# Patient Record
Sex: FEMALE | Race: WHITE | Employment: FULL TIME | URBAN - METROPOLITAN AREA
[De-identification: names, ages, dates, MRNs, and addresses within clinical notes are randomized per-mention and may not be internally consistent; named-entity substitution may affect disease eponyms.]

---

## 2017-01-27 ENCOUNTER — HOSPITAL ENCOUNTER (OUTPATIENT)
Dept: MAMMOGRAPHY | Age: 52
Discharge: HOME | End: 2017-02-25
Attending: FAMILY MEDICINE | Admitting: FAMILY MEDICINE

## 2017-01-27 DIAGNOSIS — Z12.31 VISIT FOR SCREENING MAMMOGRAM: ICD-10-CM

## 2018-10-09 ENCOUNTER — APPOINTMENT (OUTPATIENT)
Dept: GENERAL RADIOLOGY | Age: 53
End: 2018-10-09
Payer: COMMERCIAL

## 2018-10-09 ENCOUNTER — HOSPITAL ENCOUNTER (EMERGENCY)
Age: 53
Discharge: HOME | End: 2018-10-09
Payer: COMMERCIAL

## 2018-10-09 VITALS
RESPIRATION RATE: 15 BRPM | DIASTOLIC BLOOD PRESSURE: 90 MMHG | HEART RATE: 63 BPM | TEMPERATURE: 98.2 F | OXYGEN SATURATION: 97 % | SYSTOLIC BLOOD PRESSURE: 165 MMHG

## 2018-10-09 DIAGNOSIS — M51.36 DDD (DEGENERATIVE DISC DISEASE), LUMBAR: ICD-10-CM

## 2018-10-09 DIAGNOSIS — M54.50 ACUTE LEFT-SIDED LOW BACK PAIN WITHOUT SCIATICA: Primary | ICD-10-CM

## 2018-10-09 PROCEDURE — 99283 EMERGENCY DEPT VISIT LOW MDM: CPT

## 2018-10-09 PROCEDURE — 72110 X-RAY EXAM L-2 SPINE 4/>VWS: CPT

## 2018-10-09 RX ORDER — NAPROXEN 500 MG/1
500 TABLET ORAL 2 TIMES DAILY PRN
Qty: 20 TABLET | Refills: 0 | Status: SHIPPED | OUTPATIENT
Start: 2018-10-09 | End: 2019-04-09

## 2018-10-09 RX ORDER — CYCLOBENZAPRINE HCL 10 MG
10 TABLET ORAL 3 TIMES DAILY PRN
Qty: 15 TABLET | Refills: 0 | Status: SHIPPED | OUTPATIENT
Start: 2018-10-09 | End: 2018-10-19

## 2018-10-09 RX ORDER — LIDOCAINE 50 MG/G
1 PATCH TOPICAL DAILY
Qty: 30 PATCH | Refills: 0 | Status: SHIPPED | OUTPATIENT
Start: 2018-10-09 | End: 2019-04-09

## 2018-10-09 ASSESSMENT — PAIN SCALES - GENERAL: PAINLEVEL_OUTOF10: 8

## 2018-10-09 ASSESSMENT — PAIN DESCRIPTION - LOCATION: LOCATION: BACK

## 2018-10-09 NOTE — ED TRIAGE NOTES
Pt presents to ER with back pain after injury at work. Pt states she tripped over a piece of plastic on the floor, twisted her back and fell into a bin.

## 2018-10-10 NOTE — ED PROVIDER NOTES
afebrile    Constitutional:  Well developed, obese female. In no acute distress  Head:  Atraumatic,  Normocephalic  Eyes:  No scleral icterus. Conjuctiva clear, No discharge  Neck: No JVD, supple. No enlarged lymph nodes. Trachea midline  Respiratory: Respirations nonlabored  Abdomen: Bowel sounds normal, Soft, No tenderness, no masses. Musculoskeletal:  No edema, no deformities. Back:   - No gross deformity, swelling, or discolo  - +mild left sided mid to lower Paralumbar muscle tenderness without masses, fluctuance, warmth, or skin changes.  - No localized midline bony tenderness. No palpable step off, creptitus  - No change in pain with forward flexion  - SLR test negative bilaterally  - ROM limited by pain, especially with left lateral bending and rotation  - No CVA tenderness to percussion  Integument:  Well hydrated, no rash, no pallor. Neurologic:    - No obvious neurological deficits. Patient moves stiffly favoring the left lower back  - Motor & Sensation intact bilateral lower extremities. - 5/5 strength with dorsi/plantar flexion with resistance  - Patella and Achilles reflexes 2+ bilaterally  - BLE ROM intact with 5/5 strength   - No Drop foot  Vascular:  Distal pulses and capillary refill intact bilateral lower extremities      RADIOLOGY/PROCEDURES       XR LUMBAR SPINE (MIN 4 VIEWS) (Final result)   Result time 10/09/18 21:45:07   Final result by Frantz Dunn MD (10/09/18 21:45:07)                Impression:    No acute abnormalities. Mild multilevel degenerative changes. Grade 1 retrolisthesis of L5 on S1 likely relating to the degenerative  changes. Narrative:    EXAMINATION:  THREE XRAY VIEWS OF THE LUMBAR SPINE    10/9/2018 9:01 pm    COMPARISON:  CT abdomen and pelvis 07/03/2016.     HISTORY:  ORDERING SYSTEM PROVIDED HISTORY: work injury, trauma  TECHNOLOGIST PROVIDED HISTORY:  Reason for exam:->work injury, trauma  Ordering Physician Provided Reason for Exam: pain  Acuity: Acute  Type of Exam: Initial  Mechanism of Injury: twisting motion  Relevant Medical/Surgical History: none    FINDINGS:  No fractures are seen.  Vertebral body heights are maintained.  No suspicious  focal bony lesions are seen. Mild multilevel degenerative disc disease and facet osteoarthritis to the mid  to lower lumbar spine.  Grade 1 retrolisthesis of L5 on S1 likely relating to  the degenerative changes.  Findings appear similar to prior CT abdomen and  pelvis. Atherosclerotic vascular calcifications.                      ED COURSE & MEDICAL DECISION MAKING       Vital signs and nursing notes reviewed during ED course. I have independently evaluated this patient . Supervising MD - Dr. Asia Mcgraw  - present in the Emergency Department, available for consultation, throughout entirety of  patient care. All pertinent Lab data and radiographic results reviewed with patient at bedside. The patient and/or the family were informed of the results of any tests/labs/imaging, the treatment plan, and time was allotted to answer questions. Differential Diagnosis: Epidural Abscess, Abdominal Aortic Aneurysm, Metastases to back, Cauda Equina Syndrome, Kidney stone, Pyelonephritis, other    Clinical  IMPRESSION    1. Acute left-sided low back pain without sciatica    2. DDD (degenerative disc disease), lumbar        Patient presents with left lower back following twisting injury at work. On exam, well-appearing 73-year-old female, afebrile and nontoxic. Endo to the ED with a steady gait, favoring the left lower back but no evidence of drop foot. Neurovascular intact in the lower legs, negative straight leg raise. Reproducible left sided paralumbar muscle tenderness to palpation as well as active range of motion without associated midline crepitus or step-offs of the lumbar spine. No visible skin changes bruising or discoloration in the area of trauma. No CVA tenderness to percussion.

## 2019-03-20 ENCOUNTER — HOSPITAL ENCOUNTER (EMERGENCY)
Age: 54
Discharge: HOME | End: 2019-03-20
Payer: COMMERCIAL

## 2019-03-20 VITALS
RESPIRATION RATE: 18 BRPM | HEART RATE: 73 BPM | TEMPERATURE: 98.4 F | HEIGHT: 66 IN | DIASTOLIC BLOOD PRESSURE: 84 MMHG | BODY MASS INDEX: 29.73 KG/M2 | WEIGHT: 185 LBS | OXYGEN SATURATION: 96 % | SYSTOLIC BLOOD PRESSURE: 161 MMHG

## 2019-03-20 DIAGNOSIS — K43.9 VENTRAL HERNIA WITHOUT OBSTRUCTION OR GANGRENE: ICD-10-CM

## 2019-03-20 DIAGNOSIS — L03.319 CELLULITIS AND ABSCESS OF TRUNK: Primary | ICD-10-CM

## 2019-03-20 DIAGNOSIS — L02.219 CELLULITIS AND ABSCESS OF TRUNK: Primary | ICD-10-CM

## 2019-03-20 PROCEDURE — 99283 EMERGENCY DEPT VISIT LOW MDM: CPT

## 2019-03-20 PROCEDURE — 4500000028 HC INTERMEDIATE PROCEDURE

## 2019-03-20 PROCEDURE — 2500000003 HC RX 250 WO HCPCS: Performed by: PHYSICIAN ASSISTANT

## 2019-03-20 RX ORDER — LIDOCAINE HYDROCHLORIDE 20 MG/ML
10 INJECTION, SOLUTION INFILTRATION; PERINEURAL ONCE
Status: COMPLETED | OUTPATIENT
Start: 2019-03-20 | End: 2019-03-20

## 2019-03-20 RX ORDER — CHLORHEXIDINE GLUCONATE 4 G/100ML
SOLUTION TOPICAL
Qty: 1 BOTTLE | Refills: 0 | Status: SHIPPED | OUTPATIENT
Start: 2019-03-20 | End: 2019-04-03

## 2019-03-20 RX ORDER — SULFAMETHOXAZOLE AND TRIMETHOPRIM 800; 160 MG/1; MG/1
1 TABLET ORAL 2 TIMES DAILY
Qty: 14 TABLET | Refills: 0 | Status: SHIPPED | OUTPATIENT
Start: 2019-03-20 | End: 2019-03-27

## 2019-03-20 RX ADMIN — LIDOCAINE HYDROCHLORIDE 10 ML: 20 INJECTION, SOLUTION INFILTRATION; PERINEURAL at 22:15

## 2019-03-20 ASSESSMENT — PAIN SCALES - GENERAL
PAINLEVEL_OUTOF10: 8
PAINLEVEL_OUTOF10: 5

## 2019-03-20 ASSESSMENT — PAIN DESCRIPTION - PAIN TYPE: TYPE: ACUTE PAIN

## 2019-03-28 ENCOUNTER — HOSPITAL ENCOUNTER (OUTPATIENT)
Dept: CT IMAGING | Age: 54
Discharge: HOME | End: 2019-03-28
Payer: COMMERCIAL

## 2019-03-28 ENCOUNTER — HOSPITAL ENCOUNTER (OUTPATIENT)
Dept: ULTRASOUND IMAGING | Age: 54
Discharge: HOME | End: 2019-03-28
Payer: COMMERCIAL

## 2019-03-28 DIAGNOSIS — F17.200 SMOKER: ICD-10-CM

## 2019-03-28 DIAGNOSIS — R19.01 RUQ ABDOMINAL MASS: ICD-10-CM

## 2019-03-28 PROCEDURE — 71250 CT THORAX DX C-: CPT

## 2019-03-28 PROCEDURE — 76705 ECHO EXAM OF ABDOMEN: CPT

## 2019-07-17 ENCOUNTER — HOSPITAL ENCOUNTER (EMERGENCY)
Age: 54
Discharge: HOME | End: 2019-07-17
Attending: EMERGENCY MEDICINE
Payer: COMMERCIAL

## 2019-07-17 ENCOUNTER — APPOINTMENT (OUTPATIENT)
Dept: CT IMAGING | Age: 54
End: 2019-07-17
Payer: COMMERCIAL

## 2019-07-17 VITALS
HEIGHT: 67 IN | DIASTOLIC BLOOD PRESSURE: 96 MMHG | OXYGEN SATURATION: 97 % | TEMPERATURE: 98.4 F | SYSTOLIC BLOOD PRESSURE: 176 MMHG | WEIGHT: 180 LBS | BODY MASS INDEX: 28.25 KG/M2 | RESPIRATION RATE: 15 BRPM | HEART RATE: 71 BPM

## 2019-07-17 DIAGNOSIS — N39.0 URINARY TRACT INFECTION WITHOUT HEMATURIA, SITE UNSPECIFIED: ICD-10-CM

## 2019-07-17 DIAGNOSIS — J06.9 ACUTE UPPER RESPIRATORY INFECTION: ICD-10-CM

## 2019-07-17 DIAGNOSIS — E87.6 HYPOKALEMIA: ICD-10-CM

## 2019-07-17 DIAGNOSIS — R10.9 LEFT SIDED ABDOMINAL PAIN: Primary | ICD-10-CM

## 2019-07-17 LAB
ALBUMIN SERPL-MCNC: 4 GM/DL (ref 3.4–5)
ALP BLD-CCNC: 106 IU/L (ref 40–129)
ALT SERPL-CCNC: 26 U/L (ref 10–40)
ANION GAP SERPL CALCULATED.3IONS-SCNC: 9 MMOL/L (ref 4–16)
AST SERPL-CCNC: 24 IU/L (ref 15–37)
BACTERIA: ABNORMAL /HPF
BASOPHILS ABSOLUTE: 0 K/CU MM
BASOPHILS RELATIVE PERCENT: 0.4 % (ref 0–1)
BILIRUB SERPL-MCNC: 0.2 MG/DL (ref 0–1)
BILIRUBIN URINE: NEGATIVE MG/DL
BLOOD, URINE: NEGATIVE
BUN BLDV-MCNC: 10 MG/DL (ref 6–23)
CALCIUM SERPL-MCNC: 9.5 MG/DL (ref 8.3–10.6)
CHLORIDE BLD-SCNC: 103 MMOL/L (ref 99–110)
CLARITY: CLEAR
CO2: 32 MMOL/L (ref 21–32)
COLOR: YELLOW
CREAT SERPL-MCNC: 0.7 MG/DL (ref 0.6–1.1)
DIFFERENTIAL TYPE: ABNORMAL
EOSINOPHILS ABSOLUTE: 0.2 K/CU MM
EOSINOPHILS RELATIVE PERCENT: 3.3 % (ref 0–3)
GFR AFRICAN AMERICAN: >60 ML/MIN/1.73M2
GFR NON-AFRICAN AMERICAN: >60 ML/MIN/1.73M2
GLUCOSE BLD-MCNC: 133 MG/DL (ref 70–99)
GLUCOSE, URINE: NEGATIVE MG/DL
HCT VFR BLD CALC: 36.3 % (ref 37–47)
HEMOGLOBIN: 11.9 GM/DL (ref 12.5–16)
IMMATURE NEUTROPHIL %: 0.4 % (ref 0–0.43)
KETONES, URINE: NEGATIVE MG/DL
LEUKOCYTE ESTERASE, URINE: ABNORMAL
LIPASE: 13 IU/L (ref 13–60)
LYMPHOCYTES ABSOLUTE: 2.4 K/CU MM
LYMPHOCYTES RELATIVE PERCENT: 35.6 % (ref 24–44)
MAGNESIUM: 2.2 MG/DL (ref 1.8–2.4)
MCH RBC QN AUTO: 29.1 PG (ref 27–31)
MCHC RBC AUTO-ENTMCNC: 32.8 % (ref 32–36)
MCV RBC AUTO: 88.8 FL (ref 78–100)
MONOCYTES ABSOLUTE: 0.4 K/CU MM
MONOCYTES RELATIVE PERCENT: 6.1 % (ref 0–4)
MUCUS: ABNORMAL HPF
NITRITE URINE, QUANTITATIVE: NEGATIVE
NUCLEATED RBC %: 0 %
PDW BLD-RTO: 13.2 % (ref 11.7–14.9)
PH, URINE: 8 (ref 5–8)
PLATELET # BLD: 243 K/CU MM (ref 140–440)
PMV BLD AUTO: 9.4 FL (ref 7.5–11.1)
POTASSIUM SERPL-SCNC: ABNORMAL MMOL/L (ref 3.5–5.1)
PROTEIN UA: NEGATIVE MG/DL
RBC # BLD: 4.09 M/CU MM (ref 4.2–5.4)
RBC URINE: 8 /HPF (ref 0–6)
SEGMENTED NEUTROPHILS ABSOLUTE COUNT: 3.6 K/CU MM
SEGMENTED NEUTROPHILS RELATIVE PERCENT: 54.2 % (ref 36–66)
SODIUM BLD-SCNC: 144 MMOL/L (ref 135–145)
SPECIFIC GRAVITY UA: 1.01 (ref 1–1.03)
SQUAMOUS EPITHELIAL: 1 /HPF
TOTAL IMMATURE NEUTOROPHIL: 0.03 K/CU MM
TOTAL NUCLEATED RBC: 0 K/CU MM
TOTAL PROTEIN: 6.7 GM/DL (ref 6.4–8.2)
TRICHOMONAS: ABNORMAL /HPF
UROBILINOGEN, URINE: NORMAL MG/DL (ref 0.2–1)
WBC # BLD: 6.7 K/CU MM (ref 4–10.5)
WBC UA: 16 /HPF (ref 0–5)

## 2019-07-17 PROCEDURE — 81001 URINALYSIS AUTO W/SCOPE: CPT

## 2019-07-17 PROCEDURE — 80053 COMPREHEN METABOLIC PANEL: CPT

## 2019-07-17 PROCEDURE — 87086 URINE CULTURE/COLONY COUNT: CPT

## 2019-07-17 PROCEDURE — 85025 COMPLETE CBC W/AUTO DIFF WBC: CPT

## 2019-07-17 PROCEDURE — 6360000002 HC RX W HCPCS: Performed by: PHYSICIAN ASSISTANT

## 2019-07-17 PROCEDURE — 99284 EMERGENCY DEPT VISIT MOD MDM: CPT

## 2019-07-17 PROCEDURE — 6360000004 HC RX CONTRAST MEDICATION: Performed by: PHYSICIAN ASSISTANT

## 2019-07-17 PROCEDURE — 83735 ASSAY OF MAGNESIUM: CPT

## 2019-07-17 PROCEDURE — 96365 THER/PROPH/DIAG IV INF INIT: CPT

## 2019-07-17 PROCEDURE — 6370000000 HC RX 637 (ALT 250 FOR IP): Performed by: PHYSICIAN ASSISTANT

## 2019-07-17 PROCEDURE — 2580000003 HC RX 258: Performed by: PHYSICIAN ASSISTANT

## 2019-07-17 PROCEDURE — 83690 ASSAY OF LIPASE: CPT

## 2019-07-17 PROCEDURE — 74177 CT ABD & PELVIS W/CONTRAST: CPT

## 2019-07-17 PROCEDURE — 96366 THER/PROPH/DIAG IV INF ADDON: CPT

## 2019-07-17 PROCEDURE — 96375 TX/PRO/DX INJ NEW DRUG ADDON: CPT

## 2019-07-17 RX ORDER — POTASSIUM BICARBONATE 25 MEQ/1
50 TABLET, EFFERVESCENT ORAL DAILY
Qty: 2 TABLET | Refills: 0 | Status: ON HOLD | OUTPATIENT
Start: 2019-07-17 | End: 2020-11-29 | Stop reason: HOSPADM

## 2019-07-17 RX ORDER — SODIUM CHLORIDE 0.9 % (FLUSH) 0.9 %
10 SYRINGE (ML) INJECTION 2 TIMES DAILY
Status: DISCONTINUED | OUTPATIENT
Start: 2019-07-17 | End: 2019-07-17 | Stop reason: HOSPADM

## 2019-07-17 RX ORDER — AMOXICILLIN AND CLAVULANATE POTASSIUM 875; 125 MG/1; MG/1
1 TABLET, FILM COATED ORAL ONCE
Status: COMPLETED | OUTPATIENT
Start: 2019-07-17 | End: 2019-07-17

## 2019-07-17 RX ORDER — AMOXICILLIN AND CLAVULANATE POTASSIUM 875; 125 MG/1; MG/1
1 TABLET, FILM COATED ORAL 2 TIMES DAILY
Qty: 20 TABLET | Refills: 0 | Status: SHIPPED | OUTPATIENT
Start: 2019-07-17 | End: 2019-07-27

## 2019-07-17 RX ORDER — POTASSIUM CHLORIDE 7.45 MG/ML
10 INJECTION INTRAVENOUS ONCE
Status: COMPLETED | OUTPATIENT
Start: 2019-07-17 | End: 2019-07-17

## 2019-07-17 RX ORDER — 0.9 % SODIUM CHLORIDE 0.9 %
1000 INTRAVENOUS SOLUTION INTRAVENOUS ONCE
Status: COMPLETED | OUTPATIENT
Start: 2019-07-17 | End: 2019-07-17

## 2019-07-17 RX ORDER — ONDANSETRON 4 MG/1
4 TABLET, ORALLY DISINTEGRATING ORAL EVERY 8 HOURS PRN
Qty: 15 TABLET | Refills: 0 | Status: SHIPPED | OUTPATIENT
Start: 2019-07-17 | End: 2020-06-23

## 2019-07-17 RX ORDER — ONDANSETRON 2 MG/ML
4 INJECTION INTRAMUSCULAR; INTRAVENOUS ONCE
Status: COMPLETED | OUTPATIENT
Start: 2019-07-17 | End: 2019-07-17

## 2019-07-17 RX ORDER — POTASSIUM BICARBONATE 25 MEQ/1
50 TABLET, EFFERVESCENT ORAL ONCE
Status: COMPLETED | OUTPATIENT
Start: 2019-07-17 | End: 2019-07-17

## 2019-07-17 RX ADMIN — AMOXICILLIN AND CLAVULANATE POTASSIUM 1 TABLET: 875; 125 TABLET, FILM COATED ORAL at 19:59

## 2019-07-17 RX ADMIN — IOPAMIDOL 80 ML: 755 INJECTION, SOLUTION INTRAVENOUS at 18:16

## 2019-07-17 RX ADMIN — POTASSIUM BICARBONATE 50 MEQ: 25 TABLET, EFFERVESCENT ORAL at 20:01

## 2019-07-17 RX ADMIN — ONDANSETRON 4 MG: 2 INJECTION INTRAMUSCULAR; INTRAVENOUS at 19:59

## 2019-07-17 RX ADMIN — Medication 10 ML: at 18:17

## 2019-07-17 RX ADMIN — SODIUM CHLORIDE 1000 ML: 9 INJECTION, SOLUTION INTRAVENOUS at 17:44

## 2019-07-17 RX ADMIN — POTASSIUM CHLORIDE 10 MEQ: 7.46 INJECTION, SOLUTION INTRAVENOUS at 17:44

## 2019-07-17 ASSESSMENT — PAIN DESCRIPTION - LOCATION: LOCATION: THROAT

## 2019-07-17 ASSESSMENT — PAIN SCALES - GENERAL: PAINLEVEL_OUTOF10: 7

## 2019-07-17 ASSESSMENT — PAIN DESCRIPTION - PAIN TYPE: TYPE: ACUTE PAIN

## 2019-07-17 NOTE — ED PROVIDER NOTES
Migraine     Obesity      Past Surgical History:   Procedure Laterality Date    FOOT SURGERY      FOOT SURGERY      HERNIA REPAIR      HYSTERECTOMY      KIDNEY STONE SURGERY      SHOULDER SURGERY      TONSILLECTOMY      TUBAL LIGATION         CURRENT MEDICATIONS    Current Outpatient Rx   Medication Sig Dispense Refill    potassium bicarbonate (K-LYTE) 25 MEQ disintegrating tablet Take 2 tablets by mouth daily for 1 day 2 tablet 0    ondansetron (ZOFRAN ODT) 4 MG disintegrating tablet Take 1 tablet by mouth every 8 hours as needed for Nausea or Vomiting 15 tablet 0    amoxicillin-clavulanate (AUGMENTIN) 875-125 MG per tablet Take 1 tablet by mouth 2 times daily for 10 days 20 tablet 0    IBUPROFEN PO Take by mouth Indications: PRN         ALLERGIES    Allergies   Allergen Reactions    Pollen Extract Itching     Itchy and watery eyes. Pet dander also causes same symptoms.     Tramadol      Ear ringing       SOCIAL AND FAMILY HISTORY    Social History     Socioeconomic History    Marital status: Legally      Spouse name: None    Number of children: None    Years of education: None    Highest education level: None   Occupational History    None   Social Needs    Financial resource strain: None    Food insecurity:     Worry: None     Inability: None    Transportation needs:     Medical: None     Non-medical: None   Tobacco Use    Smoking status: Current Every Day Smoker     Packs/day: 1.00     Years: 20.00     Pack years: 20.00     Types: Cigarettes    Smokeless tobacco: Never Used   Substance and Sexual Activity    Alcohol use: No     Alcohol/week: 0.0 standard drinks    Drug use: No    Sexual activity: Not Currently     Partners: Male     Comment: 2 DAUGHTERS   Lifestyle    Physical activity:     Days per week: None     Minutes per session: None    Stress: None   Relationships    Social connections:     Talks on phone: None     Gets together: None     Attends Buddhism service:

## 2019-07-19 LAB
CULTURE: NORMAL
Lab: NORMAL
SPECIMEN: NORMAL

## 2020-01-07 ENCOUNTER — HOSPITAL ENCOUNTER (EMERGENCY)
Age: 55
Discharge: HOME | End: 2020-01-07
Attending: EMERGENCY MEDICINE

## 2020-01-07 VITALS
WEIGHT: 190 LBS | HEIGHT: 66 IN | DIASTOLIC BLOOD PRESSURE: 103 MMHG | BODY MASS INDEX: 30.53 KG/M2 | TEMPERATURE: 98.7 F | SYSTOLIC BLOOD PRESSURE: 175 MMHG | OXYGEN SATURATION: 99 % | RESPIRATION RATE: 16 BRPM | HEART RATE: 84 BPM

## 2020-01-07 LAB
BASOPHILS ABSOLUTE: 0 K/CU MM
BASOPHILS RELATIVE PERCENT: 0.7 % (ref 0–1)
DIFFERENTIAL TYPE: ABNORMAL
EOSINOPHILS ABSOLUTE: 0.2 K/CU MM
EOSINOPHILS RELATIVE PERCENT: 4.5 % (ref 0–3)
GLUCOSE BLD-MCNC: 99 MG/DL (ref 70–99)
HCT VFR BLD CALC: 39.7 % (ref 37–47)
HEMOGLOBIN: 12.8 GM/DL (ref 12.5–16)
IMMATURE NEUTROPHIL %: 0.4 % (ref 0–0.43)
LYMPHOCYTES ABSOLUTE: 2.2 K/CU MM
LYMPHOCYTES RELATIVE PERCENT: 49.9 % (ref 24–44)
MCH RBC QN AUTO: 28.6 PG (ref 27–31)
MCHC RBC AUTO-ENTMCNC: 32.2 % (ref 32–36)
MCV RBC AUTO: 88.6 FL (ref 78–100)
MONOCYTES ABSOLUTE: 0.2 K/CU MM
MONOCYTES RELATIVE PERCENT: 5.3 % (ref 0–4)
NUCLEATED RBC %: 0 %
PDW BLD-RTO: 13.4 % (ref 11.7–14.9)
PLATELET # BLD: 239 K/CU MM (ref 140–440)
PMV BLD AUTO: 8.9 FL (ref 7.5–11.1)
RBC # BLD: 4.48 M/CU MM (ref 4.2–5.4)
SEGMENTED NEUTROPHILS ABSOLUTE COUNT: 1.8 K/CU MM
SEGMENTED NEUTROPHILS RELATIVE PERCENT: 39.2 % (ref 36–66)
TOTAL IMMATURE NEUTOROPHIL: 0.02 K/CU MM
TOTAL NUCLEATED RBC: 0 K/CU MM
WBC # BLD: 4.5 K/CU MM (ref 4–10.5)

## 2020-01-07 PROCEDURE — 85025 COMPLETE CBC W/AUTO DIFF WBC: CPT

## 2020-01-07 PROCEDURE — 82962 GLUCOSE BLOOD TEST: CPT

## 2020-01-07 PROCEDURE — 99283 EMERGENCY DEPT VISIT LOW MDM: CPT

## 2020-01-07 RX ORDER — IBUPROFEN 600 MG/1
600 TABLET ORAL 3 TIMES DAILY PRN
Qty: 30 TABLET | Refills: 0 | Status: SHIPPED | OUTPATIENT
Start: 2020-01-07 | End: 2021-01-20

## 2020-01-07 RX ORDER — PREDNISONE 10 MG/1
TABLET ORAL
Qty: 60 TABLET | Refills: 0 | Status: SHIPPED | OUTPATIENT
Start: 2020-01-07 | End: 2020-06-23 | Stop reason: ALTCHOICE

## 2020-01-07 ASSESSMENT — PAIN DESCRIPTION - LOCATION: LOCATION: LEG

## 2020-01-07 ASSESSMENT — PAIN DESCRIPTION - PAIN TYPE: TYPE: ACUTE PAIN

## 2020-01-07 ASSESSMENT — PAIN SCALES - GENERAL: PAINLEVEL_OUTOF10: 8

## 2020-01-07 ASSESSMENT — PAIN DESCRIPTION - ORIENTATION: ORIENTATION: RIGHT;LEFT;LOWER

## 2020-01-08 NOTE — ED PROVIDER NOTES
Emergency Department Encounter    Patient: Dolores Ogden  MRN: [id number]  : 1965  Date of Evaluation: 2020  ED Provider:  Sajan Meeks    Triage Chief Complaint:   Rash (BL lower legs)    [place]:  Dolores Ogden is a 49 y.o. female that presents with a rash developed on her lower extremities this morning when she woke up. She states that is all just above her ankles. It is painful and feels like burning. Touching it does make it worse. She denies any swelling. She has not had any recent travel. She does not have any heart problems that she is aware of. Never had something like this happen to her before. She does have multiple spots on her body which she states that she has had for a long period of time. She denies any fevers, shortness of breath, chest pain, dizziness, lightheadedness, recent travel, history of PE, history of DVT, or other symptoms at this time.     ROS - see HPI, below listed is current ROS at time of my eval:  General:  No fevers, no chills, no weakness  Eyes:  No recent vison changes, no discharge  ENT:  No sore throat, no nasal congestion, no hearing changes  Cardiovascular:  No chest pain, no palpitations  Respiratory:  No shortness of breath, no cough, no wheezing  Gastrointestinal:  No pain, no nausea, no vomiting, no diarrhea  Musculoskeletal:  No muscle pain, no joint pain  Skin:  No rash, no pruritis, no easy bruising  Neurologic:  No speech problems, no headache, no extremity numbness, no extremity tingling, no extremity weakness  Psychiatric:  No anxiety  Genitourinary:  No dysuria, no hematuria  Endocrine:  No unexpected weight gain, no unexpected weight loss  Extremities:  no edema, no pain    Past Medical History:   Diagnosis Date    Arthritis     Arthritis of left knee     Asthma     Diabetes mellitus ([address]     Disorder of rotator cuff syndrome of left shoulder and allied disorder     H/O cardiovascular stress test 16    EF68% normal study    Hx of echocardiogram 2/24/16    EF 55%, normal LV function, moderate LVH    Hyperlipidemia     Hypertension     Kidney stones     Migraine     Obesity      Past Surgical History:   Procedure Laterality Date    FOOT SURGERY      FOOT SURGERY      HERNIA REPAIR      HYSTERECTOMY      KIDNEY STONE SURGERY      SHOULDER SURGERY      TONSILLECTOMY      TUBAL LIGATION       Family History   Problem Relation Age of Onset    Heart Disease Mother     High Blood Pressure Mother     Asthma Mother     Diabetes Sister     Seizures Sister     Asthma Sister     COPD Sister     Heart Disease Maternal Grandmother     High Blood Pressure Maternal Grandmother     Cancer Maternal Grandfather     Emphysema Maternal Grandfather     Asthma Daughter     Depression Daughter     Migraines Daughter     Obesity Daughter     Ulcerative Colitis Daughter     High Cholesterol Daughter     Asthma Daughter     Migraines Daughter     Obesity Daughter     Asthma Brother     Stroke Paternal Uncle      Social History     Socioeconomic History    Marital status: Legally      Spouse name: Not on file    Number of children: Not on file    Years of education: Not on file    Highest education level: Not on file   Occupational History    Not on file   Social Needs    Financial resource strain: Not on file    Food insecurity:     Worry: Not on file     Inability: Not on file    Transportation needs:     Medical: Not on file     Non-medical: Not on file   Tobacco Use    Smoking status: Current Every Day Smoker     Packs/day: 1.00     Years: 20.00     Pack years: 20.00     Types: Cigarettes    Smokeless tobacco: Never Used   Substance and Sexual Activity    Alcohol use: No     Alcohol/week: 0.0 standard drinks    Drug use: No    Sexual activity: Not Currently     Partners: Male     Comment: 2 DAUGHTERS   Lifestyle    Physical activity:     Days per week: Not on file     Minutes per session: Not on file    Stress: Not on file   Relationships    Social connections:     Talks on phone: Not on file     Gets together: Not on file     Attends Muslim service: Not on file     Active member of club or organization: Not on file     Attends meetings of clubs or organizations: Not on file     Relationship status: Not on file    Intimate partner violence:     Fear of current or ex partner: Not on file     Emotionally abused: Not on file     Physically abused: Not on file     Forced sexual activity: Not on file   Other Topics Concern    Not on file   Social History Narrative    Not on file     No current facility-administered medications for this encounter. Current Outpatient Medications   Medication Sig Dispense Refill    predniSONE (DELTASONE) 10 MG tablet Take 60 mg daily for 5 days, then 40 mg for 3 days, then 20 mg for 3 days, then 10 mg for 3 days 60 tablet 0    ibuprofen (ADVIL;MOTRIN) 600 MG tablet Take 1 tablet by mouth 3 times daily as needed for Pain 30 tablet 0    potassium bicarbonate (K-LYTE) 25 MEQ disintegrating tablet Take 2 tablets by mouth daily for 1 day 2 tablet 0    ondansetron (ZOFRAN ODT) 4 MG disintegrating tablet Take 1 tablet by mouth every 8 hours as needed for Nausea or Vomiting 15 tablet 0    IBUPROFEN PO Take by mouth Indications: PRN       Allergies   Allergen Reactions    Pollen Extract Itching     Itchy and watery eyes. Pet dander also causes same symptoms.  Tramadol      Ear ringing       Nursing Notes Reviewed    Physical Exam:  Triage VS:    ED Triage Vitals [01/07/20 2001]   Enc Vitals Group      BP (!) 175/103      Pulse 84      Resp 16      Temp 98.7 °F (37.1 °C)      Temp Source Oral      SpO2 99 %      Weight 190 lb (86.2 kg)      Height 5' 6\" (1.676 m)      Head Circumference       Peak Flow       Pain Score       Pain Loc       Pain Edu? Excl. in [address]? My pulse ox interpretation is - normal    General appearance:  No acute distress. Skin:  Warm. Dry. Patient does have a petechial rash on her lower extremities bilaterally from her ankles proximally to mid calf bilaterally. Eye:  Extraocular movements intact. Ears, nose, mouth and throat:  Oral mucosa moist   Neck:  Trachea midline. Extremity:  No swelling. Normal ROM. Heart:  Regular rate and rhythm, normal S1 & S2, no extra heart sounds. Perfusion:  intact  Respiratory:  Lungs clear to auscultation bilaterally. Respirations nonlabored. Abdominal:  Normal bowel sounds. Soft. Nontender. Non distended. Back:  No CVA tenderness to palpation     Neurological:  Alert and oriented times 3. No focal neuro deficits. Psychiatric:  Appropriate    I have reviewed and interpreted all of the currently available lab results from this visit (if applicable):  Results for orders placed or performed during the hospital encounter of 01/07/20   CBC Auto Differential   Result Value Ref Range    WBC 4.5 4.0 - 10.5 K/CU MM    RBC 4.48 4.2 - 5.4 M/CU MM    Hemoglobin 12.8 12.5 - 16.0 GM/DL    Hematocrit 39.7 37 - 47 %    MCV 88.6 78 - 100 FL    MCH 28.6 27 - 31 PG    MCHC 32.2 32.0 - 36.0 %    RDW 13.4 11.7 - 14.9 %    Platelets 287 146 - 809 K/CU MM    MPV 8.9 7.5 - 11.1 FL    Differential Type AUTOMATED DIFFERENTIAL     Segs Relative 39.2 36 - 66 %    Lymphocytes % 49.9 (H) 24 - 44 %    Monocytes % 5.3 (H) 0 - 4 %    Eosinophils % 4.5 (H) 0 - 3 %    Basophils % 0.7 0 - 1 %    Segs Absolute 1.8 K/CU MM    Lymphocytes Absolute 2.2 K/CU MM    Monocytes Absolute 0.2 K/CU MM    Eosinophils Absolute 0.2 K/CU MM    Basophils Absolute 0.0 K/CU MM    Nucleated RBC % 0.0 %    Total Nucleated RBC 0.0 K/CU MM    Total Immature Neutrophil 0.02 K/CU MM    Immature Neutrophil % 0.4 0 - 0.43 %   POCT Glucose   Result Value Ref Range    POC Glucose 99 70 - 99 MG/DL      Radiographs (if obtained):  Radiologist's Report Reviewed:  No results found.     EKG (if obtained): (All EKG's are interpreted by myself in the

## 2020-01-08 NOTE — ED NOTES
Discharge instructions and prescriptions given to pt. Pt verbalized her understanding and denied any questions or concerns at this time. Pt walked per self to private vehicle.      Simon Paul RN  01/07/20 1089

## 2020-06-23 ENCOUNTER — OFFICE VISIT (OUTPATIENT)
Dept: FAMILY MEDICINE CLINIC | Age: 55
End: 2020-06-23

## 2020-06-23 VITALS
HEART RATE: 88 BPM | HEIGHT: 66 IN | SYSTOLIC BLOOD PRESSURE: 144 MMHG | TEMPERATURE: 97.2 F | DIASTOLIC BLOOD PRESSURE: 90 MMHG | BODY MASS INDEX: 37.93 KG/M2 | OXYGEN SATURATION: 98 % | WEIGHT: 236 LBS

## 2020-06-23 PROCEDURE — 99202 OFFICE O/P NEW SF 15 MIN: CPT | Performed by: NURSE PRACTITIONER

## 2020-06-23 RX ORDER — CEPHALEXIN 500 MG/1
500 CAPSULE ORAL 4 TIMES DAILY
Qty: 40 CAPSULE | Refills: 0 | Status: SHIPPED | OUTPATIENT
Start: 2020-06-23 | End: 2020-07-03

## 2020-06-23 RX ORDER — SPIRONOLACTONE 25 MG/1
TABLET ORAL
COMMUNITY
Start: 2020-05-28

## 2020-06-23 RX ORDER — AMLODIPINE BESYLATE 5 MG/1
5 TABLET ORAL DAILY
COMMUNITY
End: 2022-08-29

## 2020-06-23 RX ORDER — TRIAMCINOLONE ACETONIDE 1 MG/G
CREAM TOPICAL
Qty: 1 TUBE | Refills: 0 | Status: SHIPPED | OUTPATIENT
Start: 2020-06-23 | End: 2021-01-20

## 2020-06-23 ASSESSMENT — ENCOUNTER SYMPTOMS
NAIL CHANGES: 0
EYE PAIN: 0
SORE THROAT: 0
RHINORRHEA: 0
NAUSEA: 0
VOMITING: 0
COUGH: 0
CHEST TIGHTNESS: 0
WHEEZING: 0
SHORTNESS OF BREATH: 0
DIARRHEA: 0

## 2020-06-23 NOTE — PROGRESS NOTES
Eric Berman   49 y.o.  female  [id number]      Chief Complaint   Patient presents with    Rash     c/o red painful warm rash to BLE onset this morning        Subjective:  49 y. o.female is here for a follow up. She has the following chronic/acute medical problems:  Patient Active Problem List   Diagnosis    Obesity, Class I, BMI 30-34.9    HTN (hypertension)    Right knee pain    Hyperlipidemia    GERD (gastroesophageal reflux disease)    Patellofemoral syndrome    DJD (degenerative joint disease) of knee    Tobacco dependence    Major depressive disorder, recurrent episode with anxious distress (HCC)    Mild persistent asthma without complication    Urinary incontinence    Irritable bowel syndrome with diarrhea       Rash   This is a new problem. The current episode started today. The problem is unchanged. The affected locations include the left lower leg and right lower leg. The rash is characterized by pain, redness and swelling. She was exposed to nothing. Pertinent negatives include no anorexia, congestion, cough, diarrhea, eye pain, facial edema, fatigue, fever, joint pain, nail changes, rhinorrhea, shortness of breath, sore throat or vomiting. Treatments tried: Advil for pain. The treatment provided mild relief. Review of Systems   Constitutional: Negative for appetite change, chills, fatigue and fever. HENT: Negative. Negative for congestion, rhinorrhea and sore throat. Eyes: Negative for pain. Respiratory: Negative for cough, chest tightness, shortness of breath and wheezing. Cardiovascular: Negative for chest pain and palpitations. Gastrointestinal: Negative for anorexia, diarrhea, nausea and vomiting. Musculoskeletal: Negative for joint pain. Skin: Positive for rash. Negative for nail changes. Neurological: Negative for dizziness, light-headedness and headaches.        Current Outpatient Medications   Medication Sig Dispense Refill    spironolactone (ALDACTONE) 25 MG tablet TAKE 1 TABLET BY MOUTH ONE TIME A DAY      amLODIPine (NORVASC) 5 MG tablet Take 5 mg by mouth daily      cephALEXin (KEFLEX) 500 MG capsule Take 1 capsule by mouth 4 times daily for 10 days 40 capsule 0    triamcinolone (KENALOG) 0.1 % cream Apply topically 2 times daily x 10 to 14 days. 1 Tube 0    ibuprofen (ADVIL;MOTRIN) 600 MG tablet Take 1 tablet by mouth 3 times daily as needed for Pain 30 tablet 0    potassium bicarbonate (K-LYTE) 25 MEQ disintegrating tablet Take 2 tablets by mouth daily for 1 day 2 tablet 0     No current facility-administered medications for this visit. Past medical, family,surgical history history reviewed today. Objective:  BP (!) 144/90   Pulse 88   Temp 97.2 °F (36.2 °C)   Ht 5' 6\" (1.676 m)   Wt 236 lb (107 kg)   SpO2 98%   BMI 38.09 kg/m²   BP Readings from Last 3 Encounters:   06/23/20 (!) 144/90   01/07/20 (!) 175/103   07/17/19 (!) 176/96     Wt Readings from Last 3 Encounters:   06/23/20 236 lb (107 kg)   01/07/20 190 lb (86.2 kg)   07/17/19 180 lb (81.6 kg)         Physical Exam  Constitutional:       Appearance: Normal appearance. HENT:      Head: Normocephalic. Neck:      Musculoskeletal: Neck supple. Cardiovascular:      Rate and Rhythm: Regular rhythm. Pulses: Normal pulses. Heart sounds: Normal heart sounds. Pulmonary:      Effort: Pulmonary effort is normal.      Breath sounds: Normal breath sounds. Musculoskeletal:      Right lower leg: Edema present. Left lower leg: Edema present. Skin:     General: Skin is warm and dry. Findings: Rash present. Rash is macular. Neurological:      Mental Status: She is alert and oriented to person, place, and time.    Psychiatric:         Mood and Affect: Mood normal.         Behavior: Behavior normal.         Lab Results   Component Value Date    WBC 4.5 01/07/2020    HGB 12.8 01/07/2020    HCT 39.7 01/07/2020    MCV 88.6 01/07/2020     01/07/2020     Lab Results   Component Value Date     07/17/2019    K (LL) 07/17/2019     2.9  K CALLED TO MOOSE KAUFMAN [id number]T  RESULTS READ BACK       07/17/2019    CO2 32 07/17/2019    BUN 10 07/17/2019    CREATININE 0.7 07/17/2019    GLUCOSE 133 (H) 07/17/2019    CALCIUM 9.5 07/17/2019    PROT 6.7 07/17/2019    LABALBU 4.0 07/17/2019    BILITOT 0.2 07/17/2019    ALKPHOS 106 07/17/2019    AST 24 07/17/2019    ALT 26 07/17/2019    LABGLOM >60 07/17/2019    GFRAA >60 07/17/2019     No results found for: CHOL  No results found for: TRIG  No results found for: HDL  No results found for: LDLCALC, LDLCHOLESTEROL  No results found for: LABA1C  No results found for: TSHFT4, TSH, TSHHS      ASSESSMENT/PLAN:      1. Rash  Will start Keflex due to the erythema of the rash, area warm to the touch, and painful. Possibly this rash could be stasis dermatitis. We will go ahead and give triamcinolone cream to apply 2 times daily for the next 10 to 14 days. Advised patient if her symptoms worsen or develops a fever to go to the emergency room. If no improvement to follow-up. Ibuprofen over-the-counter as needed for pain. - cephALEXin (KEFLEX) 500 MG capsule; Take 1 capsule by mouth 4 times daily for 10 days  Dispense: 40 capsule; Refill: 0  - triamcinolone (KENALOG) 0.1 % cream; Apply topically 2 times daily x 10 to 14 days. Dispense: 1 Tube; Refill: 0    2. Bilateral lower extremity edema  Advised patient to elevate her bilateral legs above heart level. Discussed compression socks with patient. Patient verbalized understanding. 3. Tobacco dependence  Patient counseled in length on smoking cessation including benefits of quitting and health risks of continuing to smoke including but not limited to lung cancer, COPD, risk of coronary artery disease. Patient verbalized understanding today.         Medications Discontinued During This Encounter   Medication Reason    IBUPROFEN PO LIST CLEANUP    ondansetron

## 2020-06-23 NOTE — LETTER
[address] [facility],[address] Floor WALK-IN CARE  [address] [place] [address] [address] [facility]  Phone: [phone]  Fax: [phone]    ALEJANDRO Pederson CNP        June 23, 2020     Patient: Antonette Sevilla   YOB: 1965   Date of Visit: 6/23/2020       To Whom it May Concern:    Vibha Lundy was seen in my clinic on 6/23/2020. She may return to work on 6/25/20. If you have any questions or concerns, please don't hesitate to call.     Sincerely,           ALEJANDRO Pederson CNP

## 2020-08-13 ENCOUNTER — APPOINTMENT (OUTPATIENT)
Dept: CT IMAGING | Age: 55
End: 2020-08-13

## 2020-08-13 ENCOUNTER — HOSPITAL ENCOUNTER (EMERGENCY)
Age: 55
Discharge: HOME | End: 2020-08-13
Attending: EMERGENCY MEDICINE

## 2020-08-13 VITALS
RESPIRATION RATE: 18 BRPM | SYSTOLIC BLOOD PRESSURE: 137 MMHG | TEMPERATURE: 98.5 F | OXYGEN SATURATION: 98 % | HEART RATE: 74 BPM | DIASTOLIC BLOOD PRESSURE: 74 MMHG

## 2020-08-13 LAB
ALBUMIN SERPL-MCNC: 4 GM/DL (ref 3.4–5)
ALP BLD-CCNC: 94 IU/L (ref 40–129)
ALT SERPL-CCNC: 20 U/L (ref 10–40)
ANION GAP SERPL CALCULATED.3IONS-SCNC: 11 MMOL/L (ref 4–16)
AST SERPL-CCNC: 25 IU/L (ref 15–37)
BASOPHILS ABSOLUTE: 0 K/CU MM
BASOPHILS RELATIVE PERCENT: 0.4 % (ref 0–1)
BILIRUB SERPL-MCNC: 0.2 MG/DL (ref 0–1)
BUN BLDV-MCNC: 9 MG/DL (ref 6–23)
CALCIUM SERPL-MCNC: 9.1 MG/DL (ref 8.3–10.6)
CHLORIDE BLD-SCNC: 106 MMOL/L (ref 99–110)
CO2: 24 MMOL/L (ref 21–32)
CREAT SERPL-MCNC: 0.8 MG/DL (ref 0.6–1.1)
DIFFERENTIAL TYPE: ABNORMAL
EOSINOPHILS ABSOLUTE: 0.2 K/CU MM
EOSINOPHILS RELATIVE PERCENT: 3.3 % (ref 0–3)
GFR AFRICAN AMERICAN: >60 ML/MIN/1.73M2
GFR NON-AFRICAN AMERICAN: >60 ML/MIN/1.73M2
GLUCOSE BLD-MCNC: 176 MG/DL (ref 70–99)
HCT VFR BLD CALC: 36.7 % (ref 37–47)
HEMOGLOBIN: 12.1 GM/DL (ref 12.5–16)
IMMATURE NEUTROPHIL %: 0.2 % (ref 0–0.43)
LIPASE: 16 IU/L (ref 13–60)
LYMPHOCYTES ABSOLUTE: 2.3 K/CU MM
LYMPHOCYTES RELATIVE PERCENT: 44.2 % (ref 24–44)
MCH RBC QN AUTO: 29.3 PG (ref 27–31)
MCHC RBC AUTO-ENTMCNC: 33 % (ref 32–36)
MCV RBC AUTO: 88.9 FL (ref 78–100)
MONOCYTES ABSOLUTE: 0.3 K/CU MM
MONOCYTES RELATIVE PERCENT: 6.4 % (ref 0–4)
NUCLEATED RBC %: 0 %
PDW BLD-RTO: 13.2 % (ref 11.7–14.9)
PLATELET # BLD: 231 K/CU MM (ref 140–440)
PMV BLD AUTO: 9.2 FL (ref 7.5–11.1)
POTASSIUM SERPL-SCNC: 3.4 MMOL/L (ref 3.5–5.1)
RBC # BLD: 4.13 M/CU MM (ref 4.2–5.4)
SEGMENTED NEUTROPHILS ABSOLUTE COUNT: 2.4 K/CU MM
SEGMENTED NEUTROPHILS RELATIVE PERCENT: 45.5 % (ref 36–66)
SODIUM BLD-SCNC: 141 MMOL/L (ref 135–145)
TOTAL IMMATURE NEUTOROPHIL: 0.01 K/CU MM
TOTAL NUCLEATED RBC: 0 K/CU MM
TOTAL PROTEIN: 6.4 GM/DL (ref 6.4–8.2)
WBC # BLD: 5.2 K/CU MM (ref 4–10.5)

## 2020-08-13 PROCEDURE — 96375 TX/PRO/DX INJ NEW DRUG ADDON: CPT

## 2020-08-13 PROCEDURE — 6360000002 HC RX W HCPCS: Performed by: EMERGENCY MEDICINE

## 2020-08-13 PROCEDURE — 6360000004 HC RX CONTRAST MEDICATION: Performed by: EMERGENCY MEDICINE

## 2020-08-13 PROCEDURE — 96374 THER/PROPH/DIAG INJ IV PUSH: CPT

## 2020-08-13 PROCEDURE — 36415 COLL VENOUS BLD VENIPUNCTURE: CPT

## 2020-08-13 PROCEDURE — 80053 COMPREHEN METABOLIC PANEL: CPT

## 2020-08-13 PROCEDURE — 74177 CT ABD & PELVIS W/CONTRAST: CPT

## 2020-08-13 PROCEDURE — 83690 ASSAY OF LIPASE: CPT

## 2020-08-13 PROCEDURE — 85025 COMPLETE CBC W/AUTO DIFF WBC: CPT

## 2020-08-13 PROCEDURE — 99284 EMERGENCY DEPT VISIT MOD MDM: CPT

## 2020-08-13 RX ORDER — DICYCLOMINE HCL 20 MG
20 TABLET ORAL 3 TIMES DAILY PRN
Qty: 20 TABLET | Refills: 3 | Status: SHIPPED | OUTPATIENT
Start: 2020-08-13

## 2020-08-13 RX ORDER — ONDANSETRON 4 MG/1
4 TABLET, ORALLY DISINTEGRATING ORAL EVERY 8 HOURS PRN
Qty: 15 TABLET | Refills: 0 | Status: ON HOLD | OUTPATIENT
Start: 2020-08-13 | End: 2020-11-29 | Stop reason: SDUPTHER

## 2020-08-13 RX ORDER — MORPHINE SULFATE 4 MG/ML
4 INJECTION, SOLUTION INTRAMUSCULAR; INTRAVENOUS ONCE
Status: COMPLETED | OUTPATIENT
Start: 2020-08-13 | End: 2020-08-13

## 2020-08-13 RX ORDER — ONDANSETRON 2 MG/ML
4 INJECTION INTRAMUSCULAR; INTRAVENOUS ONCE
Status: COMPLETED | OUTPATIENT
Start: 2020-08-13 | End: 2020-08-13

## 2020-08-13 RX ORDER — SODIUM CHLORIDE 0.9 % (FLUSH) 0.9 %
10 SYRINGE (ML) INJECTION 2 TIMES DAILY
Status: DISCONTINUED | OUTPATIENT
Start: 2020-08-13 | End: 2020-08-14 | Stop reason: HOSPADM

## 2020-08-13 RX ADMIN — MORPHINE SULFATE 4 MG: 4 INJECTION, SOLUTION INTRAMUSCULAR; INTRAVENOUS at 21:30

## 2020-08-13 RX ADMIN — ONDANSETRON 4 MG: 2 INJECTION INTRAMUSCULAR; INTRAVENOUS at 21:30

## 2020-08-13 RX ADMIN — IOPAMIDOL 75 ML: 755 INJECTION, SOLUTION INTRAVENOUS at 22:57

## 2020-08-13 ASSESSMENT — PAIN DESCRIPTION - PAIN TYPE: TYPE: ACUTE PAIN

## 2020-08-13 ASSESSMENT — PAIN DESCRIPTION - LOCATION: LOCATION: ABDOMEN

## 2020-08-13 ASSESSMENT — PAIN SCALES - GENERAL
PAINLEVEL_OUTOF10: 7
PAINLEVEL_OUTOF10: 8

## 2020-08-13 NOTE — LETTER
[facility] Emergency Department  [address] [id number]  Phone: [phone]  Fax: [phone]             August 13, 2020    Patient: Kamran Tijerina   YOB: 1965   Date of Visit: 8/13/2020       To Whom It May Concern:    Dalia Woodson was seen and treated in our emergency department on 8/13/2020. She may return to work on 8/15/20. If you have any questions, please call. Thank you.      Sincerely,   Nurse           Signature:__________________________________

## 2020-08-14 NOTE — ED PROVIDER NOTES
EMERGENCY DEPARTMENT ENCOUNTER      CHIEF COMPLAINT:   Abdominal pain    HPI: Solomon Penny is a 49 y.o. female who presents to the Emergency Department complaining of epigastric abdominal pain. The patient states that the pain started but she was at work this morning. It is been constant throughout the day. She states that she was sent home from work because of the pain. She states that it is sharp in nature. She lifts heavy objects at work but denies any specific injury. She denies any associated nausea, vomiting, diarrhea or constipation. There are no exacerbating or relieving factors. The patient denies fevers, chills, hematemesis, bloody stools, flank pain, or any other complaints. REVIEW OF SYSTEMS:  CONSTITUTIONAL:  Denies fever, chills, weight loss or weakness  EYES:  Denies photophobia or discharge  ENT:  Denies sore throat or ear pain  CARDIOVASCULAR:  Denies chest pain, palpitations or swelling  RESPIRATORY:  Denies cough or shortness of breath  GI: See HPI  MUSCULOSKELETAL:  Denies back pain  SKIN:  No rash  NEUROLOGIC:  Denies headache, focal weakness or sensory changes  All systems negative except as marked. \"Remaining review of systems reviewed and negative. I have reviewed the nursing triage documentation and agree unless otherwise noted below. \"    PAST MEDICAL HISTORY:   Past Medical History:   Diagnosis Date    Arthritis     Arthritis of left knee     Asthma     Diabetes mellitus ([facility] [address]     Disorder of rotator cuff syndrome of left shoulder and allied disorder     H/O cardiovascular stress test 2/24/16    EF68% normal study    Hx of echocardiogram 2/24/16    EF 55%, normal LV function, moderate LVH    Hyperlipidemia     Hypertension     Kidney stones     Migraine     Obesity        CURRENT MEDICATIONS:   Home medications reviewed.     SURGICAL HISTORY:   Past Surgical History:   Procedure Laterality Date    FOOT SURGERY      FOOT SURGERY      HERNIA REPAIR      HYSTERECTOMY  KIDNEY STONE SURGERY      SHOULDER SURGERY      TONSILLECTOMY      TUBAL LIGATION         FAMILY HISTORY:   Family History   Problem Relation Age of Onset    Heart Disease Mother     High Blood Pressure Mother     Asthma Mother     Diabetes Sister     Seizures Sister     Asthma Sister     COPD Sister     Heart Disease Maternal Grandmother     High Blood Pressure Maternal Grandmother     Cancer Maternal Grandfather     Emphysema Maternal Grandfather     Asthma Daughter     Depression Daughter     Migraines Daughter     Obesity Daughter     Ulcerative Colitis Daughter     High Cholesterol Daughter     Asthma Daughter     Migraines Daughter     Obesity Daughter     Asthma Brother     Stroke Paternal Uncle        SOCIAL HISTORY:   Social History     Socioeconomic History    Marital status: Legally      Spouse name: Not on file    Number of children: Not on file    Years of education: Not on file    Highest education level: Not on file   Occupational History    Not on file   Social Needs    Financial resource strain: Not on file    Food insecurity     Worry: Not on file     Inability: Not on file    Transportation needs     Medical: Not on file     Non-medical: Not on file   Tobacco Use    Smoking status: Current Every Day Smoker     Packs/day: 1.00     Years: 20.00     Pack years: 20.00     Types: Cigarettes    Smokeless tobacco: Never Used   Substance and Sexual Activity    Alcohol use: No     Alcohol/week: 0.0 standard drinks    Drug use: No    Sexual activity: Not Currently     Partners: Male     Comment: 2 DAUGHTERS   Lifestyle    Physical activity     Days per week: Not on file     Minutes per session: Not on file    Stress: Not on file   Relationships    Social connections     Talks on phone: Not on file     Gets together: Not on file     Attends Holiness service: Not on file     Active member of club or organization: Not on file     Attends meetings of clubs or organizations: Not on file     Relationship status: Not on file    Intimate partner violence     Fear of current or ex partner: Not on file     Emotionally abused: Not on file     Physically abused: Not on file     Forced sexual activity: Not on file   Other Topics Concern    Not on file   Social History Narrative    Not on file       ALLERGIES: Pollen extract and Tramadol    PHYSICAL EXAM:  VITAL SIGNS:   ED Triage Vitals   Enc Vitals Group      BP       Pulse       Resp       Temp       Temp src       SpO2       Weight       Height       Head Circumference       Peak Flow       Pain Score       Pain Loc       Pain Edu? Excl. in [address]? Constitutional:  Non-toxic appearance  HENT: Normocephalic, Atraumatic, Bilateral external ears normal, Oropharynx moist, No oral exudates, Nose normal.  Eyes: PERRL, conjunctiva normal   Neck: Normal range of motion, No tenderness, Supple, No stridor,No lymphadenopathy   Cardiovascular:  Normal heart rate, Normal rhythm  Pulmonary/Chest:  Normal breath sounds, No respiratory distress, No wheezing  Abdomen: Bowel sounds normal, Soft, mild epigastric tenderness to palpation, no guarding, no rebound, No masses, No pulsatile masses  Back:  No tenderness, No CVA tenderness  Extremities:  Normal range of motion, Intact distal pulses, No edema, No tenderness  Skin:  Warm, Dry, No erythema, No rash      EKG:    None    Radiology / Procedures:  CT ABDOMEN PELVIS W IV CONTRAST Additional Contrast? None (Final result)   Result time 08/13/20 23:17:53   Final result by Sienna Chavez DO (08/13/20 23:17:53)                 Impression:     1. No acute findings within the abdomen or pelvis   2. Multiple nonobstructing right intrarenal calculi, largest measuring 16 x 7   mm, within the lower pole collecting system   3. Markedly atrophic left kidney again noted   4.  Prior cholecystectomy and hysterectomy             Narrative:     EXAMINATION:   CT OF THE ABDOMEN AND PELVIS WITH CONTRAST 8/13/2020 10:54 pm     TECHNIQUE:   CT of the abdomen and pelvis was performed with the administration of   intravenous contrast. Multiplanar reformatted images are provided for review. Dose modulation, iterative reconstruction, and/or weight based adjustment of   the mA/kV was utilized to reduce the radiation dose to as low as reasonably   achievable. COMPARISON:   July 17, 2019     HISTORY:   ORDERING SYSTEM PROVIDED HISTORY: Abdominal pain   TECHNOLOGIST PROVIDED HISTORY:   Reason for exam:->Abdominal pain   Additional Contrast?->None   Reason for Exam: Abdominal pain   Acuity: Acute   Type of Exam: Initial     FINDINGS:   Lower Chest: Dependent changes are seen within the lung bases.  A granuloma   is seen within the medial left lung base. Organs: Hepatic steatosis is present.  The gallbladder surgically absent. The pancreas, spleen, adrenal glands, are stable.  Left kidney is again noted   to be atrophic.  Multiple nonobstructing calculi are now seen within the   lower pole collecting system of the right kidney, largest measuring 16 mm in   length, and 7 mm in transverse dimension cortical cyst formation and calculi   are again seen within the atrophic left kidney.  No hydronephrosis is   present. [place] GI/Bowel: The stomach is normal.  Small bowel appears normal, without   evidence of inflammation or obstruction.  The appendix is normal.  No focal   inflammatory change or wall thickening is seen involving the large bowel. Pelvis: Urinary bladder appears normal.  The uterus is surgically absent. Multiple pelvic phleboliths are again noted. Peritoneum/Retroperitoneum: No free fluid or free air seen within the abdomen   or pelvis.  No aneurysm formation is noted.  Benign-appearing retroperitoneal   lymph nodes are again visualized. Bones/Soft Tissues: No acute osseous abnormality is present.                Labs Reviewed   COMPREHENSIVE METABOLIC PANEL - Abnormal; Notable for the following components:       Result Value    Potassium 3.4 (*)     Glucose 176 (*)     All other components within normal limits   CBC WITH AUTO DIFFERENTIAL - Abnormal; Notable for the following components:    RBC 4.13 (*)     Hemoglobin 12.1 (*)     Hematocrit 36.7 (*)     Lymphocytes % 44.2 (*)     Monocytes % 6.4 (*)     Eosinophils % 3.3 (*)     All other components within normal limits   LIPASE       ED COURSE & MEDICAL DECISION MAKING:  Pertinent Labs & Imaging studies reviewed. (See chart for details)  On exam, the patient is afebrile and nontoxic appearing. She is hemodynamically stable and neurologically intact. Labs are obtained and there are no clinically significant lab abnormalities. CT abdomen pelvis is negative for acute abnormality. There are multiple nonobstructing right calculi of the largest measuring 16 x 7 mm. There is a markedly atrophic left kidney again noted. The patient is status post cholecystectomy and hysterectomy. maging was obtained as above. The patient was treated with morphine and Zofran and felt significantly better. Abdomen was reexamined and was benign. The patient was tolerating oral fluids without difficulty. I suspect that the patient has epigastric abdominal pain of unknown etiology. She could have gastritis versus peptic ulcer disease. I have a low suspicion for acute appendicitis, intraabdominal abscess, perforation, bowel obstruction, AAA, dissection, ischemic bowel, or acute surgical abdomen. I feel that the patient is stable for outpatient management with follow up in 2-3 days. She is given return precautions. The patient verbalized understanding, was agreeable with plan, and the patient was discharged home in stable condition. Clinical Impression:  1. Abdominal pain, epigastric    2.  Kidney stones        Disposition referral (if applicable):  [facility] [address] [address] [phone]    Schedule an appointment as soon as

## 2020-08-14 NOTE — ED NOTES
Patient discharged to home at this time. Discharge instructions and follow up care discussed, patient voices understanding.       Chantell Whitman RN  08/13/20 2052

## 2020-11-27 ENCOUNTER — APPOINTMENT (OUTPATIENT)
Dept: CT IMAGING | Age: 55
DRG: 854 | End: 2020-11-27

## 2020-11-27 ENCOUNTER — HOSPITAL ENCOUNTER (INPATIENT)
Age: 55
LOS: 2 days | Discharge: HOME | DRG: 854 | End: 2020-11-29
Attending: EMERGENCY MEDICINE | Admitting: INTERNAL MEDICINE

## 2020-11-27 PROBLEM — N20.1 RIGHT URETERAL CALCULUS: Status: ACTIVE | Noted: 2020-11-27

## 2020-11-27 LAB
ALBUMIN SERPL-MCNC: 4.5 GM/DL (ref 3.4–5)
ALP BLD-CCNC: 110 IU/L (ref 40–128)
ALT SERPL-CCNC: 20 U/L (ref 10–40)
ANION GAP SERPL CALCULATED.3IONS-SCNC: 14 MMOL/L (ref 4–16)
AST SERPL-CCNC: 19 IU/L (ref 15–37)
BACTERIA: NEGATIVE /HPF
BASOPHILS ABSOLUTE: 0 K/CU MM
BASOPHILS RELATIVE PERCENT: 0.1 % (ref 0–1)
BILIRUB SERPL-MCNC: 0.3 MG/DL (ref 0–1)
BILIRUBIN URINE: NEGATIVE MG/DL
BLOOD, URINE: ABNORMAL
BUN BLDV-MCNC: 15 MG/DL (ref 6–23)
CALCIUM SERPL-MCNC: 9.6 MG/DL (ref 8.3–10.6)
CHLORIDE BLD-SCNC: 104 MMOL/L (ref 99–110)
CLARITY: ABNORMAL
CO2: 22 MMOL/L (ref 21–32)
COLOR: ABNORMAL
CREAT SERPL-MCNC: 0.8 MG/DL (ref 0.6–1.1)
DIFFERENTIAL TYPE: ABNORMAL
EOSINOPHILS ABSOLUTE: 0.1 K/CU MM
EOSINOPHILS RELATIVE PERCENT: 0.8 % (ref 0–3)
GFR AFRICAN AMERICAN: >60 ML/MIN/1.73M2
GFR NON-AFRICAN AMERICAN: >60 ML/MIN/1.73M2
GLUCOSE BLD-MCNC: 136 MG/DL (ref 70–99)
GLUCOSE, URINE: NEGATIVE MG/DL
HCT VFR BLD CALC: 41.3 % (ref 37–47)
HEMOGLOBIN: 14.2 GM/DL (ref 12.5–16)
IMMATURE NEUTROPHIL %: 0.1 % (ref 0–0.43)
KETONES, URINE: NEGATIVE MG/DL
LEUKOCYTE ESTERASE, URINE: ABNORMAL
LIPASE: 28 IU/L (ref 13–60)
LYMPHOCYTES ABSOLUTE: 1.2 K/CU MM
LYMPHOCYTES RELATIVE PERCENT: 14.8 % (ref 24–44)
MCH RBC QN AUTO: 29.7 PG (ref 27–31)
MCHC RBC AUTO-ENTMCNC: 34.4 % (ref 32–36)
MCV RBC AUTO: 86.4 FL (ref 78–100)
MONOCYTES ABSOLUTE: 0 K/CU MM
MONOCYTES RELATIVE PERCENT: 0.4 % (ref 0–4)
NITRITE URINE, QUANTITATIVE: NEGATIVE
NUCLEATED RBC %: 0 %
PDW BLD-RTO: 13.2 % (ref 11.7–14.9)
PH, URINE: 8 (ref 5–8)
PLATELET # BLD: 212 K/CU MM (ref 140–440)
PMV BLD AUTO: 9 FL (ref 7.5–11.1)
POTASSIUM SERPL-SCNC: 3.2 MMOL/L (ref 3.5–5.1)
PROTEIN UA: 30 MG/DL
RBC # BLD: 4.78 M/CU MM (ref 4.2–5.4)
RBC URINE: 13 /HPF (ref 0–6)
SARS-COV-2, NAAT: NOT DETECTED
SEGMENTED NEUTROPHILS ABSOLUTE COUNT: 6.6 K/CU MM
SEGMENTED NEUTROPHILS RELATIVE PERCENT: 83.8 % (ref 36–66)
SODIUM BLD-SCNC: 140 MMOL/L (ref 135–145)
SPECIFIC GRAVITY UA: 1.01 (ref 1–1.03)
SQUAMOUS EPITHELIAL: 3 /HPF
TOTAL IMMATURE NEUTOROPHIL: 0.01 K/CU MM
TOTAL NUCLEATED RBC: 0 K/CU MM
TOTAL PROTEIN: 7.6 GM/DL (ref 6.4–8.2)
TRANSITIONAL EPITHELIAL: 1 /HPF
TRICHOMONAS: ABNORMAL /HPF
UROBILINOGEN, URINE: NORMAL MG/DL (ref 0.2–1)
WBC # BLD: 7.9 K/CU MM (ref 4–10.5)
WBC CLUMP: ABNORMAL /HPF
WBC UA: 88 /HPF (ref 0–5)

## 2020-11-27 PROCEDURE — 96375 TX/PRO/DX INJ NEW DRUG ADDON: CPT

## 2020-11-27 PROCEDURE — 74176 CT ABD & PELVIS W/O CONTRAST: CPT

## 2020-11-27 PROCEDURE — 1200000000 HC SEMI PRIVATE

## 2020-11-27 PROCEDURE — 87086 URINE CULTURE/COLONY COUNT: CPT

## 2020-11-27 PROCEDURE — 36415 COLL VENOUS BLD VENIPUNCTURE: CPT

## 2020-11-27 PROCEDURE — 80053 COMPREHEN METABOLIC PANEL: CPT

## 2020-11-27 PROCEDURE — 81001 URINALYSIS AUTO W/SCOPE: CPT

## 2020-11-27 PROCEDURE — 6360000002 HC RX W HCPCS: Performed by: EMERGENCY MEDICINE

## 2020-11-27 PROCEDURE — 2580000003 HC RX 258: Performed by: EMERGENCY MEDICINE

## 2020-11-27 PROCEDURE — 85025 COMPLETE CBC W/AUTO DIFF WBC: CPT

## 2020-11-27 PROCEDURE — 87077 CULTURE AEROBIC IDENTIFY: CPT

## 2020-11-27 PROCEDURE — 99284 EMERGENCY DEPT VISIT MOD MDM: CPT

## 2020-11-27 PROCEDURE — 96365 THER/PROPH/DIAG IV INF INIT: CPT

## 2020-11-27 PROCEDURE — 83690 ASSAY OF LIPASE: CPT

## 2020-11-27 PROCEDURE — 87186 SC STD MICRODIL/AGAR DIL: CPT

## 2020-11-27 PROCEDURE — U0002 COVID-19 LAB TEST NON-CDC: HCPCS

## 2020-11-27 RX ORDER — ONDANSETRON 2 MG/ML
4 INJECTION INTRAMUSCULAR; INTRAVENOUS EVERY 6 HOURS PRN
Status: DISCONTINUED | OUTPATIENT
Start: 2020-11-27 | End: 2020-11-29 | Stop reason: HOSPADM

## 2020-11-27 RX ORDER — 0.9 % SODIUM CHLORIDE 0.9 %
1000 INTRAVENOUS SOLUTION INTRAVENOUS ONCE
Status: COMPLETED | OUTPATIENT
Start: 2020-11-27 | End: 2020-11-27

## 2020-11-27 RX ORDER — HYDROMORPHONE HCL 110MG/55ML
0.5 PATIENT CONTROLLED ANALGESIA SYRINGE INTRAVENOUS ONCE
Status: COMPLETED | OUTPATIENT
Start: 2020-11-27 | End: 2020-11-27

## 2020-11-27 RX ORDER — MORPHINE SULFATE 4 MG/ML
4 INJECTION, SOLUTION INTRAMUSCULAR; INTRAVENOUS ONCE
Status: COMPLETED | OUTPATIENT
Start: 2020-11-27 | End: 2020-11-27

## 2020-11-27 RX ORDER — TAMSULOSIN HYDROCHLORIDE 0.4 MG/1
0.4 CAPSULE ORAL DAILY
Status: DISCONTINUED | OUTPATIENT
Start: 2020-11-28 | End: 2020-11-29 | Stop reason: HOSPADM

## 2020-11-27 RX ORDER — KETOROLAC TROMETHAMINE 30 MG/ML
30 INJECTION, SOLUTION INTRAMUSCULAR; INTRAVENOUS ONCE
Status: COMPLETED | OUTPATIENT
Start: 2020-11-27 | End: 2020-11-27

## 2020-11-27 RX ADMIN — ONDANSETRON 4 MG: 2 INJECTION INTRAMUSCULAR; INTRAVENOUS at 20:43

## 2020-11-27 RX ADMIN — CEFTRIAXONE 1 G: 1 INJECTION, POWDER, FOR SOLUTION INTRAMUSCULAR; INTRAVENOUS at 22:32

## 2020-11-27 RX ADMIN — SODIUM CHLORIDE 1000 ML: 9 INJECTION, SOLUTION INTRAVENOUS at 20:43

## 2020-11-27 RX ADMIN — MORPHINE SULFATE 4 MG: 4 INJECTION, SOLUTION INTRAMUSCULAR; INTRAVENOUS at 20:43

## 2020-11-27 RX ADMIN — KETOROLAC TROMETHAMINE 30 MG: 30 INJECTION, SOLUTION INTRAMUSCULAR; INTRAVENOUS at 20:43

## 2020-11-27 RX ADMIN — HYDROMORPHONE HYDROCHLORIDE 0.5 MG: 2 INJECTION, SOLUTION INTRAMUSCULAR; INTRAVENOUS; SUBCUTANEOUS at 22:32

## 2020-11-27 ASSESSMENT — PAIN SCALES - GENERAL
PAINLEVEL_OUTOF10: 10

## 2020-11-27 ASSESSMENT — PAIN DESCRIPTION - LOCATION: LOCATION: ABDOMEN

## 2020-11-27 ASSESSMENT — PAIN DESCRIPTION - PAIN TYPE: TYPE: ACUTE PAIN

## 2020-11-28 ENCOUNTER — ANESTHESIA (OUTPATIENT)
Dept: OPERATING ROOM | Age: 55
DRG: 854 | End: 2020-11-28

## 2020-11-28 ENCOUNTER — APPOINTMENT (OUTPATIENT)
Dept: GENERAL RADIOLOGY | Age: 55
DRG: 854 | End: 2020-11-28

## 2020-11-28 ENCOUNTER — ANESTHESIA EVENT (OUTPATIENT)
Dept: OPERATING ROOM | Age: 55
DRG: 854 | End: 2020-11-28

## 2020-11-28 VITALS
TEMPERATURE: 98.6 F | RESPIRATION RATE: 13 BRPM | OXYGEN SATURATION: 96 % | DIASTOLIC BLOOD PRESSURE: 51 MMHG | SYSTOLIC BLOOD PRESSURE: 85 MMHG

## 2020-11-28 LAB
ANION GAP SERPL CALCULATED.3IONS-SCNC: 12 MMOL/L (ref 4–16)
BANDED NEUTROPHILS ABSOLUTE COUNT: 2.62 K/CU MM
BANDED NEUTROPHILS RELATIVE PERCENT: 43 % (ref 5–11)
BUN BLDV-MCNC: 19 MG/DL (ref 6–23)
CALCIUM SERPL-MCNC: 8.6 MG/DL (ref 8.3–10.6)
CHLORIDE BLD-SCNC: 107 MMOL/L (ref 99–110)
CO2: 21 MMOL/L (ref 21–32)
CREAT SERPL-MCNC: 1.2 MG/DL (ref 0.6–1.1)
DIFFERENTIAL TYPE: ABNORMAL
GFR AFRICAN AMERICAN: 57 ML/MIN/1.73M2
GFR NON-AFRICAN AMERICAN: 47 ML/MIN/1.73M2
GLUCOSE BLD-MCNC: 127 MG/DL (ref 70–99)
GLUCOSE BLD-MCNC: 153 MG/DL (ref 70–99)
HCT VFR BLD CALC: 41 % (ref 37–47)
HEMOGLOBIN: 13.1 GM/DL (ref 12.5–16)
INR BLD: 1.12 INDEX
LYMPHOCYTES ABSOLUTE: 0.5 K/CU MM
LYMPHOCYTES RELATIVE PERCENT: 8 % (ref 24–44)
MCH RBC QN AUTO: 28.2 PG (ref 27–31)
MCHC RBC AUTO-ENTMCNC: 32 % (ref 32–36)
MCV RBC AUTO: 88.4 FL (ref 78–100)
METAMYELOCYTES ABSOLUTE COUNT: 0.06 K/CU MM
METAMYELOCYTES PERCENT: 1 %
MONOCYTES ABSOLUTE: 0.1 K/CU MM
MONOCYTES RELATIVE PERCENT: 2 % (ref 0–4)
NUCLEATED RED BLOOD CELLS: 1
PDW BLD-RTO: 13.4 % (ref 11.7–14.9)
PLATELET # BLD: 101 K/CU MM (ref 140–440)
PMV BLD AUTO: 9.2 FL (ref 7.5–11.1)
POTASSIUM SERPL-SCNC: 3.7 MMOL/L (ref 3.5–5.1)
PROTHROMBIN TIME: 13.6 SECONDS (ref 11.7–14.5)
RBC # BLD: 4.64 M/CU MM (ref 4.2–5.4)
SARS-COV-2, NAAT: NOT DETECTED
SEGMENTED NEUTROPHILS ABSOLUTE COUNT: 2.8 K/CU MM
SEGMENTED NEUTROPHILS RELATIVE PERCENT: 46 % (ref 36–66)
SODIUM BLD-SCNC: 140 MMOL/L (ref 135–145)
SOURCE: NORMAL
TOXIC GRANULATION: PRESENT
WBC # BLD: 6.1 K/CU MM (ref 4–10.5)

## 2020-11-28 PROCEDURE — 6370000000 HC RX 637 (ALT 250 FOR IP): Performed by: INTERNAL MEDICINE

## 2020-11-28 PROCEDURE — 3700000001 HC ADD 15 MINUTES (ANESTHESIA): Performed by: UROLOGY

## 2020-11-28 PROCEDURE — 0T768DZ DILATION OF RIGHT URETER WITH INTRALUMINAL DEVICE, VIA NATURAL OR ARTIFICIAL OPENING ENDOSCOPIC: ICD-10-PCS | Performed by: UROLOGY

## 2020-11-28 PROCEDURE — 2709999900 HC NON-CHARGEABLE SUPPLY: Performed by: UROLOGY

## 2020-11-28 PROCEDURE — 3600000003 HC SURGERY LEVEL 3 BASE: Performed by: UROLOGY

## 2020-11-28 PROCEDURE — 82962 GLUCOSE BLOOD TEST: CPT

## 2020-11-28 PROCEDURE — 6370000000 HC RX 637 (ALT 250 FOR IP): Performed by: UROLOGY

## 2020-11-28 PROCEDURE — 87040 BLOOD CULTURE FOR BACTERIA: CPT

## 2020-11-28 PROCEDURE — C2617 STENT, NON-COR, TEM W/O DEL: HCPCS | Performed by: UROLOGY

## 2020-11-28 PROCEDURE — 6370000000 HC RX 637 (ALT 250 FOR IP): Performed by: ANESTHESIOLOGY

## 2020-11-28 PROCEDURE — 2580000003 HC RX 258: Performed by: UROLOGY

## 2020-11-28 PROCEDURE — 3700000000 HC ANESTHESIA ATTENDED CARE: Performed by: UROLOGY

## 2020-11-28 PROCEDURE — 7100000001 HC PACU RECOVERY - ADDTL 15 MIN: Performed by: UROLOGY

## 2020-11-28 PROCEDURE — 85007 BL SMEAR W/DIFF WBC COUNT: CPT

## 2020-11-28 PROCEDURE — 85610 PROTHROMBIN TIME: CPT

## 2020-11-28 PROCEDURE — 80048 BASIC METABOLIC PNL TOTAL CA: CPT

## 2020-11-28 PROCEDURE — 85027 COMPLETE CBC AUTOMATED: CPT

## 2020-11-28 PROCEDURE — 2580000003 HC RX 258: Performed by: INTERNAL MEDICINE

## 2020-11-28 PROCEDURE — 36415 COLL VENOUS BLD VENIPUNCTURE: CPT

## 2020-11-28 PROCEDURE — 1200000000 HC SEMI PRIVATE

## 2020-11-28 PROCEDURE — 6360000002 HC RX W HCPCS: Performed by: UROLOGY

## 2020-11-28 PROCEDURE — 7100000000 HC PACU RECOVERY - FIRST 15 MIN: Performed by: UROLOGY

## 2020-11-28 PROCEDURE — 2580000003 HC RX 258: Performed by: EMERGENCY MEDICINE

## 2020-11-28 PROCEDURE — 3600000013 HC SURGERY LEVEL 3 ADDTL 15MIN: Performed by: UROLOGY

## 2020-11-28 PROCEDURE — 6360000002 HC RX W HCPCS: Performed by: INTERNAL MEDICINE

## 2020-11-28 PROCEDURE — 6360000002 HC RX W HCPCS: Performed by: NURSE ANESTHETIST, CERTIFIED REGISTERED

## 2020-11-28 PROCEDURE — 6360000002 HC RX W HCPCS: Performed by: EMERGENCY MEDICINE

## 2020-11-28 PROCEDURE — 76000 FLUOROSCOPY <1 HR PHYS/QHP: CPT

## 2020-11-28 PROCEDURE — 94761 N-INVAS EAR/PLS OXIMETRY MLT: CPT

## 2020-11-28 PROCEDURE — 88300 SURGICAL PATH GROSS: CPT

## 2020-11-28 PROCEDURE — U0002 COVID-19 LAB TEST NON-CDC: HCPCS

## 2020-11-28 PROCEDURE — BT1D1ZZ FLUOROSCOPY OF RIGHT KIDNEY, URETER AND BLADDER USING LOW OSMOLAR CONTRAST: ICD-10-PCS | Performed by: UROLOGY

## 2020-11-28 DEVICE — VARIABLE LENGTH URETERAL STENT
Type: IMPLANTABLE DEVICE | Site: URETER | Status: NON-FUNCTIONAL
Brand: CONTOUR VL™
Removed: 2021-01-21

## 2020-11-28 RX ORDER — FENTANYL CITRATE 50 UG/ML
50 INJECTION, SOLUTION INTRAMUSCULAR; INTRAVENOUS EVERY 5 MIN PRN
Status: DISCONTINUED | OUTPATIENT
Start: 2020-11-28 | End: 2020-11-28

## 2020-11-28 RX ORDER — ONDANSETRON 2 MG/ML
INJECTION INTRAMUSCULAR; INTRAVENOUS PRN
Status: DISCONTINUED | OUTPATIENT
Start: 2020-11-28 | End: 2020-11-28 | Stop reason: SDUPTHER

## 2020-11-28 RX ORDER — ONDANSETRON 2 MG/ML
4 INJECTION INTRAMUSCULAR; INTRAVENOUS
Status: DISCONTINUED | OUTPATIENT
Start: 2020-11-28 | End: 2020-11-28

## 2020-11-28 RX ORDER — PROPOFOL 10 MG/ML
INJECTION, EMULSION INTRAVENOUS PRN
Status: DISCONTINUED | OUTPATIENT
Start: 2020-11-28 | End: 2020-11-28 | Stop reason: SDUPTHER

## 2020-11-28 RX ORDER — HYDRALAZINE HYDROCHLORIDE 20 MG/ML
5 INJECTION INTRAMUSCULAR; INTRAVENOUS EVERY 10 MIN PRN
Status: DISCONTINUED | OUTPATIENT
Start: 2020-11-28 | End: 2020-11-28

## 2020-11-28 RX ORDER — AMLODIPINE BESYLATE 5 MG/1
5 TABLET ORAL ONCE
Status: DISCONTINUED | OUTPATIENT
Start: 2020-11-28 | End: 2020-11-29 | Stop reason: HOSPADM

## 2020-11-28 RX ORDER — ACETAMINOPHEN 325 MG/1
650 TABLET ORAL EVERY 6 HOURS PRN
Status: DISCONTINUED | OUTPATIENT
Start: 2020-11-28 | End: 2020-11-29 | Stop reason: HOSPADM

## 2020-11-28 RX ORDER — SCOLOPAMINE TRANSDERMAL SYSTEM 1 MG/1
1 PATCH, EXTENDED RELEASE TRANSDERMAL ONCE
Status: DISCONTINUED | OUTPATIENT
Start: 2020-11-28 | End: 2020-11-29 | Stop reason: HOSPADM

## 2020-11-28 RX ORDER — LABETALOL HYDROCHLORIDE 5 MG/ML
20 INJECTION, SOLUTION INTRAVENOUS EVERY 4 HOURS PRN
Status: DISCONTINUED | OUTPATIENT
Start: 2020-11-28 | End: 2020-11-29 | Stop reason: HOSPADM

## 2020-11-28 RX ORDER — MORPHINE SULFATE 4 MG/ML
4 INJECTION, SOLUTION INTRAMUSCULAR; INTRAVENOUS ONCE
Status: COMPLETED | OUTPATIENT
Start: 2020-11-28 | End: 2020-11-28

## 2020-11-28 RX ORDER — LIDOCAINE HYDROCHLORIDE 20 MG/ML
INJECTION, SOLUTION INTRAVENOUS PRN
Status: DISCONTINUED | OUTPATIENT
Start: 2020-11-28 | End: 2020-11-28 | Stop reason: SDUPTHER

## 2020-11-28 RX ORDER — SODIUM CHLORIDE 0.9 % (FLUSH) 0.9 %
10 SYRINGE (ML) INJECTION PRN
Status: CANCELLED | OUTPATIENT
Start: 2020-11-28

## 2020-11-28 RX ORDER — ACETAMINOPHEN 650 MG/1
650 SUPPOSITORY RECTAL EVERY 6 HOURS PRN
Status: DISCONTINUED | OUTPATIENT
Start: 2020-11-28 | End: 2020-11-29 | Stop reason: HOSPADM

## 2020-11-28 RX ORDER — SODIUM CHLORIDE 0.9 % (FLUSH) 0.9 %
10 SYRINGE (ML) INJECTION EVERY 12 HOURS SCHEDULED
Status: CANCELLED | OUTPATIENT
Start: 2020-11-28

## 2020-11-28 RX ORDER — 0.9 % SODIUM CHLORIDE 0.9 %
1000 INTRAVENOUS SOLUTION INTRAVENOUS ONCE
Status: COMPLETED | OUTPATIENT
Start: 2020-11-28 | End: 2020-11-28

## 2020-11-28 RX ORDER — SODIUM CHLORIDE 9 MG/ML
INJECTION, SOLUTION INTRAVENOUS CONTINUOUS
Status: DISCONTINUED | OUTPATIENT
Start: 2020-11-28 | End: 2020-11-29 | Stop reason: HOSPADM

## 2020-11-28 RX ORDER — POLYETHYLENE GLYCOL 3350 17 G/17G
17 POWDER, FOR SOLUTION ORAL DAILY PRN
Status: DISCONTINUED | OUTPATIENT
Start: 2020-11-28 | End: 2020-11-29 | Stop reason: HOSPADM

## 2020-11-28 RX ORDER — AMLODIPINE BESYLATE 5 MG/1
5 TABLET ORAL DAILY
Status: DISCONTINUED | OUTPATIENT
Start: 2020-11-28 | End: 2020-11-29 | Stop reason: HOSPADM

## 2020-11-28 RX ORDER — SODIUM CHLORIDE 0.9 % (FLUSH) 0.9 %
10 SYRINGE (ML) INJECTION PRN
Status: DISCONTINUED | OUTPATIENT
Start: 2020-11-28 | End: 2020-11-29 | Stop reason: HOSPADM

## 2020-11-28 RX ORDER — FENTANYL CITRATE 50 UG/ML
25 INJECTION, SOLUTION INTRAMUSCULAR; INTRAVENOUS EVERY 5 MIN PRN
Status: DISCONTINUED | OUTPATIENT
Start: 2020-11-28 | End: 2020-11-28

## 2020-11-28 RX ORDER — KETOROLAC TROMETHAMINE 30 MG/ML
30 INJECTION, SOLUTION INTRAMUSCULAR; INTRAVENOUS EVERY 6 HOURS PRN
Status: DISCONTINUED | OUTPATIENT
Start: 2020-11-28 | End: 2020-11-29 | Stop reason: HOSPADM

## 2020-11-28 RX ORDER — MORPHINE SULFATE 4 MG/ML
4 INJECTION, SOLUTION INTRAMUSCULAR; INTRAVENOUS EVERY 4 HOURS PRN
Status: COMPLETED | OUTPATIENT
Start: 2020-11-28 | End: 2020-11-29

## 2020-11-28 RX ORDER — SODIUM CHLORIDE 0.9 % (FLUSH) 0.9 %
10 SYRINGE (ML) INJECTION EVERY 12 HOURS SCHEDULED
Status: DISCONTINUED | OUTPATIENT
Start: 2020-11-28 | End: 2020-11-29 | Stop reason: HOSPADM

## 2020-11-28 RX ORDER — POTASSIUM CHLORIDE 7.45 MG/ML
10 INJECTION INTRAVENOUS ONCE
Status: COMPLETED | OUTPATIENT
Start: 2020-11-28 | End: 2020-11-28

## 2020-11-28 RX ORDER — FENTANYL CITRATE 50 UG/ML
INJECTION, SOLUTION INTRAMUSCULAR; INTRAVENOUS PRN
Status: DISCONTINUED | OUTPATIENT
Start: 2020-11-28 | End: 2020-11-28 | Stop reason: SDUPTHER

## 2020-11-28 RX ORDER — DEXAMETHASONE SODIUM PHOSPHATE 4 MG/ML
INJECTION, SOLUTION INTRA-ARTICULAR; INTRALESIONAL; INTRAMUSCULAR; INTRAVENOUS; SOFT TISSUE PRN
Status: DISCONTINUED | OUTPATIENT
Start: 2020-11-28 | End: 2020-11-28 | Stop reason: SDUPTHER

## 2020-11-28 RX ORDER — SPIRONOLACTONE 25 MG/1
25 TABLET ORAL DAILY
Status: DISCONTINUED | OUTPATIENT
Start: 2020-11-28 | End: 2020-11-29 | Stop reason: HOSPADM

## 2020-11-28 RX ORDER — LABETALOL HYDROCHLORIDE 5 MG/ML
5 INJECTION, SOLUTION INTRAVENOUS EVERY 10 MIN PRN
Status: DISCONTINUED | OUTPATIENT
Start: 2020-11-28 | End: 2020-11-28

## 2020-11-28 RX ADMIN — PHENYLEPHRINE HYDROCHLORIDE 100 MCG: 10 INJECTION INTRAVENOUS at 10:42

## 2020-11-28 RX ADMIN — PROPOFOL 150 MG: 10 INJECTION, EMULSION INTRAVENOUS at 10:28

## 2020-11-28 RX ADMIN — PHENYLEPHRINE HYDROCHLORIDE 100 MCG: 10 INJECTION INTRAVENOUS at 10:51

## 2020-11-28 RX ADMIN — SODIUM CHLORIDE: 9 INJECTION, SOLUTION INTRAVENOUS at 01:38

## 2020-11-28 RX ADMIN — ONDANSETRON 4 MG: 2 INJECTION INTRAMUSCULAR; INTRAVENOUS at 10:35

## 2020-11-28 RX ADMIN — ACETAMINOPHEN 650 MG: 325 TABLET ORAL at 17:05

## 2020-11-28 RX ADMIN — CEFTRIAXONE 1 G: 1 INJECTION, POWDER, FOR SOLUTION INTRAMUSCULAR; INTRAVENOUS at 20:42

## 2020-11-28 RX ADMIN — SODIUM CHLORIDE 1000 ML: 9 INJECTION, SOLUTION INTRAVENOUS at 00:09

## 2020-11-28 RX ADMIN — SODIUM CHLORIDE, PRESERVATIVE FREE 10 ML: 5 INJECTION INTRAVENOUS at 20:38

## 2020-11-28 RX ADMIN — MORPHINE SULFATE 4 MG: 4 INJECTION, SOLUTION INTRAMUSCULAR; INTRAVENOUS at 00:09

## 2020-11-28 RX ADMIN — LIDOCAINE HYDROCHLORIDE 100 MG: 20 INJECTION, SOLUTION INTRAVENOUS at 10:28

## 2020-11-28 RX ADMIN — MORPHINE SULFATE 4 MG: 4 INJECTION, SOLUTION INTRAMUSCULAR; INTRAVENOUS at 13:51

## 2020-11-28 RX ADMIN — MORPHINE SULFATE 4 MG: 4 INJECTION, SOLUTION INTRAMUSCULAR; INTRAVENOUS at 20:42

## 2020-11-28 RX ADMIN — ACETAMINOPHEN 650 MG: 650 SUPPOSITORY RECTAL at 01:38

## 2020-11-28 RX ADMIN — FENTANYL CITRATE 50 MCG: 50 INJECTION INTRAMUSCULAR; INTRAVENOUS at 11:07

## 2020-11-28 RX ADMIN — FENTANYL CITRATE 100 MCG: 50 INJECTION INTRAMUSCULAR; INTRAVENOUS at 10:28

## 2020-11-28 RX ADMIN — SODIUM CHLORIDE, PRESERVATIVE FREE 10 ML: 5 INJECTION INTRAVENOUS at 13:51

## 2020-11-28 RX ADMIN — PHENYLEPHRINE HYDROCHLORIDE 200 MCG: 10 INJECTION INTRAVENOUS at 10:33

## 2020-11-28 RX ADMIN — FENTANYL CITRATE 50 MCG: 50 INJECTION INTRAMUSCULAR; INTRAVENOUS at 10:39

## 2020-11-28 RX ADMIN — KETOROLAC TROMETHAMINE 30 MG: 30 INJECTION, SOLUTION INTRAMUSCULAR at 04:39

## 2020-11-28 RX ADMIN — DEXAMETHASONE SODIUM PHOSPHATE 4 MG: 4 INJECTION, SOLUTION INTRAMUSCULAR; INTRAVENOUS at 10:35

## 2020-11-28 RX ADMIN — POTASSIUM CHLORIDE 10 MEQ: 7.46 INJECTION, SOLUTION INTRAVENOUS at 01:45

## 2020-11-28 ASSESSMENT — PAIN SCALES - GENERAL
PAINLEVEL_OUTOF10: 0
PAINLEVEL_OUTOF10: 9
PAINLEVEL_OUTOF10: 0
PAINLEVEL_OUTOF10: 0
PAINLEVEL_OUTOF10: 3
PAINLEVEL_OUTOF10: 7
PAINLEVEL_OUTOF10: 4
PAINLEVEL_OUTOF10: 7
PAINLEVEL_OUTOF10: 7
PAINLEVEL_OUTOF10: 0

## 2020-11-28 ASSESSMENT — PULMONARY FUNCTION TESTS
PIF_VALUE: 1
PIF_VALUE: 13
PIF_VALUE: 11
PIF_VALUE: 4
PIF_VALUE: 9
PIF_VALUE: 2
PIF_VALUE: 14
PIF_VALUE: 13
PIF_VALUE: 17
PIF_VALUE: 1
PIF_VALUE: 8
PIF_VALUE: 14
PIF_VALUE: 3
PIF_VALUE: 18
PIF_VALUE: 11
PIF_VALUE: 13
PIF_VALUE: 12
PIF_VALUE: 12
PIF_VALUE: 13
PIF_VALUE: 3
PIF_VALUE: 18
PIF_VALUE: 1
PIF_VALUE: 4
PIF_VALUE: 17
PIF_VALUE: 17
PIF_VALUE: 1
PIF_VALUE: 14
PIF_VALUE: 14
PIF_VALUE: 0
PIF_VALUE: 0
PIF_VALUE: 13
PIF_VALUE: 9
PIF_VALUE: 12

## 2020-11-28 ASSESSMENT — PAIN DESCRIPTION - LOCATION: LOCATION: BACK

## 2020-11-28 ASSESSMENT — PAIN DESCRIPTION - FREQUENCY: FREQUENCY: CONTINUOUS

## 2020-11-28 ASSESSMENT — PAIN DESCRIPTION - PROGRESSION: CLINICAL_PROGRESSION: GRADUALLY IMPROVING

## 2020-11-28 ASSESSMENT — PAIN DESCRIPTION - ORIENTATION: ORIENTATION: RIGHT

## 2020-11-28 ASSESSMENT — PAIN DESCRIPTION - ONSET: ONSET: ON-GOING

## 2020-11-28 ASSESSMENT — PAIN DESCRIPTION - PAIN TYPE: TYPE: ACUTE PAIN

## 2020-11-28 ASSESSMENT — PAIN DESCRIPTION - DESCRIPTORS: DESCRIPTORS: ACHING

## 2020-11-28 NOTE — PROGRESS NOTES
1100- pt arrived from OR and placed on all PACU monitoring devices. Report received from [employer]. and [employer]. Respirations even and unlabored. Mendoza draining pink cloudy urine. VSS  1140- recovery complete, report called to [address] preparing pt for transfer back to room 4111 with tele. 4450-pt to room 4111, Nicky MONTANO at bedside to assume primary care.

## 2020-11-28 NOTE — OP NOTE
[address] [facility] [address]     Operative Note  [facility]    Patient: Brian Plasencia    Date: 2020  : 1965     DOA: 2020   MRN: [id number]    [facility] [id number]  ROOM#: OR/NONE     Pre-operative Diagnosis: Right ureteral stone    Post-operative Diagnosis: same    Procedure: Cystoscopy, right retrograde pyelogram/stent insertion/removal of bladder stone (likely passed ureteral stone)    Surgeons/Assistants: Marcial Smith    Anesthesia: General LMA    EBL: Minimal    Complications: none    Specimens: were obtained - possible passed ureteral stone    Indication for Procedure:      Brian Plasencia is a 48 y.o. female with history of ureterolithiasis who presented with sharp right flank pain radiating down to the groin. Imaging suggested a right ureteral calculus which measured 6*5*7 mm mm. The patient was unable to get pain relief and unable to pass calculus so Brian Plasencia was brought to the OR today for cystourethroscopy, right retrograde pyelogram, and right ureteral stent insertion. Risks and benefits were explained & Brian Plasencia agreed to proceed as planned. Description of procedure: The patient was identified in the holding area, taken to procedure room, and placed in supine position. Anesthesia was administered. The patient was then placed in the dorsal lithotomy position, sterilely prepped and then draped in the usual fashion. Time out was taken to ensure this was the proper operation, for the proper patient, on the proper side; everyone in the room agreed. Rigid cystoscopy ensured. Cloudy urine was drained. A calcification was seen in the bladder the approximate size of the stone noted on the CT prior. It was drained and handed off for pathologic analysis. A right retrograde pyelogram with a cone tipped catheter was performed opacifying the collecting system with findings of moderate HN/HU w/o any distinct filling defect.        A sensor wire was then placed into the patient's right renal pelvis. Using the sensor wire I passed up a 4.8 [place] variable length double J stent with a cystoscope. It was noted to be in good position proximally fluoroscopically and distally cystoscopically. The patient's bladder was drained with an indwelling catheter. Keisha Turner tolerated the procedure well & without difficulty and was then transferred to PACU to recover. Anupama Jacobsen will need to have intervention of the stone (also has right renal stones) as an outpatient once the urine is deemed to be sterile. This may be via ureteroscopy/laser stone manipulation or ESWL. Will have her f/u with a KUB & CT a/p w/o contrast to evaluate her residual stone burden. Right retrograde pyelogram was as follows:  Using a cone-tip catheter, 6 cc's of contrast was injected in the right collecting system opacifying it to completion. A filling defect was not seen in the right ureter consistent with that noted on prior imaging. There was moderate right HN/HU.     Maya Boswell MD  Electronically signed at 11/28/2020

## 2020-11-28 NOTE — ANESTHESIA POSTPROCEDURE EVALUATION
Department of Anesthesiology  Postprocedure Note    Patient: Jerry Escalante  MRN: [id number]  YOB: 1965  Date of evaluation: 11/28/2020  Time:  11:43 AM     Procedure Summary     Date:  11/28/20 Room / Location:  [place] [address] / [facility]    Anesthesia Start:  1019 Anesthesia Stop:  1105    Procedure:  CYSTOSCOPY RETROGRADE PYELOGRAM STENT INSERTION (N/A ) Diagnosis:  (ureteral calculi)    Surgeon:  Kymberly Le MD Responsible Provider:  ALEJANDRO Jimenez CRNA    Anesthesia Type:  general, MAC ASA Status:  3 - Emergent          Anesthesia Type: general, MAC    Khris Phase I: Khris Score: 10    Khris Phase II:      Last vitals: Reviewed and per EMR flowsheets.        Anesthesia Post Evaluation    Patient location during evaluation: PACU  Patient participation: complete - patient participated  Level of consciousness: awake  Pain score: 1  Airway patency: patent  Nausea & Vomiting: no nausea and no vomiting  Complications: no  Cardiovascular status: hemodynamically stable  Respiratory status: acceptable  Hydration status: euvolemic

## 2020-11-28 NOTE — ED NOTES
Dr Ara Bach notified patients hr rate 120-130 and is pain again new orders recieved     Jane Kwok RN  11/28/20 9006

## 2020-11-28 NOTE — H&P
HISTORY AND PHYSICAL  (Hospitalist, Internal Medicine)  IDENTIFYING INFORMATION   PATIENT:  Lexie Hunter  MRN:  [id number]  ADMIT DATE: 11/27/2020      CHIEF COMPLAINT   Back pain    HISTORY OF PRESENT ILLNESS   Lexie Hunter is a 48 y.o. female with hypertension, tobacco use, moderate obesity, history of renal calculi presented to ED with complaints of right flank pain. Patient reported the pain started 3 to 4 hours prior to coming to the ER. Reported\" bad pain\", denied any radiation, denied any aggravating or relieving factors. Admitted to having nausea, vomiting, denied any fever, chills. Has pain with urination, denied any hematuria, dysuria. Patient denied any chest pain, shortness of breath, fever, chills, cough. Patient was very drowsy. Patient received Toradol, morphine, Dilaudid in ER. Was arousable, answering questions appropriately, but had to wake her up multiple times to get information. At presentation patient was noted to have /109, , RR 17, temperature 98.1, saturating 99% on room air. Lab work significant for potassium 3.2, random glucose 136, WBC 7.9, UA-slightly cloudy, moderate blood, leukocyte esterase large, WBC 88, WBC clumps few, bacteria negative. CT abdomen/pelvis-moderate right-sided hydroureteronephrosis caused by 6 x 5 x 7 mm ureteral calculus at the level of L4. Urology was consulted from ER.     PAST MEDICAL HISTORY PAST SURGICAL HISTORY   Hypertension, tobacco use, moderate obesity, history of renal calculi I&D of right breast abscess-2010, tonsillectomy, hysterectomy, tubal ligation, rotator cuff repair-right, right knee arthroscopy-2011   FAMILY HISTORY SOCIAL HISTORY    Reviewed and noncontributory   admits to smoking 1 pack/day, denies any alcohol or illicit drug abuse   MEDICATIONS ALLERGIES    Amlodipine 5 mg daily, spironolactone 25 mg daily   tramadol       PAST MEDICAL, SURGICAL, FAMILY, and SOCIAL HISTORY         Past Medical History:   Diagnosis Date  Arthritis     Arthritis of left knee     Asthma     Diabetes mellitus ([facility] [address]     Disorder of rotator cuff syndrome of left shoulder and allied disorder     H/O cardiovascular stress test 2/24/16    EF68% normal study    Hx of echocardiogram 2/24/16    EF 55%, normal LV function, moderate LVH    Hyperlipidemia     Hypertension     Kidney stones     Migraine     Obesity      Past Surgical History:   Procedure Laterality Date    FOOT SURGERY      FOOT SURGERY      HERNIA REPAIR      HYSTERECTOMY      KIDNEY STONE SURGERY      SHOULDER SURGERY      TONSILLECTOMY      TUBAL LIGATION       Family History   Problem Relation Age of Onset    Heart Disease Mother     High Blood Pressure Mother     Asthma Mother     Diabetes Sister     Seizures Sister     Asthma Sister     COPD Sister     Heart Disease Maternal Grandmother     High Blood Pressure Maternal Grandmother     Cancer Maternal Grandfather     Emphysema Maternal Grandfather     Asthma Daughter     Depression Daughter     Migraines Daughter     Obesity Daughter     Ulcerative Colitis Daughter     High Cholesterol Daughter     Asthma Daughter     Migraines Daughter     Obesity Daughter     Asthma Brother     Stroke Paternal Uncle      Family Hx of HTN  Family Hx as reviewed above, otherwise non-contributory  Social History     Socioeconomic History    Marital status: Legally      Spouse name: None    Number of children: None    Years of education: None    Highest education level: None   Occupational History    None   Social Needs    Financial resource strain: None    Food insecurity     Worry: None     Inability: None    Transportation needs     Medical: None     Non-medical: None   Tobacco Use    Smoking status: Current Every Day Smoker     Packs/day: 1.00     Years: 20.00     Pack years: 20.00     Types: Cigarettes    Smokeless tobacco: Never Used   Substance and Sexual Activity    Alcohol use:  No Alcohol/week: 0.0 standard drinks    Drug use: No    Sexual activity: Not Currently     Partners: Male     Comment: 2 DAUGHTERS   Lifestyle    Physical activity     Days per week: None     Minutes per session: None    Stress: None   Relationships    Social connections     Talks on phone: None     Gets together: None     Attends [employer] service: None     Active member of club or organization: None     Attends meetings of clubs or organizations: None     Relationship status: None    Intimate partner violence     Fear of current or ex partner: None     Emotionally abused: None     Physically abused: None     Forced sexual activity: None   Other Topics Concern    None   Social History Narrative    None       MEDICATIONS   Medications Prior to Admission  Not in a hospital admission. Current Medications  Current Facility-Administered Medications   Medication Dose Route Frequency Provider Last Rate Last Dose    ondansetron (ZOFRAN) injection 4 mg  4 mg Intravenous Q6H PRN Dalton Purcell MD   4 mg at 11/27/20 2043    [START ON 11/28/2020] tamsulosin (FLOMAX) capsule 0.4 mg  0.4 mg Oral Daily Dalton Purcell MD         Current Outpatient Medications   Medication Sig Dispense Refill    dicyclomine (BENTYL) 20 MG tablet Take 1 tablet by mouth 3 times daily as needed (abdominal pain) 20 tablet 3    ondansetron (ZOFRAN ODT) 4 MG disintegrating tablet Take 1 tablet by mouth every 8 hours as needed for Nausea 15 tablet 0    spironolactone (ALDACTONE) 25 MG tablet TAKE 1 TABLET BY MOUTH ONE TIME A DAY      amLODIPine (NORVASC) 5 MG tablet Take 5 mg by mouth daily      triamcinolone (KENALOG) 0.1 % cream Apply topically 2 times daily x 10 to 14 days.  1 Tube 0    ibuprofen (ADVIL;MOTRIN) 600 MG tablet Take 1 tablet by mouth 3 times daily as needed for Pain 30 tablet 0    potassium bicarbonate (K-LYTE) 25 MEQ disintegrating tablet Take 2 tablets by mouth daily for 1 day 2 tablet 0         Allergies  Allergies Allergen Reactions    Pollen Extract Itching     Itchy and watery eyes. Pet dander also causes same symptoms.  Tramadol      Ear ringing       REVIEW OF SYSTEMS   Within above limitations. 14 point review of systems reviewed. Pertinent positive or negative as per HPI or otherwise negative per 14 point systems review. PHYSICAL EXAM     Wt Readings from Last 3 Encounters:   11/27/20 200 lb (90.7 kg)   06/23/20 236 lb (107 kg)   01/07/20 190 lb (86.2 kg)       Blood pressure (!) 172/104, pulse 113, temperature 98.1 °F (36.7 °C), temperature source Oral, resp. rate 17, height 5' 7\" (1.702 m), weight 200 lb (90.7 kg), SpO2 98 %, not currently breastfeeding. GEN  -Awake, alert, NAD.   EYES   -PERRL. HENT  -MM are moist.   RESP  -LS CTA equal bilat, no wheezes, rales or rhonchi. Symmetric chest movement. No respiratory distress noted. C/V  -S1/S2 auscultated, tachycardia without appreciable M/R/G. No peripheral edema. No reproducible chest wall tenderness. GI  -Abdomen is soft, non-distended, no significant tenderness. + BS in all quadrants.   - CVA tenderness. Mendoza catheter is not present. MS  -B/L extremities- No gross joint deformities. No swelling, intact sensation symmetrical.   SKIN  -Normal coloration, warm, dry. NEURO  -CN 2-12 appear grossly intact, normal speech, no lateralizing weakness. PSYC  -drowsy, arousable. LABS AND IMAGING     Results for Asaf Flores (MRN [id number]) as of 11/28/2020 01:25   Ref.  Range 11/27/2020 20:24   Sodium Latest Ref Range: 135 - 145 MMOL/L 140   Potassium Latest Ref Range: 3.5 - 5.1 MMOL/L 3.2 (L)   Chloride Latest Ref Range: 99 - 110 mMol/L 104   CO2 Latest Ref Range: 21 - 32 MMOL/L 22   BUN Latest Ref Range: 6 - 23 MG/DL 15   Creatinine Latest Ref Range: 0.6 - 1.1 MG/DL 0.8   Anion Gap Latest Ref Range: 4 - 16  14   GFR Non- Latest Ref Range: >60 mL/min/1.73m2 >60   GFR African American Latest Ref Range: >60 mL/min/1.73m2 >60 Glucose Latest Ref Range: 70 - 99 MG/ (H)   Calcium Latest Ref Range: 8.3 - 10.6 MG/DL 9.6   Total Protein Latest Ref Range: 6.4 - 8.2 GM/DL 7.6   Albumin Latest Ref Range: 3.4 - 5.0 GM/DL 4.5   Alk Phos Latest Ref Range: 40 - 128 IU/L 110   ALT Latest Ref Range: 10 - 40 U/L 20   AST Latest Ref Range: 15 - 37 IU/L 19   Bilirubin Latest Ref Range: 0.0 - 1.0 MG/DL 0.3   Lipase Latest Ref Range: 13 - 60 IU/L 28   WBC Latest Ref Range: 4.0 - 10.5 K/CU MM 7.9   RBC Latest Ref Range: 4.2 - 5.4 M/CU MM 4.78   Hemoglobin Quant Latest Ref Range: 12.5 - 16.0 GM/DL 14.2   Hematocrit Latest Ref Range: 37 - 47 % 41.3   MCV Latest Ref Range: 78 - 100 FL 86.4   MCH Latest Ref Range: 27 - 31 PG 29.7   MCHC Latest Ref Range: 32.0 - 36.0 % 34.4   MPV Latest Ref Range: 7.5 - 11.1 FL 9.0   RDW Latest Ref Range: 11.7 - 14.9 % 13.2   Platelet Count Latest Ref Range: 140 - 440 K/CU    Lymphocyte % Latest Ref Range: 24 - 44 % 14.8 (L)   Monocytes % Latest Ref Range: 0 - 4 % 0.4   Eosinophils % Latest Ref Range: 0 - 3 % 0.8   Basophils % Latest Ref Range: 0 - 1 % 0.1   Lymphocytes Absolute Latest Units: K/CU MM 1.2   Monocytes Absolute Latest Units: K/CU MM 0.0   Eosinophils Absolute Latest Units: K/CU MM 0.1   Basophils Absolute Latest Units: K/CU MM 0.0   Differential Type Unknown AUTOMATED DIFFERENTIAL   Segs Relative Latest Ref Range: 36 - 66 % 83.8 (H)   Segs Absolute Latest Units: K/CU MM 6.6   Nucleated RBC % Latest Units: % 0.0   Immature Neutrophil % Latest Ref Range: 0 - 0.43 % 0.1   Total Immature Neutrophil Latest Units: K/CU MM 0.01   Total Nucleated RBC Latest Units: K/CU MM 0.0     Results for Megan Garcia (MRN [id number]) as of 11/28/2020 01:25   Ref.  Range 11/27/2020 21:30   Color, UA Latest Ref Range: YELLOW  STRAW (A)   Clarity, UA Latest Ref Range: CLEAR  SLIGHTLY CLOUDY (A)   Bilirubin, Urine Latest Ref Range: NEGATIVE MG/DL NEGATIVE   Ketones, Urine Latest Ref Range: NEGATIVE MG/DL NEGATIVE   Specific [place], UA Latest Ref Range: 1.001 - 1.035  1.006   Blood, Urine Latest Ref Range: NEGATIVE  MODERATE (A)   Protein, UA Latest Ref Range: NEGATIVE MG/DL 30 (A)   Urobilinogen, Urine Latest Ref Range: 0.2 - 1.0 MG/DL NORMAL   Leukocyte Esterase, Urine Latest Ref Range: NEGATIVE  LARGE (A)   Glucose, Urine Latest Ref Range: NEGATIVE MG/DL NEGATIVE   Nitrite Urine, Quantitative Latest Ref Range: NEGATIVE  NEGATIVE   pH, Urine Latest Ref Range: 5.0 - 8.0  8.0   WBC, UA Latest Ref Range: 0 - 5 /HPF 88 (H)   WBC Clumps, UA Latest Units: /HPF FEW   RBC, UA Latest Ref Range: 0 - 6 /HPF 13 (H)   Squam Epithel, UA Latest Units: /HPF 3   Trans Epithel, UA Latest Units: /HPF 1   Bacteria, UA Latest Ref Range: NEGATIVE /HPF NEGATIVE   Trichomonas, UA Latest Ref Range: NONE SEEN /HPF NONE SEEN     Results for Jimmy Cameron (MRN [id number]) as of 11/28/2020 01:25   Ref. Range 11/27/2020 23:05   SARS-CoV-2, NAAT Unknown NOT DETECTED     Recent Imaging    CT ABDOMEN PELVIS WO CONTRAST Additional Contrast? None [[id number]]  Collected: 11/27/20 2109        Order Status: Completed  Updated: 11/27/20 2126       Narrative:         EXAMINATION:   CT OF THE ABDOMEN AND PELVIS WITHOUT CONTRAST 11/27/2020 9:04 pm     TECHNIQUE:   CT of the abdomen and pelvis was performed without the administration of   intravenous contrast. Multiplanar reformatted images are provided for review. Dose modulation, iterative reconstruction, and/or weight based adjustment of   the mA/kV was utilized to reduce the radiation dose to as low as reasonably   achievable.      COMPARISON:   08/13/2020     HISTORY:   ORDERING SYSTEM PROVIDED HISTORY: R side flank pain   TECHNOLOGIST PROVIDED HISTORY:   Reason for exam:->R side flank pain   Additional Contrast?->None   Is the patient pregnant?->No   Reason for Exam: rt flank pain   Acuity: Acute   Type of Exam: Initial   Additional signs and symptoms: Larry Alfaro is a 48 y.o. female that   presents with right flank pain.  Patient reports sudden onset pain just prior   to arrival.  Pain is severe, constant, rating to the right groin.  Patient   has had similar pain in the past secondary to kidney stones   Relevant Medical/Surgical History: pain nausea     FINDINGS:   Lower Chest: The lung bases are clear. Small hiatal hernia. Organs: Moderate right-sided hydronephrosis caused by a right mid ureteral   calculus measuring 5 x 6 x 7 mm in size.  This is noted at the level of L4   additional right-sided calculi identified nonobstructive.  Involve the right   lower pole up to 6 mm in size.  Left kidney is atrophic with multiple   left-sided calculi.  No left-sided hydronephrosis.  No left ureteral calculi   identified. .     Otherwise the noncontrast of the liver, spleen, adrenal glands, and pancreas   unremarkable. GI/Bowel: No bowel obstruction.  Moderate retained stool throughout the   colon.  Appendix unremarkable. Pelvis: Bladder is unremarkable.  No suspicious adnexal mass. Peritoneum/Retroperitoneum: Moderate aortic vascular calcifications.  Aorta   is nonaneurysmal.     Bones/Soft Tissues: Severe facet arthropathy identified involving lumbar   spine.       Impression:         Moderate right-sided hydroureteronephrosis caused by a 6 x 5 x 7 mm ureteral   calculus at the level of L4.           Relevant labs and imaging reviewed    ASSESSMENT AND PLAN     #. Right ureteral calculus:  -CT abdomen/pelvis-moderate right-sided hydroureteronephrosis caused by 6X5X 7 mm ureteral calculus at the level of L4.  -Urology consulted from ER-Dr. Mahogany Sommers   -Patient to be n.p.o. after midnight . -IV fluids, analgesics, antiemetics as needed .  -Continue IV ceftriaxone, tamsulosin     #. Sepsis secondary to UTI :  -When patient was transferred to the floor had a fever of 102, tachycardic  -Blood cultures ordered  -Continue IV fluids. #.  Mild hypokalemia: Replace    #.   Accelerated hypertension at admission :  -Resume home medications . labetalol as needed ordered .  -Patient is on amlodipine, spironolactone     #. Moderate obesity with BMI 31.3     DVT Prophylaxis: Hold chemical prophylaxis as patient is scheduled for procedure in a.m. GI Prophylaxis: Not indicated  Code Status: FULL.       Case d/w ED physician    Teo Stephen MD  Hospitalist, Internal Medicine  11/27/2020 at 11:07 PM

## 2020-11-28 NOTE — ED NOTES
Spoke with the charge nurse Krystal Ambriz RN informed patient heart rate was tachy but her MEWS was a 4 she stated it was of to accept her report given to nurse     Monika Mccain RN  11/28/20 20 [facility]  11/28/20 1329

## 2020-11-28 NOTE — ED NOTES
Called lab to see if I could send covid test informed to wait until 2250 to collect     Roger Anderson RN  11/27/20 2232

## 2020-11-28 NOTE — ANESTHESIA PRE PROCEDURE
Department of Anesthesiology  Preprocedure Note       Name:  Norma Sanchez   Age:  48 y.o.  :  1965                                          MRN:  [id number]         Date:  2020      Surgeon: Murtaza Lugo):  Roberth Sherwood MD    Procedure: Procedure(s):  CYSTOSCOPY RETROGRADE PYELOGRAM STENT INSERTION    Medications prior to admission:   Prior to Admission medications    Medication Sig Start Date End Date Taking? Authorizing Provider   dicyclomine (BENTYL) 20 MG tablet Take 1 tablet by mouth 3 times daily as needed (abdominal pain) 20   Gemini Núñez MD   ondansetron (ZOFRAN ODT) 4 MG disintegrating tablet Take 1 tablet by mouth every 8 hours as needed for Nausea 20   Gemini Núñez MD   spironolactone (ALDACTONE) 25 MG tablet TAKE 1 TABLET BY MOUTH ONE TIME A DAY 20   Historical Provider, MD   amLODIPine (NORVASC) 5 MG tablet Take 5 mg by mouth daily    Historical Provider, MD   triamcinolone (KENALOG) 0.1 % cream Apply topically 2 times daily x 10 to 14 days.  20   Zaynab Sands, APRN - CNP   ibuprofen (ADVIL;MOTRIN) 600 MG tablet Take 1 tablet by mouth 3 times daily as needed for Pain 20   Anna Espinoza DO   potassium bicarbonate (K-LYTE) 25 MEQ disintegrating tablet Take 2 tablets by mouth daily for 1 day 19  Malorie Garcia PA-C       Current medications:    Current Facility-Administered Medications   Medication Dose Route Frequency Provider Last Rate Last Dose    sodium chloride flush 0.9 % injection 10 mL  10 mL Intravenous 2 times per day Jessica Robertson MD        sodium chloride flush 0.9 % injection 10 mL  10 mL Intravenous PRN Jessica Robertson MD        acetaminophen (TYLENOL) tablet 650 mg  650 mg Oral Q6H PRN Jessica Robertson MD        Or    acetaminophen (TYLENOL) suppository 650 mg  650 mg Rectal Q6H PRN Jessica Robertson MD   650 mg at 20 0138    polyethylene glycol (GLYCOLAX) packet 17 g  17 g Oral Daily PRN Suchitha MD Deepika        0.9 % sodium chloride infusion   Intravenous Continuous Renetta Pimentel  mL/hr at 11/28/20 0138      morphine sulfate (PF) injection 4 mg  4 mg Intravenous Q4H PRN Renetta Pimentel MD        ketorolac (TORADOL) injection 30 mg  30 mg Intravenous Q6H PRN Renetta Pimentel MD   30 mg at 11/28/20 0439    cefTRIAXone (ROCEPHIN) 1 g IVPB in 50 mL D5W minibag  1 g Intravenous Q24H Renetta Pimentel MD        labetalol (NORMODYNE;TRANDATE) injection 20 mg  20 mg Intravenous Q4H PRN Renetta Pimentel MD        amLODIPine (NORVASC) tablet 5 mg  5 mg Oral Daily Renetta Pimentel MD        spironolactone (ALDACTONE) tablet 25 mg  25 mg Oral Daily Renetta Pimentel MD        amLODIPine (NORVASC) tablet 5 mg  5 mg Oral Once Evaristo Tapia MD   Stopped at 11/28/20 0048    scopolamine (TRANSDERM-SCOP) transdermal patch 1 patch  1 patch Transdermal Once John Pereyra MD        [facility]ansetron [facility]) injection 4 mg  4 mg Intravenous Q6H PRN Aruna Garcia MD   4 mg at 11/27/20 2043    tamsulosin (FLOMAX) capsule 0.4 mg  0.4 mg Oral Daily Aruna Garcia MD           Allergies: Allergies   Allergen Reactions    Pollen Extract Itching     Itchy and watery eyes. Pet dander also causes same symptoms.     Tramadol      Ear ringing       Problem List:    Patient Active Problem List   Diagnosis Code    Obesity, Class I, BMI 30-34.9 E66.9    HTN (hypertension) I10    Right knee pain M25.561    Hyperlipidemia E78.5    GERD (gastroesophageal reflux disease) K21.9    Patellofemoral syndrome M22.2X9    DJD (degenerative joint disease) of knee M17.10    Tobacco dependence F17.200    Major depressive disorder, recurrent episode with anxious distress (HCC) F33.9    Mild persistent asthma without complication K92.80    Urinary incontinence R32    Irritable bowel syndrome with diarrhea K58.0    Right ureteral calculus N20.1       Past Medical History:        Diagnosis Date  Arthritis     Arthritis of left knee     Asthma     Diabetes mellitus ([facility] [address]     Disorder of rotator cuff syndrome of left shoulder and allied disorder     H/O cardiovascular stress test 2/24/16    EF68% normal study    Hx of echocardiogram 2/24/16    EF 55%, normal LV function, moderate LVH    Hyperlipidemia     Hypertension     Kidney stones     Migraine     Obesity        Past Surgical History:        Procedure Laterality Date    FOOT SURGERY      FOOT SURGERY      HERNIA REPAIR      HYSTERECTOMY      KIDNEY STONE SURGERY      SHOULDER SURGERY      TONSILLECTOMY      TUBAL LIGATION         Social History:    Social History     Tobacco Use    Smoking status: Current Every Day Smoker     Packs/day: 1.00     Years: 20.00     Pack years: 20.00     Types: Cigarettes    Smokeless tobacco: Never Used   Substance Use Topics    Alcohol use: No     Alcohol/week: 0.0 standard drinks                                Ready to quit: Not Answered  Counseling given: Not Answered      Vital Signs (Current):   Vitals:    11/28/20 0115 11/28/20 0200 11/28/20 0430 11/28/20 0833   BP: (!) 154/90 (!) 152/79 (!) 95/52 (!) 100/58   Pulse: 139 119 108 101   Resp: 16 16 16 16   Temp: 102.7 °F (39.3 °C) 100.2 °F (37.9 °C) 99 °F (37.2 °C) 98.3 °F (36.8 °C)   TempSrc: Oral Oral Oral Oral   SpO2: 97% 94% 95% 96%   Weight:       Height:                                                  BP Readings from Last 3 Encounters:   11/28/20 (!) 100/58   08/13/20 137/74   06/23/20 (!) 144/90       NPO Status: Time of last liquid consumption: 0000                        Time of last solid consumption: 0000                        Date of last liquid consumption: 11/27/20                        Date of last solid food consumption: 11/27/20    BMI:   Wt Readings from Last 3 Encounters:   11/27/20 200 lb (90.7 kg)   06/23/20 236 lb (107 kg)   01/07/20 190 lb (86.2 kg)     Body mass index is 31.32 kg/m².     CBC:   Lab Results   Component Value Date    WBC 6.1 11/28/2020    RBC 4.64 11/28/2020    HGB 13.1 11/28/2020    HCT 41.0 11/28/2020    MCV 88.4 11/28/2020    RDW 13.4 11/28/2020     11/28/2020       CMP:   Lab Results   Component Value Date     11/28/2020    K 3.7 11/28/2020     11/28/2020    CO2 21 11/28/2020    BUN 19 11/28/2020    CREATININE 1.2 11/28/2020    GFRAA 57 11/28/2020    LABGLOM 47 11/28/2020    GLUCOSE 153 11/28/2020    PROT 7.6 11/27/2020    PROT 7.4 04/30/2012    CALCIUM 8.6 11/28/2020    BILITOT 0.3 11/27/2020    ALKPHOS 110 11/27/2020    AST 19 11/27/2020    ALT 20 11/27/2020       POC Tests: No results for input(s): POCGLU, POCNA, POCK, POCCL, POCBUN, POCHEMO, POCHCT in the last 72 hours. Coags:   Lab Results   Component Value Date    PROTIME 13.6 11/28/2020    PROTIME 10.4 04/30/2012    INR 1.12 11/28/2020    APTT 24.4 07/16/2013       HCG (If Applicable):   Lab Results   Component Value Date    PREGTESTUR NEGATIVE 03/20/2016        ABGs: No results found for: PHART, PO2ART, FNH7IDK, IHF6GUA, BEART, [id number]     Type & Screen (If Applicable):  No results found for: LABABO, LABRH    Drug/Infectious Status (If Applicable):  No results found for: HIV, HEPCAB    COVID-19 Screening (If Applicable):   Lab Results   Component Value Date    COVID19 NOT DETECTED 11/27/2020         Anesthesia Evaluation  Patient summary reviewed  Airway: Mallampati: III  TM distance: >3 FB   Neck ROM: full  Mouth opening: > = 3 FB Dental:    (+) edentulous      Pulmonary: breath sounds clear to auscultation  (+) asthma:                            Cardiovascular:    (+) hypertension:,         Rhythm: regular                      Neuro/Psych:   (+) headaches:, psychiatric history:            GI/Hepatic/Renal:   (+) GERD:, renal disease: kidney stones, morbid obesity          Endo/Other:    (+) Diabetes, .                  Abdominal:   (+) obese,         Vascular:                                        Anesthesia Plan      general and MAC     ASA 3 - emergent       Induction: intravenous. MIPS: Postoperative opioids intended. Anesthetic plan and risks discussed with patient. Plan discussed with CRNA.     Attending anesthesiologist reviewed and agrees with [address] MD   11/28/2020

## 2020-11-28 NOTE — CONSULTS
Department of Urology   Consult Note  [facility] 1 2 3 4 5    Date: 2020   Patient: Maryjane Martinez   : 1965   DOA: 2020   MRN: [id number]   ROOM#: [id number]     Reason for Consult:  stone, ? infected  Requesting Practitioner: Nay Bear MD      CHIEF COMPLAINT:  Flank Pain (R side flank pain)    History Obtained From:  patient, electronic medical record    HISTORY OF PRESENT ILLNESS:                The patient is a 48 y.o. diabetic female with significant past medical history of renal stones who presented with right renal colic to [facility] ED 32/88/15 and was admitted to IM upon finding on CT of a 6 x 5 x 7 mm ureteral calculus at the level of L4. She was last seen in our office by Dr. Jessica Dela Cruz on 12/9/15 for h/o urinary frequency. Had a PCNL at [facility] remotely as well. [facility] IM HPI 20: Luisa Quispe is a 48 y.o. female with hypertension, tobacco use, moderate obesity, history of renal calculi presented to ED with complaints of right flank pain. Patient reported the pain started 3 to 4 hours prior to coming to the ER. Reported\" bad pain\", denied any radiation, denied any aggravating or relieving factors. Admitted to having nausea, vomiting, denied any fever, chills. Has pain with urination, denied any hematuria, dysuria. Patient denied any chest pain, shortness of breath, fever, chills, cough. Patient was very drowsy. Patient received Toradol, morphine, Dilaudid in ER. Was arousable, answering questions appropriately, but had to wake her up multiple times to get information. At presentation patient was noted to have /109, , RR 17, temperature 98.1, saturating 99% on room air. Lab work significant for potassium 3.2, random glucose 136, WBC 7.9, UA-slightly cloudy, moderate blood, leukocyte esterase large, WBC 88, WBC clumps few, bacteria negative.   CT abdomen/pelvis-moderate right-sided hydroureteronephrosis caused by 6 x 5 x 7 mm ureteral calculus at the level of L4.  Urology was consulted from ER. \"       Past Medical History:        Diagnosis Date    Arthritis     Arthritis of left knee     Asthma     Diabetes mellitus ([address]     Disorder of rotator cuff syndrome of left shoulder and allied disorder     H/O cardiovascular stress test 2/24/16    EF68% normal study    Hx of echocardiogram 2/24/16    EF 55%, normal LV function, moderate LVH    Hyperlipidemia     Hypertension     Kidney stones     Migraine     Obesity        Past Surgical History:        Procedure Laterality Date    FOOT SURGERY      FOOT SURGERY      HERNIA REPAIR      HYSTERECTOMY      KIDNEY STONE SURGERY      SHOULDER SURGERY      TONSILLECTOMY      TUBAL LIGATION         Current Medications:   Current Facility-Administered Medications: sodium chloride flush 0.9 % injection 10 mL, 10 mL, Intravenous, 2 times per day  sodium chloride flush 0.9 % injection 10 mL, 10 mL, Intravenous, PRN  acetaminophen (TYLENOL) tablet 650 mg, 650 mg, Oral, Q6H PRN **OR** acetaminophen (TYLENOL) suppository 650 mg, 650 mg, Rectal, Q6H PRN  polyethylene glycol (GLYCOLAX) packet 17 g, 17 g, Oral, Daily PRN  0.9 % sodium chloride infusion, , Intravenous, Continuous  morphine sulfate (PF) injection 4 mg, 4 mg, Intravenous, Q4H PRN  ketorolac (TORADOL) injection 30 mg, 30 mg, Intravenous, Q6H PRN  cefTRIAXone (ROCEPHIN) 1 g IVPB in 50 mL D5W minibag, 1 g, Intravenous, Q24H  labetalol (NORMODYNE;TRANDATE) injection 20 mg, 20 mg, Intravenous, Q4H PRN  amLODIPine (NORVASC) tablet 5 mg, 5 mg, Oral, Daily  spironolactone (ALDACTONE) tablet 25 mg, 25 mg, Oral, Daily  amLODIPine (NORVASC) tablet 5 mg, 5 mg, Oral, Once  ondansetron (ZOFRAN) injection 4 mg, 4 mg, Intravenous, Q6H PRN  tamsulosin (FLOMAX) capsule 0.4 mg, 0.4 mg, Oral, Daily    Allergies:  Pollen extract and Tramadol    Social History:   TOBACCO:   reports that she has been smoking cigarettes. She has a 20.00 pack-year smoking history.  She has never used smokeless tobacco.  ETOH:   reports no history of alcohol use. Family History:       Problem Relation Age of Onset    Heart Disease Mother     High Blood Pressure Mother     Asthma Mother     Diabetes Sister     Seizures Sister     Asthma Sister     COPD Sister     Heart Disease Maternal Grandmother     High Blood Pressure Maternal Grandmother     Cancer Maternal Grandfather     Emphysema Maternal Grandfather     Asthma Daughter     Depression Daughter    [facility] Migraines Daughter    [facility] Obesity Daughter     Ulcerative Colitis Daughter     High Cholesterol Daughter     Asthma Daughter    [facility] Migraines Daughter     Obesity Daughter     Asthma Brother     Stroke Paternal Uncle        REVIEW OF SYSTEMS:  CONSTITUTIONAL:  positive for  fevers  GASTROINTESTINAL:  positive for nausea, vomiting and abdominal pain    PHYSICAL EXAM:    VITALS:  BP (!) 100/58   Pulse 101   Temp 98.3 °F (36.8 °C) (Oral)   Resp 16   Ht 5' 7\" (1.702 m)   Wt 200 lb (90.7 kg)   SpO2 96%   BMI 31.32 kg/m²   TEMPERATURE:  Current - Temp: 98.3 °F (36.8 °C); Max - Temp  Av.8 °F (37.7 °C)  Min: 98.1 °F (36.7 °C)  Max: 102.7 °F (39.3 °C)    24HR BLOOD PRESSURE RANGE:  Systolic (79KBA), NRW:858 , Min:95 , KBO:719   ; Diastolic (89XIO), AXZ:83, Min:52, Max:109      8HR INTAKE OUTPUT:  No intake/output data recorded.     URINARY CATHETER OUTPUT (Mendoza):     DRAIN/TUBE OUTPUT:        CONSTITUTIONAL:  awake, alert, cooperative, no apparent distress, and appears stated age  EYES:  Lids and lashes normal, pupils equal  NECK:  supple, symmetrical, trachea midline and skin normal  BACK:  Symmetric, no curvature, spinous processes are non-tender on palpation, paraspinous muscles are non-tender on palpation, no costal vertebral tenderness  LUNGS:  No increased work of breathing  ABDOMEN:  No scars, normal bowel sounds, soft, non-distended, non-tender, no masses palpated, no hepatosplenomegally      Data:    WBC:    Lab Results Component Value Date    WBC 6.1 11/28/2020     Hemoglobin/Hematocrit:    Lab Results   Component Value Date    HGB 13.1 11/28/2020    HCT 41.0 11/28/2020     BMP:    Lab Results   Component Value Date     11/28/2020    K 3.7 11/28/2020     11/28/2020    CO2 21 11/28/2020    BUN 19 11/28/2020    LABALBU 4.5 11/27/2020    CREATININE 1.2 11/28/2020    CALCIUM 8.6 11/28/2020    GFRAA 57 11/28/2020    LABGLOM 47 11/28/2020     PT/INR:    Lab Results   Component Value Date    PROTIME 13.6 11/28/2020    PROTIME 10.4 04/30/2012    INR 1.12 11/28/2020       Imaging:    Ct Abdomen Pelvis Wo Contrast Additional Contrast? None    Result Date: 11/27/2020  EXAMINATION: CT OF THE ABDOMEN AND PELVIS WITHOUT CONTRAST 11/27/2020 9:04 pm TECHNIQUE: CT of the abdomen and pelvis was performed without the administration of intravenous contrast. Multiplanar reformatted images are provided for review. Dose modulation, iterative reconstruction, and/or weight based adjustment of the mA/kV was utilized to reduce the radiation dose to as low as reasonably achievable. COMPARISON: 08/13/2020 HISTORY: ORDERING SYSTEM PROVIDED HISTORY: R side flank pain TECHNOLOGIST PROVIDED HISTORY: Reason for exam:->R side flank pain Additional Contrast?->None Is the patient pregnant?->No Reason for Exam: rt flank pain Acuity: Acute Type of Exam: Initial Additional signs and symptoms: Deanna Byrd is a 48 y.o. female that presents with right flank pain. Patient reports sudden onset pain just prior to arrival.  Pain is severe, constant, rating to the right groin. Patient has had similar pain in the past secondary to kidney stones Relevant Medical/Surgical History: pain nausea FINDINGS: Lower Chest: The lung bases are clear. Small hiatal hernia. Organs: Moderate right-sided hydronephrosis caused by a right mid ureteral calculus measuring 5 x 6 x 7 mm in size.   This is noted at the level of L4 additional right-sided calculi identified nonobstructive. Involve the right lower pole up to 6 mm in size. Left kidney is atrophic with multiple left-sided calculi. No left-sided hydronephrosis. No left ureteral calculi identified. . Otherwise the noncontrast of the liver, spleen, adrenal glands, and pancreas unremarkable. GI/Bowel: No bowel obstruction. Moderate retained stool throughout the colon. Appendix unremarkable. Pelvis: Bladder is unremarkable. No suspicious adnexal mass. Peritoneum/Retroperitoneum: Moderate aortic vascular calcifications. Aorta is nonaneurysmal. Bones/Soft Tissues: Severe facet arthropathy identified involving lumbar spine. Moderate right-sided hydroureteronephrosis caused by a 6 x 5 x 7 mm ureteral calculus at the level of L4. Impression: 47 yo female with right renal colic 2nd 5*8*0 mm right proximal ureteral stone with non-obstructive right renal stones and an atrophic left kidney      Recommendation: NPO for cystoscopy, right retrograde pyelogram/stent insertion possible ureteroscopy/stone manipulation. COVID swab negative. On IV Rocephin. Right flank initialed, all questions answered, consent signed & placed in the chart. Will proceed. Patient seen and examined, chart reviewed.      Janeth Stanton MD     Electronically signed at 11/28/2020

## 2020-11-28 NOTE — ED PROVIDER NOTES
Triage Chief Complaint:   Flank Pain (R side flank pain)    [place]:  Robert Jordan is a 48 y.o. female that presents with right flank pain. Patient reports sudden onset pain just prior to arrival.  Pain is severe, constant, rating to the right groin. Patient has had similar pain in the past secondary to kidney stones. Some associated nausea and vomiting. No diarrhea constipation. No fevers chills. Patient has not urinated since the onset of symptoms. No fall or trauma. Patient has had prior hysterectomy.     ROS:  General:  No fevers, no chills, no weakness  Eyes:  No recent vison changes, no discharge  ENT:  No sore throat, no nasal congestion, no hearing changes  Cardiovascular:  No chest pain, no palpitations  Respiratory:  No shortness of breath, no cough, no wheezing  Gastrointestinal:  No pain, + nausea, + vomiting, no diarrhea  Musculoskeletal:  No muscle pain, no joint pain  Skin:  No rash, no pruritis, no easy bruising  Neurologic:  No speech problems, no headache, no extremity numbness, no extremity tingling, no extremity weakness  Psychiatric:  No anxiety  Genitourinary:  No dysuria, no hematuria, + flank pain  Endocrine:  No unexpected weight gain, no unexpected weight loss  Extremities:  no edema, no pain    Past Medical History:   Diagnosis Date    Arthritis     Arthritis of left knee     Asthma     Diabetes mellitus [address] [address]     Disorder of rotator cuff syndrome of left shoulder and allied disorder     H/O cardiovascular stress test 2/24/16    EF68% normal study    Hx of echocardiogram 2/24/16    EF 55%, normal LV function, moderate LVH    Hyperlipidemia     Hypertension     Kidney stones     Migraine     Obesity      Past Surgical History:   Procedure Laterality Date    FOOT SURGERY      FOOT SURGERY      HERNIA REPAIR      HYSTERECTOMY      KIDNEY STONE SURGERY      SHOULDER SURGERY      TONSILLECTOMY      TUBAL LIGATION       Family History   Problem Relation Age of Onset  Heart Disease Mother     High Blood Pressure Mother     Asthma Mother     Diabetes Sister     Seizures Sister     Asthma Sister     COPD Sister     Heart Disease Maternal Grandmother     High Blood Pressure Maternal Grandmother     Cancer Maternal Grandfather     Emphysema Maternal Grandfather     Asthma Daughter     Depression Daughter     Migraines Daughter     Obesity Daughter     Ulcerative Colitis Daughter     High Cholesterol Daughter     Asthma Daughter     Migraines Daughter     Obesity Daughter     Asthma Brother     Stroke Paternal Uncle      Social History     Socioeconomic History    Marital status: Legally      Spouse name: Not on file    Number of children: Not on file    Years of education: Not on file    Highest education level: Not on file   Occupational History    Not on file   Social Needs    Financial resource strain: Not on file    Food insecurity     Worry: Not on file     Inability: Not on file   [employer] needs     Medical: Not on file     Non-medical: Not on file   Tobacco Use    Smoking status: Current Every Day Smoker     Packs/day: 1.00     Years: 20.00     Pack years: 20.00     Types: Cigarettes    Smokeless tobacco: Never Used   Substance and Sexual Activity    Alcohol use: No     Alcohol/week: 0.0 standard drinks    Drug use: No    Sexual activity: Not Currently     Partners: Male     Comment: 2 DAUGHTERS   Lifestyle    Physical activity     Days per week: Not on file     Minutes per session: Not on file    Stress: Not on file   Relationships    Social connections     Talks on phone: Not on file     Gets together: Not on file     Attends Voodoo service: Not on file     Active member of club or organization: Not on file     Attends meetings of clubs or organizations: Not on file     Relationship status: Not on file    Intimate partner violence     Fear of current or ex partner: Not on file     Emotionally abused: Not on file Physically abused: Not on file     Forced sexual activity: Not on file   Other Topics Concern    Not on file   Social History Narrative    Not on file     Current Facility-Administered Medications   Medication Dose Route Frequency Provider Last Rate Last Dose    ondansetron (ZOFRAN) injection 4 mg  4 mg Intravenous Q6H PRN Torrie Valladares MD   4 mg at 11/27/20 2043    0.9 % sodium chloride bolus  1,000 mL Intravenous Once Torrie Valladares  mL/hr at 11/27/20 2043 1,000 mL at 11/27/20 2043    cefTRIAXone (ROCEPHIN) 1 g IVPB in 50 mL D5W minibag  1 g Intravenous Once Torrie Valladares  mL/hr at 11/27/20 2232 1 g at 11/27/20 2232     Current Outpatient Medications   Medication Sig Dispense Refill    dicyclomine (BENTYL) 20 MG tablet Take 1 tablet by mouth 3 times daily as needed (abdominal pain) 20 tablet 3    ondansetron (ZOFRAN ODT) 4 MG disintegrating tablet Take 1 tablet by mouth every 8 hours as needed for Nausea 15 tablet 0    spironolactone (ALDACTONE) 25 MG tablet TAKE 1 TABLET BY MOUTH ONE TIME A DAY      amLODIPine (NORVASC) 5 MG tablet Take 5 mg by mouth daily      triamcinolone (KENALOG) 0.1 % cream Apply topically 2 times daily x 10 to 14 days. 1 Tube 0    ibuprofen (ADVIL;MOTRIN) 600 MG tablet Take 1 tablet by mouth 3 times daily as needed for Pain 30 tablet 0    potassium bicarbonate (K-LYTE) 25 MEQ disintegrating tablet Take 2 tablets by mouth daily for 1 day 2 tablet 0     Allergies   Allergen Reactions    Pollen Extract Itching     Itchy and watery eyes. Pet dander also causes same symptoms.  Tramadol      Ear ringing       Nursing Notes Reviewed    Physical Exam:  ED Triage Vitals [11/27/20 2004]   Enc Vitals Group      BP (!) 159/109      Pulse 104      Resp 17      Temp 98.1 °F (36.7 °C)      Temp Source Oral      SpO2 99 %      Weight 200 lb (90.7 kg)      Height 5' 7\" (1.702 m)      Head Circumference       Peak Flow       Pain Score       Pain Loc       Pain Edu? Excl.  in GC?        My pulse ox interpretation is - normal    General appearance: Appears quite uncomfortable. Holding right flank. Skin:  Warm. Dry. No rash, abrasions or contusions to the affected flank. Eye:  Extraocular movements intact. Ears, nose, mouth and throat:  Oral mucosa moist   Neck:  Trachea midline. Extremity:  No swelling. Normal ROM     Heart:   slightly tachycardic but regular, normal S1 & S2, no extra heart sounds. Perfusion:  Intact. Respiratory:  Lungs clear to auscultation bilaterally. Respirations nonlabored. Abdominal:  Normal bowel sounds. Soft. Nontender. Non distended. Back:  + Right-sided CVA tenderness to palpation. No midline bony TLS tenderness or step-off. Neurological:  Alert and oriented times 3. No focal neuro deficits. No saddle anesthesia.           Psychiatric:  Appropriate    I have reviewed and interpreted all of the currently available lab results from this visit (if applicable):  Results for orders placed or performed during the hospital encounter of 11/27/20   CBC Auto Differential   Result Value Ref Range    WBC 7.9 4.0 - 10.5 K/CU MM    RBC 4.78 4.2 - 5.4 M/CU MM    Hemoglobin 14.2 12.5 - 16.0 GM/DL    Hematocrit 41.3 37 - 47 %    MCV 86.4 78 - 100 FL    MCH 29.7 27 - 31 PG    MCHC 34.4 32.0 - 36.0 %    RDW 13.2 11.7 - 14.9 %    Platelets 116 369 - 100 K/CU MM    MPV 9.0 7.5 - 11.1 FL    Differential Type AUTOMATED DIFFERENTIAL     Segs Relative 83.8 (H) 36 - 66 %    Lymphocytes % 14.8 (L) 24 - 44 %    Monocytes % 0.4 0 - 4 %    Eosinophils % 0.8 0 - 3 %    Basophils % 0.1 0 - 1 %    Segs Absolute 6.6 K/CU MM    Lymphocytes Absolute 1.2 K/CU MM    Monocytes Absolute 0.0 K/CU MM    Eosinophils Absolute 0.1 K/CU MM    Basophils Absolute 0.0 K/CU MM    Nucleated RBC % 0.0 %    Total Nucleated RBC 0.0 K/CU MM    Total Immature Neutrophil 0.01 K/CU MM    Immature Neutrophil % 0.1 0 - 0.43 %   CMP   Result Value Ref Range    Sodium 140 135 - 145 MMOL/L Potassium 3.2 (L) 3.5 - 5.1 MMOL/L    Chloride 104 99 - 110 mMol/L    CO2 22 21 - 32 MMOL/L    BUN 15 6 - 23 MG/DL    CREATININE 0.8 0.6 - 1.1 MG/DL    Glucose 136 (H) 70 - 99 MG/DL    Calcium 9.6 8.3 - 10.6 MG/DL    Alb 4.5 3.4 - 5.0 GM/DL    Total Protein 7.6 6.4 - 8.2 GM/DL    Total Bilirubin 0.3 0.0 - 1.0 MG/DL    ALT 20 10 - 40 U/L    AST 19 15 - 37 IU/L    Alkaline Phosphatase 110 40 - 128 IU/L    GFR Non-African American >60 >60 mL/min/1.73m2    GFR African American >60 >60 mL/min/1.73m2    Anion Gap 14 4 - 16   Urinalysis   Result Value Ref Range    Color, UA STRAW (A) YELLOW    Clarity, UA SLIGHTLY CLOUDY (A) CLEAR    Glucose, Urine NEGATIVE NEGATIVE MG/DL    Bilirubin Urine NEGATIVE NEGATIVE MG/DL    Ketones, Urine NEGATIVE NEGATIVE MG/DL    Specific Gravity, UA 1.006 1.001 - 1.035    Blood, Urine MODERATE (A) NEGATIVE    pH, Urine 8.0 5.0 - 8.0    Protein, UA 30 (A) NEGATIVE MG/DL    Urobilinogen, Urine NORMAL 0.2 - 1.0 MG/DL    Nitrite Urine, Quantitative NEGATIVE NEGATIVE    Leukocyte Esterase, Urine LARGE (A) NEGATIVE    RBC, UA 13 (H) 0 - 6 /HPF    WBC, UA 88 (H) 0 - 5 /HPF    Bacteria, UA NEGATIVE NEGATIVE /HPF    WBC Clumps, UA FEW /HPF    Squam Epithel, UA 3 /HPF    Trans Epithel, UA 1 /HPF    Trichomonas, UA NONE SEEN NONE SEEN /HPF   Lipase   Result Value Ref Range    Lipase 28 13 - 60 IU/L      Radiographs (if obtained):  [] The following radiograph was interpreted by myself in the absence of a radiologist:   [x] Radiologist's Report Reviewed:  CT ABDOMEN PELVIS WO CONTRAST Additional Contrast? None   Final Result   Moderate right-sided hydroureteronephrosis caused by a 6 x 5 x 7 mm ureteral   calculus at the level of L4. EKG (if obtained): (All EKG's are interpreted by myself in the absence of a cardiologist)    Chart review shows recent radiographs:  No results found. MDM:  Pt presents as above. Emergent conditions considered.   Presentation prompted initial immediate evaluation. Labs and imaging obtained. IV established and IV Toradol, IV morphine and IV Zofran given. CBC without clinically significant derangement. CMP with mild hypokalemia and mild hyperglycemia. Urinalysis suggestive of possible urinary tract infection in addition to moderate amount of blood. Lipase is not suggestive of a pancreatitis. CT imaging demonstrating a moderate right-sided hydronephrosis caused by 6 x 5 x 7 mm ureteral calculus. I did discuss the case with urology. I spoke with Dr. Sharath Muhammad who would like the patient to be n.p.o. at midnight and agrees with antibiotics and admission at this time. Further IV Dilaudid is given for pain control on reassessment. Patient is updated and agreeable with this plan. Case is to be discussed with the hospitalist and patient to be admitted to medicine. Questions sought and answered with the patient. They voice understanding and agree with plan. Clinical Impression:  1. Ureteric stone    2. Hydronephrosis with urinary obstruction due to ureteral calculus      Disposition referral (if applicable):  No follow-up provider specified. Disposition medications (if applicable):  New Prescriptions    No medications on file       Comment: Please note this report has been produced using speech recognition software and may contain errors related to that system including errors in grammar, punctuation, and spelling, as well as words and phrases that may be inappropriate. If there are any questions or concerns please feel free to contact the dictating provider for clarification.        Dafne Watson MD  11/27/20 1134

## 2020-11-29 VITALS
OXYGEN SATURATION: 95 % | BODY MASS INDEX: 31.39 KG/M2 | HEIGHT: 67 IN | WEIGHT: 200 LBS | SYSTOLIC BLOOD PRESSURE: 102 MMHG | RESPIRATION RATE: 14 BRPM | DIASTOLIC BLOOD PRESSURE: 50 MMHG | TEMPERATURE: 98.1 F | HEART RATE: 85 BPM

## 2020-11-29 PROCEDURE — 6360000002 HC RX W HCPCS: Performed by: UROLOGY

## 2020-11-29 PROCEDURE — 94761 N-INVAS EAR/PLS OXIMETRY MLT: CPT

## 2020-11-29 PROCEDURE — 2580000003 HC RX 258: Performed by: UROLOGY

## 2020-11-29 PROCEDURE — 6370000000 HC RX 637 (ALT 250 FOR IP): Performed by: UROLOGY

## 2020-11-29 RX ORDER — TAMSULOSIN HYDROCHLORIDE 0.4 MG/1
0.4 CAPSULE ORAL DAILY
Qty: 14 CAPSULE | Refills: 0 | Status: SHIPPED | OUTPATIENT
Start: 2020-11-30 | End: 2021-01-20

## 2020-11-29 RX ORDER — ONDANSETRON 4 MG/1
4 TABLET, ORALLY DISINTEGRATING ORAL EVERY 8 HOURS PRN
Qty: 15 TABLET | Refills: 0 | Status: SHIPPED | OUTPATIENT
Start: 2020-11-29 | End: 2021-01-20

## 2020-11-29 RX ORDER — OXYCODONE HYDROCHLORIDE 5 MG/1
5 TABLET ORAL EVERY 6 HOURS PRN
Qty: 9 TABLET | Refills: 0 | Status: SHIPPED | OUTPATIENT
Start: 2020-11-29 | End: 2020-12-02

## 2020-11-29 RX ORDER — CIPROFLOXACIN 500 MG/1
500 TABLET, FILM COATED ORAL 2 TIMES DAILY
Qty: 20 TABLET | Refills: 0 | Status: SHIPPED | OUTPATIENT
Start: 2020-11-29 | End: 2020-12-09

## 2020-11-29 RX ADMIN — SODIUM CHLORIDE: 9 INJECTION, SOLUTION INTRAVENOUS at 02:15

## 2020-11-29 RX ADMIN — KETOROLAC TROMETHAMINE 30 MG: 30 INJECTION, SOLUTION INTRAMUSCULAR at 08:32

## 2020-11-29 RX ADMIN — MORPHINE SULFATE 4 MG: 4 INJECTION, SOLUTION INTRAMUSCULAR; INTRAVENOUS at 02:15

## 2020-11-29 RX ADMIN — TAMSULOSIN HYDROCHLORIDE 0.4 MG: 0.4 CAPSULE ORAL at 08:32

## 2020-11-29 RX ADMIN — ACETAMINOPHEN 650 MG: 325 TABLET ORAL at 08:32

## 2020-11-29 RX ADMIN — SPIRONOLACTONE 25 MG: 25 TABLET ORAL at 08:32

## 2020-11-29 ASSESSMENT — PAIN DESCRIPTION - PAIN TYPE: TYPE: ACUTE PAIN

## 2020-11-29 ASSESSMENT — PAIN DESCRIPTION - LOCATION: LOCATION: FLANK

## 2020-11-29 ASSESSMENT — PAIN SCALES - GENERAL
PAINLEVEL_OUTOF10: 7
PAINLEVEL_OUTOF10: 7
PAINLEVEL_OUTOF10: 0

## 2020-11-29 ASSESSMENT — PAIN DESCRIPTION - ORIENTATION: ORIENTATION: RIGHT

## 2020-11-29 NOTE — DISCHARGE INSTR - ACTIVITY
[address] [address]  Phone: [phone]             November 29, 2020    Patient: Sharri Ruiz   YOB: 1965   Date of Visit: 11/27/2020       To Whom It May Concern:    Tosha Lagunas was seen and treated in our facility  beginning 11/27/2020 until 11/29/20  . She may return to work on 1/2/20 with a lift restriction of no more than 25 lbs .       Sincerely,       Malorie Reno DO

## 2020-11-29 NOTE — DISCHARGE SUMMARY
Discharge Summary    Name:  Marlowe Peabody /Age/Sex: 1965  (48 y.o. female)   MRN & CSN:  [id number] & [id number] Admission Date/Time: 2020  8:08 PM   Attending:  Maryann Lentz DO Discharging Physician: Maryann Lentz DO     HPI and Hospital Course:   Marlowe Peabody is a 48 y.o.  female  who presented with right flank pain that began earlier on the day of presenting to the ED. Radiated to right groin. Associated with n/v. ED work up found to have a 6x5x7 mm obstructing stone in right kidney with hydronephrosis. Patient was admitted to medicine service and urology consulted. Cystoscopy completed on  with right sided ureter stent placed and UCx sent. Procedure tolerated well. The patient was discharged on cipro with OP follow up with urology. Medications as below    · Acute right hydronephrosis with obstructing calculus of 9p8p1hw  · Sirs criteria met on admission for sepsis, found to be from pyelonephritis  · Acute pyelonephritis associated with right sided obstructing kidney stone  · Chronic tobacco abuse, continued  · Moderal obesity with BMI of 31    The patient expressed appropriate understanding of and agreement with the discharge recommendations, medications, and plan.      Consults this admission:  IP CONSULT TO Car[facility] Koehler 141 TO HOSPITALIST    Discharge Instruction:   Follow up appointments: urology  Primary care physician:  within 2 weeks    Diet:  General/cardiac/ADA/as tolerated  Activity: {discharge activity: as tolerated  Disposition: Discharged to:   [x]Home, []HHC, []SNF, []Acute Rehab, []Hospice   Condition on discharge: Stable    Discharge Medications:      Gwenlyn Goodell   Home Medication Instructions [id number]    Printed on:20 6647   Medication Information                      amLODIPine (NORVASC) 5 MG tablet  Take 5 mg by mouth daily             dicyclomine (BENTYL) 20 MG tablet  Take 1 tablet by mouth 3 times daily as needed (abdominal pain) ibuprofen (ADVIL;MOTRIN) 600 MG tablet  Take 1 tablet by mouth 3 times daily as needed for Pain             ondansetron (ZOFRAN ODT) 4 MG disintegrating tablet  Take 1 tablet by mouth every 8 hours as needed for Nausea             oxyCODONE (ROXICODONE) 5 MG immediate release tablet  Take 1 tablet by mouth every 6 hours as needed for Pain for up to 3 days. Intended supply: 3 days. Take lowest dose possible to manage pain             spironolactone (ALDACTONE) 25 MG tablet  TAKE 1 TABLET BY MOUTH ONE TIME A DAY             tamsulosin (FLOMAX) 0.4 MG capsule  Take 1 capsule by mouth daily for 14 days             triamcinolone (KENALOG) 0.1 % cream  Apply topically 2 times daily x 10 to 14 days. Objective Findings at Discharge:   BP (!) 102/50   Pulse 85   Temp 98.1 °F (36.7 °C) (Oral)   Resp 14   Ht 5' 7\" (1.702 m)   Wt 200 lb (90.7 kg)   SpO2 95%   BMI 31.32 kg/m²            PHYSICAL EXAM   GEN Awake female, laying in bed in no apparent distress. Appears given age. EYES Pupils are equally round. No scleral discharge  HENT Atraumatic and symmetric head  NECK No apparent thyromegaly  RESP Symmetric chest movement while on room air. CARDIO/VASC Peripheral pulses equal bilaterally and palpable. No peripheral edema. GI Abdomen is not distended. Rectal exam deferred.  Mendoza catheter is not present. HEME/LYMPH No petechiae or ecchymoses. MSK Spontaneous movement of BL upper extremities  SKIN Normal coloration, warm, dry. NEURO Cranial nerves appear grossly intact  PSYCH Awake, alert.  Oriented x4    BMP/CBC  Recent Labs     11/27/20 2024 11/28/20  0345    140   K 3.2* 3.7    107   CO2 22 21   BUN 15 19   CREATININE 0.8 1.2*   WBC 7.9 6.1   HCT 41.3 41.0    101*     SIGNIFICANT IMAGING AND LABS:    Klebsiella pneumoniae (2)   --- Rool Valiente, 11/27    Antibiotic  Interpretation  TERESA  Status     ampicillin  Resistant  RESISTANT  Final     ceFAZolin  Sensitive  <=4  Final

## 2020-11-29 NOTE — PROGRESS NOTES
[facility] [address]   Psychiatric  Progress Note 0 1 2      Date: 2020   Patient: Sharri uRiz   : 1965   DOA: 2020   MRN: [id number]   ROOM#: [id number]     Subjective     I'm feeling better. Objective     AF/VSS    CBC:   Lab Results   Component Value Date    WBC 6.1 2020    RBC 4.64 2020    HGB 13.1 2020    HCT 41.0 2020    MCV 88.4 2020    MCH 28.2 2020    MCHC 32.0 2020    RDW 13.4 2020     2020    MPV 9.2 2020     BMP:    Lab Results   Component Value Date     2020    K 3.7 2020     2020    CO2 21 2020    BUN 19 2020    LABALBU 4.5 2020    CREATININE 1.2 2020    CALCIUM 8.6 2020    GFRAA 57 2020    LABGLOM 47 2020    GLUCOSE 153 2020       Imaging Studies:     Ct Abdomen Pelvis Wo Contrast Additional Contrast? None     Result Date: 2020  EXAMINATION: CT OF THE ABDOMEN AND PELVIS WITHOUT CONTRAST 2020 9:04 pm TECHNIQUE: CT of the abdomen and pelvis was performed without the administration of intravenous contrast. Multiplanar reformatted images are provided for review. Dose modulation, iterative reconstruction, and/or weight based adjustment of the mA/kV was utilized to reduce the radiation dose to as low as reasonably achievable. COMPARISON: 2020 HISTORY: ORDERING SYSTEM PROVIDED HISTORY: R side flank pain TECHNOLOGIST PROVIDED HISTORY: Reason for exam:->R side flank pain Additional Contrast?->None Is the patient pregnant?->No Reason for Exam: rt flank pain Acuity: Acute Type of Exam: Initial Additional signs and symptoms: Sharri Ruiz is a 48 y.o. female that presents with right flank pain. Patient reports sudden onset pain just prior to arrival.  Pain is severe, constant, rating to the right groin.   Patient has had similar pain in the past secondary to kidney stones Relevant Medical/Surgical History: pain nausea FINDINGS: Lower Chest: The lung bases are clear. Small hiatal hernia. Organs: Moderate right-sided hydronephrosis caused by a right mid ureteral calculus measuring 5 x 6 x 7 mm in size. This is noted at the level of L4 additional right-sided calculi identified nonobstructive. Involve the right lower pole up to 6 mm in size. Left kidney is atrophic with multiple left-sided calculi. No left-sided hydronephrosis. No left ureteral calculi identified. . Otherwise the noncontrast of the liver, spleen, adrenal glands, and pancreas unremarkable. GI/Bowel: No bowel obstruction. Moderate retained stool throughout the colon. Appendix unremarkable. Pelvis: Bladder is unremarkable. No suspicious adnexal mass. Peritoneum/Retroperitoneum: Moderate aortic vascular calcifications. Aorta is nonaneurysmal. Bones/Soft Tissues: Severe facet arthropathy identified involving lumbar spine.      Moderate right-sided hydroureteronephrosis caused by a 6 x 5 x 7 mm ureteral calculus at the level of L4.            Urine culture 11/27/20 pending    Results for Luigi Marcum (MRN [id number]) as of 11/29/2020 09:45   Ref. Range 11/27/2020 23:05   COVID-19 Unknown Rpt     Physical Exam:     NAD    Assessment/Plan    Deanna Byrd is a 48 y.o. diabetic female admitted 11/27/2020 for right renal colic 2nd 8*3*4 mm right mid ureteral stone    1) 7 mm right mid ureteral stone: s/p right JJ stent insertion 11/28/20.  she will need a right ureteroscopy/stone manipulation as outpatient once her urine is proven sterile. She may have passed her ureteral stone but she does have residual RLP stone burden that could be addressed. Will order stone protocol CT and KUB. 2) Urine culture pending from 11/27/20, AF on IV Rocephin. 3) disposition: I'm ok with dc home once her urine culture returns. Patient seen and examined, chart reviewed.      Sri Mccarty MD     Electronically signed at 11/29/2020

## 2020-11-30 LAB
CULTURE: ABNORMAL
CULTURE: ABNORMAL
Lab: ABNORMAL
SPECIMEN: ABNORMAL

## 2020-12-03 LAB
CULTURE: NORMAL
Lab: NORMAL
SPECIMEN: NORMAL

## 2021-01-04 ENCOUNTER — HOSPITAL ENCOUNTER (EMERGENCY)
Age: 56
Discharge: HOME | End: 2021-01-04
Attending: EMERGENCY MEDICINE

## 2021-01-04 ENCOUNTER — APPOINTMENT (OUTPATIENT)
Dept: GENERAL RADIOLOGY | Age: 56
End: 2021-01-04

## 2021-01-04 VITALS
SYSTOLIC BLOOD PRESSURE: 168 MMHG | WEIGHT: 200 LBS | HEART RATE: 93 BPM | RESPIRATION RATE: 22 BRPM | BODY MASS INDEX: 32.14 KG/M2 | OXYGEN SATURATION: 99 % | DIASTOLIC BLOOD PRESSURE: 86 MMHG | HEIGHT: 66 IN | TEMPERATURE: 99.1 F

## 2021-01-04 DIAGNOSIS — M79.601 RIGHT ARM PAIN: Primary | ICD-10-CM

## 2021-01-04 PROCEDURE — 73000 X-RAY EXAM OF COLLAR BONE: CPT

## 2021-01-04 PROCEDURE — 6370000000 HC RX 637 (ALT 250 FOR IP): Performed by: EMERGENCY MEDICINE

## 2021-01-04 PROCEDURE — 99283 EMERGENCY DEPT VISIT LOW MDM: CPT

## 2021-01-04 RX ORDER — LIDOCAINE 4 G/G
1 PATCH TOPICAL ONCE
Status: DISCONTINUED | OUTPATIENT
Start: 2021-01-04 | End: 2021-01-05 | Stop reason: HOSPADM

## 2021-01-04 RX ORDER — LIDOCAINE 50 MG/G
1 PATCH TOPICAL DAILY
Qty: 10 PATCH | Refills: 0 | Status: SHIPPED | OUTPATIENT
Start: 2021-01-04 | End: 2021-01-14

## 2021-01-04 RX ORDER — PREDNISONE 20 MG/1
40 TABLET ORAL DAILY
Qty: 10 TABLET | Refills: 0 | Status: SHIPPED | OUTPATIENT
Start: 2021-01-04 | End: 2021-01-09

## 2021-01-04 RX ORDER — HYDROCODONE BITARTRATE AND ACETAMINOPHEN 5; 325 MG/1; MG/1
1 TABLET ORAL ONCE
Status: COMPLETED | OUTPATIENT
Start: 2021-01-04 | End: 2021-01-04

## 2021-01-04 RX ORDER — HYDROCODONE BITARTRATE AND ACETAMINOPHEN 5; 325 MG/1; MG/1
1 TABLET ORAL EVERY 8 HOURS PRN
Qty: 6 TABLET | Refills: 0 | Status: SHIPPED | OUTPATIENT
Start: 2021-01-04 | End: 2021-01-06

## 2021-01-04 RX ADMIN — HYDROCODONE BITARTRATE AND ACETAMINOPHEN 1 TABLET: 5; 325 TABLET ORAL at 22:44

## 2021-01-04 ASSESSMENT — ENCOUNTER SYMPTOMS
ABDOMINAL PAIN: 0
BACK PAIN: 0
NAUSEA: 0
VOMITING: 0
RHINORRHEA: 0
COUGH: 0
EYE DISCHARGE: 0
SHORTNESS OF BREATH: 0
SORE THROAT: 0
EYE PAIN: 0

## 2021-01-04 ASSESSMENT — PAIN DESCRIPTION - ORIENTATION: ORIENTATION: RIGHT

## 2021-01-05 NOTE — ED PROVIDER NOTES
[address] ENCOUNTER      Pt Name: Tomas Mendoza  MRN: [id number]  [place] 1965  Date of evaluation: 1/4/2021  Provider: Cuate Katz MD    CHIEF COMPLAINT       Chief Complaint   Patient presents with    Clavicle Injury     pain to right clavicle, NKI         HISTORY OF PRESENT ILLNESS      Tomas Mendoza is a 48 y.o. female who presents to the emergency department  for   Chief Complaint   Patient presents with    Clavicle Injury     pain to right clavicle, NKI       60-year-old female presents with report of pain in her right clavicle as well as some right-sided radicular symptoms. Denies any weakness in the right upper extremity. Denies any trauma or injury to her right clavicle or right arm. She does have a history of prior rotator cuff surgeries on the right side as well as osteotomy at the [facility] joint. She is not have any fever or chills. No constitutional infectious symptoms. Denies any abdominal pain or chest pain. She is alert and oriented in the emergency department. No signs of respiratory distress. She is moving her right arm well. She states she has tried some over-the-counter measures like TENS unit and anti-inflammatories as well as some muscle relaxers that have not completely resolved her symptoms. Nursing Notes, Triage Notes & Vital Signs were reviewed. REVIEW OF SYSTEMS    (2-9 systems for level 4, 10 or more for level 5)     Review of Systems   Constitutional: Negative for chills and fever. HENT: Negative for congestion, rhinorrhea and sore throat. Eyes: Negative for pain and discharge. Respiratory: Negative for cough and shortness of breath. Cardiovascular: Negative for chest pain and palpitations. Gastrointestinal: Negative for abdominal pain, nausea and vomiting. Endocrine: Negative for polydipsia and polyuria.    Genitourinary: Negative for dysuria and flank pain.   Musculoskeletal: Negative for back pain and neck pain. Right arm pain   Skin: Negative for pallor and wound. Neurological: Negative for dizziness, facial asymmetry, light-headedness, numbness and headaches. Psychiatric/Behavioral: Negative for confusion. Except as noted above the remainder of the review of systems was reviewed and negative. PAST MEDICAL HISTORY     Past Medical History:   Diagnosis Date    Arthritis     Arthritis of left knee     Asthma     Diabetes mellitus ([address]     Disorder of rotator cuff syndrome of left shoulder and allied disorder     H/O cardiovascular stress test 2/24/16    EF68% normal study    Hx of echocardiogram 2/24/16    EF 55%, normal LV function, moderate LVH    Hyperlipidemia     Hypertension     Kidney stones     Migraine     Obesity        Prior to Admission medications    Medication Sig Start Date End Date Taking? Authorizing Provider   ondansetron (ZOFRAN ODT) 4 MG disintegrating tablet Take 1 tablet by mouth every 8 hours as needed for Nausea 11/29/20   Michelle Trinidad,    tamsulosin (FLOMAX) 0.4 MG capsule Take 1 capsule by mouth daily for 14 days 11/30/20 12/14/20  Michelle Trinidad,    dicyclomine (BENTYL) 20 MG tablet Take 1 tablet by mouth 3 times daily as needed (abdominal pain) 8/13/20   Cheryl Yousif MD   spironolactone (ALDACTONE) 25 MG tablet TAKE 1 TABLET BY MOUTH ONE TIME A DAY 5/28/20   Historical Provider, MD   amLODIPine (NORVASC) 5 MG tablet Take 5 mg by mouth daily    Historical Provider, MD   triamcinolone (KENALOG) 0.1 % cream Apply topically 2 times daily x 10 to 14 days.  6/23/20   Lenon Pump, APRN - CNP   ibuprofen (ADVIL;MOTRIN) 600 MG tablet Take 1 tablet by mouth 3 times daily as needed for Pain 1/7/20   Ashok Montejo DO        Patient Active Problem List   Diagnosis    Obesity, Class I, BMI 30-34.9    HTN (hypertension)    Right knee pain    Hyperlipidemia    GERD (gastroesophageal reflux disease)  Patellofemoral syndrome    DJD (degenerative joint disease) of knee    Tobacco dependence    Major depressive disorder, recurrent episode with anxious distress (HCC)    Mild persistent asthma without complication    Urinary incontinence    Irritable bowel syndrome with diarrhea    Right ureteral calculus         SURGICAL HISTORY       Past Surgical History:   Procedure Laterality Date    CYSTOSCOPY INSERTION / REMOVAL STENT / STONE N/A 11/28/2020    CYSTOSCOPY RETROGRADE PYELOGRAM STENT INSERTION performed by Consuelo Philip MD at [facility]      SHOULDER SURGERY      TONSILLECTOMY      TUBAL LIGATION           CURRENT MEDICATIONS       Previous Medications    AMLODIPINE (NORVASC) 5 MG TABLET    Take 5 mg by mouth daily    DICYCLOMINE (BENTYL) 20 MG TABLET    Take 1 tablet by mouth 3 times daily as needed (abdominal pain)    IBUPROFEN (ADVIL;MOTRIN) 600 MG TABLET    Take 1 tablet by mouth 3 times daily as needed for Pain    ONDANSETRON (ZOFRAN ODT) 4 MG DISINTEGRATING TABLET    Take 1 tablet by mouth every 8 hours as needed for Nausea    SPIRONOLACTONE (ALDACTONE) 25 MG TABLET    TAKE 1 TABLET BY MOUTH ONE TIME A DAY    TAMSULOSIN (FLOMAX) 0.4 MG CAPSULE    Take 1 capsule by mouth daily for 14 days    TRIAMCINOLONE (KENALOG) 0.1 % CREAM    Apply topically 2 times daily x 10 to 14 days.        ALLERGIES     Pollen extract and Tramadol    FAMILY HISTORY       Family History   Problem Relation Age of Onset    Heart Disease Mother     High Blood Pressure Mother     Asthma Mother     Diabetes Sister     Seizures Sister     Asthma Sister     COPD Sister     Heart Disease Maternal Grandmother     High Blood Pressure Maternal Grandmother     Cancer Maternal Grandfather     Emphysema Maternal Grandfather     Asthma Daughter     Depression Daughter    [facility] Migraines Daughter     Obesity Daughter     Ulcerative Colitis Daughter     High Cholesterol Daughter     Asthma Daughter    [facility] Migraines Daughter     Obesity Daughter     Asthma Brother     Stroke Paternal Uncle           SOCIAL HISTORY       Social History     Socioeconomic History    Marital status: Legally      Spouse name: None    Number of children: None    Years of education: None    Highest education level: None   Occupational History    None   Social Needs    Financial resource strain: None    Food insecurity     Worry: None     Inability: None    Transportation needs     Medical: None     Non-medical: None   Tobacco Use    Smoking status: Current Every Day Smoker     Packs/day: 1.00     Years: 20.00     Pack years: 20.00     Types: Cigarettes    Smokeless tobacco: Never Used   Substance and Sexual Activity    Alcohol use: No     Alcohol/week: 0.0 standard drinks    Drug use: No    Sexual activity: Not Currently     Partners: Male     Comment: 2 DAUGHTERS   Lifestyle    Physical activity     Days per week: None     Minutes per session: None    Stress: None   Relationships    Social connections     Talks on phone: None     Gets together: None     Attends Alevism service: None     Active member of club or organization: None     Attends meetings of clubs or organizations: None     Relationship status: None    Intimate partner violence     Fear of current or ex partner: None     Emotionally abused: None     Physically abused: None     Forced sexual activity: None   Other Topics Concern    None   Social History Narrative    None       SCREENINGS               PHYSICAL EXAM    (up to 7 for level 4, 8 or more for level 5)     ED Triage Vitals [01/04/21 1941]   BP Temp Temp Source Pulse Resp SpO2 Height Weight   (!) 168/86 99.1 °F (37.3 °C) Oral 93 22 99 % 5' 6\" (1.676 m) 200 lb (90.7 kg)       Physical Exam  Vitals signs reviewed. Constitutional:       Appearance: She is not ill-appearing or toxic-appearing.    HENT: Head: Normocephalic and atraumatic. Nose: No congestion or rhinorrhea. Mouth/Throat:      Mouth: Mucous membranes are moist.      Pharynx: No oropharyngeal exudate or posterior oropharyngeal erythema. Eyes:      General:         Right eye: No discharge. Left eye: No discharge. Extraocular Movements: Extraocular movements intact. Pupils: Pupils are equal, round, and reactive to light. Neck:      Musculoskeletal: Normal range of motion and neck supple. No neck rigidity or muscular tenderness. Cardiovascular:      Rate and Rhythm: Normal rate. Heart sounds: No friction rub. No gallop. Pulmonary:      Effort: Pulmonary effort is normal. No respiratory distress. Comments: Respirations unlabored  No retractions, no increased work of breathing  Abdominal:      Palpations: Abdomen is soft. Tenderness: There is no abdominal tenderness. There is no guarding. Comments: Abdomen soft, non-tender, non-peritoneal  No guarding on abdominal pain   Musculoskeletal:         General: Tenderness present. Right lower leg: No edema. Left lower leg: No edema. Comments: Tenderness over right clavicle; good ROM of right shoulder; RUE neurovascularly intact with 2+ pulses  Good  strength right hand   Skin:     General: Skin is warm. Capillary Refill: Capillary refill takes less than 2 seconds. Findings: No erythema, lesion or rash. Neurological:      General: No focal deficit present. Mental Status: She is alert and oriented to person, place, and time. DIAGNOSTIC RESULTS     Labs Reviewed - No data to display         RADIOLOGY:     Non-plain film images such as CT, Ultrasound and MRI are read by the radiologist. Plain radiographic images are visualized and preliminarily interpreted by the emergency physician.        Interpretation per the Radiologist below, if available at the time of this note:    XR CLAVICLE RIGHT   Final Result   No acute abnormality visualized. ED BEDSIDE ULTRASOUND:   Performed by ED Physician Donna Arango MD       LABS:  Labs Reviewed - No data to display    All other labs were within normal range or not returned as of this dictation. EMERGENCY DEPARTMENT COURSE and DIFFERENTIAL DIAGNOSIS/MDM:   Vitals:    Vitals:    01/04/21 1941   BP: (!) 168/86   Pulse: 93   Resp: 22   Temp: 99.1 °F (37.3 °C)   TempSrc: Oral   SpO2: 99%   Weight: 200 lb (90.7 kg)   Height: 5' 6\" (1.676 m)           MDM  Number of Diagnoses or Management Options  Right arm pain  Diagnosis management comments: 30-year-old female presents with right clavicle pain. Denies any trauma or injury. She does have a history of prior rotator cuff surgery on the right arm as well as her AC joint osteotomy. She is not have any fever or chills. No signs of infection. Blood pressure elevated emergency department. Vitals otherwise unremarkable. She is good range of motion at the right shoulder. Does complain of some symptoms which do seem consistent with radicular symptoms. She has good  strength. Did obtain x-ray which is nonacute. Results are discussed with patient. Etiology seems most consistent with inflammatory process like arthritis or potential a radiculopathy. She is treated symptomatically emergency department. She will follow up outpatient. Return precautions for worsening concerning symptoms discussed. She is discharged home in stable condition.          -  Patient seen and evaluated in the emergency department. -  Triage and nursing notes reviewed and incorporated. -  Old chart records reviewed and incorporated. -  Work-up included:  See above  -  Results discussed with patient. CONSULTS:  None    PROCEDURES:  None performed unless otherwise noted below     Procedures        FINAL IMPRESSION      1.  Right arm pain          DISPOSITION/PLAN   DISPOSITION        PATIENT REFERRED TO:  No follow-up provider specified. DISCHARGE MEDICATIONS:  New Prescriptions    No medications on file       ED Provider Disposition Time  DISPOSITION        Appropriate personal protective equipment was worn during the patient's evaluation. These included surgical, eye protection, surgical mask or in 95 respirator and gloves. The patient was also placed in a surgical mask for the prevention of possible spread of respiratory viral illnesses. The Patient was instructed to read the package inserts with any medication that was prescribed. Major potential reactions and medication interactions were discussed. The Patient understands that there are numerous possible adverse reactions not covered. The patient was also instructed to arrange follow-up with his or her primary care provider for review of any pending labwork or incidental findings on any radiology results that were obtained. All efforts were made to discuss any incidental findings that require further monitoring. Controlled Substances Monitoring:     No flowsheet data found.     (Please note that portions of this note were completed with a voice recognition program.  Efforts were made to edit the dictations but occasionally words are mis-transcribed.)    Siddhartha Cevallos MD (electronically signed)  Attending Emergency Physician           Siddhartha Cevallos MD  01/04/21 3181

## 2021-01-15 ENCOUNTER — HOSPITAL ENCOUNTER (OUTPATIENT)
Dept: LAB | Age: 56
Discharge: HOME | End: 2021-01-15

## 2021-01-15 PROCEDURE — U0002 COVID-19 LAB TEST NON-CDC: HCPCS

## 2021-01-15 PROCEDURE — C9803 HOPD COVID-19 SPEC COLLECT: HCPCS

## 2021-01-16 LAB
SARS-COV-2: NOT DETECTED
SOURCE: NORMAL

## 2021-01-20 ENCOUNTER — ANESTHESIA EVENT (OUTPATIENT)
Dept: OPERATING ROOM | Age: 56
End: 2021-01-20

## 2021-01-20 RX ORDER — AMOXICILLIN AND CLAVULANATE POTASSIUM 500; 125 MG/1; MG/1
1 TABLET, FILM COATED ORAL 2 TIMES DAILY
Status: ON HOLD | COMMUNITY
End: 2021-04-28 | Stop reason: ALTCHOICE

## 2021-01-20 RX ORDER — ATORVASTATIN CALCIUM 20 MG/1
20 TABLET, FILM COATED ORAL DAILY
COMMUNITY
End: 2022-09-27

## 2021-01-20 RX ORDER — FOLIC ACID 1 MG/1
1 TABLET ORAL DAILY
COMMUNITY

## 2021-01-20 ASSESSMENT — LIFESTYLE VARIABLES: SMOKING_STATUS: 1

## 2021-01-20 NOTE — ANESTHESIA PRE PROCEDURE
Department of Anesthesiology  Preprocedure Note       Name:  Amparo Titus   Age:  48 y.o.  :  1965                                          MRN:  [id number]         Date:  2021      Surgeon: Radha Ramos):  Shashi Ny MD    Procedure: Procedure(s):  RIGHT CYSTOSCOPY RETROGRADE PYLEOGRAMS STONE MANIPULATION    Medications prior to admission:   Prior to Admission medications    Medication Sig Start Date End Date Taking? Authorizing Provider   ondansetron (ZOFRAN ODT) 4 MG disintegrating tablet Take 1 tablet by mouth every 8 hours as needed for Nausea 20   Domingo Manzo DO   tamsulosin (FLOMAX) 0.4 MG capsule Take 1 capsule by mouth daily for 14 days 20  Domingo Manzo DO   dicyclomine (BENTYL) 20 MG tablet Take 1 tablet by mouth 3 times daily as needed (abdominal pain) 20   Autumn Calhoun MD   spironolactone (ALDACTONE) 25 MG tablet TAKE 1 TABLET BY MOUTH ONE TIME A DAY 20   Historical Provider, MD   amLODIPine (NORVASC) 5 MG tablet Take 5 mg by mouth daily    Historical Provider, MD   triamcinolone (KENALOG) 0.1 % cream Apply topically 2 times daily x 10 to 14 days. 20   ALEJANDRO Paz - CNP   ibuprofen (ADVIL;MOTRIN) 600 MG tablet Take 1 tablet by mouth 3 times daily as needed for Pain 20   Lindsay Sandhoff, DO       Current medications:    No current facility-administered medications for this encounter.       Current Outpatient Medications   Medication Sig Dispense Refill    ondansetron (ZOFRAN ODT) 4 MG disintegrating tablet Take 1 tablet by mouth every 8 hours as needed for Nausea 15 tablet 0    tamsulosin (FLOMAX) 0.4 MG capsule Take 1 capsule by mouth daily for 14 days 14 capsule 0    dicyclomine (BENTYL) 20 MG tablet Take 1 tablet by mouth 3 times daily as needed (abdominal pain) 20 tablet 3    spironolactone (ALDACTONE) 25 MG tablet TAKE 1 TABLET BY MOUTH ONE TIME A DAY      amLODIPine (NORVASC) 5 MG tablet Take 5 mg by mouth daily      Pack years: 20.00     Types: Cigarettes    Smokeless tobacco: Never Used   Substance Use Topics    Alcohol use: No     Alcohol/week: 0.0 standard drinks                                Ready to quit: Not Answered  Counseling given: Not Answered      Vital Signs (Current): There were no vitals filed for this visit. BP Readings from Last 3 Encounters:   01/04/21 (!) 168/86   11/29/20 (!) 102/50   11/28/20 (!) 85/51       NPO Status:                                                                                 BMI:   Wt Readings from Last 3 Encounters:   01/04/21 200 lb (90.7 kg)   11/27/20 200 lb (90.7 kg)   06/23/20 236 lb (107 kg)     There is no height or weight on file to calculate BMI.    CBC:   Lab Results   Component Value Date    WBC 6.1 11/28/2020    RBC 4.64 11/28/2020    HGB 13.1 11/28/2020    HCT 41.0 11/28/2020    MCV 88.4 11/28/2020    RDW 13.4 11/28/2020     11/28/2020       CMP:   Lab Results   Component Value Date     11/28/2020    K 3.7 11/28/2020     11/28/2020    CO2 21 11/28/2020    BUN 19 11/28/2020    CREATININE 1.2 11/28/2020    GFRAA 57 11/28/2020    LABGLOM 47 11/28/2020    GLUCOSE 153 11/28/2020    PROT 7.6 11/27/2020    PROT 7.4 04/30/2012    CALCIUM 8.6 11/28/2020    BILITOT 0.3 11/27/2020    ALKPHOS 110 11/27/2020    AST 19 11/27/2020    ALT 20 11/27/2020       POC Tests: No results for input(s): POCGLU, POCNA, POCK, POCCL, POCBUN, POCHEMO, POCHCT in the last 72 hours.     Coags:   Lab Results   Component Value Date    PROTIME 13.6 11/28/2020    PROTIME 10.4 04/30/2012    INR 1.12 11/28/2020    APTT 24.4 07/16/2013       HCG (If Applicable):   Lab Results   Component Value Date    PREGTESTUR NEGATIVE 03/20/2016        ABGs: No results found for: PHART, PO2ART, IHU7EVB, WNH2VTX, BEART, T9HWEOEC     Type & Screen (If Applicable):  No results found for: LABABO, LABRH    Drug/Infectious Status (If Applicable):  No results found for: HIV, HEPCAB    COVID-19 Screening (If Applicable):   Lab Results   Component Value Date    COVID19 NOT DETECTED 01/15/2021         Anesthesia Evaluation    Airway:         Dental:          Pulmonary:   (+) asthma: current smoker                           Cardiovascular:    (+) hypertension:, hyperlipidemia         Beta Blocker:  Not on Beta Blocker      ROS comment: EF 55%, normal LV function, moderate LVH     Neuro/Psych:   (+) headaches: migraine headaches, psychiatric history:depression/anxiety             GI/Hepatic/Renal:   (+) GERD:, renal disease: kidney stones, morbid obesity          Endo/Other:    (+) DiabetesType II DM, , : arthritis: OA., .                 Abdominal:   (+) obese,         Vascular:                                        Anesthesia Plan      general     ASA 3       Induction: intravenous. Pre Anesthesia Assessment complete.  Chart reviewed on 1/20/2021        Ludivina Luna APRN - CRNA   1/20/2021

## 2021-01-20 NOTE — PROGRESS NOTES
Surgery:  [email or web address]                        Arrival time: 0800  Nothing to eat or drink after midnight unless instructed to take certain medications by the doctor or the nurse the am of surgery  Arrive at the front information desk -1st floor /[facility] is on [address]  Please leave money and all other valuables at home. Wear comfortable clothing. If you wear contacts please bring a case. No make up. You may be asked to remove rings or body piercing. Please bring insurance cards and picture ID am of procedure. Please bring any consent or paper work from your doctor. If you become ill,such as a cold, sore throat or fever contact your doctor. Please bathe or shower am of procedure.   Medications to take AM of procedure:     Any questions call [facility]  [phone]

## 2021-01-21 ENCOUNTER — APPOINTMENT (OUTPATIENT)
Dept: GENERAL RADIOLOGY | Age: 56
End: 2021-01-21
Attending: UROLOGY

## 2021-01-21 ENCOUNTER — ANESTHESIA (OUTPATIENT)
Dept: OPERATING ROOM | Age: 56
End: 2021-01-21

## 2021-01-21 ENCOUNTER — HOSPITAL ENCOUNTER (OUTPATIENT)
Age: 56
Setting detail: OUTPATIENT SURGERY
Discharge: HOME | End: 2021-01-21
Attending: UROLOGY | Admitting: UROLOGY

## 2021-01-21 VITALS
OXYGEN SATURATION: 99 % | RESPIRATION RATE: 16 BRPM | TEMPERATURE: 97.2 F | BODY MASS INDEX: 32.14 KG/M2 | WEIGHT: 200 LBS | HEART RATE: 91 BPM | HEIGHT: 66 IN | DIASTOLIC BLOOD PRESSURE: 54 MMHG | SYSTOLIC BLOOD PRESSURE: 125 MMHG

## 2021-01-21 VITALS
RESPIRATION RATE: 11 BRPM | SYSTOLIC BLOOD PRESSURE: 130 MMHG | TEMPERATURE: 98.6 F | DIASTOLIC BLOOD PRESSURE: 78 MMHG | OXYGEN SATURATION: 100 %

## 2021-01-21 DIAGNOSIS — N20.1 RIGHT URETERAL STONE: Primary | ICD-10-CM

## 2021-01-21 LAB — GLUCOSE BLD-MCNC: 93 MG/DL (ref 70–99)

## 2021-01-21 PROCEDURE — C1769 GUIDE WIRE: HCPCS | Performed by: UROLOGY

## 2021-01-21 PROCEDURE — 2580000003 HC RX 258: Performed by: ANESTHESIOLOGY

## 2021-01-21 PROCEDURE — 2580000003 HC RX 258: Performed by: UROLOGY

## 2021-01-21 PROCEDURE — 6370000000 HC RX 637 (ALT 250 FOR IP): Performed by: ANESTHESIOLOGY

## 2021-01-21 PROCEDURE — 82962 GLUCOSE BLOOD TEST: CPT

## 2021-01-21 PROCEDURE — 7100000001 HC PACU RECOVERY - ADDTL 15 MIN: Performed by: UROLOGY

## 2021-01-21 PROCEDURE — 7100000010 HC PHASE II RECOVERY - FIRST 15 MIN: Performed by: UROLOGY

## 2021-01-21 PROCEDURE — 82360 CALCULUS ASSAY QUANT: CPT

## 2021-01-21 PROCEDURE — 3700000000 HC ANESTHESIA ATTENDED CARE: Performed by: UROLOGY

## 2021-01-21 PROCEDURE — 88300 SURGICAL PATH GROSS: CPT | Performed by: PATHOLOGY

## 2021-01-21 PROCEDURE — 76000 FLUOROSCOPY <1 HR PHYS/QHP: CPT

## 2021-01-21 PROCEDURE — 7100000011 HC PHASE II RECOVERY - ADDTL 15 MIN: Performed by: UROLOGY

## 2021-01-21 PROCEDURE — C1758 CATHETER, URETERAL: HCPCS | Performed by: UROLOGY

## 2021-01-21 PROCEDURE — 2500000003 HC RX 250 WO HCPCS: Performed by: NURSE ANESTHETIST, CERTIFIED REGISTERED

## 2021-01-21 PROCEDURE — C2617 STENT, NON-COR, TEM W/O DEL: HCPCS | Performed by: UROLOGY

## 2021-01-21 PROCEDURE — 7100000000 HC PACU RECOVERY - FIRST 15 MIN: Performed by: UROLOGY

## 2021-01-21 PROCEDURE — C1894 INTRO/SHEATH, NON-LASER: HCPCS | Performed by: UROLOGY

## 2021-01-21 PROCEDURE — 3600000003 HC SURGERY LEVEL 3 BASE: Performed by: UROLOGY

## 2021-01-21 PROCEDURE — 6360000002 HC RX W HCPCS: Performed by: NURSE ANESTHETIST, CERTIFIED REGISTERED

## 2021-01-21 PROCEDURE — 3700000001 HC ADD 15 MINUTES (ANESTHESIA): Performed by: UROLOGY

## 2021-01-21 PROCEDURE — 6370000000 HC RX 637 (ALT 250 FOR IP): Performed by: NURSE ANESTHETIST, CERTIFIED REGISTERED

## 2021-01-21 PROCEDURE — 2720000010 HC SURG SUPPLY STERILE: Performed by: UROLOGY

## 2021-01-21 PROCEDURE — 6360000002 HC RX W HCPCS: Performed by: UROLOGY

## 2021-01-21 PROCEDURE — 3600000013 HC SURGERY LEVEL 3 ADDTL 15MIN: Performed by: UROLOGY

## 2021-01-21 PROCEDURE — 2709999900 HC NON-CHARGEABLE SUPPLY: Performed by: UROLOGY

## 2021-01-21 DEVICE — VARIABLE LENGTH URETERAL STENT
Type: IMPLANTABLE DEVICE | Site: URETER | Status: FUNCTIONAL
Brand: CONTOUR VL™

## 2021-01-21 RX ORDER — SODIUM CHLORIDE, SODIUM LACTATE, POTASSIUM CHLORIDE, CALCIUM CHLORIDE 600; 310; 30; 20 MG/100ML; MG/100ML; MG/100ML; MG/100ML
INJECTION, SOLUTION INTRAVENOUS CONTINUOUS
Status: DISCONTINUED | OUTPATIENT
Start: 2021-01-21 | End: 2021-01-21 | Stop reason: HOSPADM

## 2021-01-21 RX ORDER — HYDRALAZINE HYDROCHLORIDE 20 MG/ML
5 INJECTION INTRAMUSCULAR; INTRAVENOUS EVERY 10 MIN PRN
Status: DISCONTINUED | OUTPATIENT
Start: 2021-01-21 | End: 2021-01-21 | Stop reason: HOSPADM

## 2021-01-21 RX ORDER — SODIUM CHLORIDE 9 MG/ML
INJECTION, SOLUTION INTRAVENOUS CONTINUOUS
Status: DISCONTINUED | OUTPATIENT
Start: 2021-01-21 | End: 2021-01-21 | Stop reason: HOSPADM

## 2021-01-21 RX ORDER — HYDROCODONE BITARTRATE AND ACETAMINOPHEN 5; 325 MG/1; MG/1
2 TABLET ORAL PRN
Status: COMPLETED | OUTPATIENT
Start: 2021-01-21 | End: 2021-01-21

## 2021-01-21 RX ORDER — SCOLOPAMINE TRANSDERMAL SYSTEM 1 MG/1
PATCH, EXTENDED RELEASE TRANSDERMAL PRN
Status: DISCONTINUED | OUTPATIENT
Start: 2021-01-21 | End: 2021-01-21 | Stop reason: SDUPTHER

## 2021-01-21 RX ORDER — PROMETHAZINE HYDROCHLORIDE 25 MG/ML
6.25 INJECTION, SOLUTION INTRAMUSCULAR; INTRAVENOUS
Status: DISCONTINUED | OUTPATIENT
Start: 2021-01-21 | End: 2021-01-21 | Stop reason: HOSPADM

## 2021-01-21 RX ORDER — LABETALOL HYDROCHLORIDE 5 MG/ML
5 INJECTION, SOLUTION INTRAVENOUS EVERY 10 MIN PRN
Status: DISCONTINUED | OUTPATIENT
Start: 2021-01-21 | End: 2021-01-21 | Stop reason: HOSPADM

## 2021-01-21 RX ORDER — HYDROMORPHONE HCL 110MG/55ML
0.5 PATIENT CONTROLLED ANALGESIA SYRINGE INTRAVENOUS EVERY 5 MIN PRN
Status: DISCONTINUED | OUTPATIENT
Start: 2021-01-21 | End: 2021-01-21 | Stop reason: HOSPADM

## 2021-01-21 RX ORDER — FENTANYL CITRATE 50 UG/ML
INJECTION, SOLUTION INTRAMUSCULAR; INTRAVENOUS PRN
Status: DISCONTINUED | OUTPATIENT
Start: 2021-01-21 | End: 2021-01-21 | Stop reason: SDUPTHER

## 2021-01-21 RX ORDER — HYDROCODONE BITARTRATE AND ACETAMINOPHEN 5; 325 MG/1; MG/1
1 TABLET ORAL EVERY 8 HOURS PRN
Qty: 12 TABLET | Refills: 0 | Status: SHIPPED | OUTPATIENT
Start: 2021-01-21 | End: 2021-01-24

## 2021-01-21 RX ORDER — HYDROCODONE BITARTRATE AND ACETAMINOPHEN 5; 325 MG/1; MG/1
1 TABLET ORAL PRN
Status: COMPLETED | OUTPATIENT
Start: 2021-01-21 | End: 2021-01-21

## 2021-01-21 RX ORDER — PROPOFOL 10 MG/ML
INJECTION, EMULSION INTRAVENOUS PRN
Status: DISCONTINUED | OUTPATIENT
Start: 2021-01-21 | End: 2021-01-21 | Stop reason: SDUPTHER

## 2021-01-21 RX ORDER — PHENYLEPHRINE HCL IN 0.9% NACL 1 MG/10 ML
SYRINGE (ML) INTRAVENOUS PRN
Status: DISCONTINUED | OUTPATIENT
Start: 2021-01-21 | End: 2021-01-21 | Stop reason: SDUPTHER

## 2021-01-21 RX ORDER — FENTANYL CITRATE 50 UG/ML
50 INJECTION, SOLUTION INTRAMUSCULAR; INTRAVENOUS EVERY 5 MIN PRN
Status: DISCONTINUED | OUTPATIENT
Start: 2021-01-21 | End: 2021-01-21 | Stop reason: HOSPADM

## 2021-01-21 RX ORDER — DIPHENHYDRAMINE HYDROCHLORIDE 50 MG/ML
12.5 INJECTION INTRAMUSCULAR; INTRAVENOUS
Status: DISCONTINUED | OUTPATIENT
Start: 2021-01-21 | End: 2021-01-21 | Stop reason: HOSPADM

## 2021-01-21 RX ORDER — LIDOCAINE HYDROCHLORIDE 20 MG/ML
INJECTION, SOLUTION EPIDURAL; INFILTRATION; INTRACAUDAL; PERINEURAL PRN
Status: DISCONTINUED | OUTPATIENT
Start: 2021-01-21 | End: 2021-01-21 | Stop reason: SDUPTHER

## 2021-01-21 RX ORDER — GLYCOPYRROLATE 1 MG/5 ML
SYRINGE (ML) INTRAVENOUS PRN
Status: DISCONTINUED | OUTPATIENT
Start: 2021-01-21 | End: 2021-01-21 | Stop reason: SDUPTHER

## 2021-01-21 RX ORDER — MEPERIDINE HYDROCHLORIDE 25 MG/ML
12.5 INJECTION INTRAMUSCULAR; INTRAVENOUS; SUBCUTANEOUS EVERY 5 MIN PRN
Status: DISCONTINUED | OUTPATIENT
Start: 2021-01-21 | End: 2021-01-21 | Stop reason: HOSPADM

## 2021-01-21 RX ORDER — DEXAMETHASONE SODIUM PHOSPHATE 4 MG/ML
INJECTION, SOLUTION INTRA-ARTICULAR; INTRALESIONAL; INTRAMUSCULAR; INTRAVENOUS; SOFT TISSUE PRN
Status: DISCONTINUED | OUTPATIENT
Start: 2021-01-21 | End: 2021-01-21 | Stop reason: SDUPTHER

## 2021-01-21 RX ORDER — CIPROFLOXACIN 2 MG/ML
400 INJECTION, SOLUTION INTRAVENOUS ONCE
Status: DISCONTINUED | OUTPATIENT
Start: 2021-01-21 | End: 2021-01-21 | Stop reason: HOSPADM

## 2021-01-21 RX ORDER — ONDANSETRON 2 MG/ML
INJECTION INTRAMUSCULAR; INTRAVENOUS PRN
Status: DISCONTINUED | OUTPATIENT
Start: 2021-01-21 | End: 2021-01-21 | Stop reason: SDUPTHER

## 2021-01-21 RX ADMIN — FENTANYL CITRATE 50 MCG: 50 INJECTION INTRAMUSCULAR; INTRAVENOUS at 10:15

## 2021-01-21 RX ADMIN — Medication 150 MCG: at 10:31

## 2021-01-21 RX ADMIN — FENTANYL CITRATE 50 MCG: 50 INJECTION INTRAMUSCULAR; INTRAVENOUS at 10:45

## 2021-01-21 RX ADMIN — CEFTRIAXONE 1 G: 1 INJECTION, POWDER, FOR SOLUTION INTRAMUSCULAR; INTRAVENOUS at 10:27

## 2021-01-21 RX ADMIN — Medication 100 MCG: at 10:40

## 2021-01-21 RX ADMIN — Medication 0.2 MG: at 10:40

## 2021-01-21 RX ADMIN — SODIUM CHLORIDE: 9 INJECTION, SOLUTION INTRAVENOUS at 10:02

## 2021-01-21 RX ADMIN — FENTANYL CITRATE 50 MCG: 50 INJECTION INTRAMUSCULAR; INTRAVENOUS at 10:35

## 2021-01-21 RX ADMIN — DEXAMETHASONE SODIUM PHOSPHATE 4 MG: 4 INJECTION, SOLUTION INTRAMUSCULAR; INTRAVENOUS at 11:08

## 2021-01-21 RX ADMIN — FENTANYL CITRATE 50 MCG: 50 INJECTION INTRAMUSCULAR; INTRAVENOUS at 10:23

## 2021-01-21 RX ADMIN — ONDANSETRON 4 MG: 2 INJECTION INTRAMUSCULAR; INTRAVENOUS at 11:08

## 2021-01-21 RX ADMIN — PROPOFOL 170 MG: 10 INJECTION, EMULSION INTRAVENOUS at 10:17

## 2021-01-21 RX ADMIN — LIDOCAINE HYDROCHLORIDE 100 MG: 20 INJECTION, SOLUTION EPIDURAL; INFILTRATION; INTRACAUDAL; PERINEURAL at 10:17

## 2021-01-21 RX ADMIN — SODIUM CHLORIDE, POTASSIUM CHLORIDE, SODIUM LACTATE AND CALCIUM CHLORIDE: 600; 310; 30; 20 INJECTION, SOLUTION INTRAVENOUS at 08:20

## 2021-01-21 RX ADMIN — HYDROCODONE BITARTRATE AND ACETAMINOPHEN 2 TABLET: 5; 325 TABLET ORAL at 13:00

## 2021-01-21 RX ADMIN — SCOPOLAMINE 1 PATCH: 1 PATCH, EXTENDED RELEASE TRANSDERMAL at 09:55

## 2021-01-21 ASSESSMENT — PAIN SCALES - GENERAL
PAINLEVEL_OUTOF10: 7
PAINLEVEL_OUTOF10: 3
PAINLEVEL_OUTOF10: 7
PAINLEVEL_OUTOF10: 0

## 2021-01-21 ASSESSMENT — PULMONARY FUNCTION TESTS
PIF_VALUE: 9
PIF_VALUE: 14
PIF_VALUE: 9
PIF_VALUE: 14
PIF_VALUE: 14
PIF_VALUE: 0
PIF_VALUE: 15
PIF_VALUE: 15
PIF_VALUE: 1
PIF_VALUE: 14
PIF_VALUE: 11
PIF_VALUE: 14
PIF_VALUE: 1
PIF_VALUE: 12
PIF_VALUE: 14
PIF_VALUE: 14
PIF_VALUE: 1
PIF_VALUE: 14
PIF_VALUE: 1
PIF_VALUE: 14
PIF_VALUE: 14
PIF_VALUE: 0
PIF_VALUE: 14
PIF_VALUE: 15
PIF_VALUE: 8
PIF_VALUE: 14
PIF_VALUE: 20
PIF_VALUE: 14

## 2021-01-21 ASSESSMENT — PAIN DESCRIPTION - ORIENTATION
ORIENTATION: LOWER
ORIENTATION: LOWER

## 2021-01-21 ASSESSMENT — LIFESTYLE VARIABLES: SMOKING_STATUS: 1

## 2021-01-21 ASSESSMENT — PAIN DESCRIPTION - DESCRIPTORS: DESCRIPTORS: BURNING

## 2021-01-21 ASSESSMENT — PAIN DESCRIPTION - FREQUENCY: FREQUENCY: CONTINUOUS

## 2021-01-21 ASSESSMENT — PAIN DESCRIPTION - PAIN TYPE: TYPE: SURGICAL PAIN

## 2021-01-21 NOTE — PROGRESS NOTES
1119 patient received from the OR monitor placed and alarms on. Report received from [facility]. Black string noted for stent no vaginal drainage or bleeding noted   1130 Dr Suzan Melara in to visit at bedside  1150 voided 50 cc's light pink tinged urine per bedpan no stones noted   1227 transferred back to same day surgery via cart.  Report given to [facility] RN

## 2021-01-21 NOTE — PROGRESS NOTES
1230 pt recd from PACU call light in reach,   1235 soda and kristen crackers provided  1250 ambulated to bathroom  1300 medicated for pain  1325 pt states she is ready to go home pain is down to 3/10.   1330 pt dressing  1335 waiting on ride  1350 discharged to car via wheelchair

## 2021-01-21 NOTE — BRIEF OP NOTE
Brief Postoperative Note      Patient: Sandee Ashraf  YOB: 1965  MRN: [id number]    Date of Procedure: 1/21/2021    Pre-Op Diagnosis: 1.  6mm proximal right ureter stone                                 2.  6mm right renal stone    Post-Op Diagnosis: Same       Procedure  1. Right ureteroscopy and extraction of small right renal stones  2. Cystoscopy and right ureter stent exchange    Surgeon(s):  Kristina Melchor MD    Assistant:  none    Anesthesia: General    Estimated Blood Loss (mL): Minimal    Complications: None    Specimens:   Right renal stones    Implants:  Right ureter stent- 4.8 Kyr[place]z variable with black string          Findings: 1.   No stone found in right ureter                  2.  Found 20-30 small stone debris (uric acid appearance) each less than 1mm but no solid stone greater than 2mm                  3.  Right ureter appeared intact    Electronically signed by Kristina Melchor MD on 1/21/2021 at 11:05 AM

## 2021-01-21 NOTE — ANESTHESIA PRE PROCEDURE
Department of Anesthesiology  Preprocedure Note       Name:  Lanny Florez   Age:  48 y.o.  :  1965                                          MRN:  [id number]         Date:  2021      Surgeon: Olga Bunn):  Newton Vences MD    Procedure: Procedure(s):  RIGHT CYSTOSCOPY RETROGRADE PYLEOGRAMS STONE MANIPULATION    Medications prior to admission:   Prior to Admission medications    Medication Sig Start Date End Date Taking? Authorizing Provider   VITAMIN D PO Take 1,000 Units by mouth daily    Historical Provider, MD   amoxicillin-clavulanate (AUGMENTIN) 500-125 MG per tablet Take 1 tablet by mouth 2 times daily    Historical Provider, MD   atorvastatin (LIPITOR) 20 MG tablet Take 20 mg by mouth daily    Historical Provider, MD   folic acid (FOLVITE) 1 MG tablet Take 1 mg by mouth daily    Historical Provider, MD   dicyclomine (BENTYL) 20 MG tablet Take 1 tablet by mouth 3 times daily as needed (abdominal pain) 20   Daren Rivera MD   spironolactone (ALDACTONE) 25 MG tablet TAKE 1 TABLET BY MOUTH ONE TIME A DAY 20   Historical Provider, MD   amLODIPine (NORVASC) 5 MG tablet Take 5 mg by mouth daily    Historical Provider, MD       Current medications:    No current facility-administered medications for this visit. No current outpatient medications on file. Facility-Administered Medications Ordered in Other Visits   Medication Dose Route Frequency Provider Last Rate Last Admin    ciprofloxacin (CIPRO) IVPB 400 mg  400 mg Intravenous Once Newton Vences MD        lactated ringers infusion   Intravenous Continuous Josie Dempsey  mL/hr at 21 0820 New Bag at 21 0820       Allergies: Allergies   Allergen Reactions    Pollen Extract Itching     Itchy and watery eyes. Pet dander also causes same symptoms.     Tramadol      Ear ringing       Problem List:    Patient Active Problem List   Diagnosis Code    Obesity, Class I, BMI 30-34.9 E66.9    HTN (hypertension) I10  Right knee pain M25.561    Hyperlipidemia E78.5    GERD (gastroesophageal reflux disease) K21.9    Patellofemoral syndrome M22.2X9    DJD (degenerative joint disease) of knee M17.10    Tobacco dependence F17.200    Major depressive disorder, recurrent episode with anxious distress (HCC) F33.9    Mild persistent asthma without complication L57.71    Urinary incontinence R32    Irritable bowel syndrome with diarrhea K58.0    Right ureteral calculus N20.1       Past Medical History:        Diagnosis Date    Arthritis     Arthritis of left knee     Asthma     Diabetes mellitus ([facility] [address]     Disorder of rotator cuff syndrome of left shoulder and allied disorder     H/O cardiovascular stress test 2/24/16    EF68% normal study    Hx of echocardiogram 2/24/16    EF 55%, normal LV function, moderate LVH    Hyperlipidemia     Hypertension     Kidney stones     Migraine     Obesity     PONV (postoperative nausea and vomiting)        Past Surgical History:        Procedure Laterality Date    CYSTOSCOPY INSERTION / REMOVAL STENT / STONE N/A 11/28/2020    CYSTOSCOPY RETROGRADE PYELOGRAM STENT INSERTION performed by Marya Coello MD [facility]      HYSTERECTOMY      KIDNEY STONE SURGERY      SHOULDER SURGERY      TONSILLECTOMY      TUBAL LIGATION         Social History:    Social History     Tobacco Use    Smoking status: Current Every Day Smoker     Packs/day: 1.00     Years: 20.00     Pack years: 20.00     Types: Cigarettes    Smokeless tobacco: Never Used   Substance Use Topics    Alcohol use: No     Alcohol/week: 0.0 standard drinks                                Ready to quit: Not Answered  Counseling given: Not Answered      Vital Signs (Current): There were no vitals filed for this visit.                                            BP Readings from Last 3 Encounters:   01/21/21 120/65   01/04/21 (!) 168/86   11/29/20 (!) 102/50       NPO Status:                                                                                 BMI:   Wt Readings from Last 3 Encounters:   01/21/21 200 lb (90.7 kg)   01/04/21 200 lb (90.7 kg)   11/27/20 200 lb (90.7 kg)     There is no height or weight on file to calculate BMI.    CBC:   Lab Results   Component Value Date    WBC 6.1 11/28/2020    RBC 4.64 11/28/2020    HGB 13.1 11/28/2020    HCT 41.0 11/28/2020    MCV 88.4 11/28/2020    RDW 13.4 11/28/2020     11/28/2020       CMP:   Lab Results   Component Value Date     11/28/2020    K 3.7 11/28/2020     11/28/2020    CO2 21 11/28/2020    BUN 19 11/28/2020    CREATININE 1.2 11/28/2020    GFRAA 57 11/28/2020    LABGLOM 47 11/28/2020    GLUCOSE 153 11/28/2020    PROT 7.6 11/27/2020    PROT 7.4 04/30/2012    CALCIUM 8.6 11/28/2020    BILITOT 0.3 11/27/2020    ALKPHOS 110 11/27/2020    AST 19 11/27/2020    ALT 20 11/27/2020       POC Tests: No results for input(s): POCGLU, POCNA, POCK, POCCL, POCBUN, POCHEMO, POCHCT in the last 72 hours. Coags:   Lab Results   Component Value Date    PROTIME 13.6 11/28/2020    PROTIME 10.4 04/30/2012    INR 1.12 11/28/2020    APTT 24.4 07/16/2013       HCG (If Applicable):   Lab Results   Component Value Date    PREGTESTUR NEGATIVE 03/20/2016        ABGs: No results found for: PHART, PO2ART, JYB6KRV, XHO7GJS, BEART, K9OLWWIZ     Type & Screen (If Applicable):  No results found for: LABABO, LABRH    Drug/Infectious Status (If Applicable):  No results found for: HIV, HEPCAB    COVID-19 Screening (If Applicable):   Lab Results   Component Value Date    COVID19 NOT DETECTED 01/15/2021         Anesthesia Evaluation  Patient summary reviewed and Nursing notes reviewed   history of anesthetic complications: PONV.   Airway: Mallampati: IV  TM distance: >3 FB   Neck ROM: full   Dental:    (+) edentulous      Pulmonary:normal exam    (+) asthma: current smoker                           Cardiovascular:  Exercise tolerance: good (>4 METS),   (+) hypertension:, hyperlipidemia        Rhythm: regular  Rate: normal           Beta Blocker:  Not on Beta Blocker      ROS comment: EF 55%, normal LV function, moderate LVH     Neuro/Psych:   (+) headaches: migraine headaches, psychiatric history:depression/anxiety             GI/Hepatic/Renal:   (+) GERD:, renal disease: kidney stones, morbid obesity          Endo/Other:    (+) DiabetesType II DM, , : arthritis: OA., .                 Abdominal:   (+) obese,         Vascular:                                          Anesthesia Plan      general     ASA 3       Induction: intravenous. Anesthetic plan and risks discussed with patient. Plan discussed with CRNA. Pre Anesthesia Assessment complete.  Chart reviewed on 1/21/2021        ALEJANDRO Krishna - CRNA   1/21/2021

## 2021-01-21 NOTE — ANESTHESIA POSTPROCEDURE EVALUATION
Department of Anesthesiology  Postprocedure Note    Patient: Hank Mott  MRN: [id number]  YOB: 1965  Date of evaluation: 1/21/2021  Time:  11:20 AM     Procedure Summary     Date: 01/21/21 Room / Location: [place] [id number] / [facility]    Anesthesia Start: 1010 Anesthesia Stop: 1120    Procedure: CYSTOSCOPY STENT EXCHANGE URETEROSCOPY EXTRACTION OF STONES (Right Ureter) Diagnosis: (URETER - KIDNEY STONE)    Surgeons: Sandralee Cushing, MD Responsible Provider: Kaley Bunch DO    Anesthesia Type: general ASA Status: 3          Anesthesia Type: general    Khris Phase I: Khris Score: 10    Khris Phase II:      Last vitals: Reviewed and per EMR flowsheets.        Anesthesia Post Evaluation    Patient location during evaluation: PACU  Patient participation: complete - patient participated  Level of consciousness: sleepy but conscious  Airway patency: patent  Nausea & Vomiting: no nausea  Complications: no  Cardiovascular status: hemodynamically stable  Respiratory status: acceptable  Hydration status: euvolemic

## 2021-01-22 NOTE — OP NOTE
[address] [place] [address], [address] [place] [address]                                OPERATIVE REPORT    PATIENT NAME: Sriisha Glover                      :        1965  MED REC NO:   [id number]                          ROOM:  ACCOUNT NO:   [de-identified]                           ADMIT DATE: 2021  PROVIDER:     Jossue Hanna MD    DATE OF PROCEDURE:  2021    PREOPERATIVE DIAGNOSES:  1. A 6-mm proximal right ureter stone. 2.  A 6-mm right renal stone. POSTOPERATIVE DIAGNOSES:  1. A 6-mm proximal right ureter stone. 2.  A 6-mm right renal stone. PROCEDURE PERFORMED:  1. Right ureteroscopy and extraction of small right renal stones. 2.  Cystoscopy and right ureter stent exchange. SURGEON:  Jossue Hanna MD    ANESTHESIA:  General.    ESTIMATED BLOOD LOSS:  Minimal.    COMPLICATIONS:  None. SPECIMEN:  Right renal stones. DRAINS:  A 4.8-Thai variable right ureter stent with black string. FINDINGS:  1.  No stone found in right ureter. 2.  I did not find a large solid stone but found 20 to 30 small stone  debris with uric acid appearance in the right kidney. I attempted to  remove the largest ones but the remaining were too small for the basket. 3.  Right ureter appeared intact after the procedure. DISPOSITION:  Stable to PACU. INDICATIONS FOR PROCEDURE:  The patient is a 52-year-old female with a  history of kidney stones who had an obstructing 6-mm proximal right  ureter stones several weeks ago. She has a stent in place. She  presents today for further management of her stones. She also has right  renal stones on CAT scan. She denied any UTI symptoms but was bothered  by stent irritation. She was on Augmentin for preoperative urine  culture. She denies any current urinary tract infection symptoms. Her  COVID test was negative.   I reviewed the risks, benefits and alternative therapies. The patient elected to proceed with surgery. She accepted  the COVID risks. The patient received preoperative antibiotics of Cipro and Rocephin. She had compression boots in place. She was brought to the operating  room, placed in supine position. A general anesthetic was administered  by the Anesthesia Service. She was then placed in dorsal lithotomy  position and prepped and draped in a sterile fashion after being  appropriately padded. A time-out was performed per protocol. A  21-Sinhala cystoscope with a 30-degree lens was placed through the  urethra and into the bladder without difficulty. The bladder was  irrigated several times. Her previous stent was removed to the meatus  followed by a sensor guidewire. I then placed a semi-rigid ureteroscope  and advanced to the proximal right ureter. No stone was found. A  second sensor guidewire was placed into the right collecting system  followed by a navigator ureteral access sheath. I then placed a  LithoVue flexible ureteroscope into the right collecting system. The  remainder of the right ureter was clear of stones. I then inspected the  right renal pelvis. I did not find any large stone, but I did find a  clot which was removed. In addition, there were 20 to 30 small yellow  stone debris with a uric acid appearance but no single large stone. I  did attempt to remove the stone fragments with a basket; however, I was  only able to remove three. The remainder were too small to collect in  the basket. The right renal pelvis and calyces were inspected again and  no additional solitary stones were found except for more of the small  debris. I elected to end the procedure at this point in time. The  flexible ureteroscope was slowly removed. The right ureter appeared  intact. The cystoscope was replaced over the safety wire and a 4.8  [place] variable stent was placed into the right collecting system.   It appeared to have adequate positioning. A black string remained in place  for later stent removal.  The bladder was emptied and the patient was  brought to the PACU in stable condition. There was minimal blood loss  during this procedure. The patient will follow up in our office in one week for black string  and stent removal.  She will follow up in our office in one month with a  renal ultrasound.         Amalia Olvera MD    D: 2021 11:19:21       T: 2021 11:27:46     [email or web address]  Job#: [id number]     Doc#: [id number]    CC:  Jossue Hanna MD

## 2021-01-25 LAB
STONE COMPOSITION: NORMAL
STONE DESCRIPTION: NORMAL
STONE MASS: 3 MG
STONE NUMBER: 6
STONE SIZE: NORMAL MM

## 2021-02-09 ENCOUNTER — HOSPITAL ENCOUNTER (EMERGENCY)
Age: 56
Discharge: HOME | End: 2021-02-09

## 2021-02-09 ENCOUNTER — APPOINTMENT (OUTPATIENT)
Dept: GENERAL RADIOLOGY | Age: 56
End: 2021-02-09

## 2021-02-09 VITALS
OXYGEN SATURATION: 97 % | TEMPERATURE: 98.4 F | RESPIRATION RATE: 20 BRPM | SYSTOLIC BLOOD PRESSURE: 156 MMHG | HEART RATE: 96 BPM | DIASTOLIC BLOOD PRESSURE: 71 MMHG

## 2021-02-09 DIAGNOSIS — S89.92XA INJURY OF LEFT KNEE, INITIAL ENCOUNTER: Primary | ICD-10-CM

## 2021-02-09 PROCEDURE — 99283 EMERGENCY DEPT VISIT LOW MDM: CPT

## 2021-02-09 PROCEDURE — 73564 X-RAY EXAM KNEE 4 OR MORE: CPT

## 2021-02-09 RX ORDER — NAPROXEN 500 MG/1
500 TABLET ORAL 2 TIMES DAILY
Qty: 60 TABLET | Refills: 0 | Status: ON HOLD | OUTPATIENT
Start: 2021-02-09 | End: 2021-04-29 | Stop reason: HOSPADM

## 2021-02-09 ASSESSMENT — PAIN - FUNCTIONAL ASSESSMENT: PAIN_FUNCTIONAL_ASSESSMENT: 0-10

## 2021-02-09 NOTE — ED PROVIDER NOTES
EMERGENCY DEPARTMENT ENCOUNTER      PCP: Astrid Mayorga MD    [address] [facility]    Chief Complaint   Patient presents with    Knee Pain     fall on ice, left knee       I evaluated this patient. My supervising physician was available for consultation. HPI    Tyrone Mckinney is a 48 y.o. female who presents with left knee pain. Onset was this morning. The context is patient slipped on ice landed on left knee. Pain is localized to medial left knee. The pain severity is 10 out of 10. Patient was able to ambulate after the injury. the patient has no associated other injuries. The pain is aggravated by ambulation, palpation and knee movement. REVIEW OF SYSTEMS    General: Denies fever or chills  Cardiac: Denies chest pain  Pulmonary: Denies shortness of breath  GI: Denies abdominal pain, vomiting, or diarrhea  : No dysuria or hematuria    Denies any other muscles skeletal injuries, including chest wall and back.     All other review of systems are negative  See HPI and nursing notes for additional information     PAST MEDICAL & SURGICAL HISTORY    Past Medical History:   Diagnosis Date    Arthritis     Arthritis of left knee     Asthma     Diabetes mellitus ([facility] [address]     Disorder of rotator cuff syndrome of left shoulder and allied disorder     H/O cardiovascular stress test 2/24/16    EF68% normal study    Hx of echocardiogram 2/24/16    EF 55%, normal LV function, moderate LVH    Hyperlipidemia     Hypertension     Kidney stones     Migraine     Obesity     PONV (postoperative nausea and vomiting)      Past Surgical History:   Procedure Laterality Date    CYSTOSCOPY INSERTION / REMOVAL STENT / STONE N/A 11/28/2020    CYSTOSCOPY RETROGRADE PYELOGRAM STENT INSERTION performed by Hedda Lanes, MD at [address] [facility] / [address] / Chip Gerard Right 1/21/2021    CYSTOSCOPY STENT EXCHANGE URETEROSCOPY EXTRACTION OF STONES performed by Brittany Bautista MD at [address] [place] [address] FOOT SURGERY      HERNIA REPAIR      HYSTERECTOMY      KIDNEY STONE SURGERY      SHOULDER SURGERY      TONSILLECTOMY      TUBAL LIGATION         CURRENT MEDICATIONS    Current Outpatient Rx   Medication Sig Dispense Refill    naproxen (NAPROSYN) 500 MG tablet Take 1 tablet by mouth 2 times daily 60 tablet 0    VITAMIN D PO Take 1,000 Units by mouth daily      amoxicillin-clavulanate (AUGMENTIN) 500-125 MG per tablet Take 1 tablet by mouth 2 times daily      atorvastatin (LIPITOR) 20 MG tablet Take 20 mg by mouth daily      folic acid (FOLVITE) 1 MG tablet Take 1 mg by mouth daily      dicyclomine (BENTYL) 20 MG tablet Take 1 tablet by mouth 3 times daily as needed (abdominal pain) 20 tablet 3    spironolactone (ALDACTONE) 25 MG tablet TAKE 1 TABLET BY MOUTH ONE TIME A DAY      amLODIPine (NORVASC) 5 MG tablet Take 5 mg by mouth daily         ALLERGIES    Allergies   Allergen Reactions    Pollen Extract Itching     Itchy and watery eyes. Pet dander also causes same symptoms.     Tramadol      Ear ringing       SOCIAL & FAMILY HISTORY    Social History     Socioeconomic History    Marital status: Legally      Spouse name: None    Number of children: None    Years of education: None    Highest education level: None   Occupational History    None   Social Needs    Financial resource strain: None    Food insecurity     Worry: None     Inability: None    Transportation needs     Medical: None     Non-medical: None   Tobacco Use    Smoking status: Current Every Day Smoker     Packs/day: 1.00     Years: 20.00     Pack years: 20.00     Types: Cigarettes    Smokeless tobacco: Never Used   Substance and Sexual Activity    Alcohol use: No     Alcohol/week: 0.0 standard drinks    Drug use: No    Sexual activity: Not Currently     Partners: Male     Comment: 2 DAUGHTERS   Lifestyle    Physical activity     Days per week: None     Minutes per session: None    Stress: None   Relationships    Social connections     Talks on phone: None     Gets together: None     Attends Hoahaoism service: None     Active member of club or organization: None     Attends meetings of clubs or organizations: None     Relationship status: None    Intimate partner violence     Fear of current or ex partner: None     Emotionally abused: None     Physically abused: None     Forced sexual activity: None   Other Topics Concern    None   Social History Narrative    None     Family History   Problem Relation Age of Onset    Heart Disease Mother     High Blood Pressure Mother     Asthma Mother     Diabetes Sister     Seizures Sister     Asthma Sister     COPD Sister     Heart Disease Maternal Grandmother     High Blood Pressure Maternal Grandmother     Cancer Maternal Grandfather     Emphysema Maternal Grandfather     Asthma Daughter     Depression Daughter     Migraines Daughter     Obesity Daughter     Ulcerative Colitis Daughter     High Cholesterol Daughter     Asthma Daughter     Migraines Daughter     Obesity Daughter     Asthma Brother     Stroke Paternal Uncle            PHYSICAL EXAM    VITAL SIGNS: BP (!) 156/71   Pulse 96   Temp 98.4 °F (36.9 °C) (Oral)   Resp 20   SpO2 97%   Constitutional:  Well developed, Appears comfortable    Musculoskeletal:    Left knee exam:      -Inspection:  No obvious defects or deformities, skin intact   - Palpation: No redness, or warmth      No effusion     medial joint line tenderness, no parapatellar, pes region tenderness. -ROM:  Extension - Has full extension (Extensor mechanism intact)    Flexion - Mildly limited due pain   -Provocative tests: Manual manipulation of knee limited due to pain    No swelling, discoloration, tenderness to palpation of lower leg, or range of motion deficit of the ipsilateral hip and ankle  Vascular: Distal pulses (DP, PT) intact on affected side. Capillary refill intact.   Integument:  Well hydrated, no rash   Neurologic: Awake and alert, normal flow of speech. Distal sensation, motor intact. Psychiatric: Cooperative, pleasant affect      RADIOLOGY/PROCEDURES    Pt elects to hold on xray today and understands that I am unable to fully evaluate for presenting symptoms by omiting this. The patient understands they may return to the ED at any time, without penalty or recrimminations, for reevaluation and to have it done. ED COURSE & MEDICAL DECISION MAKING        Patient presents left knee pain after fall onto the knee on ice. No obvious deformity on exam, patient able to ambulate, neurovascularly intact. Patient refuses x-rays at this time, I do not suspect fracture. Discussed rice therapy. Patient to take 2 days off work to rest the extremity. Naproxen for pain control and anti-inflammatory. I recommend follow-up with orthopedist if no improvement over the next 5-7 days. Patient agrees to return emergency department if symptoms worsen or any new symptoms develop. Clinical  IMPRESSION    1. Injury of left knee, initial encounter            Comment: Please note this report has been produced using speech recognition software and may contain errors related to that system including errors in grammar, punctuation, and spelling, as well as words and phrases that may be inappropriate. If there are any questions or concerns please feel free to contact the dictating provider for clarification.         Sebastian Montano Alakayodema  02/10/21 8413

## 2021-02-09 NOTE — DISCHARGE INSTR - COC
Continuity of Care Form    Patient Name: Sandee Ashraf   :  1965  MRN:  [id number]    Admit date:  2021  Discharge date:  ***    Code Status Order: Prior   Advance Directives:     Admitting Physician:  No admitting provider for patient encounter.   PCP: Elayne Ovalles MD    Discharging Nurse: [facility] Unit/Room#: ED04/ED-04  Discharging Unit Phone Number: ***    Emergency Contact:   Extended Emergency Contact Information  Primary Emergency Contact: Bradford Gaucher [address] Phone: [phone]  Relation: Parent    Past Surgical History:  Past Surgical History:   Procedure Laterality Date    CYSTOSCOPY INSERTION / REMOVAL STENT / STONE N/A 2020    CYSTOSCOPY RETROGRADE PYELOGRAM STENT INSERTION performed by Ivanna Lu MD at [address] [place] [address] Ave / [address] [place] [address] Rd / John Pert Right 2021    CYSTOSCOPY STENT EXCHANGE URETEROSCOPY EXTRACTION OF STONES performed by Kristina Melchor MD at [facility]      HYSTERECTOMY      KIDNEY STONE SURGERY      SHOULDER SURGERY      TONSILLECTOMY      TUBAL LIGATION         Immunization History:   Immunization History   Administered Date(s) Administered    Influenza Virus Vaccine 2011    Tdap (Boostrix, Adacel) 2015       Active Problems:  Patient Active Problem List   Diagnosis Code    Obesity, Class I, BMI 30-34.9 E66.9    HTN (hypertension) I10    Right knee pain M25.561    Hyperlipidemia E78.5    GERD (gastroesophageal reflux disease) K21.9    Patellofemoral syndrome M22.2X9    DJD (degenerative joint disease) of knee M17.10    Tobacco dependence F17.200    Major depressive disorder, recurrent episode with anxious distress ([facility] [address] F33.9    Mild persistent asthma without complication K52.12    Urinary incontinence R32    Irritable bowel syndrome with diarrhea K58.0    Right ureteral stone N20.1       Isolation/Infection: Isolation          No Isolation        Patient Infection Status     Infection Onset Added Last Indicated Last Indicated By Review Planned Expiration Resolved Resolved By    None active    Resolved    COVID-19 Rule Out 01/15/21 01/15/21 01/15/21 Covid-19 Ambulatory (Ordered)   21 Rule-Out Test Resulted          Nurse Assessment:  Last Vital Signs: BP (!) 156/71   Pulse 96   Temp 98.4 °F (36.9 °C) (Oral)   Resp 20   SpO2 97%     Last documented pain score (0-10 scale): Pain Level: 10  Last Weight:   Wt Readings from Last 1 Encounters:   21 200 lb (90.7 kg)     Mental Status:  {IP PT MENTAL STATUS:}    IV Access:  { HANANE IV ACCESS:049333259}    Nursing Mobility/ADLs:  Walking   {CHP DME RQOY:363311073}  Transfer  {CHP DME VVX}  Bathing  {CHP DME VCUE:406788541}  Dressing  {CHP DME BJRW:174944706}  Toileting  {CHP DME IQCI:478966954}  Feeding  {CHP DME BXRZ:688986697}  Med Admin  {CHP DME QJBK:213373915}  Med Delivery   { HANANE MED Delivery:294148240}    Wound Care Documentation and Therapy:        Elimination:  Continence:   · Bowel: {YES / ZA:24381}  · Bladder: {YES / CS:78315}  Urinary Catheter: {Urinary Catheter:072747778}   Colostomy/Ileostomy/Ileal Conduit: {YES / DJ:30901}       Date of Last BM: ***  No intake or output data in the 24 hours ending 21 1134  No intake/output data recorded.     Safety Concerns:     [address] Safety Concerns:049436428}    Impairments/Disabilities:      [address] Impairments/Disabilities:918792813}    Nutrition Therapy:  Current Nutrition Therapy:   [address] Diet List:697690918}    Routes of Feeding: {CHP DME Other Feedings:713028593}  Liquids: {Slp liquid thickness:79126}  Daily Fluid Restriction: {CHP DME Yes amt example:960580915}  Last Modified Barium Swallow with Video (Video Swallowing Test): {Done Not Done ROSETTA:043191504}    Treatments at the Time of Hospital Discharge:   Respiratory Treatments: ***  Oxygen Therapy:  {Therapy; copd oxygen:28446}  Ventilator:    { CC Vent KMWN:788839576}    Rehab Therapies: {THERAPEUTIC INTERVENTION:0300208161}  Weight Bearing Status/Restrictions: { CC Weight Bearin}  Other Medical Equipment (for information only, NOT a DME order):  {EQUIPMENT:246017540}  Other Treatments: ***    Patient's personal belongings (please select all that are sent with patient):  {[facility] DME Belongings:228771237}    RN SIGNATURE:  {Esignature:344580959}    CASE MANAGEMENT/SOCIAL WORK SECTION    Inpatient Status Date: ***    Readmission Risk Assessment Score:  Readmission Risk              Risk of Unplanned Readmission:        0           Discharging to Facility/ Agency   · Name:   · Address:  · Phone:  · Fax:    Dialysis Facility (if applicable)   · Name:  · Address:  · Dialysis Schedule:  · Phone:  · Fax:    / signature: {Esignature:521851239}    PHYSICIAN SECTION    Prognosis: {Prognosis:8686619460}    Condition at Discharge: [address] [place] [address] Patient Condition:303046596}    Rehab Potential (if transferring to Rehab): {Prognosis:6161604350}    Recommended Labs or Other Treatments After Discharge: ***    Physician Certification: I certify the above information and transfer of Calderon Dos Santos  is necessary for the continuing treatment of the diagnosis listed and that she requires {Admit to Appropriate Level of Care:49930} for {GREATER/LESS:653577070} 30 days.      Update Admission H&P: {CHP DME Changes in TIOSY:713705291}    PHYSICIAN SIGNATURE:  {Esignature:356591485}

## 2021-02-09 NOTE — ED TRIAGE NOTES
Pt to ER for c/o left knee pain after falling on ice today. Denies hitting head or loc.     Mother, Amadou Faith, [phone]

## 2021-02-11 ENCOUNTER — HOSPITAL ENCOUNTER (EMERGENCY)
Age: 56
Discharge: HOME | End: 2021-02-11

## 2021-02-11 VITALS
SYSTOLIC BLOOD PRESSURE: 135 MMHG | DIASTOLIC BLOOD PRESSURE: 109 MMHG | OXYGEN SATURATION: 96 % | HEART RATE: 93 BPM | RESPIRATION RATE: 18 BRPM | TEMPERATURE: 97.8 F

## 2021-02-11 DIAGNOSIS — M25.562 ACUTE PAIN OF LEFT KNEE: Primary | ICD-10-CM

## 2021-02-11 PROCEDURE — 99284 EMERGENCY DEPT VISIT MOD MDM: CPT

## 2021-02-11 NOTE — LETTER
[facility] Emergency Department  [address] [address]  Phone: [phone]  Fax: [phone]               February 11, 2021    Patient: Alessandro Penn   YOB: 1965   Date of Visit: 2/11/2021       To Whom It May Concern:    Rosey Peterson was seen and treated in our emergency department on 2/11/2021.      Patient is to remain nonweightbearing left lower extremity with use of crutches until cleared by orthopedist.    Sincerely,       MANDEEP Benjamin         Signature:__________________________________

## 2021-02-11 NOTE — ED PROVIDER NOTES
EMERGENCY DEPARTMENT ENCOUNTER      PCP: Fatimah Mckinney MD    [address] [facility]    Chief Complaint   Patient presents with    Knee Pain     left knee, seen on the 2/09/2021 and states it is not getting any better         This patient was not evaluated by the attending physician. I have independently evaluated this patient . HPI    Karly Valente is a 48 y.o. female who presents with left knee pain. Onset 2 days. Context is patient slipped on ice and injured her knee 2 days ago. She was seen in the emergency department at which time x-rays were negative per patient report. She is currently awaiting orthopedic appointment in 4 days and continues to have knee pain and desires a work note as work is requiring her to continue work without restrictions. Pain is generalized, does not radiate. Pain is worse with ambulation and range of motion. No new injury from initial injury. No new nature or severity of symptoms. No lower extremity pain or swelling otherwise. No distal numbness, tingling, weakness, or functional deficit. No other pain. REVIEW OF SYSTEMS    General: Denies constitutional symptoms. Cardiac: Denies chest wall injury or chest pain  Pulmonary: Denies chest wall injury or shortness of breath  GI: Denies abdomen injury or abdominal pain  Musculoskeletal:  Denies any other musculoskeletal pain or injuries, including chest wall and back. Skin:  Skin intact. Lymphatics:  No nodules or streaks.       See HPI and nursing notes for additional information     PAST MEDICAL & SURGICAL HISTORY    Past Medical History:   Diagnosis Date    Arthritis     Arthritis of left knee     Asthma     Diabetes mellitus ([facility] [address]     Disorder of rotator cuff syndrome of left shoulder and allied disorder     H/O cardiovascular stress test 2/24/16    EF68% normal study    Hx of echocardiogram 2/24/16    EF 55%, normal LV function, moderate LVH    Hyperlipidemia     Hypertension     Kidney stones  Migraine     Obesity     PONV (postoperative nausea and vomiting)      Past Surgical History:   Procedure Laterality Date    CYSTOSCOPY INSERTION / REMOVAL STENT / STONE N/A 11/28/2020    CYSTOSCOPY RETROGRADE PYELOGRAM STENT INSERTION performed by Violette Maldonado MD at [address] / Breanne Lobato / Roxie Guillaume Right 1/21/2021    CYSTOSCOPY STENT EXCHANGE URETEROSCOPY EXTRACTION OF STONES performed by Rafael Crawford MD at [facility]      HERNIA REPAIR      HYSTERECTOMY      KIDNEY STONE SURGERY      SHOULDER SURGERY      TONSILLECTOMY      TUBAL LIGATION         CURRENT MEDICATIONS    Current Outpatient Rx   Medication Sig Dispense Refill    naproxen (NAPROSYN) 500 MG tablet Take 1 tablet by mouth 2 times daily 60 tablet 0    VITAMIN D PO Take 1,000 Units by mouth daily      amoxicillin-clavulanate (AUGMENTIN) 500-125 MG per tablet Take 1 tablet by mouth 2 times daily      atorvastatin (LIPITOR) 20 MG tablet Take 20 mg by mouth daily      folic acid (FOLVITE) 1 MG tablet Take 1 mg by mouth daily      dicyclomine (BENTYL) 20 MG tablet Take 1 tablet by mouth 3 times daily as needed (abdominal pain) 20 tablet 3    spironolactone (ALDACTONE) 25 MG tablet TAKE 1 TABLET BY MOUTH ONE TIME A DAY      amLODIPine (NORVASC) 5 MG tablet Take 5 mg by mouth daily         ALLERGIES    Allergies   Allergen Reactions    Pollen Extract Itching     Itchy and watery eyes. Pet dander also causes same symptoms.     Tramadol      Ear ringing       SOCIAL & FAMILY HISTORY    Social History     Socioeconomic History    Marital status: Legally      Spouse name: None    Number of children: None    Years of education: None    Highest education level: None   Occupational History    None   Social Needs    Financial resource strain: None    Food insecurity     Worry: None     Inability: None    Transportation needs     Medical: None     Non-medical: None Tobacco Use    Smoking status: Current Every Day Smoker     Packs/day: 1.00     Years: 20.00     Pack years: 20.00     Types: Cigarettes    Smokeless tobacco: Never Used   Substance and Sexual Activity    Alcohol use: No     Alcohol/week: 0.0 standard drinks    Drug use: No    Sexual activity: Not Currently     Partners: Male     Comment: 2 DAUGHTERS   Lifestyle    Physical activity     Days per week: None     Minutes per session: None    Stress: None   Relationships    Social connections     Talks on phone: None     Gets together: None     Attends Episcopal service: None     Active member of club or organization: None     Attends meetings of clubs or organizations: None     Relationship status: None    Intimate partner violence     Fear of current or ex partner: None     Emotionally abused: None     Physically abused: None     Forced sexual activity: None   Other Topics Concern    None   Social History Narrative    None     Family History   Problem Relation Age of Onset    Heart Disease Mother     High Blood Pressure Mother     Asthma Mother     Diabetes Sister     Seizures Sister     Asthma Sister     COPD Sister     Heart Disease Maternal Grandmother     High Blood Pressure Maternal Grandmother     Cancer Maternal Grandfather     Emphysema Maternal Grandfather     Asthma Daughter     Depression Daughter     Migraines Daughter     Obesity Daughter     Ulcerative Colitis Daughter     High Cholesterol Daughter     Asthma Daughter     Migraines Daughter     Obesity Daughter     Asthma Brother     Stroke Paternal Uncle            PHYSICAL EXAM    VITAL SIGNS: BP (!) 135/109   Pulse 93   Temp 97.8 °F (36.6 °C) (Oral)   Resp 18   SpO2 96%   Constitutional:  Well developed, well nourished, no acute distress, non-toxic appearance   HENT:  Atraumatic    Musculoskeletal:   Left knee exam: There is mild effusion without erythema noted.   There is generalized tenderness and focal

## 2021-02-11 NOTE — ED NOTES
Patient seen in the ED for left knee injury,made a f/u appt with an ortho but can not be seen until the 15th of this month and need a note for work,denies any new pain or injury     Kai Alejandra RN  02/11/21 1033

## 2021-02-16 ENCOUNTER — OFFICE VISIT (OUTPATIENT)
Dept: ORTHOPEDIC SURGERY | Age: 56
End: 2021-02-16

## 2021-02-16 VITALS
HEART RATE: 87 BPM | WEIGHT: 200 LBS | RESPIRATION RATE: 16 BRPM | OXYGEN SATURATION: 100 % | HEIGHT: 66 IN | BODY MASS INDEX: 32.14 KG/M2

## 2021-02-16 DIAGNOSIS — S83.92XA SPRAIN OF LEFT KNEE, UNSPECIFIED LIGAMENT, INITIAL ENCOUNTER: ICD-10-CM

## 2021-02-16 DIAGNOSIS — S80.02XA CONTUSION OF LEFT KNEE, INITIAL ENCOUNTER: Primary | ICD-10-CM

## 2021-02-16 PROCEDURE — 99203 OFFICE O/P NEW LOW 30 MIN: CPT | Performed by: PHYSICIAN ASSISTANT

## 2021-02-16 ASSESSMENT — ENCOUNTER SYMPTOMS
ALLERGIC/IMMUNOLOGIC NEGATIVE: 1
EYES NEGATIVE: 1
RESPIRATORY NEGATIVE: 1
GASTROINTESTINAL NEGATIVE: 1

## 2021-02-16 NOTE — PATIENT INSTRUCTIONS
Continue to weight bear as tolerated using walker as needed  Continue range of motion as tolerated  Ice and elevate as needed  Tylenol or Motrin for pain as needed  Reddie brace given   Work note given  MRI ordered.   Once MRI has been completed, please follow-up in office  Central Scheduling # [phone]

## 2021-02-16 NOTE — PROGRESS NOTES
[place] [address] [facility]    HPI:  Fred Quevedo is a 48 y.o. female who complains of left knee pain. Patient states she had a fall from slipping on ice and twisted her left knee on February 9, 2021. Patient rates her pain 7-8/10 and describes it as an achy sharp sensation. Patient states her pain is on the lateral side of her left knee. Patient states she had a bruise on the lateral thigh and medial knee region. Patient states she has gone to the emergency department twice previously and was given naproxen. Patient states she was initially unable to weight-bear but now has been able to bear some weight on her knee now and has been using a walker. Patient states she previously did not use a walker before this fall. She states some of her pain has improving. She states walking and standing worsen her pain. She states she has noted a lot of cracking, popping, instability, and some feelings of locking. She states she does have a history of surgery on her bilateral patellas.     Past Medical History:   Diagnosis Date    Arthritis     Arthritis of left knee     Asthma     Diabetes mellitus ([facility] [address]     Disorder of rotator cuff syndrome of left shoulder and allied disorder     H/O cardiovascular stress test 2/24/16    EF68% normal study    Hx of echocardiogram 2/24/16    EF 55%, normal LV function, moderate LVH    Hyperlipidemia     Hypertension     Kidney stones     Migraine     Obesity     PONV (postoperative nausea and vomiting)      Past Surgical History:   Procedure Laterality Date    CYSTOSCOPY INSERTION / REMOVAL STENT / STONE N/A 11/28/2020    CYSTOSCOPY RETROGRADE PYELOGRAM STENT INSERTION performed by José Luis Fitch MD at [facility] [place] [facility] [place] [address] [place] [address] / [facility] / Jack Haile Right 1/21/2021    CYSTOSCOPY STENT EXCHANGE URETEROSCOPY EXTRACTION OF STONES performed by Abeba Maradiaga MD at [facility] KIDNEY STONE SURGERY      SHOULDER SURGERY      TONSILLECTOMY      TUBAL LIGATION        Family History   Problem Relation Age of Onset    Heart Disease Mother     High Blood Pressure Mother     Asthma Mother     Diabetes Sister     Seizures Sister     Asthma Sister     COPD Sister     Heart Disease Maternal Grandmother     High Blood Pressure Maternal Grandmother     Cancer Maternal Grandfather     Emphysema Maternal Grandfather     Asthma Daughter     Depression Daughter     Migraines Daughter     Obesity Daughter     Ulcerative Colitis Daughter     High Cholesterol Daughter     Asthma Daughter     Migraines Daughter     Obesity Daughter     Asthma Brother     Stroke Paternal Uncle      Social History     Socioeconomic History    Marital status: Legally      Spouse name: None    Number of children: None    Years of education: None    Highest education level: None   Occupational History    None   Social Needs    Financial resource strain: None    Food insecurity     Worry: None     Inability: None    Transportation needs     Medical: None     Non-medical: None   Tobacco Use    Smoking status: Current Every Day Smoker     Packs/day: 1.00     Years: 20.00     Pack years: 20.00     Types: Cigarettes    Smokeless tobacco: Never Used   Substance and Sexual Activity    Alcohol use: No     Alcohol/week: 0.0 standard drinks    Drug use: No    Sexual activity: Not Currently     Partners: Male     Comment: 2 DAUGHTERS   Lifestyle    Physical activity     Days per week: None     Minutes per session: None    Stress: None   Relationships    Social connections     Talks on phone: None     Gets together: None     Attends [employer] service: None     Active member of club or organization: None     Attends meetings of clubs or organizations: None     Relationship status: None    Intimate partner violence     Fear of current or ex partner: None     Emotionally abused: None Physically abused: None     Forced sexual activity: None   Other Topics Concern    None   Social History Narrative    None     Current Outpatient Medications   Medication Sig Dispense Refill    naproxen (NAPROSYN) 500 MG tablet Take 1 tablet by mouth 2 times daily 60 tablet 0    VITAMIN D PO Take 1,000 Units by mouth daily      amoxicillin-clavulanate (AUGMENTIN) 500-125 MG per tablet Take 1 tablet by mouth 2 times daily      atorvastatin (LIPITOR) 20 MG tablet Take 20 mg by mouth daily      folic acid (FOLVITE) 1 MG tablet Take 1 mg by mouth daily      dicyclomine (BENTYL) 20 MG tablet Take 1 tablet by mouth 3 times daily as needed (abdominal pain) 20 tablet 3    spironolactone (ALDACTONE) 25 MG tablet TAKE 1 TABLET BY MOUTH ONE TIME A DAY      amLODIPine (NORVASC) 5 MG tablet Take 5 mg by mouth daily       No current facility-administered medications for this visit. Allergies   Allergen Reactions    Pollen Extract Itching     Itchy and watery eyes. Pet dander also causes same symptoms.  Tramadol      Ear ringing     Review of Systems:   Review of Systems   Constitutional: Negative. HENT: Negative. Eyes: Negative. Respiratory: Negative. Cardiovascular: Negative. Gastrointestinal: Negative. Endocrine: Negative. Genitourinary: Negative. Musculoskeletal: Positive for arthralgias. Skin: Negative. Allergic/Immunologic: Negative. Neurological: Negative. Hematological: Negative. Psychiatric/Behavioral: Negative. Physical Exam:  Pulse 87   Resp 16   Ht 5' 6\" (1.676 m)   Wt 200 lb (90.7 kg)   SpO2 100%   BMI 32.28 kg/m²      Gait is antalgic and patient is noted to be using walker. The patient can bear weight on the injured extremity. Gen/Psych: Examination reveals a pleasant individual in no acute distress. The patient is oriented to time, place, and person. The patient's mood and affect are appropriate. Patient appears well nourished.       Lymph: No obvious lymphedema in left lower extremity     Skin: Intact in left lower extremity with no ulcerations, lesions, rash, erythema. Vascular: There are no obvious varicosities in left lower extremity, sensation present to light touch over left lower extremity. Capillary refill less than 3. Musculoskeletal:  Left leg/knee exam (limited exam due to pain and guarding): Inspection:    Faint yellowish ecchymosis seen on the medial aspect of the left knee. Mild swelling noted over the left knee. No erythema noted. No ecchymosis noted over the lateral thigh. Leg alignment:     neutral  Quadriceps/hamstring atrophy:   no  Knee effusion:    mild   Knee erythema:   no  ROM:     15 degrees-85 degrees  Lachman:    no  Posterior drawer:   no  Lateral patella glide at 30 deg's: 20%  Medial patella glide at 30 deg's: 10mm  Varus laxity at 0 and 30 deg's: Limited due to pain and guarding/increased pain  Valguslaxity at 0 and 30 deg's: Limited due to pain and guarding/increased pain  Tenderness at: Increased tenderness over the lateral joint line and fibular head region. Mild tenderness over the medial joint line region. Nontender palpation of posterior calf, soft compartments. Atul    Limited due to pain and guarding  Knee strength is 5/5 flexion and extension  No pain with hip internal rotation and external rotation. Patient able to perform ankle dorsiflexion and plantarflexion. Outside record review: ER provider notes from 2021 and 2021 were reviewed. She was noted to have an x-ray of the left knee completed on 2021. Impression   1. No acute bony abnormality   2. Tricompartmental osteoarthritic changes progressed compared to 2016   3. Small joint effusion         Imagin views of the left knee were obtained and showed no acute fracture or dislocation. No significant narrowing of the medial or lateral compartments noted. Significant narrowing of the patellofemoral compartment noted. Tricompartmental steophytes noted. The official read and interpretation of these x-rays will be done by the the [facility]. Please see their impression below. Impression   1. No acute abnormality in the left knee. 2. Severe patellofemoral osteoarthritis.         Impression:   1. Left knee contusion  2. Left knee sprain    Plan:   Patient was seen in clinic today for evaluation of her left knee pain. Patient states she had a fall on her knee on 2/9/2021. Patient states her knee twisted the time and states she had bruising on the lateral thigh and medial knee region. She states she was unable to bear weight on the knee initially but has been bearing some weight with the use of a walker at this time. Patient states she previously did not use a walker. On physical exam, patient had increased tenderness to palpation over the lateral joint line and fibular head region of the left knee. Mild tenderness over the medial joint line. Physical exam was limited due to patient's pain and guarding. Patient was noted to have increased pain with valgus and varus maneuvers. Capillary refill less than 3 and sensation intact to light touch. Non-tender to palpation posterior calf, soft compartments. 4 views of the left knee were obtained and showed no acute fracture or dislocation. Tricompartmental osteophytes were noted. The patient was given a knee brace in the office today. Due to the patient's mechanism of injury, limited range of motion, and pain with weightbearing, an MRI was ordered. The patient will follow up in clinic once MRI has been completed. The patient was given a work note today. Continue to weight bear as tolerated using walker as needed  Continue range of motion as tolerated  Ice and elevate as needed  Tylenol or Motrin for pain as needed  Knee brace given   Work note given  MRI ordered.   Once MRI has been completed, please follow-up in office  Central Scheduling # [phone]      Please note this report has been partially produced using speech recognition software and may contain errors related to that system including errors in grammar, punctuation, and spelling, as well as words and phrases that may be inappropriate.  If there are any questions or concerns please feel free to contact the dictating provider for clarification

## 2021-03-01 ENCOUNTER — HOSPITAL ENCOUNTER (OUTPATIENT)
Dept: ULTRASOUND IMAGING | Age: 56
Discharge: HOME | End: 2021-03-01

## 2021-03-01 DIAGNOSIS — N20.0 URIC ACID NEPHROLITHIASIS: ICD-10-CM

## 2021-03-01 PROCEDURE — 76775 US EXAM ABDO BACK WALL LIM: CPT

## 2021-03-02 ENCOUNTER — HOSPITAL ENCOUNTER (OUTPATIENT)
Dept: MRI IMAGING | Age: 56
Discharge: HOME | End: 2021-03-02

## 2021-03-02 DIAGNOSIS — S83.92XA SPRAIN OF LEFT KNEE, UNSPECIFIED LIGAMENT, INITIAL ENCOUNTER: ICD-10-CM

## 2021-03-02 DIAGNOSIS — S80.02XA CONTUSION OF LEFT KNEE, INITIAL ENCOUNTER: ICD-10-CM

## 2021-03-02 PROCEDURE — 73721 MRI JNT OF LWR EXTRE W/O DYE: CPT

## 2021-03-09 ENCOUNTER — TELEPHONE (OUTPATIENT)
Dept: ORTHOPEDIC SURGERY | Age: 56
End: 2021-03-09

## 2021-03-09 NOTE — TELEPHONE ENCOUNTER
Called and left message on pts voicemail appointment rescheduled to Dr Meghna Elena at 2:50 on 3/11/21

## 2021-03-11 ENCOUNTER — OFFICE VISIT (OUTPATIENT)
Dept: ORTHOPEDIC SURGERY | Age: 56
End: 2021-03-11
Payer: MEDICAID

## 2021-03-11 VITALS
RESPIRATION RATE: 15 BRPM | OXYGEN SATURATION: 98 % | BODY MASS INDEX: 32.14 KG/M2 | HEIGHT: 66 IN | HEART RATE: 99 BPM | WEIGHT: 200 LBS

## 2021-03-11 DIAGNOSIS — M17.12 PRIMARY OSTEOARTHRITIS OF LEFT KNEE: Primary | ICD-10-CM

## 2021-03-11 PROCEDURE — 99214 OFFICE O/P EST MOD 30 MIN: CPT | Performed by: ORTHOPAEDIC SURGERY

## 2021-03-11 NOTE — PROGRESS NOTES
Patient presents to the office today for evaluation of the of the left knee. Pt seen Dainelle KAUFMAN on 2/16/21 which she ordered an MRI of the left knee. Pt states onset of pain about a month ago. Pt states on 2/9/21 she twisted her left knee and instantly felt pain along the lateral aspect of the left knee. Pt states pain today is an 8/10. She is having a difficult time walking and going up and down the stairs. Pt states pain will increase at night. Nothing is helping with her pain. She has tired ice and ibuprofen. MRI of the left knee completed on 3/2/21  Impression   Abnormal appearance to the ACL felt more compatible with developing ACL   ganglion although low-grade sprain could have a similar appearance.       Patchy bone marrow edema involving femur, tibia, fibula and patella.  There   is a nondisplaced fracture line involving the fibular head.  No other   fracture lines are seen.  No suspicious focal bony lesions.       Tricompartmental degenerative changes, most significant (severe) to the   patellofemoral compartment.       Small knee joint effusion with some intra-articular loose bodies.       Trace Dumont's cyst with some small loose bodies or debris within the cyst.       Prior remote injury to the [facility].       The findings were sent to the Radiology Results [id number] at 11:51   am on 3/2/2021to be communicated to caregivers office.

## 2021-03-11 NOTE — PATIENT INSTRUCTIONS
Continue weight-bearing as tolerated. Continue range of motion exercises as instructed. Ice and elevate as needed. Tylenol or Motrin for pain. We will schedule surgery at soonest convenience. If you have any questions regarding your surgery please call our office and ask to speak with Sonja.  [phone]

## 2021-03-11 NOTE — LETTER
[facility]  [address] [facility]  Phone: [phone]  Fax: [phone]    Agnes Almaguer MD        March 11, 2021     Patient: Miguel Nuñez   YOB: 1965   Date of Visit: 3/11/2021       To Whom It May Concern: It is my medical opinion that Anastasia Guevara should remain out of work until at least 3 months in order to fully heal from surgery. .    If you have any questions or concerns, please don't hesitate to call.     Sincerely,        Agnes Almaguer MD

## 2021-03-12 ASSESSMENT — ENCOUNTER SYMPTOMS
VOMITING: 0
COLOR CHANGE: 0
SHORTNESS OF BREATH: 0
EYE REDNESS: 0
CHEST TIGHTNESS: 0
EYE PAIN: 0
WHEEZING: 0

## 2021-03-12 NOTE — PROGRESS NOTES
3/11/2021   Chief Complaint   Patient presents with    Knee Pain     left        History of Present Illness:                             Robert Shetty is a 48 y.o. female who presents today for evaluation of her left knee pain, swelling, and stiffness. She has had many years of problems and pain in the left knee. She has been diagnosed in the past with knee arthritis. She has received multiple injections over the course of the last few years which have been only partially successful but a typically wear off quickly. She had a injury to the left knee on 2/9/2020 when she twisted her knee. Since that time her knee pain has been severe and debilitating. She has a difficult time walking and mobilizing. She is requiring use of a cane even for short distances. She is afraid that her knee may buckle or cause her to get out and fall. She has been unable to return to her job which requires prolonged standing walking. She recently had an MRI is here today for a review the results. Patient presents to the office today for evaluation of the of the left knee. Pt seen Cindy KAUFMAN on 2/16/21 which she ordered an MRI of the left knee. Pt states onset of pain about a month ago. Pt states on 2/9/21 she twisted her left knee and instantly felt pain along the lateral aspect of the left knee. Pt states pain today is an 8/10. She is having a difficult time walking and going up and down the stairs. Pt states pain will increase at night. Nothing is helping with her pain. She has tired ice and ibuprofen.                   Medical History  Patient's medications, allergies, past medical, surgical, social and family histories were reviewed and updated as appropriate.     Past Medical History:   Diagnosis Date    Arthritis     Arthritis of left knee     Asthma     Diabetes mellitus ([facility] [address]     Disorder of rotator cuff syndrome of left shoulder and allied disorder     H/O cardiovascular stress test 2/24/16    EF68% normal study    Hx of echocardiogram 2/24/16    EF 55%, normal LV function, moderate LVH    Hyperlipidemia     Hypertension     Kidney stones     Migraine     Obesity     PONV (postoperative nausea and vomiting)      Past Surgical History:   Procedure Laterality Date    CYSTOSCOPY INSERTION / REMOVAL STENT / STONE N/A 11/28/2020    CYSTOSCOPY RETROGRADE PYELOGRAM STENT INSERTION performed by Serg Polo MD at [address] / REMOVAL STENT / STONE Right 1/21/2021    CYSTOSCOPY STENT EXCHANGE URETEROSCOPY EXTRACTION OF STONES performed by Barbie Diallo MD at [facility]      HERNIA REPAIR      HYSTERECTOMY      KIDNEY STONE SURGERY      SHOULDER SURGERY      TONSILLECTOMY      TUBAL LIGATION       Family History   Problem Relation Age of Onset    Heart Disease Mother     High Blood Pressure Mother     Asthma Mother     Diabetes Sister     Seizures Sister     Asthma Sister     COPD Sister     Heart Disease Maternal Grandmother     High Blood Pressure Maternal Grandmother     Cancer Maternal Grandfather     Emphysema Maternal Grandfather     Asthma Daughter     Depression Daughter     Migraines Daughter     Obesity Daughter     Ulcerative Colitis Daughter     High Cholesterol Daughter     Asthma Daughter     Migraines Daughter     Obesity Daughter     Asthma Brother     Stroke Paternal Uncle      Social History     Socioeconomic History    Marital status: Legally      Spouse name: None    Number of children: None    Years of education: None    Highest education level: None   Occupational History    None   Social Needs    Financial resource strain: None    Food insecurity     Worry: None     Inability: None    Transportation needs     Medical: None     Non-medical: None   Tobacco Use    Smoking status: Current Every Day Smoker     Packs/day: 1.00     Years: 20.00     Pack years: 20.00     Types: Cigarettes    Smokeless tobacco: Never Used   Substance and Sexual Activity    Alcohol use: No     Alcohol/week: 0.0 standard drinks    Drug use: No    Sexual activity: Not Currently     Partners: Male     Comment: 2 DAUGHTERS   Lifestyle    Physical activity     Days per week: None     Minutes per session: None    Stress: None   Relationships    Social connections     Talks on phone: None     Gets together: None     Attends Judaism service: None     Active member of club or organization: None     Attends meetings of clubs or organizations: None     Relationship status: None    Intimate partner violence     Fear of current or ex partner: None     Emotionally abused: None     Physically abused: None     Forced sexual activity: None   Other Topics Concern    None   Social History Narrative    None     Current Outpatient Medications   Medication Sig Dispense Refill    VITAMIN D PO Take 1,000 Units by mouth daily      atorvastatin (LIPITOR) 20 MG tablet Take 20 mg by mouth daily      folic acid (FOLVITE) 1 MG tablet Take 1 mg by mouth daily      dicyclomine (BENTYL) 20 MG tablet Take 1 tablet by mouth 3 times daily as needed (abdominal pain) 20 tablet 3    spironolactone (ALDACTONE) 25 MG tablet TAKE 1 TABLET BY MOUTH ONE TIME A DAY      amLODIPine (NORVASC) 5 MG tablet Take 5 mg by mouth daily      naproxen (NAPROSYN) 500 MG tablet Take 1 tablet by mouth 2 times daily (Patient not taking: Reported on 3/11/2021) 60 tablet 0    amoxicillin-clavulanate (AUGMENTIN) 500-125 MG per tablet Take 1 tablet by mouth 2 times daily       No current facility-administered medications for this visit. Allergies   Allergen Reactions    Pollen Extract Itching     Itchy and watery eyes. Pet dander also causes same symptoms.  Tramadol      Ear ringing       Review of Systems:   Review of Systems   Constitutional: Negative for chills and fever. HENT: Negative for congestion and sneezing.     Eyes: Negative for pain and redness. Respiratory: Negative for chest tightness, shortness of breath and wheezing. Cardiovascular: Negative for chest pain and palpitations. Gastrointestinal: Negative for vomiting. Musculoskeletal: Positive for arthralgias, gait problem and joint swelling. Skin: Negative for color change and rash. Neurological: Negative for weakness and numbness. Psychiatric/Behavioral: Negative for agitation. The patient is not nervous/anxious. Examination:  General Exam:  Vitals: Pulse 99   Resp 15   Ht 5' 6\" (1.676 m)   Wt 200 lb (90.7 kg)   SpO2 98%   BMI 32.28 kg/m²    Physical Exam  Vitals signs and nursing note reviewed. Constitutional:       Appearance: Normal appearance. She is obese. HENT:      Head: Normocephalic and atraumatic. Eyes:      Conjunctiva/sclera: Conjunctivae normal.      Pupils: Pupils are equal, round, and reactive to light. Neck:      Musculoskeletal: Normal range of motion. Pulmonary:      Effort: Pulmonary effort is normal.   Musculoskeletal:      Right hip: Normal.      Left hip: She exhibits normal range of motion, normal strength, no tenderness, no bony tenderness, no swelling, no crepitus and no deformity. Right knee: She exhibits normal range of motion, no swelling, no effusion, no ecchymosis, no deformity, no erythema, normal alignment, no LCL laxity, normal patellar mobility, no bony tenderness and no MCL laxity. No medial joint line and no lateral joint line tenderness noted. Comments: Left Lower Extremity:    There is moderate to severe tenderness to palpation diffusely throughout the knee, most significant along the anterior joint. There is a moderate knee joint effusion present and mild global swelling anteriorly. Moderate restricted range of motion at the knee with approximately 8 degrees lack of full extension and knee flexion up to 120 degrees with pain at the extremes of motion.   There is mild crepitation during active range of motion. There is mild varus knee alignment. There is 5 out of 5 strength with knee flexion and extension. There is no instability to varus or valgus stress testing and anterior and posterior drawer testing. Sensation is intact to light touch throughout the lower extremity. There is positive medial and lateral Atul's test with tenderness to palpation and pain along the joint line. Skin is intact. Pulses are intact    No pain with active range of motion of the hip. Strength and range of motion of the hip are intact. No tenderness to palpation at the hip. Skin:     General: Skin is warm and dry. Neurological:      Mental Status: She is alert and oriented to person, place, and time. Psychiatric:         Mood and Affect: Mood normal.         Behavior: Behavior normal.            Diagnostic testing:  X-ray images were reviewed by myself and discussed with the patient:  X-ray images 3 views of the left knee from 2/16/2021 were reviewed by myself and discussed with patient:  There is evidence of severe advanced tricompartmental degenerative joint disease most severe in the patellofemoral joint where there is complete loss of joint space and bone-on-bone articulation. There are prominent osteophytes present in the medial lateral joint line as well as the patellofemoral joint.     Prior images of the left knee were reviewed by myself and discussed with the patient today:  MRI of the left knee completed on 3/2/21  Impression   Abnormal appearance to the ACL felt more compatible with developing ACL   ganglion although low-grade sprain could have a similar appearance.       Patchy bone marrow edema involving femur, tibia, fibula and patella.  There   is a nondisplaced fracture line involving the fibular head.  No other   fracture lines are seen.  No suspicious focal bony lesions.       Tricompartmental degenerative changes, most significant (severe) to the   patellofemoral compartment.       Small knee joint effusion with some intra-articular loose bodies.       Trace Dumont's cyst with some small loose bodies or debris within the cyst.       Prior remote injury to the MCL.         Office Procedures:  Orders Placed This Encounter   Procedures    CT KNEE LEFT WO CONTRAST     Standing Status:   Future     Standing Expiration Date:   3/12/2022     Scheduling Instructions:      Please schedule patient between 4/12/21 through 4/16/21 for the [address] surgery     Order Specific Question:   Reason for exam:     Answer:   [place] [facility] [employer] Protocol  Date of Surgery: 4/28/21   [facility]     Referral Priority:   Elective     Referral Type:   Eval and Treat     Referral Reason:   Specialty Services Required     Requested Specialty:   Physical Therapy     Number of Visits Requested:   1       Assessment and Plan  1. Left knee advanced tricompartmental primary osteoarthritis    We discussed the severity of the degenerative findings on the x-rays, MRI and physical exam.  The patient feels that they have exhausted conservative measures. I do not expect further injections to be significantly helpful at this time. The patient has previously tried injections, physical therapy, over-the-counter pain medications, and activity modification. The pain level is severe and bothers the patient on a daily basis, including interrupting sleep at night. Activities of daily living are difficult to perform. The patient has trouble getting dressed, getting up from a seated position, climbing stairs, and has fear of falling. The pain and dysfunction at the knee are severely limiting at this stage and the patient would like to consider surgical intervention. I have recommended surgical intervention for a total knee replacement. We discussed the risks, benefits, and alternatives to surgery.   We discussed the intended benefits from a well-functioning replacement and the anticipated recovery process. We discussed potential risks and complications including but not limited to DVT/PE, infection, stiffness, loosening, and fracture. We discussed pain control, physical therapy, and anticoagulation during the perioperative timeframe. The patient would like to proceed with knee replacement surgery and will be enrolled in the joint replacement class    Plan will be to proceed with a robotic assisted total knee replacement. She will be on aspirin postoperatively for DVT prophylaxis. The patient was counseled at length about the risks of yunior Covid-19 during their perioperative period and any recovery window from their procedure. The patient was made aware that yunior Covid-19  may worsen their prognosis for recovering from their procedure  and lend to a higher morbidity and/or mortality risk. All material risks, benefits, and reasonable alternatives including postponing the procedure were discussed. The patient does wish to proceed with the procedure at this time.           Electronically signed by Agnes Almaguer MD on 3/12/2021 at 1:47 PM

## 2021-03-15 DIAGNOSIS — S83.92XA SPRAIN OF LEFT KNEE, UNSPECIFIED LIGAMENT, INITIAL ENCOUNTER: ICD-10-CM

## 2021-03-15 DIAGNOSIS — M17.12 PRIMARY OSTEOARTHRITIS OF LEFT KNEE: Primary | ICD-10-CM

## 2021-03-15 RX ORDER — HYDROCODONE BITARTRATE AND ACETAMINOPHEN 5; 325 MG/1; MG/1
1 TABLET ORAL 2 TIMES DAILY PRN
Qty: 14 TABLET | Refills: 0 | Status: SHIPPED | OUTPATIENT
Start: 2021-03-15 | End: 2021-03-22

## 2021-03-15 NOTE — TELEPHONE ENCOUNTER
Patient called office requesting pain medication until knee replacement surgery on 4/28/21. OTC pain relievers are not helping . Please advise.

## 2021-03-17 NOTE — TELEPHONE ENCOUNTER
patient called a third time requesting a prescription for pain meds. She reports her pain is severe and is not scheduled for surgery until 4/28/21.  Please advise

## 2021-03-18 NOTE — TELEPHONE ENCOUNTER
WILLOVM for patient advising patient that medication was sent into her pharmacy and she should take the medication sparingly per Dr. Malika Brooks advisement due to upcoming surgery

## 2021-03-24 DIAGNOSIS — M17.12 PRIMARY OSTEOARTHRITIS OF LEFT KNEE: Primary | ICD-10-CM

## 2021-04-12 ENCOUNTER — HOSPITAL ENCOUNTER (OUTPATIENT)
Dept: CT IMAGING | Age: 56
Discharge: HOME | End: 2021-04-12

## 2021-04-12 DIAGNOSIS — M17.12 PRIMARY OSTEOARTHRITIS OF LEFT KNEE: ICD-10-CM

## 2021-04-12 PROCEDURE — 73700 CT LOWER EXTREMITY W/O DYE: CPT

## 2021-04-19 RX ORDER — SODIUM CHLORIDE, SODIUM LACTATE, POTASSIUM CHLORIDE, CALCIUM CHLORIDE 600; 310; 30; 20 MG/100ML; MG/100ML; MG/100ML; MG/100ML
INJECTION, SOLUTION INTRAVENOUS ONCE
Status: CANCELLED | OUTPATIENT
Start: 2021-04-28

## 2021-04-20 ENCOUNTER — HOSPITAL ENCOUNTER (OUTPATIENT)
Dept: PHYSICAL THERAPY | Age: 56
Setting detail: THERAPIES SERIES
Discharge: HOME | End: 2021-04-20

## 2021-04-20 PROCEDURE — 97110 THERAPEUTIC EXERCISES: CPT

## 2021-04-20 PROCEDURE — 97161 PT EVAL LOW COMPLEX 20 MIN: CPT

## 2021-04-20 ASSESSMENT — PAIN DESCRIPTION - PROGRESSION: CLINICAL_PROGRESSION: GRADUALLY WORSENING

## 2021-04-20 ASSESSMENT — PAIN DESCRIPTION - DESCRIPTORS: DESCRIPTORS: ACHING;DULL

## 2021-04-20 ASSESSMENT — PAIN DESCRIPTION - PAIN TYPE: TYPE: ACUTE PAIN

## 2021-04-20 NOTE — CARE COORDINATION
CM attempted to call pt to discuss discharge plan regarding upcoming surgery. No answer at this time, voicemail left with request for pt to return call.

## 2021-04-20 NOTE — PLAN OF CARE
Outpatient Physical Therapy           [place]           [] Phone: [phone]   Fax: [phone]  [place]           [] Phone: [phone]   Fax: [phone]     To: Referring Practitioner: Dr. Asia Pino   From: Shawn Rea, PT     Patient: Isabella Barker       : 1965  Diagnosis: Diagnosis: L TKA pre-op   Treatment Diagnosis: Treatment Diagnosis: L TKA pre-op   Date: 2021    Physical Therapy Certification/Re-Certification Form  Dear Dr. Asia Pino,  The following patient has been evaluated for physical therapy services and for therapy to continue, insurance requires physician review of the treatment plan initially and every 90 days. Please review the attached evaluation and/or summary of the patient's plan of care, and verify that you agree therapy should continue by signing the attached document and sending it back to our office. Assessment:      Pt is 48year old female with expected L TKA on 21. Pt now has difficulties completing closed chain activities including stairs and sit to stand and prolonged weightbearing activities with increasing pain. Pt demo deficits this date that include pain, flexibility restrictions for knee flex/ext, quad/hip flexor weakness, and diminished balance. Pt educated on proper gait with AD and stair negotiation. Issued handout for exercises prior and post surgery until return to outpatient PT services. Pt will benefit with PT services with strength/ROM, gait training, manual and modalities post surgery to maximize function. Pt prior to onset of current condition had min/no pain with able to complete full ADLs and work activities. Patient received education on their current pathology and how their condition effects them with their functional activities. Patient understood discussion and questions were answered. Patient understands their activity limitations and understands rational for treatment progression.     Plan of Care/Treatment to date:  [x] Therapeutic Exercise  [] Modalities:  [x] Therapeutic Activity     [] Ultrasound  [] Electrical Stimulation  [x] Gait Training      [] Cervical Traction [] Lumbar Traction  [x] Neuromuscular Re-education    [] Cold/hotpack [] Iontophoresis   [x] Instruction in HEP      [] Vasopneumatic    [] Dry Needling  [] Manual Therapy               [] Aquatic Therapy       Other:          Frequency/Duration:  # Days per week: [x] 1 day # Weeks: [] 1 week [] 5 weeks     [] 2 days   [x] 2 weeks [] 6 weeks     [] 3 days   [] 3 weeks [] 7 weeks     [] 4 days   [] 4 weeks [] 8 weeks         [] 9 weeks [] 10 weeks         [] 11 weeks [] 12 weeks    Rehab Potential/Progress: [] Excellent [x] Good [] Fair  [] Poor     Goals:    Short term goals (All Goals MET)   Time Frame for Long term goals : In this visit, patient will  Long term goal 1: demonstrate good understanding of exercise progression post surgery. Long term goal 2: demonstrate good understanding of walker use post surgery    Long term goal 3: demonstrate good understanding of ascending/descending stairs after surgery. Long term goal 4: watch [id number] Video      Time Frame for Long term goals: Re-assess post surgery for appropriate goals. Electronically signed by:  James Alva PT, DPT, OCS 4/20/2021, 12:12 PM    4/20/2021 12:12 PM   EUGENE Whiting      If you have any questions or concerns, please don't hesitate to call.   Thank you for your referral.      Physician Signature:________________________________Date:_________ TIME: _____  By signing above, therapists plan is approved by physician

## 2021-04-20 NOTE — FLOWSHEET NOTE
Outpatient Physical Therapy  [place]           [x] Phone: [phone]   Fax: [phone]  [place]           [] Phone: [phone]   Fax: [phone]        Physical Therapy Daily Treatment Note  Date:  2021    Patient Name:  Jb Elias    :  1965  MRN: [id number]  Restrictions/Precautions:  None  Diagnosis:   Diagnosis: L TKA pre-op  Date of Injury/Surgery: 21  Treatment Diagnosis: Treatment Diagnosis: L TKA pre-op    Insurance/Certification information:   Self Pay  Referring Physician:  Referring Practitioner: Dr. Renetta South  Next Doctor Visit:  Surgery 21  Plan of care signed (Y/N):  N, sent 21  Outcome Measure:   Visit# / total visits:   1/2  Pain level: 0/10 at rest      3-4/10 worst this week    Goals:     Short term goals (All Goals MET)   Time Frame for Long term goals : In this visit, patient will  Long term goal 1: demonstrate good understanding of exercise progression post surgery. Long term goal 2: demonstrate good understanding of walker use post surgery    Long term goal 3: demonstrate good understanding of ascending/descending stairs after surgery. Long term goal 4: watch [id number] Video      Time Frame for Long term goals: Re-assess post surgery for appropriate goals. Summary of Evaluation:   Pt is 48year old female with expected L TKA on 21. Pt now has difficulties completing closed chain activities including stairs and sit to stand and prolonged weightbearing activities with increasing pain. Pt demo deficits this date that include pain, flexibility restrictions for knee flex/ext, quad/hip flexor weakness, and diminished balance. Pt educated on proper gait with AD and stair negotiation. Issued handout for exercises prior and post surgery until return to outpatient PT services. Pt will benefit with PT services with strength/ROM, gait training, manual and modalities post surgery to maximize function.  Pt prior to onset of current educated on proper gait with AD and stair negotiation. Issued handout for exercises prior and post surgery until return to outpatient PT services. Pt will benefit with PT services with strength/ROM, gait training, manual and modalities post surgery to maximize function. Pt prior to onset of current condition had min/no pain with able to complete full ADLs and work activities. Patient received education on their current pathology and how their condition effects them with their functional activities. Patient understood discussion and questions were answered. Patient understands their activity limitations and understands rational for treatment progression.          Plan for Next Session: Specific instructions for Next Treatment: Post-op follow up, re-eval      Time In / Time Out:  [id number]        If [facility] Please Indicate Time In/Out/Total Time  CPT Code Time in Time out Total Time                                                            Total for session             Timed Code/Total Treatment Minutes:  15'/55'    (1) PT Eval  40'    (1) TE  15'       Next Progress Note due:  Eval sent 4/20/21     Plan of Care Interventions:  [x] Therapeutic Exercise  [] Modalities:  [x] Therapeutic Activity     [] Ultrasound  [] Estim  [] Gait Training      [] Cervical Traction [] Lumbar Traction  [x] Neuromuscular Re-education    [] Cold/hotpack [] Iontophoresis   [x] Instruction in HEP      [] Vasopneumatic   [] Dry Needling    [x] Manual Therapy               [] Aquatic Therapy              Electronically signed by:  Juan C Colin PT, DPT, OCS 4/20/2021, 12:12 PM    4/20/2021 12:12 PM   Girma Woodson, SPT

## 2021-04-20 NOTE — PROGRESS NOTES
Physical Therapy  Initial Assessment  Date: 2021  Patient Name: Kathleen David  MRN: [id number]  : 1965     Treatment Diagnosis: L TKA pre-op    Restrictions: vertigo, unable to lay supine (propped is best) and on L side     Subjective   General  Chart Reviewed: Yes  Patient assessed for rehabilitation services?: Yes  Referring Practitioner: Dr. Yani Watson  Diagnosis: L TKA pre-op  Subjective  Subjective: Pt surgery is scheduled for 21. She fell on ice in February, and decided with her surgeron TKA was the best option. She is not using ice and taking ibuprophen 800 mg. She has vicodin prescribed when she initially fell; but not using them anymore. Her plan is to attend outpatient therapy.   Pain Screening  Patient Currently in Pain: Yes  Pain Assessment  Pain Assessment: 0-10  Pain Level: (0/10 at rest, 7/10)  Pain Type: Acute pain  Pain Location: Knee  Pain Orientation: Left  Pain Descriptors: Aching;Dull  Pain Frequency: Intermittent  Pain Onset: Awakened from sleep  Clinical Progression: Gradually worsening  Vital Signs  Patient Currently in Pain: Yes    Orientation  Orientation  Overall Orientation Status: Within Normal Limits    Social/Functional History  Social/Functional History  Lives With: Daughter  Type of Home: House  Home Layout: Two level(sleeps on 1st floor;)  Home Access: Stairs to enter with rails(1 step in back, 1 step front)  Bathroom Shower/Tub: Walk-in shower  Bathroom Toilet: Standard  Bathroom Equipment: Shower chair;Built-in shower seat  Bathroom Accessibility: Accessible  Home Equipment: Standard walker;Rolling walker;Cane  Receives Help From: Family  Active : Yes  Mode of Transportation: Car  Type of occupation: retired  Leisure & Hobbies: hiking, walking    Objective  Observation/Palpation  Posture: Good  Palpation: TTP along patellar tendon, lateral joint line  Observation: demo antalgic gait w/o AD, decreased heel strike and step length  Edema: Swelling along medil joint capsule > lateral    AROM RLE (degrees)  RLE AROM: WNL  PROM LLE (degrees)  LLE General PROM: hard end feel with flexion and limited by increased pain  AROM LLE (degrees)  LLE General AROM: ext: 18 deg lacking flex: 90 deg *increased pain  Joint Mobility  ROM LLE: Patellar mobility limited in all directions: pain with inferior and lateral glide    Strength RLE  R Hip Flexion: 4-/5  R Hip Extension: 4+/5  R Hip ABduction: 4+/5  R Hip ADduction: 4+/5  R Hip Internal Rotation: 4/5  R Hip External Rotation: 4/5  R Knee Flexion: 4+/5  R Knee Extension: 4+/5  Strength LLE  L Hip Flexion: 4-/5  L Hip Extension: 4/5  L Hip ABduction: 4-/5  L Hip ADduction: 4/5  L Hip Internal Rotation: 4-/5  L Hip External Rotation: 4-/5  L Knee Flexion: 4-/5  L Knee Extension: 3+/5     Additional Measures  Other: 30 sec STS: 6, no icnrease in pain     SL Balance: <10 sec bilat no increase in pain    Assessment   Conditions Requiring Skilled Therapeutic Intervention  Body structures, Functions, Activity limitations: Decreased functional mobility ; Increased pain;Decreased balance;Decreased ROM; Decreased ADL status; Decreased strength;Decreased coordination; Vestibular Impairment  Pt is 48year old female with expected L TKA on 4/28/21. Pt now has difficulties completing closed chain activities including stairs and sit to stand and prolonged weightbearing activities with increasing pain. Pt demo deficits this date that include pain, flexibility restrictions for knee flex/ext, quad/hip flexor weakness, and diminished balance. Pt educated on proper gait with AD and stair negotiation. Issued handout for exercises prior and post surgery until return to outpatient PT services. Pt will benefit with PT services with strength/ROM, gait training, manual and modalities post surgery to maximize function. Pt prior to onset of current condition had min/no pain with able to complete full ADLs and work activities.  Patient received education on their current pathology and how their condition effects them with their functional activities. Patient understood discussion and questions were answered. Patient understands their activity limitations and understands rational for treatment progression. Treatment Diagnosis: L TKA pre-op  Prognosis: Good  Decision Making: Low Complexity  Barriers to Learning: Vertigo; unable to lay completly supine and on L side  REQUIRES PT FOLLOW UP: Yes  Activity Tolerance  Activity Tolerance: Patient limited by pain; Patient limited by fatigue;Patient limited by endurance      Plan   Plan  Times per week: 1x  Plan weeks: 2 weeks  Specific instructions for Next Treatment: Post-op follow up, re-eval  Current Treatment Recommendations: Strengthening, Neuromuscular Re-education, Home Exercise Program, ROM, Manual Therapy - Soft Tissue Mobilization, Balance Training, Endurance Training, Manual Therapy - Joint Manipulation, Gait Training, Stair training, Pain Management    Goals  Patient Goals   Patient goals : Improve pain and ability to walk long distances, return to work    Short term goals (All Goals MET)   Time Frame for Long term goals : In this visit, patient will  Long term goal 1: demonstrate good understanding of exercise progression post surgery. Long term goal 2: demonstrate good understanding of walker use post surgery    Long term goal 3: demonstrate good understanding of ascending/descending stairs after surgery. Long term goal 4: watch [id number] Video      Time Frame for Long term goals: Re-assess post surgery for appropriate goals.     Tara Duggan, PT, DPT, OCS    4/20/2021 10:11 AM   Riley Teran SPT

## 2021-04-21 ENCOUNTER — HOSPITAL ENCOUNTER (OUTPATIENT)
Age: 56
Discharge: HOME | End: 2021-04-21

## 2021-04-21 ENCOUNTER — HOSPITAL ENCOUNTER (OUTPATIENT)
Dept: LAB | Age: 56
Discharge: HOME | End: 2021-04-21

## 2021-04-21 ENCOUNTER — HOSPITAL ENCOUNTER (OUTPATIENT)
Dept: GENERAL RADIOLOGY | Age: 56
Discharge: HOME | End: 2021-04-21

## 2021-04-21 ENCOUNTER — HOSPITAL ENCOUNTER (OUTPATIENT)
Dept: PREADMISSION TESTING | Age: 56
Discharge: HOME | End: 2021-04-25
Payer: MEDICAID

## 2021-04-21 VITALS
OXYGEN SATURATION: 99 % | TEMPERATURE: 96.3 F | WEIGHT: 223 LBS | DIASTOLIC BLOOD PRESSURE: 81 MMHG | HEART RATE: 86 BPM | BODY MASS INDEX: 37.15 KG/M2 | RESPIRATION RATE: 16 BRPM | SYSTOLIC BLOOD PRESSURE: 136 MMHG | HEIGHT: 65 IN

## 2021-04-21 DIAGNOSIS — M17.12 PRIMARY OSTEOARTHRITIS OF LEFT KNEE: ICD-10-CM

## 2021-04-21 DIAGNOSIS — N20.0 KIDNEY STONE: ICD-10-CM

## 2021-04-21 LAB
ALBUMIN SERPL-MCNC: 4.4 GM/DL (ref 3.4–5)
ALP BLD-CCNC: 83 IU/L (ref 40–128)
ALT SERPL-CCNC: 18 U/L (ref 10–40)
ANION GAP SERPL CALCULATED.3IONS-SCNC: 8 MMOL/L (ref 4–16)
AST SERPL-CCNC: 16 IU/L (ref 15–37)
BACTERIA: ABNORMAL /HPF
BASOPHILS ABSOLUTE: 0 K/CU MM
BASOPHILS RELATIVE PERCENT: 0.5 % (ref 0–1)
BILIRUB SERPL-MCNC: 0.2 MG/DL (ref 0–1)
BILIRUBIN URINE: NEGATIVE MG/DL
BLOOD, URINE: ABNORMAL
BUN BLDV-MCNC: 15 MG/DL (ref 6–23)
CALCIUM SERPL-MCNC: 9.3 MG/DL (ref 8.3–10.6)
CHLORIDE BLD-SCNC: 102 MMOL/L (ref 99–110)
CLARITY: ABNORMAL
CO2: 29 MMOL/L (ref 21–32)
COLOR: YELLOW
CREAT SERPL-MCNC: 0.7 MG/DL (ref 0.6–1.1)
DIFFERENTIAL TYPE: ABNORMAL
EKG ATRIAL RATE: 78 BPM
EKG DIAGNOSIS: NORMAL
EKG P AXIS: 36 DEGREES
EKG P-R INTERVAL: 154 MS
EKG Q-T INTERVAL: 396 MS
EKG QRS DURATION: 86 MS
EKG QTC CALCULATION (BAZETT): 451 MS
EKG R AXIS: -11 DEGREES
EKG T AXIS: 26 DEGREES
EKG VENTRICULAR RATE: 78 BPM
EOSINOPHILS ABSOLUTE: 0.3 K/CU MM
EOSINOPHILS RELATIVE PERCENT: 4.4 % (ref 0–3)
ERYTHROCYTE SEDIMENTATION RATE: 38 MM/HR (ref 0–30)
GFR AFRICAN AMERICAN: >60 ML/MIN/1.73M2
GFR NON-AFRICAN AMERICAN: >60 ML/MIN/1.73M2
GLUCOSE BLD-MCNC: 108 MG/DL (ref 70–99)
GLUCOSE, URINE: NEGATIVE MG/DL
HCT VFR BLD CALC: 40.7 % (ref 37–47)
HEMOGLOBIN: 13.1 GM/DL (ref 12.5–16)
IMMATURE NEUTROPHIL %: 0.4 % (ref 0–0.43)
KETONES, URINE: NEGATIVE MG/DL
LEUKOCYTE ESTERASE, URINE: ABNORMAL
LYMPHOCYTES ABSOLUTE: 2 K/CU MM
LYMPHOCYTES RELATIVE PERCENT: 35.2 % (ref 24–44)
MCH RBC QN AUTO: 28.9 PG (ref 27–31)
MCHC RBC AUTO-ENTMCNC: 32.2 % (ref 32–36)
MCV RBC AUTO: 89.6 FL (ref 78–100)
MONOCYTES ABSOLUTE: 0.4 K/CU MM
MONOCYTES RELATIVE PERCENT: 6.8 % (ref 0–4)
MUCUS: ABNORMAL HPF
NITRITE URINE, QUANTITATIVE: NEGATIVE
NUCLEATED RBC %: 0 %
PDW BLD-RTO: 13.6 % (ref 11.7–14.9)
PH, URINE: 6 (ref 5–8)
PLATELET # BLD: 260 K/CU MM (ref 140–440)
PMV BLD AUTO: 9 FL (ref 7.5–11.1)
POTASSIUM SERPL-SCNC: 4.4 MMOL/L (ref 3.5–5.1)
PROTEIN UA: NEGATIVE MG/DL
RBC # BLD: 4.54 M/CU MM (ref 4.2–5.4)
RBC URINE: 6 /HPF (ref 0–6)
SARS-COV-2: NOT DETECTED
SEGMENTED NEUTROPHILS ABSOLUTE COUNT: 3 K/CU MM
SEGMENTED NEUTROPHILS RELATIVE PERCENT: 52.7 % (ref 36–66)
SODIUM BLD-SCNC: 139 MMOL/L (ref 135–145)
SOURCE: NORMAL
SPECIFIC GRAVITY UA: 1.02 (ref 1–1.03)
SQUAMOUS EPITHELIAL: 7 /HPF
TOTAL IMMATURE NEUTOROPHIL: 0.02 K/CU MM
TOTAL NUCLEATED RBC: 0 K/CU MM
TOTAL PROTEIN: 6.6 GM/DL (ref 6.4–8.2)
TRICHOMONAS: ABNORMAL /HPF
UROBILINOGEN, URINE: NEGATIVE MG/DL (ref 0.2–1)
WBC # BLD: 5.6 K/CU MM (ref 4–10.5)
WBC CLUMP: ABNORMAL /HPF
WBC UA: 101 /HPF (ref 0–5)

## 2021-04-21 PROCEDURE — U0003 INFECTIOUS AGENT DETECTION BY NUCLEIC ACID (DNA OR RNA); SEVERE ACUTE RESPIRATORY SYNDROME CORONAVIRUS 2 (SARS-COV-2) (CORONAVIRUS DISEASE [COVID-19]), AMPLIFIED PROBE TECHNIQUE, MAKING USE OF HIGH THROUGHPUT TECHNOLOGIES AS DESCRIBED BY CMS-2020-01-R: HCPCS

## 2021-04-21 PROCEDURE — 85025 COMPLETE CBC W/AUTO DIFF WBC: CPT

## 2021-04-21 PROCEDURE — 87086 URINE CULTURE/COLONY COUNT: CPT

## 2021-04-21 PROCEDURE — 36415 COLL VENOUS BLD VENIPUNCTURE: CPT

## 2021-04-21 PROCEDURE — C9803 HOPD COVID-19 SPEC COLLECT: HCPCS

## 2021-04-21 PROCEDURE — U0005 INFEC AGEN DETEC AMPLI PROBE: HCPCS

## 2021-04-21 PROCEDURE — 93010 ELECTROCARDIOGRAM REPORT: CPT | Performed by: INTERNAL MEDICINE

## 2021-04-21 PROCEDURE — 87077 CULTURE AEROBIC IDENTIFY: CPT

## 2021-04-21 PROCEDURE — 85652 RBC SED RATE AUTOMATED: CPT

## 2021-04-21 PROCEDURE — 71046 X-RAY EXAM CHEST 2 VIEWS: CPT

## 2021-04-21 PROCEDURE — 93005 ELECTROCARDIOGRAM TRACING: CPT | Performed by: ORTHOPAEDIC SURGERY

## 2021-04-21 PROCEDURE — 81001 URINALYSIS AUTO W/SCOPE: CPT

## 2021-04-21 PROCEDURE — 87186 SC STD MICRODIL/AGAR DIL: CPT

## 2021-04-21 PROCEDURE — 80053 COMPREHEN METABOLIC PANEL: CPT

## 2021-04-21 RX ORDER — IBUPROFEN 800 MG/1
800 TABLET ORAL EVERY 8 HOURS PRN
COMMUNITY

## 2021-04-21 RX ORDER — ALBUTEROL SULFATE 90 UG/1
2 AEROSOL, METERED RESPIRATORY (INHALATION) EVERY 6 HOURS PRN
COMMUNITY

## 2021-04-21 ASSESSMENT — PAIN DESCRIPTION - ONSET: ONSET: ON-GOING

## 2021-04-21 ASSESSMENT — PAIN DESCRIPTION - ORIENTATION: ORIENTATION: LEFT

## 2021-04-21 ASSESSMENT — PAIN DESCRIPTION - FREQUENCY: FREQUENCY: INTERMITTENT

## 2021-04-21 ASSESSMENT — PAIN DESCRIPTION - PROGRESSION: CLINICAL_PROGRESSION: GRADUALLY WORSENING

## 2021-04-21 ASSESSMENT — PAIN DESCRIPTION - PAIN TYPE: TYPE: CHRONIC PAIN

## 2021-04-21 NOTE — PROGRESS NOTES
Notified Dr Roxane Okeefe office that the patients sed rate 38 today and small amt blood in urine( culture pending

## 2021-04-23 ENCOUNTER — TELEPHONE (OUTPATIENT)
Dept: ORTHOPEDIC SURGERY | Age: 56
End: 2021-04-23

## 2021-04-23 LAB
CULTURE: ABNORMAL
CULTURE: ABNORMAL
Lab: ABNORMAL
SPECIMEN: ABNORMAL

## 2021-04-23 NOTE — TELEPHONE ENCOUNTER
Called patient's PCP spoke with MA, asked for patient to be placed on antibiotics for abnormal UA / Culture. Faxed over lab results. They will notify patient.

## 2021-04-27 ENCOUNTER — ANESTHESIA EVENT (OUTPATIENT)
Dept: OPERATING ROOM | Age: 56
End: 2021-04-27

## 2021-04-27 ASSESSMENT — LIFESTYLE VARIABLES: SMOKING_STATUS: 1

## 2021-04-27 NOTE — ANESTHESIA PRE PROCEDURE
Department of Anesthesiology  Preprocedure Note       Name:  Glenys Watkins   Age:  48 y.o.  :  1965                                          MRN:  [id number]         Date:  2021      Surgeon: Rahul Lauren):  Donte Diego MD    Procedure: Procedure(s):  LEFT KNEE TOTAL ARTHROPLASTY ROBOTIC    Medications prior to admission:   Prior to Admission medications    Medication Sig Start Date End Date Taking? Authorizing Provider   ibuprofen (ADVIL;MOTRIN) 800 MG tablet Take 800 mg by mouth every 8 hours as needed for Pain    Historical Provider, MD   albuterol sulfate HFA (VENTOLIN HFA) 108 (90 Base) MCG/ACT inhaler Inhale 2 puffs into the lungs every 6 hours as needed for Wheezing    Historical Provider, MD   naproxen (NAPROSYN) 500 MG tablet Take 1 tablet by mouth 2 times daily  Patient not taking: Reported on 3/11/2021 2/9/21   MANDEEP Donohue   VITAMIN D PO Take 1,000 Units by mouth daily    Historical Provider, MD   amoxicillin-clavulanate (AUGMENTIN) 500-125 MG per tablet Take 1 tablet by mouth 2 times daily    Historical Provider, MD   atorvastatin (LIPITOR) 20 MG tablet Take 20 mg by mouth daily    Historical Provider, MD   folic acid (FOLVITE) 1 MG tablet Take 1 mg by mouth daily    Historical Provider, MD   dicyclomine (BENTYL) 20 MG tablet Take 1 tablet by mouth 3 times daily as needed (abdominal pain) 20   Augustus Fox MD   spironolactone (ALDACTONE) 25 MG tablet TAKE 1 TABLET BY MOUTH ONE TIME A DAY 20   Historical Provider, MD   amLODIPine (NORVASC) 5 MG tablet Take 5 mg by mouth daily    Historical Provider, MD       Current medications:    No current facility-administered medications for this encounter.       Current Outpatient Medications   Medication Sig Dispense Refill    ibuprofen (ADVIL;MOTRIN) 800 MG tablet Take 800 mg by mouth every 8 hours as needed for Pain      albuterol sulfate HFA (VENTOLIN HFA) 108 (90 Base) MCG/ACT inhaler Inhale 2 puffs into the lungs every 6 hours as needed for Wheezing      naproxen (NAPROSYN) 500 MG tablet Take 1 tablet by mouth 2 times daily (Patient not taking: Reported on 3/11/2021) 60 tablet 0    VITAMIN D PO Take 1,000 Units by mouth daily      amoxicillin-clavulanate (AUGMENTIN) 500-125 MG per tablet Take 1 tablet by mouth 2 times daily      atorvastatin (LIPITOR) 20 MG tablet Take 20 mg by mouth daily      folic acid (FOLVITE) 1 MG tablet Take 1 mg by mouth daily      dicyclomine (BENTYL) 20 MG tablet Take 1 tablet by mouth 3 times daily as needed (abdominal pain) 20 tablet 3    spironolactone (ALDACTONE) 25 MG tablet TAKE 1 TABLET BY MOUTH ONE TIME A DAY      amLODIPine (NORVASC) 5 MG tablet Take 5 mg by mouth daily         Allergies: Allergies   Allergen Reactions    Pollen Extract Itching     Itchy and watery eyes. Pet dander also causes same symptoms.     Tramadol      Ear ringing       Problem List:    Patient Active Problem List   Diagnosis Code    Obesity, Class I, BMI 30-34.9 E66.9    HTN (hypertension) I10    Right knee pain M25.561    Hyperlipidemia E78.5    GERD (gastroesophageal reflux disease) K21.9    Patellofemoral syndrome M22.2X9    DJD (degenerative joint disease) of knee M17.10    Tobacco dependence F17.200    Major depressive disorder, recurrent episode with anxious distress (HCC) F33.9    Mild persistent asthma without complication E59.86    Urinary incontinence R32    Irritable bowel syndrome with diarrhea K58.0    Right ureteral stone N20.1       Past Medical History:        Diagnosis Date    Arthritis     Arthritis of left knee     Asthma     last flare up summer 2020    Diabetes mellitus ([facility] [address]     \"use to take Metformin been off this since  2019\"    Disorder of rotator cuff syndrome of left shoulder and allied disorder     H/O cardiovascular stress test 2/24/16    EF68% normal study    History of motion sickness     Hx of echocardiogram 2/24/16    EF 55%, normal LV function, moderate LVH    Hyperlipidemia     Hypertension     Kidney stones     \"had kidney stone 11/2020- removed with surg- , back in 2009- had to put tube in my back(nephostomy tube) for kidney stone-had lithotripsy    Migraine     Obesity     PONV (postoperative nausea and vomiting)     hx of motion sickness    UTI (urinary tract infection)     \"last one 11/2020\"    Vertigo     \"have trouble leaning to my left and laying flat with this\"    Wears glasses        Past Surgical History:        Procedure Laterality Date    CHOLECYSTECTOMY  age20's    CYSTOSCOPY INSERTION / REMOVAL STENT / STONE N/A 11/28/2020    CYSTOSCOPY RETROGRADE PYELOGRAM STENT INSERTION performed by Jazmyn Dominguez MD at [address] / [facility] Right 1/21/2021    CYSTOSCOPY STENT EXCHANGE URETEROSCOPY EXTRACTION OF STONES performed by Elissa Raymundo MD at [address] Bilateral     tarsal tunnel, hammer toe, neuroma removed -1990's    HERNIA REPAIR  2019? ventral hernia repair    HYSTERECTOMY  2001    KIDNEY STONE SURGERY      per old chart had surg for left nephrostolithotomy and cysto /lithotripsy in [address]     \"open surg right shoulder and later had 2 rotator cuff surgeries on my right shoulder\"    TONSILLECTOMY  age 26   [address]       Social History:    Social History     Tobacco Use    Smoking status: Current Every Day Smoker     Packs/day: 1.00     Years: 20.00     Pack years: 20.00     Types: Cigarettes    Smokeless tobacco: Never Used   Substance Use Topics    Alcohol use: No     Alcohol/week: 0.0 standard drinks                                Ready to quit: Not Answered  Counseling given: Not Answered      Vital Signs (Current): There were no vitals filed for this visit.                                            BP Readings from Last 3 Encounters:   04/21/21 136/81   02/11/21 (!) 135/109   02/09/21 (!) 156/71       NPO Status: BMI:   Wt Readings from Last 3 Encounters:   04/21/21 223 lb (101.2 kg)   03/11/21 200 lb (90.7 kg)   02/16/21 200 lb (90.7 kg)     There is no height or weight on file to calculate BMI.    CBC:   Lab Results   Component Value Date    WBC 5.6 04/21/2021    RBC 4.54 04/21/2021    HGB 13.1 04/21/2021    HCT 40.7 04/21/2021    MCV 89.6 04/21/2021    RDW 13.6 04/21/2021     04/21/2021       CMP:   Lab Results   Component Value Date     04/21/2021    K 4.4 04/21/2021     04/21/2021    CO2 29 04/21/2021    BUN 15 04/21/2021    CREATININE 0.7 04/21/2021    GFRAA >60 04/21/2021    LABGLOM >60 04/21/2021    GLUCOSE 108 04/21/2021    PROT 6.6 04/21/2021    PROT 7.4 04/30/2012    CALCIUM 9.3 04/21/2021    BILITOT 0.2 04/21/2021    ALKPHOS 83 04/21/2021    AST 16 04/21/2021    ALT 18 04/21/2021       POC Tests: No results for input(s): POCGLU, POCNA, POCK, POCCL, POCBUN, POCHEMO, POCHCT in the last 72 hours. Coags:   Lab Results   Component Value Date    PROTIME 13.6 11/28/2020    PROTIME 10.4 04/30/2012    INR 1.12 11/28/2020    APTT 24.4 07/16/2013       HCG (If Applicable):   Lab Results   Component Value Date    PREGTESTUR NEGATIVE 03/20/2016        ABGs: No results found for: PHART, PO2ART, FEO5VIC, EPP7LTG, BEART, Q8WRBEXF     Type & Screen (If Applicable):  No results found for: LABABO, LABRH    Drug/Infectious Status (If Applicable):  No results found for: HIV, HEPCAB    COVID-19 Screening (If Applicable):   Lab Results   Component Value Date    COVID19 NOT DETECTED 04/21/2021           Anesthesia Evaluation  Patient summary reviewed   history of anesthetic complications: PONV.   Airway: Mallampati: II  TM distance: >3 FB   Neck ROM: full  Mouth opening: > = 3 FB Dental:    (+) edentulous      Pulmonary:normal exam    (+) asthma: current smoker                           Cardiovascular:    (+) hypertension:, hyperlipidemia               ROS comment: Normal sinus rhythm   Normal ECG   No previous ECGs available   Confirmed by RISHABH Haro ([id number]) on 4/21/2021 3:47:26 PM     cardiovascular stress test EF68% normal study     echocardiogram EF 55%, normal LV function, moderate LVH      Neuro/Psych:   (+) headaches: migraine headaches, psychiatric history:            GI/Hepatic/Renal:   (+) GERD:, morbid obesity          Endo/Other:    (+) Diabetes, . Abdominal:           Vascular: negative vascular ROS. Anesthesia Plan      regional and spinal     ASA 2     (Wants a scope patch)  Induction: intravenous. MIPS: Postoperative opioids intended. Anesthetic plan and risks discussed with patient. Plan discussed with CRNA. Attending anesthesiologist reviewed and agrees with Pre Eval content            Claude Lee, APRN - CRNA   4/27/2021         Pre Anesthesia Assessment complete.  Chart reviewed on 4/27/2021

## 2021-04-28 ENCOUNTER — ANESTHESIA (OUTPATIENT)
Dept: OPERATING ROOM | Age: 56
End: 2021-04-28

## 2021-04-28 ENCOUNTER — HOSPITAL ENCOUNTER (OUTPATIENT)
Age: 56
Discharge: HOME | End: 2021-04-29
Attending: ORTHOPAEDIC SURGERY | Admitting: ORTHOPAEDIC SURGERY
Payer: MEDICAID

## 2021-04-28 ENCOUNTER — APPOINTMENT (OUTPATIENT)
Dept: GENERAL RADIOLOGY | Age: 56
End: 2021-04-28
Attending: ORTHOPAEDIC SURGERY

## 2021-04-28 VITALS — TEMPERATURE: 98.4 F | DIASTOLIC BLOOD PRESSURE: 77 MMHG | OXYGEN SATURATION: 100 % | SYSTOLIC BLOOD PRESSURE: 136 MMHG

## 2021-04-28 DIAGNOSIS — Z96.652 STATUS POST LEFT KNEE REPLACEMENT: Primary | ICD-10-CM

## 2021-04-28 PROBLEM — M17.12 PRIMARY OSTEOARTHRITIS OF LEFT KNEE: Status: ACTIVE | Noted: 2021-04-28

## 2021-04-28 LAB
GLUCOSE BLD-MCNC: 111 MG/DL (ref 70–99)
GLUCOSE BLD-MCNC: 119 MG/DL (ref 70–99)

## 2021-04-28 PROCEDURE — 2720000010 HC SURG SUPPLY STERILE: Performed by: ORTHOPAEDIC SURGERY

## 2021-04-28 PROCEDURE — 6360000002 HC RX W HCPCS: Performed by: ANESTHESIOLOGY

## 2021-04-28 PROCEDURE — 76942 ECHO GUIDE FOR BIOPSY: CPT | Performed by: ANESTHESIOLOGY

## 2021-04-28 PROCEDURE — 7100000000 HC PACU RECOVERY - FIRST 15 MIN: Performed by: ORTHOPAEDIC SURGERY

## 2021-04-28 PROCEDURE — 2580000003 HC RX 258: Performed by: ORTHOPAEDIC SURGERY

## 2021-04-28 PROCEDURE — 97116 GAIT TRAINING THERAPY: CPT

## 2021-04-28 PROCEDURE — C2617 STENT, NON-COR, TEM W/O DEL: HCPCS | Performed by: ORTHOPAEDIC SURGERY

## 2021-04-28 PROCEDURE — S2900 ROBOTIC SURGICAL SYSTEM: HCPCS | Performed by: ORTHOPAEDIC SURGERY

## 2021-04-28 PROCEDURE — 3700000000 HC ANESTHESIA ATTENDED CARE: Performed by: ORTHOPAEDIC SURGERY

## 2021-04-28 PROCEDURE — 97162 PT EVAL MOD COMPLEX 30 MIN: CPT

## 2021-04-28 PROCEDURE — 73560 X-RAY EXAM OF KNEE 1 OR 2: CPT

## 2021-04-28 PROCEDURE — 6360000002 HC RX W HCPCS: Performed by: ORTHOPAEDIC SURGERY

## 2021-04-28 PROCEDURE — 6370000000 HC RX 637 (ALT 250 FOR IP): Performed by: ORTHOPAEDIC SURGERY

## 2021-04-28 PROCEDURE — 3700000001 HC ADD 15 MINUTES (ANESTHESIA): Performed by: ORTHOPAEDIC SURGERY

## 2021-04-28 PROCEDURE — 2500000003 HC RX 250 WO HCPCS: Performed by: NURSE ANESTHETIST, CERTIFIED REGISTERED

## 2021-04-28 PROCEDURE — 6360000002 HC RX W HCPCS: Performed by: NURSE ANESTHETIST, CERTIFIED REGISTERED

## 2021-04-28 PROCEDURE — C1776 JOINT DEVICE (IMPLANTABLE): HCPCS | Performed by: ORTHOPAEDIC SURGERY

## 2021-04-28 PROCEDURE — 2500000003 HC RX 250 WO HCPCS: Performed by: ORTHOPAEDIC SURGERY

## 2021-04-28 PROCEDURE — 7100000011 HC PHASE II RECOVERY - ADDTL 15 MIN: Performed by: ORTHOPAEDIC SURGERY

## 2021-04-28 PROCEDURE — 2709999900 HC NON-CHARGEABLE SUPPLY: Performed by: ORTHOPAEDIC SURGERY

## 2021-04-28 PROCEDURE — 82962 GLUCOSE BLOOD TEST: CPT

## 2021-04-28 PROCEDURE — 7100000001 HC PACU RECOVERY - ADDTL 15 MIN: Performed by: ORTHOPAEDIC SURGERY

## 2021-04-28 PROCEDURE — 3600000019 HC SURGERY ROBOT ADDTL 15MIN: Performed by: ORTHOPAEDIC SURGERY

## 2021-04-28 PROCEDURE — 3600000009 HC SURGERY ROBOT BASE: Performed by: ORTHOPAEDIC SURGERY

## 2021-04-28 PROCEDURE — 27447 TOTAL KNEE ARTHROPLASTY: CPT | Performed by: ORTHOPAEDIC SURGERY

## 2021-04-28 PROCEDURE — C1713 ANCHOR/SCREW BN/BN,TIS/BN: HCPCS | Performed by: ORTHOPAEDIC SURGERY

## 2021-04-28 PROCEDURE — 7100000010 HC PHASE II RECOVERY - FIRST 15 MIN: Performed by: ORTHOPAEDIC SURGERY

## 2021-04-28 DEVICE — CEMENT BNE 40GM W/ GENT HI VISC RADPQ FOR REV SURG: Type: IMPLANTABLE DEVICE | Site: KNEE | Status: FUNCTIONAL

## 2021-04-28 RX ORDER — HYDROCODONE BITARTRATE AND ACETAMINOPHEN 5; 325 MG/1; MG/1
1 TABLET ORAL PRN
Status: DISCONTINUED | OUTPATIENT
Start: 2021-04-28 | End: 2021-04-28

## 2021-04-28 RX ORDER — SPIRONOLACTONE 25 MG/1
25 TABLET ORAL DAILY
Status: DISCONTINUED | OUTPATIENT
Start: 2021-04-28 | End: 2021-04-29 | Stop reason: HOSPADM

## 2021-04-28 RX ORDER — FOLIC ACID 1 MG/1
1 TABLET ORAL DAILY
Status: DISCONTINUED | OUTPATIENT
Start: 2021-04-28 | End: 2021-04-29 | Stop reason: HOSPADM

## 2021-04-28 RX ORDER — ATORVASTATIN CALCIUM 20 MG/1
20 TABLET, FILM COATED ORAL DAILY
Status: DISCONTINUED | OUTPATIENT
Start: 2021-04-28 | End: 2021-04-29 | Stop reason: HOSPADM

## 2021-04-28 RX ORDER — SODIUM CHLORIDE, SODIUM LACTATE, POTASSIUM CHLORIDE, CALCIUM CHLORIDE 600; 310; 30; 20 MG/100ML; MG/100ML; MG/100ML; MG/100ML
INJECTION, SOLUTION INTRAVENOUS CONTINUOUS
Status: DISCONTINUED | OUTPATIENT
Start: 2021-04-28 | End: 2021-04-29

## 2021-04-28 RX ORDER — DICYCLOMINE HCL 20 MG
20 TABLET ORAL 3 TIMES DAILY PRN
Status: DISCONTINUED | OUTPATIENT
Start: 2021-04-28 | End: 2021-04-29 | Stop reason: HOSPADM

## 2021-04-28 RX ORDER — ONDANSETRON 2 MG/ML
4 INJECTION INTRAMUSCULAR; INTRAVENOUS
Status: DISCONTINUED | OUTPATIENT
Start: 2021-04-28 | End: 2021-04-28

## 2021-04-28 RX ORDER — TRANEXAMIC ACID 100 MG/ML
1000 INJECTION, SOLUTION INTRAVENOUS
Status: COMPLETED | OUTPATIENT
Start: 2021-04-28 | End: 2021-04-28

## 2021-04-28 RX ORDER — 0.9 % SODIUM CHLORIDE 0.9 %
500 INTRAVENOUS SOLUTION INTRAVENOUS
Status: DISCONTINUED | OUTPATIENT
Start: 2021-04-28 | End: 2021-04-28

## 2021-04-28 RX ORDER — FENTANYL CITRATE 50 UG/ML
50 INJECTION, SOLUTION INTRAMUSCULAR; INTRAVENOUS EVERY 5 MIN PRN
Status: DISCONTINUED | OUTPATIENT
Start: 2021-04-28 | End: 2021-04-28

## 2021-04-28 RX ORDER — PROPOFOL 10 MG/ML
INJECTION, EMULSION INTRAVENOUS CONTINUOUS PRN
Status: DISCONTINUED | OUTPATIENT
Start: 2021-04-28 | End: 2021-04-28 | Stop reason: SDUPTHER

## 2021-04-28 RX ORDER — DIPHENHYDRAMINE HYDROCHLORIDE 50 MG/ML
12.5 INJECTION INTRAMUSCULAR; INTRAVENOUS
Status: DISCONTINUED | OUTPATIENT
Start: 2021-04-28 | End: 2021-04-28

## 2021-04-28 RX ORDER — HYDROMORPHONE HCL 110MG/55ML
0.25 PATIENT CONTROLLED ANALGESIA SYRINGE INTRAVENOUS
Status: DISCONTINUED | OUTPATIENT
Start: 2021-04-28 | End: 2021-04-29 | Stop reason: HOSPADM

## 2021-04-28 RX ORDER — PROMETHAZINE HYDROCHLORIDE 25 MG/ML
6.25 INJECTION, SOLUTION INTRAMUSCULAR; INTRAVENOUS
Status: DISCONTINUED | OUTPATIENT
Start: 2021-04-28 | End: 2021-04-28

## 2021-04-28 RX ORDER — OXYCODONE HYDROCHLORIDE AND ACETAMINOPHEN 5; 325 MG/1; MG/1
2 TABLET ORAL EVERY 4 HOURS PRN
Status: DISCONTINUED | OUTPATIENT
Start: 2021-04-28 | End: 2021-04-29 | Stop reason: HOSPADM

## 2021-04-28 RX ORDER — HYDROMORPHONE HCL 110MG/55ML
0.5 PATIENT CONTROLLED ANALGESIA SYRINGE INTRAVENOUS EVERY 5 MIN PRN
Status: DISCONTINUED | OUTPATIENT
Start: 2021-04-28 | End: 2021-04-28

## 2021-04-28 RX ORDER — HYDRALAZINE HYDROCHLORIDE 20 MG/ML
5 INJECTION INTRAMUSCULAR; INTRAVENOUS EVERY 10 MIN PRN
Status: DISCONTINUED | OUTPATIENT
Start: 2021-04-28 | End: 2021-04-28

## 2021-04-28 RX ORDER — MIDAZOLAM HYDROCHLORIDE 1 MG/ML
INJECTION INTRAMUSCULAR; INTRAVENOUS PRN
Status: DISCONTINUED | OUTPATIENT
Start: 2021-04-28 | End: 2021-04-28 | Stop reason: SDUPTHER

## 2021-04-28 RX ORDER — ACETAMINOPHEN 325 MG/1
650 TABLET ORAL EVERY 6 HOURS
Status: DISCONTINUED | OUTPATIENT
Start: 2021-04-28 | End: 2021-04-29 | Stop reason: HOSPADM

## 2021-04-28 RX ORDER — MEPERIDINE HYDROCHLORIDE 25 MG/ML
12.5 INJECTION INTRAMUSCULAR; INTRAVENOUS; SUBCUTANEOUS EVERY 5 MIN PRN
Status: DISCONTINUED | OUTPATIENT
Start: 2021-04-28 | End: 2021-04-28

## 2021-04-28 RX ORDER — HYDROCODONE BITARTRATE AND ACETAMINOPHEN 5; 325 MG/1; MG/1
2 TABLET ORAL EVERY 6 HOURS PRN
Status: DISCONTINUED | OUTPATIENT
Start: 2021-04-28 | End: 2021-04-28

## 2021-04-28 RX ORDER — SENNA PLUS 8.6 MG/1
1 TABLET ORAL DAILY PRN
Status: DISCONTINUED | OUTPATIENT
Start: 2021-04-28 | End: 2021-04-29 | Stop reason: HOSPADM

## 2021-04-28 RX ORDER — PROMETHAZINE HYDROCHLORIDE 12.5 MG/1
12.5 TABLET ORAL EVERY 6 HOURS PRN
Status: DISCONTINUED | OUTPATIENT
Start: 2021-04-28 | End: 2021-04-29 | Stop reason: HOSPADM

## 2021-04-28 RX ORDER — SODIUM CHLORIDE, SODIUM LACTATE, POTASSIUM CHLORIDE, CALCIUM CHLORIDE 600; 310; 30; 20 MG/100ML; MG/100ML; MG/100ML; MG/100ML
INJECTION, SOLUTION INTRAVENOUS ONCE
Status: COMPLETED | OUTPATIENT
Start: 2021-04-28 | End: 2021-04-28

## 2021-04-28 RX ORDER — OXYCODONE HYDROCHLORIDE AND ACETAMINOPHEN 5; 325 MG/1; MG/1
1 TABLET ORAL EVERY 4 HOURS PRN
Status: DISCONTINUED | OUTPATIENT
Start: 2021-04-28 | End: 2021-04-29 | Stop reason: HOSPADM

## 2021-04-28 RX ORDER — MAGNESIUM HYDROXIDE 1200 MG/15ML
LIQUID ORAL CONTINUOUS PRN
Status: COMPLETED | OUTPATIENT
Start: 2021-04-28 | End: 2021-04-28

## 2021-04-28 RX ORDER — BUPIVACAINE HYDROCHLORIDE 5 MG/ML
INJECTION, SOLUTION EPIDURAL; INTRACAUDAL PRN
Status: DISCONTINUED | OUTPATIENT
Start: 2021-04-28 | End: 2021-04-28 | Stop reason: SDUPTHER

## 2021-04-28 RX ORDER — LABETALOL HYDROCHLORIDE 5 MG/ML
5 INJECTION, SOLUTION INTRAVENOUS EVERY 10 MIN PRN
Status: DISCONTINUED | OUTPATIENT
Start: 2021-04-28 | End: 2021-04-28

## 2021-04-28 RX ORDER — AMLODIPINE BESYLATE 5 MG/1
5 TABLET ORAL DAILY
Status: DISCONTINUED | OUTPATIENT
Start: 2021-04-29 | End: 2021-04-29 | Stop reason: HOSPADM

## 2021-04-28 RX ORDER — SODIUM CHLORIDE 9 MG/ML
25 INJECTION, SOLUTION INTRAVENOUS PRN
Status: DISCONTINUED | OUTPATIENT
Start: 2021-04-28 | End: 2021-04-29 | Stop reason: HOSPADM

## 2021-04-28 RX ORDER — SODIUM CHLORIDE 0.9 % (FLUSH) 0.9 %
10 SYRINGE (ML) INJECTION EVERY 12 HOURS SCHEDULED
Status: DISCONTINUED | OUTPATIENT
Start: 2021-04-28 | End: 2021-04-29 | Stop reason: HOSPADM

## 2021-04-28 RX ORDER — SODIUM CHLORIDE 0.9 % (FLUSH) 0.9 %
10 SYRINGE (ML) INJECTION PRN
Status: DISCONTINUED | OUTPATIENT
Start: 2021-04-28 | End: 2021-04-29 | Stop reason: HOSPADM

## 2021-04-28 RX ORDER — ALBUTEROL SULFATE 90 UG/1
2 AEROSOL, METERED RESPIRATORY (INHALATION) EVERY 6 HOURS PRN
Status: DISCONTINUED | OUTPATIENT
Start: 2021-04-28 | End: 2021-04-29 | Stop reason: HOSPADM

## 2021-04-28 RX ORDER — ROPIVACAINE HYDROCHLORIDE 5 MG/ML
INJECTION, SOLUTION EPIDURAL; INFILTRATION; PERINEURAL
Status: COMPLETED | OUTPATIENT
Start: 2021-04-28 | End: 2021-04-28

## 2021-04-28 RX ORDER — ONDANSETRON 2 MG/ML
4 INJECTION INTRAMUSCULAR; INTRAVENOUS EVERY 6 HOURS PRN
Status: DISCONTINUED | OUTPATIENT
Start: 2021-04-28 | End: 2021-04-29 | Stop reason: HOSPADM

## 2021-04-28 RX ORDER — HYDROMORPHONE HCL 110MG/55ML
0.5 PATIENT CONTROLLED ANALGESIA SYRINGE INTRAVENOUS
Status: DISCONTINUED | OUTPATIENT
Start: 2021-04-28 | End: 2021-04-29 | Stop reason: HOSPADM

## 2021-04-28 RX ADMIN — OXYCODONE HYDROCHLORIDE AND ACETAMINOPHEN 2 TABLET: 5; 325 TABLET ORAL at 13:38

## 2021-04-28 RX ADMIN — MIDAZOLAM 2 MG: 1 INJECTION INTRAMUSCULAR; INTRAVENOUS at 07:21

## 2021-04-28 RX ADMIN — CEFAZOLIN 2000 MG: 10 INJECTION, POWDER, FOR SOLUTION INTRAVENOUS at 07:38

## 2021-04-28 RX ADMIN — PROPOFOL 50 MCG/KG/MIN: 10 INJECTION, EMULSION INTRAVENOUS at 07:46

## 2021-04-28 RX ADMIN — HYDROMORPHONE HYDROCHLORIDE 0.5 MG: 2 INJECTION, SOLUTION INTRAMUSCULAR; INTRAVENOUS; SUBCUTANEOUS at 17:33

## 2021-04-28 RX ADMIN — SODIUM CHLORIDE, POTASSIUM CHLORIDE, SODIUM LACTATE AND CALCIUM CHLORIDE: 600; 310; 30; 20 INJECTION, SOLUTION INTRAVENOUS at 21:22

## 2021-04-28 RX ADMIN — SODIUM CHLORIDE, POTASSIUM CHLORIDE, SODIUM LACTATE AND CALCIUM CHLORIDE: 600; 310; 30; 20 INJECTION, SOLUTION INTRAVENOUS at 07:23

## 2021-04-28 RX ADMIN — ACETAMINOPHEN 650 MG: 325 TABLET ORAL at 13:38

## 2021-04-28 RX ADMIN — HYDROMORPHONE HYDROCHLORIDE 0.5 MG: 2 INJECTION, SOLUTION INTRAMUSCULAR; INTRAVENOUS; SUBCUTANEOUS at 23:14

## 2021-04-28 RX ADMIN — CEFAZOLIN SODIUM 2000 MG: 10 INJECTION, POWDER, FOR SOLUTION INTRAVENOUS at 17:29

## 2021-04-28 RX ADMIN — SPIRONOLACTONE 25 MG: 25 TABLET ORAL at 17:29

## 2021-04-28 RX ADMIN — BUPIVACAINE HYDROCHLORIDE 2.7 ML: 5 INJECTION, SOLUTION EPIDURAL; INTRACAUDAL; PERINEURAL at 07:34

## 2021-04-28 RX ADMIN — ASPIRIN 325 MG: 325 TABLET, COATED ORAL at 17:29

## 2021-04-28 RX ADMIN — FOLIC ACID 1 MG: 1 TABLET ORAL at 17:29

## 2021-04-28 RX ADMIN — ROPIVACAINE HYDROCHLORIDE 15 ML: 5 INJECTION, SOLUTION EPIDURAL; INFILTRATION; PERINEURAL at 09:50

## 2021-04-28 RX ADMIN — OXYCODONE HYDROCHLORIDE AND ACETAMINOPHEN 2 TABLET: 5; 325 TABLET ORAL at 19:50

## 2021-04-28 RX ADMIN — SODIUM CHLORIDE, POTASSIUM CHLORIDE, SODIUM LACTATE AND CALCIUM CHLORIDE: 600; 310; 30; 20 INJECTION, SOLUTION INTRAVENOUS at 11:05

## 2021-04-28 RX ADMIN — SODIUM CHLORIDE, POTASSIUM CHLORIDE, SODIUM LACTATE AND CALCIUM CHLORIDE: 600; 310; 30; 20 INJECTION, SOLUTION INTRAVENOUS at 08:51

## 2021-04-28 RX ADMIN — ATORVASTATIN CALCIUM 20 MG: 20 TABLET, FILM COATED ORAL at 17:29

## 2021-04-28 RX ADMIN — TRANEXAMIC ACID 1000 MG: 100 INJECTION, SOLUTION INTRAVENOUS at 07:57

## 2021-04-28 ASSESSMENT — PAIN SCALES - GENERAL
PAINLEVEL_OUTOF10: 5
PAINLEVEL_OUTOF10: 8
PAINLEVEL_OUTOF10: 5
PAINLEVEL_OUTOF10: 0
PAINLEVEL_OUTOF10: 7
PAINLEVEL_OUTOF10: 7

## 2021-04-28 ASSESSMENT — PULMONARY FUNCTION TESTS
PIF_VALUE: 1
PIF_VALUE: 0
PIF_VALUE: 1
PIF_VALUE: 0
PIF_VALUE: 1
PIF_VALUE: 0
PIF_VALUE: 1
PIF_VALUE: 0
PIF_VALUE: 1
PIF_VALUE: 0
PIF_VALUE: 1
PIF_VALUE: 1
PIF_VALUE: 0
PIF_VALUE: 1
PIF_VALUE: 1
PIF_VALUE: 0
PIF_VALUE: 1
PIF_VALUE: 1
PIF_VALUE: 0
PIF_VALUE: 1
PIF_VALUE: 1

## 2021-04-28 ASSESSMENT — PAIN DESCRIPTION - LOCATION
LOCATION: KNEE

## 2021-04-28 ASSESSMENT — PAIN DESCRIPTION - DESCRIPTORS
DESCRIPTORS: ACHING

## 2021-04-28 ASSESSMENT — PAIN DESCRIPTION - PROGRESSION
CLINICAL_PROGRESSION: NOT CHANGED
CLINICAL_PROGRESSION: GRADUALLY IMPROVING
CLINICAL_PROGRESSION: GRADUALLY WORSENING
CLINICAL_PROGRESSION: NOT CHANGED

## 2021-04-28 ASSESSMENT — PAIN DESCRIPTION - ORIENTATION
ORIENTATION: LEFT
ORIENTATION: LEFT

## 2021-04-28 ASSESSMENT — PAIN DESCRIPTION - ONSET
ONSET: ON-GOING
ONSET: ON-GOING

## 2021-04-28 ASSESSMENT — PAIN - FUNCTIONAL ASSESSMENT: PAIN_FUNCTIONAL_ASSESSMENT: 0-10

## 2021-04-28 ASSESSMENT — PAIN DESCRIPTION - PAIN TYPE
TYPE: SURGICAL PAIN
TYPE: SURGICAL PAIN
TYPE: ACUTE PAIN

## 2021-04-28 NOTE — H&P
Chief Complaint   Patient presents with    Knee Pain       left         History of Present Illness:                             Kathleen David is a 48 y.o. female who presents today for evaluation of her left knee pain, swelling, and stiffness. She has had many years of problems and pain in the left knee. She has been diagnosed in the past with knee arthritis. She has received multiple injections over the course of the last few years which have been only partially successful but a typically wear off quickly. She had a injury to the left knee on 2/9/2020 when she twisted her knee. Since that time her knee pain has been severe and debilitating. She has a difficult time walking and mobilizing. She is requiring use of a cane even for short distances. She is afraid that her knee may buckle or cause her to get out and fall. She has been unable to return to her job which requires prolonged standing walking. She recently had an MRI is here today for a review the results.     Patient presents to the office today for evaluation of the of the left knee. Pt seen Essence KAUFMAN on 2/16/21 which she ordered an MRI of the left knee. Pt states onset of pain about a month ago. Pt states on 2/9/21 she twisted her left knee and instantly felt pain along the lateral aspect of the left knee. Pt states pain today is an 8/10. She is having a difficult time walking and going up and down the stairs. Pt states pain will increase at night. Nothing is helping with her pain. She has tired ice and ibuprofen.              Medical History  Patient's medications, allergies, past medical, surgical, social and family histories were reviewed and updated as appropriate.     Past Medical History:   Diagnosis Date    Arthritis     Arthritis of left knee     Asthma     last flare up summer 2020    Diabetes mellitus ([facility] [address]     \"use to take Metformin been off this since  2019\"    Disorder of rotator cuff syndrome of left shoulder and allied disorder     H/O cardiovascular stress test 2/24/16    EF68% normal study    History of motion sickness     Hx of echocardiogram 2/24/16    EF 55%, normal LV function, moderate LVH    Hyperlipidemia     Hypertension     Kidney stones     \"had kidney stone 11/2020- removed with surg- , back in 2009- had to put tube in my back(nephostomy tube) for kidney stone-had lithotripsy    Migraine     Obesity     PONV (postoperative nausea and vomiting)     hx of motion sickness    UTI (urinary tract infection)     \"last one 11/2020\"    Vertigo     \"have trouble leaning to my left and laying flat with this\"    Wears glasses      Past Surgical History:   Procedure Laterality Date    CHOLECYSTECTOMY  age22's    CYSTOSCOPY INSERTION / REMOVAL STENT / STONE N/A 11/28/2020    CYSTOSCOPY RETROGRADE PYELOGRAM STENT INSERTION performed by Julissa Bermudez MD at [facility] / [address] [place] [address] Rd / [place] Right 1/21/2021    CYSTOSCOPY STENT EXCHANGE URETEROSCOPY EXTRACTION OF STONES performed by Shanell Cotto MD at [facility] Bilateral     tarsal tunnel, hammer toe, neuroma removed -1990's    HERNIA REPAIR  2019?     ventral hernia repair    HYSTERECTOMY  2001    KIDNEY STONE SURGERY      per old chart had surg for left nephrostolithotomy and cysto /lithotripsy in [address] Right     \"open surg right shoulder and later had 2 rotator cuff surgeries on my right shoulder\"    TONSILLECTOMY  age 26   [place] TUBAL LIGATION  13     Family History   Problem Relation Age of Onset    Heart Disease Mother     High Blood Pressure Mother     Asthma Mother     Heart Disease Father     Diabetes Sister     Seizures Sister     Asthma Sister     COPD Sister     Heart Disease Maternal Grandmother     High Blood Pressure Maternal Grandmother     Cancer Maternal Grandfather     Emphysema Maternal Grandfather     Asthma Daughter     Depression Daughter     Migraines Daughter     Obesity Daughter     Ulcerative Colitis Daughter     High Cholesterol Daughter     Asthma Daughter    Yvonne [place]view Migraines Daughter     Obesity Daughter     Asthma Brother     Stroke Paternal Uncle      Social History     Socioeconomic History    Marital status: Legally      Spouse name: None    Number of children: None    Years of education: None    Highest education level: None   Occupational History    None   Social Needs    Financial resource strain: None    Food insecurity     Worry: None     Inability: None    Transportation needs     Medical: None     Non-medical: None   Tobacco Use    Smoking status: Current Every Day Smoker     Packs/day: 1.00     Years: 20.00     Pack years: 20.00     Types: Cigarettes    Smokeless tobacco: Never Used   Substance and Sexual Activity    Alcohol use: No     Alcohol/week: 0.0 standard drinks    Drug use: No    Sexual activity: Not Currently     Partners: Male     Comment: 2 DAUGHTERS   Lifestyle    Physical activity     Days per week: None     Minutes per session: None    Stress: None   Relationships    Social connections     Talks on phone: None     Gets together: None     Attends Protestant service: None     Active member of club or organization: None     Attends meetings of clubs or organizations: None     Relationship status: None    Intimate partner violence     Fear of current or ex partner: None     Emotionally abused: None     Physically abused: None     Forced sexual activity: None   Other Topics Concern    None   Social History Narrative    None     Current Facility-Administered Medications   Medication Dose Route Frequency Provider Last Rate Last Admin    ceFAZolin (ANCEF) 2000 mg in dextrose 5 % 100 mL IVPB  2,000 mg Intravenous Once Moe Ramon MD        lactated ringers infusion   Intravenous Once Moe Ramon MD        meperidine (DEMEROL) injection 12.5 mg  12.5 mg Intravenous Q5 Min PRN Brittany Fair MD        fentaNYL (SUBLIMAZE) injection 50 mcg  50 mcg Intravenous Q5 Min PRN Anastasia Lester MD        HYDROmorphone (DILAUDID) injection 0.5 mg  0.5 mg Intravenous Q5 Min PRN Anastasia Lester MD        HYDROcodone-acetaminophen [facility]) 5-325 MG per tablet 1 tablet  1 tablet Oral PRN Anastasia Lester MD        Or    HYDROcodone-acetaminophen [facility]) 5-325 MG per tablet 2 tablet  2 tablet Oral Q6H PRN Anastasia Lester MD        ondansetron [facility]) injection 4 mg  4 mg Intravenous Once PRN Anastasia Lester MD        promethazine (PHENERGAN) injection 6.25 mg  6.25 mg Intramuscular Once PRN Anastasia Lester MD        0.9 % sodium chloride bolus  500 mL Intravenous Once PRN Anastasia Lester MD        diphenhydrAMINE (BENADRYL) injection 12.5 mg  12.5 mg Intravenous Once PRN Anastasia Lester MD        labetalol (NORMODYNE;TRANDATE) injection 5 mg  5 mg Intravenous Q10 Min PRN Anastasia Lester MD        hydrALAZINE (APRESOLINE) injection 5 mg  5 mg Intravenous Q10 Min PRN Anastasia Lester MD         Allergies   Allergen Reactions    Pollen Extract Itching     Itchy and watery eyes. Pet dander also causes same symptoms.  Tramadol      Ear ringing       Review of Systems:   Review of Systems   Constitutional: Negative for chills and fever. HENT: Negative for congestion and sneezing. Eyes: Negative for pain and redness. Respiratory: Negative for chest tightness, shortness of breath and wheezing. Cardiovascular: Negative for chest pain and palpitations. Gastrointestinal: Negative for vomiting. Musculoskeletal: Positive for arthralgias, gait problem and joint swelling. Skin: Negative for color change and rash. Neurological: Negative for weakness and numbness. Psychiatric/Behavioral: Negative for agitation. The patient is not nervous/anxious.                                                 Examination:  General Exam:  Vitals: /70   Pulse 74   Temp 96.6 °F (35.9 °C) (Temporal)   Resp 16    Physical Exam alert and oriented to person, place, and time. Psychiatric:         Mood and Affect: Mood normal.         Behavior: Behavior normal.        Diagnostic testing:  X-ray images were reviewed by myself and discussed with the patient:  X-ray images 3 views of the left knee from 2/16/2021 were reviewed by myself and discussed with patient:  There is evidence of severe advanced tricompartmental degenerative joint disease most severe in the patellofemoral joint where there is complete loss of joint space and bone-on-bone articulation. There are prominent osteophytes present in the medial lateral joint line as well as the patellofemoral joint.     Prior images of the left knee were reviewed by myself and discussed with the patient today:  MRI of the left knee completed on 3/2/21  Impression   Abnormal appearance to the ACL felt more compatible with developing ACL   ganglion although low-grade sprain could have a similar appearance.       Patchy bone marrow edema involving femur, tibia, fibula and patella.  There   is a nondisplaced fracture line involving the fibular head.  No other   fracture lines are seen.  No suspicious focal bony lesions.       Tricompartmental degenerative changes, most significant (severe) to the   patellofemoral compartment.       Small knee joint effusion with some intra-articular loose bodies.       Trace Dumont's cyst with some small loose bodies or debris within the cyst.       Prior remote injury to the MCL.              Office Procedures:    Assessment and Plan  1. Left knee advanced tricompartmental primary osteoarthritis     We discussed the severity of the degenerative findings on the x-rays, MRI and physical exam.  The patient feels that they have exhausted conservative measures. I do not expect further injections to be significantly helpful at this time. The patient has previously tried injections, physical therapy, over-the-counter pain medications, and activity modification.   The pain level is severe and bothers the patient on a daily basis, including interrupting sleep at night. Activities of daily living are difficult to perform. The patient has trouble getting dressed, getting up from a seated position, climbing stairs, and has fear of falling.     The pain and dysfunction at the knee are severely limiting at this stage and the patient would like to consider surgical intervention.     I have recommended surgical intervention for a total knee replacement. We discussed the risks, benefits, and alternatives to surgery. We discussed the intended benefits from a well-functioning replacement and the anticipated recovery process. We discussed potential risks and complications including but not limited to DVT/PE, infection, stiffness, loosening, and fracture. We discussed pain control, physical therapy, and anticoagulation during the perioperative timeframe.     The patient would like to proceed with knee replacement surgery and will be enrolled in the joint replacement class     Plan will be to proceed with a robotic assisted total knee replacement. She will be on aspirin postoperatively for DVT prophylaxis.     The patient was counseled at length about the risks of yunior Covid-19 during their perioperative period and any recovery window from their procedure.  The patient was made aware that yunior Covid-19  may worsen their prognosis for recovering from their procedure  and lend to a higher morbidity and/or mortality risk.  All material risks, benefits, and reasonable alternatives including postponing the procedure were discussed. The patient does wish to proceed with the procedure at this time.     Electronically signed by Effie Langley MD on 4/28/2021 at 7:08 AM

## 2021-04-28 NOTE — ANESTHESIA PROCEDURE NOTES
Spinal Block    Patient location during procedure: OR  Start time: 4/28/2021 7:30 AM  End time: 4/28/2021 7:34 AM  Reason for block: procedure for pain, post-op pain management and primary anesthetic  Staffing  Performed: resident/CRNA   Resident/CRNA: ALEJANDRO Delgado CRNA  Preanesthetic Checklist  Completed: patient identified, IV checked, site marked, risks and benefits discussed, surgical consent, monitors and equipment checked, pre-op evaluation, timeout performed, anesthesia consent given, oxygen available and patient being monitored  Spinal Block  Patient position: sitting  Prep: ChloraPrep  Patient monitoring: cardiac monitor, continuous pulse ox, continuous capnometry and frequent blood pressure checks  Approach: midline  Location: L3/L4  Provider prep: mask and sterile gloves  Agent: bupivacaine  Dose: 2.7  Dose: 2.7  Needle  Needle type: Quincke   Needle gauge: 22 G  Assessment  Sensory level: T6  Swirl obtained: Yes  CSF: clear  Attempts: 1  Hemodynamics: stable

## 2021-04-28 NOTE — PROGRESS NOTES
1435 assumed care.  Pt denies needs  1445 ambulated with therapy  1500 room 1126, entered for transport  1530 kristen nickerson provided  1605 pt sent to room 1126 via transporter

## 2021-04-28 NOTE — PROGRESS NOTES
Physical Therapy    Facility/Department: [address] [place] [address] OR  Initial Assessment    NAME: Yas Cai  : 1965  MRN: [id number]    Date of Service: 2021    Discharge Recommendations:  24 hour supervision or assist, Outpatient PT, Home with Home health PT        Assessment   Assessment: Pt is a 48 y.o. female with a medical history, surgical history, co-morbidities, and personal factors including Arthritis, Arthritis of left knee, Asthma, Diabetes mellitus ([address] Disorder of rotator cuff syndrome of left shoulder and allied disorder, H/O cardiovascular stress test, History of motion sickness, Hx of echocardiogram, Hyperlipidemia, Hypertension, Kidney stones, Migraine, Obesity, PONV (postoperative nausea and vomiting), UTI (urinary tract infection), Vertigo, wears glasses, Foot surgery (Bilateral); shoulder surgery (Right); Tubal ligation (); Kidney stone surgery; CYSTOSCOPY INSERTION / REMOVAL STENT / STONE (N/A, 2020); CYSTOSCOPY INSERTION / REMOVAL STENT / STONE (Right, 2021); Hysterectomy (); hernia repair (2019?); Tonsillectomy (age 26); and Cholecystectomy (age20's) with admission for primary osteoarthritis of the left knee and s/p L TKA. Prior to admission, pt was independent with all functional mobility. Examination of body systems reveals decreased strength, decreased balance, decreased endurance, and decreased independence with functional mobility. Prognosis: Good  Decision Making: Medium Complexity  Clinical Presentation: evolving  PT Education: Goals;Weight-bearing Education;PT Role;General Safety; Adaptive Device Training;Plan of Care;Gait Training;Home Exercise Program;Equipment; Functional Mobility Training;Transfer Training  REQUIRES PT FOLLOW UP: Yes  Activity Tolerance  Activity Tolerance: Patient Tolerated treatment well     Restrictions  Restrictions/Precautions  Restrictions/Precautions: Fall Risk, General Precautions  Vision/Hearing  Vision: Within Functional Limits  Hearing: Within functional limits     Subjective  General  Chart Reviewed: Yes  Patient assessed for rehabilitation services?: Yes  Family / Caregiver Present: No  Follows Commands: Within Functional Limits  Pain Screening  Patient Currently in Pain: No  Vital Signs  Patient Currently in Pain: No       Orientation  Orientation  Overall Orientation Status: Within Functional Limits  Social/Functional History  Social/Functional History  Lives With: Daughter  Type of Home: House  Home Layout: Two level, Able to Live on Main level with bedroom/bathroom  Home Access: Stairs to enter without rails  Entrance Stairs - Number of Steps: 1  Bathroom Shower/Tub: Walk-in shower  Bathroom Toilet: Handicap height  Bathroom Equipment: Grab bars around toilet, Tub transfer bench  Bathroom Accessibility: Accessible  Home Equipment: Standard walker  ADL Assistance: Independent  Homemaking Assistance: Independent  Ambulation Assistance: Independent  Transfer Assistance: Independent  Active : Yes    Cognition   Cognition  Overall Cognitive Status: WFL    Objective     Observation/Palpation  Posture: Fair       Strength RLE  Strength RLE: WFL  Comment: knee extension 3+/5 observed functionally  Strength LLE  Strength LLE: WFL  Comment: knee extension 3+/5 observed functionally     Sensation  Overall Sensation Status: WFL  Bed mobility  Supine to Sit: Stand by assistance(with use of bed features)  Sit to Supine: Stand by assistance(with use of bed features; pt used BUE to advance LLE onto bed)  Transfers  Sit to Stand: Stand by assistance(with verbal cues to push through bed and to not pull on FWW)  Stand to sit: Stand by assistance(with verbal cues to feel bed at back of legs, reach back, and sit slowly)  Ambulation  Ambulation?: Yes  Ambulation 1  Surface: level tile  Device: Rolling Walker  Assistance: Stand by assistance  Quality of Gait: decreased gait speed, decreased step length bilaterally, decreased stance time on

## 2021-04-28 NOTE — ANESTHESIA POSTPROCEDURE EVALUATION
Department of Anesthesiology  Postprocedure Note    Patient: Mili Owens  MRN: [id number]  YOB: 1965  Date of evaluation: 4/28/2021  Time:  10:08 AM     Procedure Summary     Date: 04/28/21 Room / Location: [address]    Anesthesia Start: 7718 Anesthesia Stop: 1007    Procedure: LEFT KNEE TOTAL ARTHROPLASTY ROBOTIC (Left Knee) Diagnosis: (PRIMARY OSTEOARTHRITIS OF LEFT KNEE)    Surgeons: Justen Palacios MD Responsible Provider: Forrest Hammond MD    Anesthesia Type: regional, spinal ASA Status: 2          Anesthesia Type: regional, spinal    Khris Phase I:      Khris Phase II:      Last vitals: Reviewed and per EMR flowsheets.        Anesthesia Post Evaluation    Patient location during evaluation: PACU  Patient participation: complete - patient participated  Level of consciousness: awake  Pain score: 0  Airway patency: patent  Nausea & Vomiting: no nausea and no vomiting  Complications: no  Cardiovascular status: blood pressure returned to baseline  Respiratory status: acceptable  Hydration status: euvolemic

## 2021-04-28 NOTE — PROGRESS NOTES
Report called to LT[facility], located within [facility], RN on [employer].

## 2021-04-28 NOTE — PROGRESS NOTES
1256 Arrive to [facility] room 8 per bed from PACU (awaiting inpatient bed). Awake. Side rails up x2. Call light in reach. [address] Awaiting meal tray already ordered. 1330 Resting in bed watching TV. Ice therapy running. 1338 Percocet 2 po admin for pain. Gave Pepsi. Called for tray, they will check. 1354 Meal to bedside. 1426 Eating. [address], cleaning room. 1433 P. T. at bedside. 1435 Report to Moncho Camejo RN.

## 2021-04-28 NOTE — OP NOTE
Operative Note      Patient: Rene Aguilar  YOB: 1965  MRN: [id number]    Date of Procedure: 4/28/2021    Pre-Op Diagnosis: PRIMARY OSTEOARTHRITIS OF LEFT KNEE    Post-Op Diagnosis: Same       Procedure(s):  LEFT KNEE TOTAL ARTHROPLASTY ROBOTIC    Surgeon(s):  Danita Peña MD    Assistant:   * No surgical staff found *    Anesthesia: Spinal    Estimated Blood Loss (mL): 080     Complications: None    Specimens:   * No specimens in log *    Implants:  Implant Name Type Inv. Item Serial No.  Lot No. LRB No. Used Action   CEMENT BNE 40GM W/ GENT HI VISC RADPQ FOR REV SURG  CEMENT BNE 40GM W/ GENT HI VISC RADPQ FOR REV SURG  [employer]- [id number] Left 1 Implanted   CEMENT BNE 40GM W/ GENT HI VISC RADPQ FOR REV SURG  CEMENT BNE 40GM W/ GENT HI VISC RADPQ FOR REV SURG  [employer]- [id number] Left 1 Implanted   BASEPLATE TIB SZ 4 KNEE TRITANIUM ASHLEY TARAS LO PROF TRIATHLON  BASEPLATE TIB SZ 4 KNEE TRITANIUM ASHLEY TARAS LO PROF TRIATHLON  [employer] TempeestZase [id number] Left 1 Implanted   COMPONENT FEM SZ 4 L ANT KNEE CRUCE RET ASHLEY REFERENCING  COMPONENT FEM SZ 4 L ANT KNEE CRUCE RET ASHLEY REFERENCING  [employer] Tempeest- [id number] Left 1 Implanted   IMPLANT PAT [id number] X3 ASYM TRIATHLON  IMPLANT PAT [id number] X3 ASYM TRIATHLON  [employer] TempeestZase HRDW Left 1 Implanted   INSERT TIB BEAR SZ 4 THK9MM KNEE X3 CRUCE RET TRIATHLON  INSERT TIB BEAR SZ 4 THK9MM KNEE X3 CRUCE RET TRIATHLON  [employer] TempeestSnackr [id number] Left 1 Implanted         Drains:   [REMOVED] Urethral Catheter Non-latex (Removed)       Findings:     Detailed Description of Procedure:   Implants:  Rosemary triathlon cemented total knee replacement size 4  CR femoral component, size 4 tibial baseplate, size 9mm CR articular surface liner, and a size 32 asymmetric polyethylene patella    The patient was identified in the preoperative area and the site was marked.   The patient was taken back to the operating room placed supine on the operative table. The above anesthesia was initiated. The lower extremity was prepped and draped in sterile fashion with a thigh tourniquet. Timeout was performed and preoperative antibiotics were given. Leg was exsanguinated with an Esmarch and tourniquet was inflated to 325 mmHg and deflated at the conclusion of the case during wound closure after less than 100 minutes of tourniquet time. A midline incision was made over the knee and dissection was carried down through subcutaneous tissues and bleeding was controlled with the Bovie. A medial parapatellar arthrotomy was performed to gain access to the knee joint. There was evidence of tricompartmental extensive degenerative joint disease. Release was performed along the deep fibers of the MCL at the medial tibia. ACL was resected. Portions of the fat pad were excised. Pins were placed at the femur and tibia for the robotic arrays. The center of the hip and ankle were identified with the robotic system. The blue probe was used to identify appropriate data points for identifying the anatomy of the femur and tibia. Using the robotic information, the knee was balanced in flexion and extension with appropriate positioning of the trial components using the computer program.    The robotic arm was brought into the field and bony cuts were made at the distal femur and proximal tibia. Excess bone was removed and the medial lateral meniscus were excised. The PCL was protected and intact. Trial of tibial component was placed and pinned in the appropriate rotation. Trial CR femoral component was impacted into place as well. Trial CR articular surface liners were placed in the knee and the knee was brought through range of motion and stress examination. The size 9mm CR articular surface liner provided the knee with excellent stability full range of motion with no tightness in flexion.     The patella was resurfaced with a cutting guide. A size 32 asymmetric patella trial component was inserted. The knee was again taken through range of motion and there was excellent patellofemoral tracking. The trial components were removed. The knee was thoroughly irrigated with the pulse lavage. Cement was mixed and applied to the proximal tibia and distal femur. The size 4 tibial component was impacted into place along with the size 4 femoral component, and excess cement was removed. The trial liner was inserted and the knee was brought into full extension while the cement hardened. The size 32 asymmetric polyethylene patella was cemented into place as well. The knee was trialed again and the size 9mm CR articular surface liner provided excellent stability and range of motion with improved alignment of the knee. The liner was then implanted. The knee was injected with 30 mL of half percent Marcaine with epinephrine and 30 mg of Toradol at the arthrotomy site and joint capsule. The tourniquet was deflated and bleeding was well controlled with the Bovie. The medial parapatellar arthrotomy was closed with a #1 strata fix suture. Deep subcutaneous layers were closed with buried 2-0 Vicryl. Skin was then closed with a running 3-0 strata fix subcuticular stitch followed by a Prineo Dermabond dressing. A sterile bandage was applied with 4 x 8's, ABD, sterile web roll, ice therapy pad and an Ace wrap. The patient was awakened and taken to PACU in stable condition. All sponge and needle counts were correct. The patient will be admitted to the hospital for pain control, physical therapy, and medical monitoring. The patient will be on chemical DVT prophylaxis and will be weightbearing as tolerated.       Electronically signed by Aleyda Borden MD on 4/28/2021 at 10:05 AM

## 2021-04-28 NOTE — H&P
History and Physical      Name:  Sanya Cummings /Age/Sex: 1965  (48 y.o. female)   MRN & CSN:  [id number] & [id number] Admission Date/Time: 2021  5:50 AM   Location:  [address] [place] [address] PCP: Flavia Seo MD       Hospital Day: 1        Admitting Physician: Dr. Radames Dill and Plan:   Sanya Cummings is a 48 y.o. female who presents with Knee pain       Primary osteoarthritis of left knee s/p L TKA. Post operative management per ortho   PRN pain control    N/V checks    PT/OT evaluation completed     Essential hypertension- continue home antihypertensive regimen- Monitor BP trends. Patient case discussed with ED provider    Diet DIET GENERAL;   DVT Prophylaxis [x] Lovenox, []  Heparin, [] SCDs, [] Ambulation  [] Long term AC   GI Prophylaxis [] PPI,  [] H2 Blocker,  [] Carafate,  [] Diet/Tube Feeds   Code Status Full     Disposition Admit to OP in bed. Patient plans to return home upon discharge   MDM [x] Low, [] Moderate,[]  High     -Patient assessment and plan discussed and reviewed with admitting physician: Jailene Smith MD.     History of Present Illness:     Chief Complaint: Left knee pain      Sanya Cummings is a 48 y.o. female who presents for elective left knee total arthoplasty. Tolerated procedure with no apparent complications. States pain well controlled. States she walked around hallway and bathroom without difficulty. Ten point ROS: reviewed negative, unless as noted in above HPI. Objective:        Intake/Output Summary (Last 24 hours) at 2021 1722  Last data filed at 2021 1605  Gross per 24 hour   Intake 1130 ml   Output 700 ml   Net 430 ml        Vitals:   Vitals:    21 1500 21 1615 21 1624 21 1630   BP: (!) 132/46 (!) 145/73     Pulse: 90 84 84    Resp: 16 16     Temp:  98.1 °F (36.7 °C)     TempSrc:  Oral     SpO2: 98% 98%     Weight:    241 lb (109.3 kg)   Height:    5' 6\" (1.676 m)       Physical Exam: 21     Gen: awake, alert, cooperative, no apparent distress obese body habitus  Head/Eyes:  Normocephalic atraumatic, EOMI   NECK:   symmetrical, trachea midline  LUNGS: Normal Effort/ symmetry movement   CARDIOVASCULAR:  Normal rate   ABDOMEN: Non tender, non distended, no HSM noted. MUSCULOSKELETAL: no gross deformities. Surgical site not visualized d/t dressing and immobilization device. NEUROLOGIC: Alert and Oriented,  Cranial nerves II-XII are grossly intact. SKIN:  no bruising or bleeding, normal skin color,  no redness\      Past Medical History:      Past Medical History:   Diagnosis Date    Arthritis     Arthritis of left knee     Asthma     last flare up summer 2020    Diabetes mellitus ([facility] [address]     \"use to take Metformin been off this since  2019\"    Disorder of rotator cuff syndrome of left shoulder and allied disorder     H/O cardiovascular stress test 2/24/16    EF68% normal study    History of motion sickness     Hx of echocardiogram 2/24/16    EF 55%, normal LV function, moderate LVH    Hyperlipidemia     Hypertension     Kidney stones     \"had kidney stone 11/2020- removed with surg- , back in 2009- had to put tube in my back(nephostomy tube) for kidney stone-had lithotripsy    Migraine     Obesity     PONV (postoperative nausea and vomiting)     hx of motion sickness    UTI (urinary tract infection)     \"last one 11/2020\"    Vertigo     \"have trouble leaning to my left and laying flat with this\"    Wears glasses        Past Surgical  History:    has a past surgical history that includes Foot surgery (Bilateral); shoulder surgery (Right); Tubal ligation (1992); Kidney stone surgery; CYSTOSCOPY INSERTION / REMOVAL STENT / STONE (N/A, 11/28/2020); CYSTOSCOPY INSERTION / REMOVAL STENT / STONE (Right, 1/21/2021); Hysterectomy (2001); hernia repair (2019?); Tonsillectomy (age 26); and Cholecystectomy (age20's).     Social History:    FAM HX: Reviewed  family history includes Asthma in her brother, daughter, daughter, mother, and sister; COPD in her sister; Cancer in her maternal grandfather; Depression in her daughter; Diabetes in her sister; Emphysema in her maternal grandfather; Heart Disease in her father, maternal grandmother, and mother; High Blood Pressure in her maternal grandmother and mother; High Cholesterol in her daughter; Migraines in her daughter and daughter; Obesity in her daughter and daughter; Seizures in her sister; Stroke in her paternal uncle; Ulcerative Colitis in her daughter. Soc HX:   Social History     Socioeconomic History    Marital status: Legally      Spouse name: None    Number of children: None    Years of education: None    Highest education level: None   Occupational History    None   Social Needs    Financial resource strain: None    Food insecurity     Worry: None     Inability: None    Transportation needs     Medical: None     Non-medical: None   Tobacco Use    Smoking status: Current Every Day Smoker     Packs/day: 1.00     Years: 20.00     Pack years: 20.00     Types: Cigarettes    Smokeless tobacco: Never Used   Substance and Sexual Activity    Alcohol use: No     Alcohol/week: 0.0 standard drinks    Drug use: No    Sexual activity: Not Currently     Partners: Male     Comment: 2 DAUGHTERS   Lifestyle    Physical activity     Days per week: None     Minutes per session: None    Stress: None   Relationships    Social connections     Talks on phone: None     Gets together: None     Attends Nondenominational service: None     Active member of club or organization: None     Attends meetings of clubs or organizations: None     Relationship status: None    Intimate partner violence     Fear of current or ex partner: None     Emotionally abused: None     Physically abused: None     Forced sexual activity: None   Other Topics Concern    None   Social History Narrative    None     TOBACCO:   reports that she has been smoking cigarettes.  She has a 20.00 pack-year smoking history. She has never used smokeless tobacco.  ETOH:   reports no history of alcohol use. Drugs:  reports no history of drug use. Allergies: Allergies   Allergen Reactions    Pollen Extract Itching     Itchy and watery eyes. Pet dander also causes same symptoms.  Tramadol      Ear ringing       Home Medications:     Prior to Admission medications    Medication Sig Start Date End Date Taking?  Authorizing Provider   VITAMIN D PO Take 1,000 Units by mouth daily   Yes Historical Provider, MD   atorvastatin (LIPITOR) 20 MG tablet Take 20 mg by mouth daily   Yes Historical Provider, MD   folic acid (FOLVITE) 1 MG tablet Take 1 mg by mouth daily   Yes Historical Provider, MD   dicyclomine (BENTYL) 20 MG tablet Take 1 tablet by mouth 3 times daily as needed (abdominal pain) 8/13/20  Yes Peter Calderon MD   spironolactone (ALDACTONE) 25 MG tablet TAKE 1 TABLET BY MOUTH ONE TIME A DAY 5/28/20  Yes Historical Provider, MD   amLODIPine (NORVASC) 5 MG tablet Take 5 mg by mouth daily   Yes Historical Provider, MD   ibuprofen (ADVIL;MOTRIN) 800 MG tablet Take 800 mg by mouth every 8 hours as needed for Pain    Historical Provider, MD   albuterol sulfate HFA (VENTOLIN HFA) 108 (90 Base) MCG/ACT inhaler Inhale 2 puffs into the lungs every 6 hours as needed for Wheezing    Historical Provider, MD   naproxen (NAPROSYN) 500 MG tablet Take 1 tablet by mouth 2 times daily  Patient not taking: Reported on 3/11/2021 2/9/21   MANDEEP Michelle         Medications:   Medications:    spironolactone  25 mg Oral Daily    [START ON 4/29/2021] amLODIPine  5 mg Oral Daily    [START ON 4/29/2021] vitamin D  1,000 Units Oral Daily    atorvastatin  20 mg Oral Daily    folic acid  1 mg Oral Daily    sodium chloride flush  10 mL Intravenous 2 times per day    acetaminophen  650 mg Oral Q6H    ceFAZolin (ANCEF) IVPB  2,000 mg Intravenous Q8H    aspirin  325 mg Oral BID      Infusions:    lactated ringers 75 mL/hr at 04/28/21 1105    sodium chloride       PRN Meds: dicyclomine, 20 mg, TID PRN  albuterol sulfate HFA, 2 puff, Q6H PRN  sodium chloride flush, 10 mL, PRN  sodium chloride, 25 mL, PRN  HYDROmorphone, 0.25 mg, Q3H PRN    Or  HYDROmorphone, 0.5 mg, Q3H PRN  senna, 1 tablet, Daily PRN  promethazine, 12.5 mg, Q6H PRN    Or  ondansetron, 4 mg, Q6H PRN  oxyCODONE-acetaminophen, 1 tablet, Q4H PRN  oxyCODONE-acetaminophen, 2 tablet, Q4H PRN        Data:     Laboratory this visit:  Reviewed  No results for input(s): WBC, HGB, HCT, PLT in the last 72 hours. No results for input(s): NA, K, CL, CO2, PHOS, BUN, CREATININE in the last 72 hours. Invalid input(s): CA  No results for input(s): AST, ALT, ALB, BILIDIR, BILITOT, ALKPHOS in the last 72 hours. No results for input(s): INR in the last 72 hours. Radiology this visit:  Reviewed. Xr Chest (2 Vw)    Result Date: 4/21/2021  EXAMINATION: TWO XRAY VIEWS OF THE CHEST 4/21/2021 9:02 am COMPARISON: 10/10/2016 HISTORY: ORDERING SYSTEM PROVIDED HISTORY: Primary osteoarthritis of left knee TECHNOLOGIST PROVIDED HISTORY: Reason for exam:->preop Reason for Exam: Primary osteoarthritis of left knee M17.12 (ICD-10-CM) FINDINGS: The lungs and pleural spaces are without acute focal process. The cardiomediastinal silhouette is without acute process. There is no evidence of pneumothorax. The osseous structures are without acute process. No acute process. Xr Knee Left (1-2 Views)    Result Date: 4/28/2021  EXAMINATION: TWO XRAY VIEWS OF THE LEFT KNEE 4/28/2021 10:10 am COMPARISON: CT dated 04/12/2021. HISTORY: ORDERING SYSTEM PROVIDED HISTORY: post op TECHNOLOGIST PROVIDED HISTORY: Of operative side while in recovery room.  Reason for exam:->post op Reason for Exam: post op Acuity: Acute Type of Exam: Initial FINDINGS: There has been interval placement of components of left total knee arthroplasty situated in anatomic alignment without specific imaging features of hardware complication seen. There are foci of soft tissue gas, reflecting recent surgery. Postsurgical changes of left total knee arthroplasty without specific imaging features of immediate postop complication seen. Ct Knee Left Wo Contrast    Result Date: 4/14/2021  EXAMINATION: CT OF THE LEFT KNEE WITHOUT CONTRAST, 4/12/2021 9:37 am TECHNIQUE: CT of the left knee was performed without the administration of intravenous contrast.  Multiplanar reformatted images are provided for review. Dose modulation, iterative reconstruction, and/or weight based adjustment of the mA/kV was utilized to reduce the radiation dose to as low as reasonably achievable. COMPARISON: Left knee radiograph dated 02/16/2021. Correlation was also made to the MRI dated 03/02/2021. HISTORY ORDERING SYSTEM PROVIDED HISTORY: Primary osteoarthritis of left knee. TECHNOLOGIST PROVIDED HISTORY: Reason for exam:  [employer] Robot Protocol  Date of Surgery: 4/28/21 Is the patient pregnant? No Reason for Exam: Osteoarthritis Acuity: Chronic Type of Exam: Ongoing Additional signs and symptoms:  Limited read for procedure planning; JAMSHID protocol. Relevant Medical/Surgical History:  Limited read for procedure planning; JAMSHID protocol. FINDINGS: Left Hip: Axial images through the hip demonstrates no evidence of acute fracture or dislocation. Left Knee: Axial images through the knee demonstrates advanced tricompartmental osteoarthritic changes with tricompartmental osteophytes with foci of subchondral lucency and sclerosis. There appears to be small knee joint effusion. There are foci of mineralization seen along the posterolateral aspect of the knee joint, suspicious for intra-articular bodies. Left Ankle: Axial images through the ankle demonstrates possible 6 mm osteochondral lesion of the talar dome. The study was performed for preoperative planning demonstrating advanced tricompartmental osteoarthritic changes of the left knee.   Small knee joint effusion. Possible intra-articular body seen along the posterolateral aspect of the knee joint. Possible 6 mm osteochondral lesion of the talar dome.          EKG this visit:  Reviewed         Electronically signed by ALEJANDRO Mcgraw CNP on 4/28/2021 at 5:22 PM

## 2021-04-28 NOTE — ANESTHESIA PROCEDURE NOTES
Peripheral Block    Patient location during procedure: OR  Start time: 4/28/2021 9:48 AM  End time: 4/28/2021 9:50 AM  Staffing  Performed: resident/CRNA   Resident/CRNA: ALEJANDRO Melo CRNA  Preanesthetic Checklist  Completed: patient identified, IV checked, site marked, risks and benefits discussed, surgical consent, monitors and equipment checked, pre-op evaluation, timeout performed, anesthesia consent given, oxygen available and patient being monitored  Peripheral Block  Patient position: supine  Prep: ChloraPrep  Patient monitoring: cardiac monitor, continuous pulse ox, continuous capnometry, frequent blood pressure checks and IV access  Block type: Saphenous  Laterality: left  Injection technique: single-shot  Guidance: ultrasound guided  Provider prep: mask, sterile gloves and sterile gown  Needle  Needle type: short-bevel   Needle gauge: 22 G  Needle length: 8 cm  Assessment  Injection assessment: negative aspiration for heme  Hemodynamics: stable  Medications Administered  Ropivacaine (NAROPIN) injection 0.5%, 15 mL  Reason for block: post-op pain management

## 2021-04-29 VITALS
HEART RATE: 95 BPM | SYSTOLIC BLOOD PRESSURE: 134 MMHG | TEMPERATURE: 98.6 F | RESPIRATION RATE: 16 BRPM | WEIGHT: 241 LBS | OXYGEN SATURATION: 94 % | DIASTOLIC BLOOD PRESSURE: 67 MMHG | BODY MASS INDEX: 38.73 KG/M2 | HEIGHT: 66 IN

## 2021-04-29 PROBLEM — Z96.652 STATUS POST LEFT KNEE REPLACEMENT: Status: ACTIVE | Noted: 2021-04-29

## 2021-04-29 LAB — GLUCOSE BLD-MCNC: 130 MG/DL (ref 70–99)

## 2021-04-29 PROCEDURE — 6370000000 HC RX 637 (ALT 250 FOR IP): Performed by: ORTHOPAEDIC SURGERY

## 2021-04-29 PROCEDURE — 97110 THERAPEUTIC EXERCISES: CPT

## 2021-04-29 PROCEDURE — 6360000002 HC RX W HCPCS: Performed by: ORTHOPAEDIC SURGERY

## 2021-04-29 PROCEDURE — 97530 THERAPEUTIC ACTIVITIES: CPT

## 2021-04-29 PROCEDURE — 85025 COMPLETE CBC W/AUTO DIFF WBC: CPT

## 2021-04-29 PROCEDURE — 82962 GLUCOSE BLOOD TEST: CPT

## 2021-04-29 PROCEDURE — 2580000003 HC RX 258: Performed by: ORTHOPAEDIC SURGERY

## 2021-04-29 PROCEDURE — 97116 GAIT TRAINING THERAPY: CPT

## 2021-04-29 RX ORDER — OXYCODONE HYDROCHLORIDE AND ACETAMINOPHEN 5; 325 MG/1; MG/1
1 TABLET ORAL EVERY 6 HOURS PRN
Qty: 28 TABLET | Refills: 0 | Status: SHIPPED | OUTPATIENT
Start: 2021-04-29 | End: 2021-05-06

## 2021-04-29 RX ORDER — DOCUSATE SODIUM 100 MG/1
100 CAPSULE, LIQUID FILLED ORAL DAILY PRN
Qty: 30 CAPSULE | Refills: 0 | Status: SHIPPED | OUTPATIENT
Start: 2021-04-29

## 2021-04-29 RX ADMIN — OXYCODONE HYDROCHLORIDE AND ACETAMINOPHEN 2 TABLET: 5; 325 TABLET ORAL at 02:45

## 2021-04-29 RX ADMIN — SODIUM CHLORIDE, PRESERVATIVE FREE 10 ML: 5 INJECTION INTRAVENOUS at 08:04

## 2021-04-29 RX ADMIN — OXYCODONE HYDROCHLORIDE AND ACETAMINOPHEN 2 TABLET: 5; 325 TABLET ORAL at 08:04

## 2021-04-29 RX ADMIN — ACETAMINOPHEN 650 MG: 325 TABLET ORAL at 08:04

## 2021-04-29 RX ADMIN — SPIRONOLACTONE 25 MG: 25 TABLET ORAL at 08:04

## 2021-04-29 RX ADMIN — Medication 1000 UNITS: at 08:04

## 2021-04-29 RX ADMIN — FOLIC ACID 1 MG: 1 TABLET ORAL at 08:04

## 2021-04-29 RX ADMIN — AMLODIPINE BESYLATE 5 MG: 5 TABLET ORAL at 08:04

## 2021-04-29 RX ADMIN — ATORVASTATIN CALCIUM 20 MG: 20 TABLET, FILM COATED ORAL at 08:04

## 2021-04-29 RX ADMIN — ASPIRIN 325 MG: 325 TABLET, COATED ORAL at 08:04

## 2021-04-29 RX ADMIN — CEFAZOLIN SODIUM 2000 MG: 10 INJECTION, POWDER, FOR SOLUTION INTRAVENOUS at 02:46

## 2021-04-29 ASSESSMENT — PAIN SCALES - GENERAL
PAINLEVEL_OUTOF10: 4
PAINLEVEL_OUTOF10: 8
PAINLEVEL_OUTOF10: 7

## 2021-04-29 ASSESSMENT — PAIN DESCRIPTION - PROGRESSION
CLINICAL_PROGRESSION: GRADUALLY WORSENING
CLINICAL_PROGRESSION: GRADUALLY WORSENING

## 2021-04-29 ASSESSMENT — PAIN DESCRIPTION - PAIN TYPE: TYPE: SURGICAL PAIN

## 2021-04-29 ASSESSMENT — PAIN DESCRIPTION - LOCATION: LOCATION: KNEE

## 2021-04-29 NOTE — DISCHARGE SUMMARY
DISCHARGE SUMMARY:  Thomas Zabala MD     Date of Admission:      4/28/2021  Date of Discharge:    4/29/2021     Admission Diagnosis   Primary osteoarthritis of left knee [M17.12]   Discharge Diagnosis   Same    Procedure:    Left Total knee replacement 4/28/2021    Consultations:  IP CONSULT TO HOSPITALIST  IP CONSULT TO CASE MANAGEMENT  IP CONSULT TO HOME CARE NEEDS  IP CONSULT TO HOME CARE NEEDS    Brief history and hospital stay. Robyn Dawkins is a 48 y.o. female who underwent total knee replacement procedure without complication. Robyn Dawkins was admitted to the floor following surgery. Appropriate consults were obtained as outlined in progress notes. The patients diet was advanced as tolerated. The patient remained hemodynamically stable and neurovascularly intact in the lower extremity throughout the hospital course. On the day of discharge the patient's calf remained soft and showed no evidence of DVT. Physical therapy and occupational therapy were consulted. The patient progressed well with PT/OT throughout the stay. Hemoglobin was checked on postoperative day #1 and DVT prophylaxis was initiated and will continue for 2 weeks. The patients pain was controlled initially with IV pain medications and then was transitioned to oral pain medications prior to discharge    The patient was discharged to Home and will continue to follow the precautions outlined to them by us and PT/OT.      Condition on Discharge: Stable    Medications     Gildardo Lozada   Home Medication Instructions QI:[id number]    Printed on:04/29/21 0731   Medication Information                      albuterol sulfate HFA (VENTOLIN HFA) 108 (90 Base) MCG/ACT inhaler  Inhale 2 puffs into the lungs every 6 hours as needed for Wheezing             amLODIPine (NORVASC) 5 MG tablet  Take 5 mg by mouth daily             aspirin 325 MG EC tablet  Take 1 tablet by mouth 2 times daily atorvastatin (LIPITOR) 20 MG tablet  Take 20 mg by mouth daily             dicyclomine (BENTYL) 20 MG tablet  Take 1 tablet by mouth 3 times daily as needed (abdominal pain)             docusate sodium (COLACE) 100 MG capsule  Take 1 capsule by mouth daily as needed for Constipation             folic acid (FOLVITE) 1 MG tablet  Take 1 mg by mouth daily             ibuprofen (ADVIL;MOTRIN) 800 MG tablet  Take 800 mg by mouth every 8 hours as needed for Pain             oxyCODONE-acetaminophen (PERCOCET) 5-325 MG per tablet  Take 1 tablet by mouth every 6 hours as needed for Pain for up to 7 days. Intended supply: 7 days. Take lowest dose possible to manage pain             spironolactone (ALDACTONE) 25 MG tablet  TAKE 1 TABLET BY MOUTH ONE TIME A DAY             VITAMIN D PO  Take 1,000 Units by mouth daily                   Plan  Discharge instructions were given along with prescriptions for pain medications as well as DVT prophylaxis. Plan is to follow up in 10-14 days for a wound check and x-rays. Patient was instructed on the use of pain medications, the signs and symptoms of infection or blood clot, and was given our number to call should they have any questions or concerns following discharge.     Vivian Lakhani MD

## 2021-04-29 NOTE — PROGRESS NOTES
Hospitalist Progress Note      Name:  Carrie Gallego /Age/Sex: 1965  (48 y.o. female)   MRN & CSN:  [id number] & [id number] Admission Date/Time: 2021  5:50 AM   Location:  [address] [place] [address] PCP: Sebastian Hernandez MD         Hospital Day: 2    Assessment and Plan:     Carrie Gallego is a 48 y.o. female who presents with Knee pain  We are being consulted for medical management        Primary osteoarthritis of left knee s/p L TKA 2021              Post operative management per ortho              PRN pain control               N/V checks              PT/OT on board      Essential hypertension, good control   continue home antihypertensive regimen  - Monitor BP trends    Diet No diet orders on file   DVT Prophylaxis [] Lovenox, []  Heparin, [] SCDs, []No VTE prophylaxis, patient ambulating   GI Prophylaxis [] PPI, [] H2 Blocker, [] No GI prophylaxis, patient is receiving diet/Tube Feeds   Code Status Prior   Disposition Patient requires continued admission due to    MDM [] Low, [] Moderate,[]  High  Patient's risk as above due to      History of Present Illness:     Pt S&E. Seen just before discharge, patient doing well, has no complaints, tolerated physical therapy without any problem    10-14 point ROS reviewed negative, unless as noted above    Objective:   No intake or output data in the 24 hours ending 21 1636   Vitals:   Vitals:    21 0800   BP: 134/67   Pulse: 95   Resp: 16   Temp: 98.6 °F (37 °C)   SpO2: 94%     Physical Exam:    GEN Awake female, sitting upright in bed in no apparent distress. Appears given age. EYES Pupils are equally round. No scleral erythema, discharge, or conjunctivitis. HENT Mucous membranes are moist.   NECK No apparent thyromegaly or masses. RESP Clear to auscultation, no wheezes, rales or rhonchi. Symmetric chest movement while on room air. CARDIO/VASC S1/S2 auscultated. Regular rate without appreciable murmurs, rubs, or gallops.  Peripheral pulses equal bilaterally and palpable. No peripheral edema. GI Abdomen is soft without significant tenderness, masses, or guarding. Bowel sounds are normoactive. Rectal exam deferred.  Mendoza catheter is not present. HEME/LYMPH No petechiae or ecchymoses. MSK No gross joint deformities. Spontaneous movement of all extremities. Left knee with clean incisions  SKIN Normal coloration, warm, dry. NEURO Cranial nerves appear grossly intact, normal speech, no lateralizing weakness. PSYCH Awake, alert, oriented x 4. Affect appropriate.     Medications:   Medications:    Infusions:   PRN Meds:       Electronically signed by Kulwant Marquez MD on 4/29/2021 at 4:36 PM

## 2021-04-29 NOTE — CARE COORDINATION
CM met w/ pt to initiate discharge plan. CM introduced self and explained role. Pt is alert and oriented. Pt states she lives at home with her  and daughter. Pt states her  and mother will be assisting with transport post op. Pt has PCP but does not ave insurance. Pt states she informed Dr. Ric Cuevas that she will be self pay and was supposed to be given financial assistance application but she was never given one. CM asked Suresh Aguirre to see pt. CM also informed pt that  would be unable to get her [address] since she has no insurance. Pt states she has no money to pay for therapy because she has been out of work since January. Pt states she does have a walker at home. Pt declined any other needs from  at this time.

## 2021-04-29 NOTE — PROGRESS NOTES
Physical Therapy    Physical Therapy Treatment Note  Name: Estefany Spivey MRN: [id number] :   1965   Date:  2021   Admission Date: 2021 Room:  1126/1126-A   Restrictions/Precautions:  Restrictions/Precautions  Restrictions/Precautions: Fall Risk, General Precautions       Communication with other providers:    Subjective:  Patient states:  Agreeable to tx and reports nurse gave pain meds before tx session  Pain:   Location, Type, Intensity (0/10 to 10/10):  Rates pain 8 at end of tx and will call nurse as needed for pain meds  Objective:    Observation:  Alert and oriented  Treatment, including education/measures:  Pt given and reviewed TKA booklet and reviewed home safety and ex  Ex in sup, sitting, and standin reps aps  20 reps quad sets  20 reps glut sets  10 reps with assist heel slides  10 reps with assist saqs  10 reps with assist SLRs  10 reps heel slides and laqs in sitting  5 reps HS curle standing at walker  Sup<=>sit sba but needing leg  to assist leg in/out of bed and needing increased time and effort. Sit<=>stand from bed, wc, and commode sba  amb with rw 200' sba and cues for posture and safety. Up/down 5 steps with rails sba and cues  Up/down curb steps with rw cga and cues  Safety  Pt requested to return to bed at end of tx to elevate leg. Patient left safely in the bed, with call light/phone in reach with alarm applied. Gait belt and mask were used for transfers and gait. Assessment / Impression:       Patient's tolerance of treatment:  good  Adverse Reaction: na  Significant change in status and impact:  na  Barriers to improvement:  none  Plan for Next Session:    Cont.  POC  Time in:  830  Time out:  925  Timed treatment minutes:  55  Total treatment time:  54    Previously filed items:  Social/Functional History  Lives With: Daughter  Type of Home: House  Home Layout: Two level, Able to Live on Main level with bedroom/bathroom  Home Access: Stairs to enter without rails  Entrance Stairs - Number of Steps: 1  Bathroom Shower/Tub: Walk-in shower  Bathroom Toilet: Handicap height  Bathroom Equipment: Grab bars around toilet, Tub transfer bench  Bathroom Accessibility: Accessible  Home Equipment: Standard walker  ADL Assistance: Independent  Homemaking Assistance: Independent  Ambulation Assistance: Independent  Transfer Assistance: Independent  Active : Yes  Occupation: On disability  Type of occupation: [email or web address] term goals  Time Frame for Long term goals :  In one week:  Long term goal 1: pt will independently complete all bed mobility  Long term goal 2: pt will independently complete all transfers with supervisionx1 and use of LRAD  Long term goal 3: pt will ambulate 400 feet with SBAx1 and use of FWW  Long term goal 4: pt will ascend/descend 1 step with SBAx1  Long term goal 5: pt will independently complete 3 sets of 10 reps of BLE AROM exercises in appropriate and allowed ROM    Electronically signed by:    Mirella Fine PTA  4/29/2021, 8:20 AM

## 2021-04-29 NOTE — PROGRESS NOTES
Doing well postoperatively. Pain controlled  Worked well with physical therapy    Objective:     Patient Vitals for the past 4 hrs:   BP Temp Temp src Pulse Resp SpO2   04/29/21 0545 (!) 129/59 98.8 °F (37.1 °C) Oral 83 16 94 %         Physical Exam:     The patient is awake and alert  Resting comfortably in bed    Operative extremity:    The dressing is clean dry and intact. Incision is intact with Dermabond dressing. No erythema, drainage, or induration. Calf is soft and nontender. Sensation and motor function intact distally      LABS   CBC: No results for input(s): WBC, HGB, PLT in the last 72 hours. Postoperative x-rays show well aligned prosthesis with no complications. Assessment and Plan     1. POD # 1 total knee replacement    1:  Physical therapy consult for mobilization   -Weight-bear as tolerated, progress as tolerated  2:  DVT prophylaxis   -Aspirin twice a day for 2 weeks  3:  Continue Pain Control   -Oral medications as needed, IV medication only for breakthrough pain  4. Medical management per hospitalist service  5:  D/C Planning:    -Discharge to home later today if patient is mobilizing well with physical therapy and pain is well controlled. -Follow-up in 2 weeks for x-rays and wound check.  -Patient would like to do home physical therapy for the first 2 weeks. Consult was placed for this.          Jami Rich MD

## 2021-05-04 ENCOUNTER — HOSPITAL ENCOUNTER (OUTPATIENT)
Dept: PHYSICAL THERAPY | Age: 56
Setting detail: THERAPIES SERIES
Discharge: HOME | End: 2021-05-04
Payer: MEDICAID

## 2021-05-04 NOTE — FLOWSHEET NOTE
Physical Therapy  Cancellation/No-show Note  Patient Name:  Katja Anderson  :  1965   Date:  2021  Cancelled visits to date: 1  No-shows to date: 0    For today's appointment patient:  [x]  Cancelled  []  Rescheduled appointment  []  No-show     Reason given by patient:  []  Patient ill  []  Conflicting appointment  [x]  No transportation    []  Conflict with work  []  No reason given  []  Other:     Comments:      Electronically signed by:  Do Pearce PT, DPT, OCS    2021 1:24 PM

## 2021-05-06 ENCOUNTER — HOSPITAL ENCOUNTER (OUTPATIENT)
Dept: PHYSICAL THERAPY | Age: 56
Setting detail: THERAPIES SERIES
Discharge: HOME | End: 2021-05-06
Payer: MEDICAID

## 2021-05-06 ENCOUNTER — TELEPHONE (OUTPATIENT)
Dept: ORTHOPEDIC SURGERY | Age: 56
End: 2021-05-06

## 2021-05-06 DIAGNOSIS — M17.12 PRIMARY OSTEOARTHRITIS OF LEFT KNEE: Primary | ICD-10-CM

## 2021-05-06 PROCEDURE — 97016 VASOPNEUMATIC DEVICE THERAPY: CPT

## 2021-05-06 PROCEDURE — 97110 THERAPEUTIC EXERCISES: CPT

## 2021-05-06 PROCEDURE — 97140 MANUAL THERAPY 1/> REGIONS: CPT

## 2021-05-06 PROCEDURE — 97164 PT RE-EVAL EST PLAN CARE: CPT

## 2021-05-06 RX ORDER — OXYCODONE HYDROCHLORIDE AND ACETAMINOPHEN 5; 325 MG/1; MG/1
1 TABLET ORAL EVERY 6 HOURS PRN
Qty: 28 TABLET | Refills: 0 | Status: SHIPPED | OUTPATIENT
Start: 2021-05-06 | End: 2021-05-13

## 2021-05-06 NOTE — PLAN OF CARE
Outpatient Physical Therapy           [place]           [] Phone: [phone]   Fax: [phone]  [facility]           [] Phone: [phone]   Fax: [phone]     To:  Dr. Meir Ramirez       From: José Luis Loera, PT, DPT, OCS      Patient: Prashanth Griffin       : 1965  Diagnosis:   L TKA   21  Treatment Diagnosis:   L knee stiffness, weakness, pain    Date: 2021    Physical Therapy Certification/Re-Certification Form  Dear Dr. Meir Ramirez,  The following patient has been evaluated for physical therapy services and for therapy to continue, insurance requires physician review of the treatment plan initially and every 90 days. Please review the attached evaluation and/or summary of the patient's plan of care, and verify that you agree therapy should continue by signing the attached document and sending it back to our office. Assessment:         Pt is 48year old female with L TKA on 21. Pt now has difficulties completing transfers and general mobility with increasing pain. Pt demo deficits this date that include pain, flexibility restrictions for knee flex/ext, quad/hip flexor weakness, gait dysfunction and diminished balance. Issued handout for new exercises post surgery to maximize function. Pt will benefit with PT services with strength/ROM, gait training, manual and modalities to maximize function. Pt prior to onset of current condition had min/no pain with able to complete full ADLs and work activities. Patient received education on their current pathology and how their condition effects them with their functional activities. Patient understood discussion and questions were answered. Patient understands their activity limitations and understands rational for treatment progression.     Plan of Care/Treatment to date:  [x] Therapeutic Exercise  [] Modalities:  [x] Therapeutic Activity     [] Ultrasound  [] Electrical Stimulation  [x] Gait Training      [] Cervical Traction [] Lumbar Traction  [x] Neuromuscular Re-education    [] Cold/hotpack [] Iontophoresis   [x] Instruction in HEP      [] Vasopneumatic    [] Dry Needling  [] Manual Therapy               [] Aquatic Therapy       Other:          Frequency/Duration:  # Days per week: [x] 1 day # Weeks: [] 1 week [] 5 weeks     [x] 2 days   [] 2 weeks [x] 6 weeks     [] 3 days   [] 3 weeks [] 7 weeks     [] 4 days   [] 4 weeks [] 8 weeks         [] 9 weeks [] 10 weeks         [] 11 weeks [] 12 weeks    Rehab Potential/Progress: [] Excellent [x] Good [] Fair  [] Poor     Goals:       Short term goals   Time Frame for Long term goals :  6 weeks 6/20/21  Long Term Goal 1: Pt will demo I with HEP/symptom management. Long Term Goal 2: Pt will demo normal gait mechanics with min/no deficits to ease community mobility. Long Term Goal 3: Pt will demo 0-120 deg knee A/PROM to ease transfers. Long Term Goal 4: Pt will completed TUG <12 sec to demo improved mobility. Long Term Goal 5: Pt will demo >25% improvement per LEFS to demo improved functional mobility. Long Term Goal 6: Pt will demo sit to stand without compensation and UE assistance to demo improved LE strength and ease with transfers.      Patient goals:  Improve in pain and mobility       Electronically signed by:  Jono Zepeda, PT, DPT, OCS 5/6/2021, 2:07 PM    5/6/2021 2:07 PM         If you have any questions or concerns, please don't hesitate to call.   Thank you for your referral.      Physician Signature:________________________________Date:_________ TIME: _____  By signing above, therapists plan is approved by physician

## 2021-05-06 NOTE — TELEPHONE ENCOUNTER
Pt had a TKA on 4/28. Yesterday fell off the toilet, she Is having pain from her buttucks to her toes. Has PT today and hoping  it will work the [facility] out. Would like refill of percocet.  Took last one this AM  [phone]  Mikhail

## 2021-05-06 NOTE — FLOWSHEET NOTE
Physical Therapy  Cancellation/No-show Note  Patient Name:  Sara Long  :  1965   Date:  2021  Cancelled visits to date: 1  No-shows to date: 1    For today's appointment patient:  []  Cancelled  []  Rescheduled appointment  [x]  No-show     Reason given by patient:  []  Patient ill  []  Conflicting appointment  []  No transportation    []  Conflict with work  []  No reason given  []  Other:     Comments: Will discharge after 30 days if no follow up is completed.      Electronically signed by:  Nate Villanueva PT, DPT, ROCIO    2021 1:57 PM

## 2021-05-06 NOTE — FLOWSHEET NOTE
Outpatient Physical Therapy  [place]           [x] Phone: [phone]   Fax: [phone]  [place]           [] Phone: [phone]   Fax: [phone]        Physical Therapy Daily Treatment Note  Date:  2021    Patient Name:  Enrike Bowman    :  1965  MRN: [id number]  Restrictions/Precautions:  None  Diagnosis:   Diagnosis: L TKA   Date of Injury/Surgery: 21  Treatment Diagnosis: Treatment Diagnosis: L knee stiffness, weakness, pain   Insurance/Certification information:   Self Pay  Referring Physician:  Referring Practitioner: Dr. Jonathan Srivastava  Next Doctor Visit:  Surgery 21  Plan of care signed (Y/N):  N, sent 21  Outcome Measure:   LEFS  Visit# / total visits:     Pain level: 7/10    Goals:     Short term goals   Time Frame for Long term goals :  6 weeks 21  Long Term Goal 1: Pt will demo I with HEP/symptom management. Long Term Goal 2: Pt will demo normal gait mechanics with min/no deficits to ease community mobility. Long Term Goal 3: Pt will demo 0-120 deg knee A/PROM to ease transfers. Long Term Goal 4: Pt will completed TUG <12 sec to demo improved mobility. Long Term Goal 5: Pt will demo >25% improvement per LEFS to demo improved functional mobility. Long Term Goal 6: Pt will demo sit to stand without compensation and UE assistance to demo improved LE strength and ease with transfers. Patient goals:  Improve in pain and mobility    Summary of Evaluation:   Pt is 48year old female with L TKA on 21. Pt now has difficulties completing transfers and general mobility with increasing pain. Pt demo deficits this date that include pain, flexibility restrictions for knee flex/ext, quad/hip flexor weakness, gait dysfunction and diminished balance. Issued handout for new exercises post surgery to maximize function. Pt will benefit with PT services with strength/ROM, gait training, manual and modalities to maximize function.  Pt prior to onset of current condition had min/no pain with able to complete full ADLs and work activities. Patient received education on their current pathology and how their condition effects them with their functional activities. Patient understood discussion and questions were answered. Patient understands their activity limitations and understands rational for treatment progression. Subjective:  8/10 pain currently. Ice and elevating as able and HEP. States catching foot behind with sitting on the toilet secondary to increase in pain. Any changes in Ambulatory Summary Sheet? None        Objective:    COVID screening questions were asked and patient attested that there had been no contact or symptoms    FWW with flexed posture with min knee flexion ROM throughout gait   TU.26 sec with FWW   Poor quad set with min superior patellar pull   -4 deg knee ext to 70 deg knee flexion actively with cues to increase ROM. LEFS:  full function       Exercises: (No more than 4 columns)   Exercise/Equipment 21 #1 21 #2 Date           WARM UP         Nu step   4'    Lv2           TABLE      *Quad Set demo 10x 2   3\"    *SLR demo     *Seated Knee Flexion demo 10x  5\"     *Heel Slides  demo     *Heel Prop    demo 30\"x4    Seated marches   10x2                STANDING      *[address]                      Other Therapeutic Activities/Education: Patient received education on their current pathology and how their condition effects them with their functional activities. Patient understood discussion and questions were answered. Patient understands their activity limitations and understands rational for treatment progression. Home Exercise Program: HO issued, reviewed and discussed with patient. Pt agreed to comply.          Manual Treatments: manual PROM knee flexion to tolerance with progressive holds and increase in knee flexion x10' for pain management. Modalities:  Gameready to L knee in supine with supported by towel, low pressure, 34 deg x10' for pain management. No adverse effects. Communication with other providers:  Eval sent 4/20/21      Assessment:  (Response towards treatment session) (Pain Rating)    Pt is 48year old female with L TKA on 4/28/21. Pt now has difficulties completing transfers and general mobility with increasing pain. Pt demo deficits this date that include pain, flexibility restrictions for knee flex/ext, quad/hip flexor weakness, gait dysfunction and diminished balance. Issued handout for new exercises post surgery to maximize function. Pt will benefit with PT services with strength/ROM, gait training, manual and modalities to maximize function. Pt prior to onset of current condition had min/no pain with able to complete full ADLs and work activities. Patient received education on their current pathology and how their condition effects them with their functional activities. Patient understood discussion and questions were answered. Patient understands their activity limitations and understands rational for treatment progression. Plan for Next Session: Specific instructions for Next Treatment: functional progression in ROM, quad strength and balance stability as tolerated, game ready.        Time In / Time Out:  1405- 1502       If BWC Please Indicate Time In/Out/Total Time  CPT Code Time in Time out Total Time                                                            Total for session             Timed Code/Total Treatment Minutes: 25'/57'    (1) Re-Dima    (1) TE  13'    1 man 10'    10' vaso       Next Progress Note due:  Eval sent 4/20/21       Re-dima 5/6/21     Plan of Care Interventions:  [x] Therapeutic Exercise  [x] Modalities:  [x] Therapeutic Activity     [] Ultrasound  [x] Estim  [x] Gait Training      [] Cervical Traction [] Lumbar Traction  [x] Neuromuscular Re-education    [x] Cold/hotpack [] Iontophoresis   [x] Instruction in HEP      [x] Vasopneumatic   [] Dry Needling    [x] Manual Therapy               [] Aquatic Therapy              Electronically signed by:  Tammy Burris, PT, DPT, OCS 5/6/2021, 2:09 PM    5/6/2021 2:09 PM

## 2021-05-10 NOTE — TELEPHONE ENCOUNTER
Patient called into the office stating that she needed a refill on her medication. Called over to her [facility] and she did  her medication on 05/06/2021.

## 2021-05-11 ENCOUNTER — HOSPITAL ENCOUNTER (OUTPATIENT)
Dept: PHYSICAL THERAPY | Age: 56
Setting detail: THERAPIES SERIES
Discharge: HOME | End: 2021-05-11
Payer: MEDICAID

## 2021-05-11 PROCEDURE — 97140 MANUAL THERAPY 1/> REGIONS: CPT

## 2021-05-11 PROCEDURE — 97016 VASOPNEUMATIC DEVICE THERAPY: CPT

## 2021-05-11 PROCEDURE — 97110 THERAPEUTIC EXERCISES: CPT

## 2021-05-11 RX ORDER — OXYCODONE HYDROCHLORIDE AND ACETAMINOPHEN 5; 325 MG/1; MG/1
1 TABLET ORAL EVERY 8 HOURS PRN
Qty: 21 TABLET | Refills: 0 | Status: SHIPPED | OUTPATIENT
Start: 2021-05-11 | End: 2021-05-18

## 2021-05-11 NOTE — TELEPHONE ENCOUNTER
I sent a new prescription in for her. Please inform the patient that she should be weaning off of this medication I recommend only taking maximum 3/day at this time.

## 2021-05-11 NOTE — FLOWSHEET NOTE
Outpatient Physical Therapy  [place]           [x] Phone: [phone]   Fax: [phone]  [facility]           [] Phone: [phone]   Fax: [phone]        Physical Therapy Daily Treatment Note  Date:  2021    Patient Name:  Arlen Yanes    :  1965  MRN: [id number]  Restrictions/Precautions:  None  Diagnosis:   Diagnosis: L TKA   Date of Injury/Surgery: 21  Treatment Diagnosis: Treatment Diagnosis: L knee stiffness, weakness, pain   Insurance/Certification information:   Self Pay  Referring Physician:  Referring Practitioner: Dr. Loraine Butterfield  Next Doctor Visit:  Surgery 21  Plan of care signed (Y/N):  N, sent 21  Outcome Measure:   LEFS  Visit# / total visits:   3/14  Pain level: 6/10    Goals:     Short term goals   Time Frame for Long term goals :  6 weeks 21  Long Term Goal 1: Pt will demo I with HEP/symptom management. Long Term Goal 2: Pt will demo normal gait mechanics with min/no deficits to ease community mobility. Long Term Goal 3: Pt will demo 0-120 deg knee A/PROM to ease transfers. Long Term Goal 4: Pt will completed TUG <12 sec to demo improved mobility. Long Term Goal 5: Pt will demo >25% improvement per LEFS to demo improved functional mobility. Long Term Goal 6: Pt will demo sit to stand without compensation and UE assistance to demo improved LE strength and ease with transfers. Patient goals:  Improve in pain and mobility    Summary of Evaluation:   Pt is 48year old female with L TKA on 21. Pt now has difficulties completing transfers and general mobility with increasing pain. Pt demo deficits this date that include pain, flexibility restrictions for knee flex/ext, quad/hip flexor weakness, gait dysfunction and diminished balance. Issued handout for new exercises post surgery to maximize function. Pt will benefit with PT services with strength/ROM, gait training, manual and modalities to maximize function.  Pt prior to onset of current condition had min/no pain with able to complete full ADLs and work activities. Patient received education on their current pathology and how their condition effects them with their functional activities. Patient understood discussion and questions were answered. Patient understands their activity limitations and understands rational for treatment progression. Subjective:  6/10 pain currently. She has been able to lift her leg a little. Pt states she was accepted for emergency Medicaid. Any changes in Ambulatory Summary Sheet? None    Objective:    COVID screening questions were asked and patient attested that there had been no contact or symptoms    FWW with flexed posture with min knee flexion ROM throughout gait     FAIR quad set with min superior patellar pull   -4 deg knee ext to 75 deg knee flexion actively with cues to increase ROM. Exercises: (No more than 4 columns)   Exercise/Equipment 4/20/21 #1 5/6/21 #2 5/11/21 #3           WARM UP         Nu step   4'    Lv2  5' lv 2         TABLE      *Quad Set demo 10x 2   3\" 10x 2   3\"   *SLR demo     *Seated Knee Flexion demo 10x  5\"  10x  5\"    *Heel Slides  demo  10 x 5 ct with strap to tolerance   *Heel Prop    demo 30\"x4 30\"x4   Seated marches   10x2 10x2               STANDING      *Marches demo                                              PROPRIOCEPTION                                    MODALITIES                      Other Therapeutic Activities/Education: Patient received education on their current pathology and how their condition effects them with their functional activities. Patient understood discussion and questions were answered. Patient understands their activity limitations and understands rational for treatment progression. Home Exercise Program: HO issued, reviewed and discussed with patient. Pt agreed to comply.          Manual Treatments: manual PROM knee flexion to tolerance with progressive holds and increase in knee flexion x10' for pain management. Modalities:  Gameready to L knee in supine with supported by towel, low pressure, 34 deg x10' for pain management. No adverse effects. Communication with other providers:  Eval sent 4/20/21      Assessment:  (Response towards treatment session) (Pain Rating)    Pt demonstrated GOOD tolerance to tx with increased flexion today. Reports doing HEP 3x day. Pt would continue to benefit from skilled therapy interventions to address remaining impairments, improve mobility and strength and progress toward goal completion while reducing risk for re-injury or further decline. 4/10 post vaso    Pt is 48year old female with L TKA on 4/28/21. Pt now has difficulties completing transfers and general mobility with increasing pain. Pt demo deficits this date that include pain, flexibility restrictions for knee flex/ext, quad/hip flexor weakness, gait dysfunction and diminished balance. Issued handout for new exercises post surgery to maximize function. Pt will benefit with PT services with strength/ROM, gait training, manual and modalities to maximize function. Pt prior to onset of current condition had min/no pain with able to complete full ADLs and work activities. Patient received education on their current pathology and how their condition effects them with their functional activities. Patient understood discussion and questions were answered. Patient understands their activity limitations and understands rational for treatment progression. Plan for Next Session: Specific instructions for Next Treatment: functional progression in ROM, quad strength and balance stability as tolerated, game ready.        Time In / Time Out:  8750- 4683      Timed Code/Total Treatment Minutes: 40'/50' 1 TE  30'   1 man 10'  1 vaso       Next Progress Note due:  Eval sent 4/20/21       Re-eval 5/6/21     Plan of Care Interventions:  [x] Therapeutic Exercise  [x] Modalities:  [x] Therapeutic Activity     [] Ultrasound  [x] Estim  [x] Gait Training      [] Cervical Traction [] Lumbar Traction  [x] Neuromuscular Re-education    [x] Cold/hotpack [] Iontophoresis   [x] Instruction in HEP      [x] Vasopneumatic   [] Dry Needling    [x] Manual Therapy               [] Aquatic Therapy              Electronically signed by:  Catia Mccurdy PTA, CLT 5/11/2021, 2:53 PM

## 2021-05-18 ENCOUNTER — OFFICE VISIT (OUTPATIENT)
Dept: ORTHOPEDIC SURGERY | Age: 56
End: 2021-05-18

## 2021-05-18 VITALS
HEIGHT: 66 IN | WEIGHT: 241 LBS | OXYGEN SATURATION: 97 % | BODY MASS INDEX: 38.73 KG/M2 | HEART RATE: 88 BPM | RESPIRATION RATE: 16 BRPM

## 2021-05-18 DIAGNOSIS — Z09 POSTOP CHECK: ICD-10-CM

## 2021-05-18 DIAGNOSIS — Z96.652 S/P TOTAL KNEE REPLACEMENT, LEFT: Primary | ICD-10-CM

## 2021-05-18 PROCEDURE — 99024 POSTOP FOLLOW-UP VISIT: CPT | Performed by: ORTHOPAEDIC SURGERY

## 2021-05-18 NOTE — PROGRESS NOTES
Patient returns to the office 3 weeks post operatively for a follow up on her left total knee arthroplasty that was performed on 4/28/21. Denies SOB, chest, or calf muscle pain with incision dry, clean, and intact with no visible signs of infection. Patient continues to work with therapy on ROM and stretching exercises without complication and is ambulatory with the aide of a walker. Pain is rated at a 3/10 which she describes as an aching sensation within the left knee and swelling with pain intensifying upon pushing herself when working on ROM and strengthening exercises at home. Otherwise, she is progressing well and expresses no concerns at this time.

## 2021-05-18 NOTE — PATIENT INSTRUCTIONS
Continue to work on ROM and stretching exercises per PT protocol  Continue to take Ibuprofen and Percocet as needed  Rest, ice, and elevate as needed  Weightbearing and activities as tolerated  Follow up in one month

## 2021-05-21 DIAGNOSIS — Z96.652 S/P TOTAL KNEE REPLACEMENT, LEFT: Primary | ICD-10-CM

## 2021-05-21 RX ORDER — OXYCODONE HYDROCHLORIDE AND ACETAMINOPHEN 5; 325 MG/1; MG/1
1 TABLET ORAL EVERY 6 HOURS PRN
Qty: 28 TABLET | Refills: 0 | Status: SHIPPED | OUTPATIENT
Start: 2021-05-21 | End: 2021-06-01 | Stop reason: SDUPTHER

## 2021-05-21 NOTE — PROGRESS NOTES
5/18/2021   Chief Complaint   Patient presents with    Post-Op Check     s/p left TKA DOS 4/28/21        History of Present Illness:                             Maryann Allen is a 48 y.o. female who returns today for follow-up of her left total knee replacement. She is doing well advancing with therapy. She still has some mild pain swelling and tightness of the knee but overall is making improvements with her range of motion. Patient returns to the office 3 weeks post operatively for a follow up on her left total knee arthroplasty that was performed on 4/28/21. Denies SOB, chest, or calf muscle pain with incision dry, clean, and intact with no visible signs of infection. Patient continues to work with therapy on ROM and stretching exercises without complication and is ambulatory with the aide of a walker. Pain is rated at a 3/10 which she describes as an aching sensation within the left knee and swelling with pain intensifying upon pushing herself when working on ROM and strengthening exercises at home. Otherwise, she is progressing well and expresses no concerns at this time. Medical History  Patient's medications, allergies, past medical, surgical, social and family histories were reviewed and updated as appropriate. Review of Systems:   Review of Systems                                            Examination:  General Exam:  Vitals: Pulse 88   Resp 16   Ht 5' 6\" (1.676 m)   Wt 241 lb (109.3 kg)   SpO2 97%   BMI 38.90 kg/m²    Physical Exam   Left Lower Extremity:    The incision is healing well and skin edges are well approximated. Mild ecchymosis present and resolving. No erythema, drainage, or induration. Mild soft tissue swelling present throughout the soft tissues of the knee. Range of motion is present from 5 degrees short of full extension to 100 degrees of flexion. Calf is soft and nontender. Negative Homans sign.   Sensation and motor function is intact at the knee and ankle.        Diagnostic testing:  X-rays reviewed in office, I independently reviewed the films in the office today:     Narrative   3 views of the knee show excellent position and alignment of the total    knee replacement with good bone cement interface.  No evidence of fracture    or postoperative complication.  Mild diffuse soft tissue swelling present.             Office Procedures:  No orders of the defined types were placed in this encounter. Assessment and Plan  1. Left total knee replacement      The patient is healing appropriately well following the surgery. We reviewed the x-rays. We also discussed the healing incision site. I have explained to them the importance of continuing physical therapy to advance stretching, range of motion and strengthening. We discussed the use of gait aids and progression of ambulatory activities at the guidance of the patient's therapist.    We discussed limiting the use of narcotic pain medications and transitioning to over-the-counter medication such as Tylenol or ibuprofen as pain allows. I recommended follow-up here in 4 weeks for repeat exam to evaluate progression of range of motion and strengthening. They will return sooner if there are any concerns for infection, swelling, stiffness, or other concerns.           Electronically signed by Marry Mcintosh MD on 5/21/2021 at 12:17 PM

## 2021-05-22 ENCOUNTER — HOSPITAL ENCOUNTER (OUTPATIENT)
Dept: PHYSICAL THERAPY | Age: 56
Setting detail: THERAPIES SERIES
Discharge: HOME | End: 2021-05-22
Payer: MEDICAID

## 2021-05-22 PROCEDURE — 97110 THERAPEUTIC EXERCISES: CPT

## 2021-05-22 PROCEDURE — 97116 GAIT TRAINING THERAPY: CPT

## 2021-05-22 NOTE — FLOWSHEET NOTE
Outpatient Physical Therapy  [place]           [x] Phone: [phone]   Fax: [phone]  [facility]           [] Phone: [phone]   Fax: [phone]        Physical Therapy Daily Treatment Note  Date:  2021    Patient Name:  Carrie Gallego    :  1965  MRN: [id number]  Restrictions/Precautions:  None  Diagnosis:   Diagnosis: L TKA   Date of Injury/Surgery: 21  Treatment Diagnosis: Treatment Diagnosis: L knee stiffness, weakness, pain   Insurance/Certification information:   Self Pay  Referring Physician:  Referring Practitioner: Dr. Servando Hill  Next Doctor Visit:  Surgery 21  Plan of care signed (Y/N):  N, sent 21  Outcome Measure:   LEFS  Visit# / total visits:     Pain level: 4/10    Goals:     Short term goals   Time Frame for Long term goals :  6 weeks 21  Long Term Goal 1: Pt will demo I with HEP/symptom management. Long Term Goal 2: Pt will demo normal gait mechanics with min/no deficits to ease community mobility. Long Term Goal 3: Pt will demo 0-120 deg knee A/PROM to ease transfers. Long Term Goal 4: Pt will completed TUG <12 sec to demo improved mobility. Long Term Goal 5: Pt will demo >25% improvement per LEFS to demo improved functional mobility. Long Term Goal 6: Pt will demo sit to stand without compensation and UE assistance to demo improved LE strength and ease with transfers. Patient goals:  Improve in pain and mobility    Summary of Evaluation:   Pt is 48year old female with L TKA on 21. Pt now has difficulties completing transfers and general mobility with increasing pain. Pt demo deficits this date that include pain, flexibility restrictions for knee flex/ext, quad/hip flexor weakness, gait dysfunction and diminished balance. Issued handout for new exercises post surgery to maximize function. Pt will benefit with PT services with strength/ROM, gait training, manual and modalities to maximize function.  Pt prior to onset of them with their functional activities. Patient understood discussion and questions were answered. Patient understands their activity limitations and understands rational for treatment progression. Home Exercise Program: HO issued, reviewed and discussed with patient. Pt agreed to comply. Manual Treatments: Cross friction massage to scar. Manual PROM knee flexion to tolerance. Modalities:  Gameready to L knee in supine with supported by towel, low pressure, 34 deg x10' for pain management. No adverse effects. Communication with other providers:  Eval sent 4/20/21      Assessment:  (Response towards treatment session) (Pain Rating)  Nadira Standing with a good tolerance towards today's treatment session. Gait training with a SPC was practiced today, in which therapist provided moderate verbal and visual cueing. Patient encouraged to following \"heel to toe\" gait pattern and to continue practicing at home. Patient also taught how to perform cross friction massage to scar to decreased scar tissue and promote improved healing. Advised to use cocoa butter or vitamin E oil. Lastly, HEP was updated and standing knee flexion stretch was added into program. Patient will continue to benefit from skilled therapeutic intervention in this setting to improve knee ROM, decrease pain, and improve overall functional mobility. Pt is 48year old female with L TKA on 4/28/21. Pt now has difficulties completing transfers and general mobility with increasing pain. Pt demo deficits this date that include pain, flexibility restrictions for knee flex/ext, quad/hip flexor weakness, gait dysfunction and diminished balance. Issued handout for new exercises post surgery to maximize function. Pt will benefit with PT services with strength/ROM, gait training, manual and modalities to maximize function. Pt prior to onset of current condition had min/no pain with able to complete full ADLs and work activities.  Patient received education on their current pathology and how their condition effects them with their functional activities. Patient understood discussion and questions were answered. Patient understands their activity limitations and understands rational for treatment progression. Plan for Next Session: Specific instructions for Next Treatment: functional progression in ROM, quad strength and balance stability as tolerated, game ready.        Time In / Time Out:  8076-1795      Timed Code/Total Treatment Minutes:   43'/43'   Gait 13', There ex 30'      Next Progress Note due:  Eval sent 4/20/21       Re-eval 5/6/21     Plan of Care Interventions:  [x] Therapeutic Exercise  [x] Modalities:  [x] Therapeutic Activity     [] Ultrasound  [x] Estim  [x] Gait Training      [] Cervical Traction [] Lumbar Traction  [x] Neuromuscular Re-education    [x] Cold/hotpack [] Iontophoresis   [x] Instruction in HEP      [x] Vasopneumatic   [] Dry Needling    [x] Manual Therapy               [] Aquatic Therapy              Electronically signed by:  Mehul Alvarez PT, PT   5/22/2021, 11:37 AM

## 2021-05-27 ENCOUNTER — HOSPITAL ENCOUNTER (OUTPATIENT)
Dept: PHYSICAL THERAPY | Age: 56
Setting detail: THERAPIES SERIES
Discharge: HOME | End: 2021-05-27
Payer: MEDICAID

## 2021-05-27 PROCEDURE — 97110 THERAPEUTIC EXERCISES: CPT

## 2021-05-27 PROCEDURE — 97016 VASOPNEUMATIC DEVICE THERAPY: CPT

## 2021-05-27 PROCEDURE — 97116 GAIT TRAINING THERAPY: CPT

## 2021-05-27 RX ORDER — OXYCODONE HYDROCHLORIDE AND ACETAMINOPHEN 5; 325 MG/1; MG/1
1 TABLET ORAL EVERY 6 HOURS PRN
Qty: 28 TABLET | Refills: 0 | OUTPATIENT
Start: 2021-05-27 | End: 2021-06-03

## 2021-05-27 NOTE — FLOWSHEET NOTE
Outpatient Physical Therapy  [place]           [x] Phone: [phone]   Fax: [phone]  [facility]           [] Phone: [phone]   Fax: [phone]        Physical Therapy Daily Treatment Note  Date:  2021    Patient Name:  Sudha Loera    :  1965  MRN: [id number]  Restrictions/Precautions:  None  Diagnosis:   Diagnosis: L TKA   Date of Injury/Surgery: 21  Treatment Diagnosis: Treatment Diagnosis: L knee stiffness, weakness, pain   Insurance/Certification information:   Self Pay  Referring Physician:  Referring Practitioner: Dr. Inocente Otoole  Next Doctor Visit:  Surgery 21  Plan of care signed (Y/N):  N, sent 21  Outcome Measure:   LEFS  Visit# / total visits:     Pain level: 0/10    Goals:     Short term goals   Time Frame for Long term goals :  6 weeks 21  Long Term Goal 1: Pt will demo I with HEP/symptom management. Long Term Goal 2: Pt will demo normal gait mechanics with min/no deficits to ease community mobility. Long Term Goal 3: Pt will demo 0-120 deg knee A/PROM to ease transfers. Long Term Goal 4: Pt will completed TUG <12 sec to demo improved mobility. Long Term Goal 5: Pt will demo >25% improvement per LEFS to demo improved functional mobility. Long Term Goal 6: Pt will demo sit to stand without compensation and UE assistance to demo improved LE strength and ease with transfers. Patient goals:  Improve in pain and mobility    Summary of Evaluation:   Pt is 48year old female with L TKA on 21. Pt now has difficulties completing transfers and general mobility with increasing pain. Pt demo deficits this date that include pain, flexibility restrictions for knee flex/ext, quad/hip flexor weakness, gait dysfunction and diminished balance. Issued handout for new exercises post surgery to maximize function. Pt will benefit with PT services with strength/ROM, gait training, manual and modalities to maximize function.  Pt prior to onset of education on their current pathology and how their condition effects them with their functional activities. Patient understood discussion and questions were answered. Patient understands their activity limitations and understands rational for treatment progression. Home Exercise Program: HO issued, reviewed and discussed with patient. Pt agreed to comply. Manual Treatments: Cross friction massage to scar. Manual PROM knee flexion to tolerance. Modalities:  Gameready to L knee in supine with supported by towel, low pressure, 34 deg x10' for pain management. No adverse effects. Communication with other providers:  Eval sent 4/20/21      Assessment:  (Response towards treatment session) (Pain Rating)  Pt performed well session with advances as noted. Gait training without AD and worked with increasing WB over LLE and heel toe pattern. ROM measures per flow sheet. Noted improvements in function and slight changes in ROM       Pt is 48year old female with L TKA on 4/28/21. Pt now has difficulties completing transfers and general mobility with increasing pain. Pt demo deficits this date that include pain, flexibility restrictions for knee flex/ext, quad/hip flexor weakness, gait dysfunction and diminished balance. Issued handout for new exercises post surgery to maximize function. Pt will benefit with PT services with strength/ROM, gait training, manual and modalities to maximize function. Pt prior to onset of current condition had min/no pain with able to complete full ADLs and work activities. Patient received education on their current pathology and how their condition effects them with their functional activities. Patient understood discussion and questions were answered. Patient understands their activity limitations and understands rational for treatment progression.       Plan for Next Session: Specific instructions for Next Treatment: functional progression in ROM, quad strength and balance stability as tolerated, game ready.        Time In / Time Out:  1038/1138      Timed Code/Total Treatment Minutes:   60'/60'   1 Gait 15', 2 There ex 30', vaso 1 15'      Next Progress Note due:  Eval sent 4/20/21       Re-eval 5/6/21     Plan of Care Interventions:  [x] Therapeutic Exercise  [x] Modalities:  [x] Therapeutic Activity     [] Ultrasound  [x] Estim  [x] Gait Training      [] Cervical Traction [] Lumbar Traction  [x] Neuromuscular Re-education    [x] Cold/hotpack [] Iontophoresis   [x] Instruction in HEP      [x] Vasopneumatic   [] Dry Needling    [x] Manual Therapy               [] Aquatic Therapy              Electronically signed by:  Shyam Green PTA,    5/27/2021, 8:53 AM

## 2021-05-28 DIAGNOSIS — Z96.652 S/P TOTAL KNEE REPLACEMENT, LEFT: ICD-10-CM

## 2021-05-28 NOTE — TELEPHONE ENCOUNTER
Pt called requesting a refill of pain medication. Left knee replacement 4/28/21. Pt informed that this may not be addressed until Tuesday due to the holiday and that the message will be sent to the provider for review.

## 2021-06-01 RX ORDER — OXYCODONE HYDROCHLORIDE AND ACETAMINOPHEN 5; 325 MG/1; MG/1
1 TABLET ORAL EVERY 8 HOURS PRN
Qty: 20 TABLET | Refills: 0 | Status: SHIPPED | OUTPATIENT
Start: 2021-06-01 | End: 2021-06-08 | Stop reason: SDUPTHER

## 2021-06-08 ENCOUNTER — HOSPITAL ENCOUNTER (OUTPATIENT)
Dept: PHYSICAL THERAPY | Age: 56
Setting detail: THERAPIES SERIES
Discharge: HOME | End: 2021-06-08
Payer: MEDICAID

## 2021-06-08 DIAGNOSIS — Z96.652 S/P TOTAL KNEE REPLACEMENT, LEFT: ICD-10-CM

## 2021-06-08 PROCEDURE — 97110 THERAPEUTIC EXERCISES: CPT

## 2021-06-08 PROCEDURE — 97530 THERAPEUTIC ACTIVITIES: CPT

## 2021-06-08 PROCEDURE — 97016 VASOPNEUMATIC DEVICE THERAPY: CPT

## 2021-06-08 RX ORDER — OXYCODONE HYDROCHLORIDE AND ACETAMINOPHEN 5; 325 MG/1; MG/1
1 TABLET ORAL EVERY 8 HOURS PRN
Qty: 20 TABLET | Refills: 0 | Status: SHIPPED | OUTPATIENT
Start: 2021-06-08 | End: 2021-06-16 | Stop reason: SDUPTHER

## 2021-06-08 NOTE — TELEPHONE ENCOUNTER
Pt called requesting another refill of pain medication. She states that she is taking 2-3 a day. Pain level 6/10.     Sammi

## 2021-06-08 NOTE — FLOWSHEET NOTE
Outpatient Physical Therapy  [place]           [x] Phone: [phone]   Fax: [phone]  Chelly Roberts           [] Phone: [phone]   Fax: [phone]        Physical Therapy Daily Treatment Note  Date:  2021    Patient Name:  Kathleen David    :  1965  MRN: [id number]  Restrictions/Precautions: vertigo  Diagnosis:   Diagnosis: L TKA   Date of Injury/Surgery: 21  Treatment Diagnosis: Treatment Diagnosis: L knee stiffness, weakness, pain   Insurance/Certification information:   Self Pay  Referring Physician:  Referring Practitioner: Dr. Yani Watson  Next Doctor Visit:  Surgery 21  Plan of care signed (Y/N):  YES sent 21  Outcome Measure:   LEFS  Visit# / total visits:     Pain level: 6/10    Goals:     Short term goals   Time Frame for Long term goals :  6 weeks 21  Long Term Goal 1: Pt will demo I with HEP/symptom management. Long Term Goal 2: Pt will demo normal gait mechanics with min/no deficits to ease community mobility. Long Term Goal 3: Pt will demo 0-120 deg knee A/PROM to ease transfers. Long Term Goal 4: Pt will completed TUG <12 sec to demo improved mobility. Long Term Goal 5: Pt will demo >25% improvement per LEFS to demo improved functional mobility. Long Term Goal 6: Pt will demo sit to stand without compensation and UE assistance to demo improved LE strength and ease with transfers. Patient goals:  Improve in pain and mobility    Summary of Evaluation:   Pt is 48year old female with L TKA on 21. Pt now has difficulties completing transfers and general mobility with increasing pain. Pt demo deficits this date that include pain, flexibility restrictions for knee flex/ext, quad/hip flexor weakness, gait dysfunction and diminished balance. Issued handout for new exercises post surgery to maximize function. Pt will benefit with PT services with strength/ROM, gait training, manual and modalities to maximize function.  Pt prior to onset of current condition had min/no pain with able to complete full ADLs and work activities. Patient received education on their current pathology and how their condition effects them with their functional activities. Patient understood discussion and questions were answered. Patient understands their activity limitations and understands rational for treatment progression. Subjective: Pt stated that her pain was  6/10 today. Pt stated that she has positional vertigo and has to sit upright. Any changes in Ambulatory Summary Sheet? None    Objective:    COVID screening questions were asked and patient attested that there had been no contact or symptoms            Exercises: (No more than 4 columns)   Exercise/Equipment 5/22/21 #4 5/27/2021 #5 6/8/2021 #6           WARM UP         Nu step  5' lv 2 5' lv 2 L-4 x 8'          TABLE      *Quad Set 10x 2   3\" 5\" 10 x 1 10x2 5\"    *SLR      *Seated Knee Flexion 10x2  5\"   Slide board and pillow case 5\" 10 x 2  Slide board and pillow case gsb 10x2   *Heel Slides       *Heel Prop         Seated marches       LAQ   2# x 10 2# 10x2 5'         STANDING      *Marches  2# x 10    Gait training With SPC 20ftx2  In // bars with 1 handrail assist, step through gait pattern No AD in // bars with occasional HHA of // bars for heel toe and step through with good weight transitions. Standing hip   -Abduction  -Flexion  -Extension x10 each 2# x 10 each sena     2# 10x2 B  2# 10x2 B     Knee flexion stretch at stairs 10\", x4 --    Hamstring curl  at railing   2#v 10x2 B                    PROPRIOCEPTION                                    MODALITIES  Vaso per below          ROM measures  86 AROM flexion  91 PROM flexion 90° AROM  100° PROM       Other Therapeutic Activities/Education: Patient received education on their current pathology and how their condition effects them with their functional activities. Patient understood discussion and questions were answered.  Patient understands their activity limitations and understands rational for treatment progression. Home Exercise Program: HO issued, reviewed and discussed with patient. Pt agreed to comply. Instructed in hamstring stretching for home        Manual Treatments:     Modalities:  Gameready to L knee in supine with supported by towel, low pressure, 34 deg x15' for pain management. No adverse effects. Communication with other providers:  Eval sent 4/20/21      Assessment:  (Response towards treatment session) (Pain Rating) Pt tolerated treatment fairly well today. Pt was able to increase activity in the gym today. Pt was able to get more ROM today. Pt rated pain at 5/10 after vaso. Pt is 48year old female with L TKA on 4/28/21. Pt now has difficulties completing transfers and general mobility with increasing pain. Pt demo deficits this date that include pain, flexibility restrictions for knee flex/ext, quad/hip flexor weakness, gait dysfunction and diminished balance. Issued handout for new exercises post surgery to maximize function. Pt will benefit with PT services with strength/ROM, gait training, manual and modalities to maximize function. Pt prior to onset of current condition had min/no pain with able to complete full ADLs and work activities. Patient received education on their current pathology and how their condition effects them with their functional activities. Patient understood discussion and questions were answered. Patient understands their activity limitations and understands rational for treatment progression. Plan for Next Session: Specific instructions for Next Treatment: functional progression in ROM, quad strength and balance stability as tolerated, game ready.        Time In / Time Out: 1530/ 1623      Timed Code/Total Treatment Minutes:   38'/ 53' 1 TE ( 15') 2 TA (23') 1 vaso (15')       Next Progress Note due:  Eval sent 4/20/21       Re-eval 5/6/21     Plan of Care Interventions:  [x] Therapeutic Exercise  [x] Modalities:  [x] Therapeutic Activity     [] Ultrasound  [x] Estim  [x] Gait Training      [] Cervical Traction [] Lumbar Traction  [x] Neuromuscular Re-education    [x] Cold/hotpack [] Iontophoresis   [x] Instruction in HEP      [x] Vasopneumatic   [] Dry Needling    [x] Manual Therapy               [] Aquatic Therapy              Electronically signed by:  Elliot Cordero PTA    6/8/2021, 11:13 AM       6/8/2021,6:20 PM

## 2021-06-15 ENCOUNTER — OFFICE VISIT (OUTPATIENT)
Dept: ORTHOPEDIC SURGERY | Age: 56
End: 2021-06-15

## 2021-06-15 ENCOUNTER — HOSPITAL ENCOUNTER (OUTPATIENT)
Dept: PHYSICAL THERAPY | Age: 56
Setting detail: THERAPIES SERIES
Discharge: HOME | End: 2021-06-15
Payer: MEDICAID

## 2021-06-15 VITALS — HEART RATE: 80 BPM | HEIGHT: 66 IN | BODY MASS INDEX: 38.73 KG/M2 | RESPIRATION RATE: 16 BRPM | WEIGHT: 241 LBS

## 2021-06-15 DIAGNOSIS — Z96.652 S/P TOTAL KNEE REPLACEMENT, LEFT: ICD-10-CM

## 2021-06-15 DIAGNOSIS — Z96.652 S/P TOTAL KNEE REPLACEMENT, LEFT: Primary | ICD-10-CM

## 2021-06-15 PROCEDURE — 99024 POSTOP FOLLOW-UP VISIT: CPT | Performed by: ORTHOPAEDIC SURGERY

## 2021-06-15 PROCEDURE — 97016 VASOPNEUMATIC DEVICE THERAPY: CPT

## 2021-06-15 PROCEDURE — 97140 MANUAL THERAPY 1/> REGIONS: CPT

## 2021-06-15 PROCEDURE — 97110 THERAPEUTIC EXERCISES: CPT

## 2021-06-15 RX ORDER — OXYCODONE HYDROCHLORIDE AND ACETAMINOPHEN 5; 325 MG/1; MG/1
1 TABLET ORAL 2 TIMES DAILY PRN
Qty: 14 TABLET | Refills: 0 | Status: SHIPPED | OUTPATIENT
Start: 2021-06-15 | End: 2021-06-22

## 2021-06-15 ASSESSMENT — ENCOUNTER SYMPTOMS
CHEST TIGHTNESS: 0
COLOR CHANGE: 0
SHORTNESS OF BREATH: 0

## 2021-06-15 NOTE — PATIENT INSTRUCTIONS
Continue to work on ROM and strengthening exercises per PT protocol  May continue to take Percocet or Ibuprofen as needed  Rest, ice, and elevate as needed  Weightbearing and activities as tolerated  Follow up in 6 weeks

## 2021-06-15 NOTE — FLOWSHEET NOTE
Outpatient Physical Therapy  [place]           [x] Phone: [phone]   Fax: [phone]  Jeanne Camacho           [] Phone: [phone]   Fax: [phone]        Physical Therapy Daily Treatment Note  Date:  6/15/2021    Patient Name:  Estefany Room    :  1965  MRN: [id number]  Restrictions/Precautions: vertigo   Diagnosis:   Diagnosis: L TKA   Date of Injury/Surgery: 21  Treatment Diagnosis: Treatment Diagnosis: L knee stiffness, weakness, pain   Insurance/Certification information:   Medicaid  Referring Physician:  Referring Practitioner: Dr. Hannah Sousa  Next Doctor Visit:  Surgery 21  Plan of care signed (Y/N):  YES sent 21  Outcome Measure:   LEFS  Visit# / total visits:     Pain level: 5/10  \"kink\"  Goals:     Short term goals   Time Frame for Long term goals :  6 weeks 21  Long Term Goal 1: Pt will demo I with HEP/symptom management. Long Term Goal 2: Pt will demo normal gait mechanics with min/no deficits to ease community mobility. Long Term Goal 3: Pt will demo 0-120 deg knee A/PROM to ease transfers. Long Term Goal 4: Pt will completed TUG <12 sec to demo improved mobility. Long Term Goal 5: Pt will demo >25% improvement per LEFS to demo improved functional mobility. Long Term Goal 6: Pt will demo sit to stand without compensation and UE assistance to demo improved LE strength and ease with transfers. Patient goals:  Improve in pain and mobility    Summary of Evaluation:   Pt is 48year old female with L TKA on 21. Pt now has difficulties completing transfers and general mobility with increasing pain. Pt demo deficits this date that include pain, flexibility restrictions for knee flex/ext, quad/hip flexor weakness, gait dysfunction and diminished balance. Issued handout for new exercises post surgery to maximize function. Pt will benefit with PT services with strength/ROM, gait training, manual and modalities to maximize function.  Pt prior to onset of current condition had min/no pain with able to complete full ADLs and work activities. Patient received education on their current pathology and how their condition effects them with their functional activities. Patient understood discussion and questions were answered. Patient understands their activity limitations and understands rational for treatment progression. Subjective: Pt stated that her pain was  5/10 today. Pt stated that she has positional vertigo and has to sit upright. Her back and hip hurts a lot these days and she needs to sit when doing dishes. Any changes in Ambulatory Summary Sheet? None    Objective:    COVID screening questions were asked and patient attested that there had been no contact or symptoms    Reports hip pain with exercises    L knee flexion 104 dg PROM      Exercises: (No more than 4 columns)   Exercise/Equipment 5/22/21 #4 5/27/2021 #5 6/8/2021 #6 6/15/21 #7         7th week   WARM UP          Nu step  5' lv 2 5' lv 2 L-4 x 8'  5' lv 3          TABLE       *Quad Set 10x 2   3\" 5\" 10 x 1 10x2 5\"  -   *SLR       *Seated Knee Flexion 10x2  5\"   Slide board and pillow case 5\" 10 x 2  Slide board and pillow case gsb 10x2 -   *Heel Slides     Reclined w board and strap  x10 10ct   *Heel Prop          Seated marches        LAQ   2# x 10 2# 10x2 5' 2# 10x2 5'          STANDING       *Marches  2# x 10     Gait training With SPC 20ftx2  In // bars with 1 handrail assist, step through gait pattern No AD in // bars with occasional HHA of // bars for heel toe and step through with good weight transitions.      Standing hip   -Abduction  -Flexion  -Extension x10 each 2# x 10 each sena     2# 10x2 B  2# 10x2 B   2# 10x2 B  2# 10x2 B  2# 10x2 B   Knee flexion stretch at stairs 10\", x4 --     Hamstring curl  at railing   2# 10x2 B 2# 10x2 B   TG squats    2 x 10 5ct               PROPRIOCEPTION                                          MODALITIES  Vaso per below         Vaso   ROM measures  86 AROM flexion  91 PROM flexion 90° AROM  100° PROM 95° AROM  105° PROM       Other Therapeutic Activities/Education: Patient received education on their current pathology and how their condition effects them with their functional activities. Patient understood discussion and questions were answered. Patient understands their activity limitations and understands rational for treatment progression. Home Exercise Program: HO issued, reviewed and discussed with patient. Pt agreed to comply. Instructed in hamstring stretching for home        Manual Treatments: IASTM proximal gastroc, quad, ITB,  Manual PROM knee flexion to tolerance. with progressive holds and increase in knee flexion x10' for pain management. Modalities:  Gameready to L knee in supine with supported by towel, low pressure, 34 deg x15' for pain management. No adverse effects. Communication with other providers:  Eval sent 4/20/21      Assessment:  (Response towards treatment session) (Pain Rating)   Pt demonstrated GOOD tolerance to tx with increased flexion today. Some hip pain today with activities but tolerable. Pt would continue to benefit from skilled therapy interventions to address remaining impairments, improve mobility and strength and progress toward goal completion while reducing risk for re-injury or further decline. Pt rated pain at 3/10 after vaso. Pt is 48year old female with L TKA on 4/28/21. Pt now has difficulties completing transfers and general mobility with increasing pain. Pt demo deficits this date that include pain, flexibility restrictions for knee flex/ext, quad/hip flexor weakness, gait dysfunction and diminished balance. Issued handout for new exercises post surgery to maximize function. Pt will benefit with PT services with strength/ROM, gait training, manual and modalities to maximize function.  Pt prior to onset of current condition had min/no pain with able to complete full ADLs and work activities. Patient received education on their current pathology and how their condition effects them with their functional activities. Patient understood discussion and questions were answered. Patient understands their activity limitations and understands rational for treatment progression. Plan for Next Session: Specific instructions for Next Treatment: functional progression in ROM, quad strength and balance stability as tolerated, game ready.        Time In / Time Out: 1114/1214      Timed Code/Total Treatment Minutes:   50'/60' 35 TE 15 MT 10 Vaso       Next Progress Note due:  Eval sent 4/20/21       Re-eval 5/6/21     Plan of Care Interventions:  [x] Therapeutic Exercise  [x] Modalities:  [x] Therapeutic Activity     [] Ultrasound  [x] Estim  [x] Gait Training      [] Cervical Traction [] Lumbar Traction  [x] Neuromuscular Re-education    [x] Cold/hotpack [] Iontophoresis   [x] Instruction in HEP      [x] Vasopneumatic   [] Dry Needling    [x] Manual Therapy               [] Aquatic Therapy              Electronically signed by:  Ever Carrillo PTA, CLT    6/15/2021, 11:13 AM

## 2021-06-15 NOTE — PROGRESS NOTES
Patient returns to the office for a 7 week post operative follow up on her left total knee arthroplasty that was performed on 4/28/21. Patient is progressing nicely with continued aching along the medial and lateral aspect of the left knee, but continues to work with therapy and has had improved ROM and stability of the extremity. She continues to take the Percocet as needed prior to therapy and as needed which provides her significant relief from pain felt after her therapy sessions. No new or worsening of symptoms reported. She is asking for a refill of Percocet at this time.
and nontender. Negative Homans sign. Sensation and motor function is intact at the knee and ankle. Office Procedures:  No orders of the defined types were placed in this encounter. Assessment and Plan  1. Left total knee replacement    The patient is healing well and making steady improvements in the knee with physical therapy. I have recommended continuing with rehabilitation as a home program.  The patient understands importance of continuing with regular and aggressive stretching exercises and understands how to continue advancing strengthening and endurance as a home program.    The patient will use over-the-counter medications as needed for pain. I have given her a refill of Percocet to use only as needed at nighttime and with therapy.     Follow-up in 6 weeks for repeat exam.            Electronically signed by Karsten Jordan MD on 6/15/2021 at 11:02 AM

## 2021-06-16 RX ORDER — OXYCODONE HYDROCHLORIDE AND ACETAMINOPHEN 5; 325 MG/1; MG/1
1 TABLET ORAL EVERY 8 HOURS PRN
Qty: 20 TABLET | Refills: 0 | Status: SHIPPED | OUTPATIENT
Start: 2021-06-16 | End: 2021-06-23

## 2021-06-22 ENCOUNTER — HOSPITAL ENCOUNTER (OUTPATIENT)
Dept: PHYSICAL THERAPY | Age: 56
Setting detail: THERAPIES SERIES
Discharge: HOME | End: 2021-06-22
Payer: MEDICAID

## 2021-06-22 PROCEDURE — 97016 VASOPNEUMATIC DEVICE THERAPY: CPT

## 2021-06-22 PROCEDURE — 97140 MANUAL THERAPY 1/> REGIONS: CPT

## 2021-06-22 PROCEDURE — 97110 THERAPEUTIC EXERCISES: CPT

## 2021-06-22 NOTE — FLOWSHEET NOTE
Outpatient Physical Therapy  [place]           [x] Phone: [phone]   Fax: [phone]  Maksim Tameka           [] Phone: [phone]   Fax: [phone]        Physical Therapy Daily Treatment Note  Date:  2021    Patient Name:  Valentina Dickson    :  1965  MRN: [id number]  Restrictions/Precautions: vertigo   Diagnosis:   Diagnosis: L TKA   Date of Injury/Surgery: 21  Treatment Diagnosis: Treatment Diagnosis: L knee stiffness, weakness, pain   Insurance/Certification information:   Medicaid  Referring Physician:  Referring Practitioner: Dr. Margoth Knapp  Next Doctor Visit:  21  Plan of care signed (Y/N):  YES sent 21  Outcome Measure:   LEFS  Visit# / total visits:    Pain level: 6 /10  \"kink\"  Goals:     Short term goals   Time Frame for Long term goals :  6 weeks 21  Long Term Goal 1: Pt will demo I with HEP/symptom management. Long Term Goal 2: Pt will demo normal gait mechanics with min/no deficits to ease community mobility. Long Term Goal 3: Pt will demo 0-120 deg knee A/PROM to ease transfers. Long Term Goal 4: Pt will completed TUG <12 sec to demo improved mobility. Long Term Goal 5: Pt will demo >25% improvement per LEFS to demo improved functional mobility. Long Term Goal 6: Pt will demo sit to stand without compensation and UE assistance to demo improved LE strength and ease with transfers. Patient goals:  Improve in pain and mobility    Summary of Evaluation:   Pt is 48year old female with L TKA on 21. Pt now has difficulties completing transfers and general mobility with increasing pain. Pt demo deficits this date that include pain, flexibility restrictions for knee flex/ext, quad/hip flexor weakness, gait dysfunction and diminished balance. Issued handout for new exercises post surgery to maximize function. Pt will benefit with PT services with strength/ROM, gait training, manual and modalities to maximize function.  Pt prior to onset of current condition had min/no pain with able to complete full ADLs and work activities. Patient received education on their current pathology and how their condition effects them with their functional activities. Patient understood discussion and questions were answered. Patient understands their activity limitations and understands rational for treatment progression. Subjective: Pt stated that her pain was  6/10 today. Her back and hip hurts a lot these days but are getting better. States having a \"knot\" in the back of the knee. No problems after last visit. Any changes in Ambulatory Summary Sheet? None    Objective:    COVID screening questions were asked and patient attested that there had been no contact or symptoms    Min antalgic gait with reduced TKE without AD entering therapy. Significant tenderness at distal hamstring (semitendinosus), improvement after manual     Post tx:   Knee ext AR OM: 0 deg   L knee flexion 107 dg PROM      Exercises: (No more than 4 columns)   Exercise/Equipment 5/22/21 #4 5/27/2021 #5 6/8/2021 #6 6/15/21 #7 6/22/21 #8         7th week    WARM UP           Nu step  5' lv 2 5' lv 2 L-4 x 8'  5' lv 3 5' lv 3           TABLE        *Quad Set 10x 2   3\" 5\" 10 x 1 10x2 5\"  -    *SLR        *Seated Knee Flexion 10x2  5\"   Slide board and pillow case 5\" 10 x 2  Slide board and pillow case gsb 10x2 -    *Heel Slides     Reclined w board and strap  x10 10ct GSB 10x2 with OP   *Heel Prop        7# 1'x3   Seated marches         LAQ   2# x 10 2# 10x2 5' 2# 10x2 5'    hamstring stretch      Man 20\"x4   Stool drags      10x2                                   STANDING        *Marches  2# x 10      Gait training With SPC 20ftx2  In // bars with 1 handrail assist, step through gait pattern No AD in // bars with occasional HHA of // bars for heel toe and step through with good weight transitions.       Standing hip   -Abduction  -Flexion  -Extension x10 each 2# x 10 each sena     2# 10x2 B  2# 10x2 B   2# 10x2 B  2# 10x2 B  2# 10x2 B    Knee flexion stretch at stairs 10\", x4 --      Hamstring curl  at railing   2# 10x2 B 2# 10x2 B    TG squats    2 x 10 5ct         Shuttle leg press      L LE 10x2   2c      Small stool L LE 10x2  3\" stretch                                    PROPRIOCEPTION                                                MODALITIES  Vaso per below      Vaso     Vaso 10'   ROM measures  86 AROM flexion  91 PROM flexion 90° AROM  100° PROM 95° AROM  105° PROM See above       Other Therapeutic Activities/Education: Patient received education on their current pathology and how their condition effects them with their functional activities. Patient understood discussion and questions were answered. Patient understands their activity limitations and understands rational for treatment progression. Home Exercise Program: HO issued, reviewed and discussed with patient. Pt agreed to comply. Instructed in hamstring stretching for home        Manual Treatments: Manual PROM knee flexion to tolerance. with progressive holds and increase in knee flexion, light roller to hamstrings and proximal gastroc with passive knee ext in prone with OP x25' for pain management. Modalities:  Gameready to L knee in supine with supported by towel, low pressure, 34 deg x15' for pain management. No adverse effects. Communication with other providers:  Eval sent 4/20/21      Assessment:  (Response towards treatment session) (Pain Rating)   Pt demonstrated GOOD tolerance to tx with able to increase flexion with terminal actively with knee ext today. Progressed activities this date with light closed chain activities and additional intensity to end ranges and tolerated well. Palpable  Musculotendinous stiffness with improvement with overpressure and applied pressure to region. Will assess next visit. Will continue at 1x per week.  Pt would continue to benefit from skilled therapy interventions to address remaining impairments, improve mobility and strength and progress toward goal completion while reducing risk for re-injury or further decline. Pt rated pain at 3/10 after vaso. Plan for Next Session: Specific instructions for Next Treatment: functional progression in ROM, quad strength and balance stability as tolerated, game ready.        Time In / Time Out: 1116 1208      Timed Code/Total Treatment Minutes:  42/52'        17' TE 25' MT  10 Vaso       Next Progress Note due:  Eval sent 4/20/21       Re-eval 5/6/21     Plan of Care Interventions:  [x] Therapeutic Exercise  [x] Modalities:  [x] Therapeutic Activity     [] Ultrasound  [x] Estim  [x] Gait Training      [] Cervical Traction [] Lumbar Traction  [x] Neuromuscular Re-education    [x] Cold/hotpack [] Iontophoresis   [x] Instruction in HEP      [x] Vasopneumatic   [] Dry Needling    [x] Manual Therapy               [] Aquatic Therapy              Electronically signed by:  Carlos Boxer, PT, DPT, OCS    6/22/2021 7:44 AM

## 2021-06-28 ENCOUNTER — TELEPHONE (OUTPATIENT)
Dept: ORTHOPEDIC SURGERY | Age: 56
End: 2021-06-28

## 2021-06-28 DIAGNOSIS — Z96.652 S/P TOTAL KNEE REPLACEMENT, LEFT: Primary | ICD-10-CM

## 2021-06-28 RX ORDER — HYDROCODONE BITARTRATE AND ACETAMINOPHEN 5; 325 MG/1; MG/1
1 TABLET ORAL EVERY 8 HOURS PRN
Qty: 20 TABLET | Refills: 0 | Status: SHIPPED | OUTPATIENT
Start: 2021-06-28 | End: 2021-07-05

## 2021-06-28 NOTE — TELEPHONE ENCOUNTER
Patient calling requesting a refill of medication. Surgery L knee replacement JAMSHID on 04/28/2021. Last refill for pain medication was 06/15/2021 Oxycodone 1 qd every 8 hours PRN 7 days to help manage pain. [facility] [phone].  Stefan Truong

## 2021-07-06 ENCOUNTER — TELEPHONE (OUTPATIENT)
Dept: ORTHOPEDIC SURGERY | Age: 56
End: 2021-07-06

## 2021-07-06 DIAGNOSIS — Z96.652 S/P TOTAL KNEE REPLACEMENT, LEFT: Primary | ICD-10-CM

## 2021-07-06 NOTE — TELEPHONE ENCOUNTER
Pt called and LMOVM wanting to know if she could get a refill of pain medication, if possible. Please Advise.

## 2021-07-07 ENCOUNTER — HOSPITAL ENCOUNTER (OUTPATIENT)
Dept: PHYSICAL THERAPY | Age: 56
Setting detail: THERAPIES SERIES
Discharge: HOME | End: 2021-07-07
Payer: MEDICAID

## 2021-07-07 PROCEDURE — 97530 THERAPEUTIC ACTIVITIES: CPT

## 2021-07-07 PROCEDURE — 97016 VASOPNEUMATIC DEVICE THERAPY: CPT

## 2021-07-07 PROCEDURE — 97110 THERAPEUTIC EXERCISES: CPT

## 2021-07-07 PROCEDURE — 97140 MANUAL THERAPY 1/> REGIONS: CPT

## 2021-07-07 RX ORDER — HYDROCODONE BITARTRATE AND ACETAMINOPHEN 5; 325 MG/1; MG/1
1 TABLET ORAL 2 TIMES DAILY PRN
Qty: 14 TABLET | Refills: 0 | Status: SHIPPED | OUTPATIENT
Start: 2021-07-07 | End: 2021-07-14

## 2021-07-07 NOTE — TELEPHONE ENCOUNTER
Prescription sent for Norco.  Please advise patient that she went to be seen in the office before any more prescriptions could be sent.

## 2021-07-07 NOTE — TELEPHONE ENCOUNTER
Called patient, left message to call office to advise of post op appt on 7/27/21 and no additional refills until seen.

## 2021-07-07 NOTE — FLOWSHEET NOTE
Outpatient Physical Therapy  [place]           [x] Phone: [phone]   Fax: [phone]  Emiliana jones           [] Phone: [phone]   Fax: [phone]        Physical Therapy Daily Treatment Note  Date:  2021    Patient Name:  Deanna Byrd    :  1965  MRN: [id number]  Restrictions/Precautions: vertigo   Diagnosis:   Diagnosis: L TKA   Date of Injury/Surgery: 21  Treatment Diagnosis: Treatment Diagnosis: L knee stiffness, weakness, pain   Insurance/Certification information:   Medicaid  Referring Physician:  Referring Practitioner: Dr. Eliezer Gutierrez  Next Doctor Visit:  21  Plan of care signed (Y/N):  YES sent 21  Outcome Measure:  59/80 LEFS  Visit# / total visits:    Pain level:  6 /10     Goals:     Short term goals   Time Frame for Long term goals :  6 weeks 21  Long Term Goal 1: Pt will demo I with HEP/symptom management. Long Term Goal 2: Pt will demo normal gait mechanics with min/no deficits to ease community mobility. Mostly MET   Long Term Goal 3: Pt will demo 0-120 deg knee A/PROM to ease transfers. Partially MET   Long Term Goal 4: Pt will completed TUG <12 sec to demo improved mobility. MET  Long Term Goal 5: Pt will demo >25% improvement per LEFS to demo improved functional mobility. MET  Long Term Goal 6: Pt will demo sit to stand without compensation and UE assistance to demo improved LE strength and ease with transfers. MET     Patient goals:  Improve in pain and mobility    Summary of Evaluation:   Pt is 48year old female with L TKA on 21. Pt now has difficulties completing transfers and general mobility with increasing pain. Pt demo deficits this date that include pain, flexibility restrictions for knee flex/ext, quad/hip flexor weakness, gait dysfunction and diminished balance. Issued handout for new exercises post surgery to maximize function.  Pt will benefit with PT services with strength/ROM, gait training, manual and modalities to maximize function. Pt prior to onset of current condition had min/no pain with able to complete full ADLs and work activities. Patient received education on their current pathology and how their condition effects them with their functional activities. Patient understood discussion and questions were answered. Patient understands their activity limitations and understands rational for treatment progression. Subjective: Pt stated that her pain was  6/10 today. Best at 4/10 pain. Pain into lateral knee. Using ice/heat to help manage the pain. Completing HEP. Returns back in 3 weeks to Dr. Eliezer Gutierrez. Any changes in Ambulatory Summary Sheet? None    Objective:    COVID screening questions were asked and patient attested that there had been no contact or symptoms    B joint line tenderness, distal IT band   Min antalgic gait with reduced TKE without AD entering therapy. Able to stand without UE assistance.    4/5 knee ext/flex   Normal patellar mobility     TUG: 10.41 sec    5x sit to stand: 20.55 sec   L SLS: 6 sec     6MWT: 1104ft with one standing rest break     Post tx:   Knee ext AR OM: 0 deg   L knee flexion 107 dg AROM      LEFS:  59/80 full function     Exercises: (No more than 4 columns)   Exercise/Equipment 6/15/21 #7 6/22/21 #8 7/7/21  #9       7th week     WARM UP         Nu step  5' lv 3 5' lv 3 5' lv 3         TABLE      *Quad Set -     *SLR      *Seated Knee Flexion -  10x 5\"   *Heel Slides  Reclined w board and strap  x10 10ct GSB 10x2 with OP    *Heel Prop     7# 1'x3    Seated marches       LAQ  2# 10x2 5'     hamstring stretch   Man 20\"x4    Stool drags   10x2                            STANDING      *Marches      Gait training      Standing hip   -Abduction  -Flexion  -Extension   2# 10x2 B  2# 10x2 B  2# 10x2 B     Knee flexion stretch at stairs   Demo    Hamstring curl  at railing 2# 10x2 B     TG squats 2 x 10 5ct          Shuttle leg press   L LE 10x2   2c      Small stool L LE 10x2  3\" stretch PROPRIOCEPTION                                    MODALITIES      Vaso  Vaso 10'    ROM measures 95° AROM  105° PROM See above        Other Therapeutic Activities/Education: Patient received education on their current pathology and how their condition effects them with their functional activities. Patient understood discussion and questions were answered. Patient understands their activity limitations and understands rational for treatment progression. Home Exercise Program: HO issued, reviewed and discussed with patient. Pt agreed to comply. Instructed in hamstring stretching for home        Manual Treatments: Manual PROM knee flexion to tolerance. with progressive holds and increase in knee flexion, completed in reclined due to vertigo with laying supine. Modalities:  Gameready to L knee in supine, low pressure, 34 deg x15' for pain management. No adverse effects. Communication with other providers:  Eval sent 4/20/21      Assessment:  (Response towards treatment session) (Pain Rating)   Pt has completed 9 visits since start of therapy. Improving functional mobility with increased ease with transfers and short ambulation. Limited to 107 deg knee flexion but improving with full passive terminal knee ext. Remains with min gait deficits and mod pain at this time. Pt appears compliant with home program and anticipate min deficits by return to surgeon in 3 weeks. Pt would continue to benefit from skilled therapy interventions to address remaining impairments, improve mobility and strength and progress toward goal completion while reducing risk for re-injury or further decline. Pt rated pain at 5/10 after vaso. Plan for Next Session: Specific instructions for Next Treatment: functional progression in ROM, quad strength and balance stability as tolerated, game ready.        Time In / Time Out: 1121-0080      Timed Code/Total Treatment Minutes:  45/55'            10' TE   20' TA   15' MT  10 Vaso       Next Progress Note due:  Eval sent 4/20/21       Re-eval 5/6/21     Plan of Care Interventions:  [x] Therapeutic Exercise  [x] Modalities:  [x] Therapeutic Activity     [] Ultrasound  [x] Estim  [x] Gait Training      [] Cervical Traction [] Lumbar Traction  [x] Neuromuscular Re-education    [x] Cold/hotpack [] Iontophoresis   [x] Instruction in HEP      [x] Vasopneumatic   [] Dry Needling    [x] Manual Therapy               [] Aquatic Therapy              Electronically signed by:  Kain Rhoades, PT, DPT, OCS    7/7/2021 9:05 AM

## 2021-07-07 NOTE — PROGRESS NOTES
Outpatient Physical Therapy           [place]           [] Phone: [phone]   Fax: [phone]  Sven Cali           [] Phone: [phone]   Fax: [phone]      To:   Dr. Dannie Chu      From: Stephan Purcell, PT, DPT, OCS     Patient: Norma Sanchez                  : 1965  Diagnosis:    L TKA   21    Date: 2021  Treatment Diagnosis:   L knee stiffness, weakness, pain        [x]  Progress Note                []  Discharge Note    Evaluation Date:   21  Total Visits to date: 9   Cancels/No-shows to date:  1    Subjective:  Pt stated that her pain was  6/10 today. Best at 4/10 pain. Pain into lateral knee. Using ice/heat to help manage the pain. Completing HEP. Returns back in 3 weeks to Dr. Dannie Chu. Plan of Care/Treatment to date:  [x] Therapeutic Exercise    [x] Modalities:  [x] Therapeutic Activity     [] Ultrasound  [] Electrical Stimulation  [x] Gait Training      [] Cervical Traction   [] Lumbar Traction  [x] Neuromuscular Re-education  [x] Cold/hotpack [] Iontophoresis  [x] Instruction in HEP      Other:  [x] Manual Therapy       [x]  Vasopneumatic  [] Aquatic Therapy       []   Dry Needle Therapy                      Objective/Significant Findings At Last Visit/Comments:     B joint line tenderness, distal IT band   Min antalgic gait with reduced TKE without AD entering therapy. Able to stand without UE assistance. 4/5 knee ext/flex   Normal patellar mobility      TUG: 10.41 sec    5x sit to stand: 20.55 sec   L SLS: 6 sec      6MWT: 1104ft with one standing rest break      Post tx:   Knee ext AR OM: 0 deg   L knee flexion 107 dg AROM       LEFS:  59/80 full function     Assessment:         Goal Status:  [] Achieved [x] Partially Achieved  [] Not Achieved     Short term goals   Time Frame for Long term goals :  6 weeks 21  Long Term Goal 1: Pt will demo I with HEP/symptom management.    Long Term Goal 2: Pt will demo normal gait mechanics with min/no deficits to ease community mobility. Mostly MET   Long Term Goal 3: Pt will demo 0-120 deg knee A/PROM to ease transfers. Partially MET   Long Term Goal 4: Pt will completed TUG <12 sec to demo improved mobility. MET  Long Term Goal 5: Pt will demo >25% improvement per LEFS to demo improved functional mobility. MET  Long Term Goal 6: Pt will demo sit to stand without compensation and UE assistance to demo improved LE strength and ease with transfers. MET              Patient Status: [x] Continue per initial plan of Care     [] Patient now discharged     [] Additional visits requested, Please re-certify for additional visits: If we are requesting more visits, we fully anticipate the patient's condition is expected to improve within the treatment timeframe we are requesting. Electronically signed by:  Bg Cazares PT, DPT, OCS  7/7/2021, 9:06 AM    7/7/2021 9:07 AM     If you have any questions or concerns, please don't hesitate to call.   Thank you for your referral.    Physician Signature:______________________ Date:______ Time: ________  By signing above, therapists plan is approved by physician

## 2021-07-14 ENCOUNTER — HOSPITAL ENCOUNTER (OUTPATIENT)
Dept: PHYSICAL THERAPY | Age: 56
Setting detail: THERAPIES SERIES
Discharge: HOME | End: 2021-07-14
Payer: MEDICAID

## 2021-07-14 PROCEDURE — 97110 THERAPEUTIC EXERCISES: CPT

## 2021-07-14 PROCEDURE — 97016 VASOPNEUMATIC DEVICE THERAPY: CPT

## 2021-07-14 PROCEDURE — 97140 MANUAL THERAPY 1/> REGIONS: CPT

## 2021-07-14 PROCEDURE — 97530 THERAPEUTIC ACTIVITIES: CPT

## 2021-07-14 NOTE — FLOWSHEET NOTE
Outpatient Physical Therapy  [place]           [x] Phone: [phone]   Fax: [phone]  Carmel Baron           [] Phone: [phone]   Fax: [phone]        Physical Therapy Daily Treatment Note  Date:  2021    Patient Name:  Felisha Figueroa    :  1965  MRN: [id number]  Restrictions/Precautions: vertigo   Diagnosis:   Diagnosis: L TKA   Date of Injury/Surgery: 21  Treatment Diagnosis: Treatment Diagnosis: L knee stiffness, weakness, pain   Insurance/Certification information:   Medicaid  Referring Physician:  Referring Practitioner: Dr. Burak Owens  Next Doctor Visit:  21  Plan of care signed (Y/N):  YES sent 21  Outcome Measure:   LEFS  Visit# / total visits:  10/14  Pain level:   4 /10     Goals:     Short term goals   Time Frame for Long term goals :  6 weeks 21  Long Term Goal 1: Pt will demo I with HEP/symptom management. Long Term Goal 2: Pt will demo normal gait mechanics with min/no deficits to ease community mobility. Mostly MET   Long Term Goal 3: Pt will demo 0-120 deg knee A/PROM to ease transfers. Partially MET   Long Term Goal 4: Pt will completed TUG <12 sec to demo improved mobility. MET  Long Term Goal 5: Pt will demo >25% improvement per LEFS to demo improved functional mobility. MET  Long Term Goal 6: Pt will demo sit to stand without compensation and UE assistance to demo improved LE strength and ease with transfers. MET     Patient goals:  Improve in pain and mobility    Summary of Evaluation:   Pt is 48year old female with L TKA on 21. Pt now has difficulties completing transfers and general mobility with increasing pain. Pt demo deficits this date that include pain, flexibility restrictions for knee flex/ext, quad/hip flexor weakness, gait dysfunction and diminished balance. Issued handout for new exercises post surgery to maximize function.  Pt will benefit with PT services with strength/ROM, gait training, manual and modalities to maximize function. Pt prior to onset of current condition had min/no pain with able to complete full ADLs and work activities. Patient received education on their current pathology and how their condition effects them with their functional activities. Patient understood discussion and questions were answered. Patient understands their activity limitations and understands rational for treatment progression. Subjective: Pt stated that her pain was  6/10 today. Best at 4/10 pain. Pain into anterolateral knee with a thumping/throbbing sensation. Any changes in Ambulatory Summary Sheet? Started Vicodin for next 3 weeks. Objective:    COVID screening questions were asked and patient attested that there had been no contact or symptoms    Anterolateral knee pain with presence of moderate effusion. Min antalgic gait with reduced TKE without AD entering therapy. Able to stand without UE assistance.      Normal patellar mobility       Post tx:   Knee ext AR OM: 0 deg post stretch  L knee flexion 108 dg AROM      Exercises: (No more than 4 columns)   Exercise/Equipment 6/15/21 #7 6/22/21 #8 7/7/21  #9  7/14/21 #10      7th week      WARM UP          Nu step  5' lv 3 5' lv 3  6' lv 3   Startrac bike    S 12 x 20   TABLE       *Quad Set -      *SLR       *Seated Knee Flexion -  10x 5\"    *Heel Slides  Reclined w board and strap  x10 10ct GSB 10x2 with OP GSB 10x2 with OP Slide board 2 x 10 5 ct hold   *Heel Prop     7# 1'x3  7# 3'   Seated marches        LAQ  2# 10x2 5'      hamstring stretch   Man 20\"x4     Stool drags   10x2  10 x 2                               STANDING       *Marches       Gait training       Standing hip   -Abduction  -Flexion  -Extension   2# 10x2 B  2# 10x2 B  2# 10x2 B      Knee flexion stretch at stairs   Demo     Hamstring curl  at railing 2# 10x2 B      TG squats 2 x 10 5ct           Shuttle leg press   L LE 10x2   2c      Small stool L LE 10x2  3\" stretch  L LE 10x2   3c L LE 10x2   3c PROPRIOCEPTION                                          MODALITIES       Vaso  Vaso 10'  10'   ROM measures 95° AROM  105° PROM See above         Other Therapeutic Activities/Education: Patient received education on their current pathology and how their condition effects them with their functional activities. Patient understood discussion and questions were answered. Patient understands their activity limitations and understands rational for treatment progression. Home Exercise Program: HO issued, reviewed and discussed with patient. Pt agreed to comply. Instructed in hamstring stretching for home        Manual Treatments:Manual PROM knee flexion to tolerance. with progressive holds and increase in knee flexion, completed in reclined due to vertigo with laying supine. Gentle A/P knee mod in seated    Modalities:  Gameready to L knee in supine, low pressure, 34 deg x10' for pain management. No adverse effects. Communication with other providers:  Eval sent 4/20/21      Assessment:  (Response towards treatment session) (Pain Rating)   Pt demonstrated GOOD- tolerance to new exercises today. Arrived stiff but able to gain little flexion today. Pt would continue to benefit from skilled therapy interventions to address remaining impairments, improve mobility and strength and progress toward goal completion while reducing risk for re-injury or further decline. Pt rated pain at 5/10 pre-vaso. Plan for Next Session: Specific instructions for Next Treatment: functional progression in ROM, quad strength and balance stability as tolerated, game ready.        Time In / Time Out: 2784-3218      Timed Code/Total Treatment Minutes:  45/55'            10' TE   20' TA   15' MT  10 Vaso       Next Progress Note due:  Eval sent 4/20/21       Re-eval 5/6/21     Plan of Care Interventions:  [x] Therapeutic Exercise  [x] Modalities:  [x] Therapeutic Activity     [] Ultrasound  [x]

## 2021-07-16 ENCOUNTER — HOSPITAL ENCOUNTER (OUTPATIENT)
Dept: PHYSICAL THERAPY | Age: 56
Setting detail: THERAPIES SERIES
Discharge: HOME | End: 2021-07-16
Payer: MEDICAID

## 2021-07-16 PROCEDURE — 97110 THERAPEUTIC EXERCISES: CPT

## 2021-07-16 PROCEDURE — 97016 VASOPNEUMATIC DEVICE THERAPY: CPT

## 2021-07-16 PROCEDURE — 97530 THERAPEUTIC ACTIVITIES: CPT

## 2021-07-16 PROCEDURE — 97140 MANUAL THERAPY 1/> REGIONS: CPT

## 2021-07-23 ENCOUNTER — HOSPITAL ENCOUNTER (OUTPATIENT)
Dept: PHYSICAL THERAPY | Age: 56
Discharge: HOME | End: 2021-07-23

## 2021-07-23 NOTE — FLOWSHEET NOTE
Physical Therapy  Cancellation/No-show Note  Patient Name:  Gia Silva  :  1965   Date:  2021  Cancelled visits to date:1  No-shows to date: 2    For today's appointment patient:  [x]  Cancelled  []  Rescheduled appointment  []  No-show     Reason given by patient:  []  Patient ill  []  Conflicting appointment  [x]  No transportation    []  Conflict with work  []  No reason given  []  Other:     Comments:      Electronically signed by:  Saul Forde PT, DPT, OCS    2021 9:49 AM

## 2021-07-28 ENCOUNTER — HOSPITAL ENCOUNTER (OUTPATIENT)
Dept: PHYSICAL THERAPY | Age: 56
Discharge: HOME | End: 2021-07-28

## 2021-07-28 NOTE — FLOWSHEET NOTE
Physical Therapy  Cancellation/No-show Note  Patient Name:  Eleazar El  :  1965   Date:  2021  Cancelled visits to date:2  No-shows to date: 2    For today's appointment patient:  [x]  Cancelled  []  Rescheduled appointment  []  No-show     Reason given by patient:  []  Patient ill  [x]  Conflicting appointment  []  No transportation    []  Conflict with work  []  No reason given  []  Other:     Comments:      Electronically signed by:  Devyn Dean PT, DPT, OCS    2021 11:39 AM

## 2021-08-05 ENCOUNTER — OFFICE VISIT (OUTPATIENT)
Dept: ORTHOPEDIC SURGERY | Age: 56
End: 2021-08-05
Payer: MEDICAID

## 2021-08-05 VITALS
BODY MASS INDEX: 41.15 KG/M2 | HEIGHT: 64 IN | OXYGEN SATURATION: 99 % | RESPIRATION RATE: 15 BRPM | HEART RATE: 88 BPM | WEIGHT: 241 LBS

## 2021-08-05 DIAGNOSIS — Z96.652 S/P TOTAL KNEE REPLACEMENT, LEFT: Primary | ICD-10-CM

## 2021-08-05 PROCEDURE — 99212 OFFICE O/P EST SF 10 MIN: CPT | Performed by: ORTHOPAEDIC SURGERY

## 2021-08-05 PROCEDURE — G8417 CALC BMI ABV UP PARAM F/U: HCPCS | Performed by: ORTHOPAEDIC SURGERY

## 2021-08-05 PROCEDURE — G8428 CUR MEDS NOT DOCUMENT: HCPCS | Performed by: ORTHOPAEDIC SURGERY

## 2021-08-05 PROCEDURE — 3017F COLORECTAL CA SCREEN DOC REV: CPT | Performed by: ORTHOPAEDIC SURGERY

## 2021-08-05 PROCEDURE — 4004F PT TOBACCO SCREEN RCVD TLK: CPT | Performed by: ORTHOPAEDIC SURGERY

## 2021-08-05 RX ORDER — IBUPROFEN 800 MG/1
800 TABLET ORAL 4 TIMES DAILY PRN
Qty: 120 TABLET | Refills: 0 | Status: CANCELLED | OUTPATIENT
Start: 2021-08-05

## 2021-08-05 NOTE — LETTER
[facility]  [address] [place] [address]  Phone: [phone]  Fax: [phone]    Nupur Argueta MD        August 5, 2021     Patient: Jeanette Keane   YOB: 1965   Date of Visit: 8/5/2021       To Whom It May Concern: It is my medical opinion that [place] [employer] should remain out of work until 9/20/21. If you have any questions or concerns, please don't hesitate to call.     Sincerely,        Nupur Argueta MD

## 2021-08-05 NOTE — PATIENT INSTRUCTIONS
Continue weight-bearing as tolerated. Continue range of motion exercises as instructed. Ice and elevate as needed. Tylenol or Motrin for pain.   Follow up in 6 weeks  Remain out of work 6 weeks

## 2021-08-06 NOTE — PROGRESS NOTES
Patient returns to the office today for left TKA DOS 4/28/21. Pt states pain today in the left knee is along the lateral aspect of the left thigh area. Pt states she has not been going to therapy do to transpiration issues. Pt states she is taking tylenol and ibuprofen with mild relief.  Pt states she tries to stretch daily but has a difficult time do to how tight her leg is
exam today and I believe that she is continuing to improve and will make further progress with her strength and endurance over the next couple of months. I have encouraged her to continue working with her home exercise program and really increase walking activities. She will continue with stretching as well. Continue with over-the-counter medications as needed. We discussed massage activities as well. I have written her a note to remain off of work at this time.   Follow-up in 6 weeks for repeat exam we will determine her ability to return to work at that time        Electronically signed by Korey Hernandez MD on 8/6/2021 at 7:57 AM

## 2021-09-16 ENCOUNTER — OFFICE VISIT (OUTPATIENT)
Dept: ORTHOPEDIC SURGERY | Age: 56
End: 2021-09-16
Payer: MEDICAID

## 2021-09-16 VITALS
HEART RATE: 52 BPM | WEIGHT: 241 LBS | OXYGEN SATURATION: 98 % | RESPIRATION RATE: 15 BRPM | HEIGHT: 64 IN | BODY MASS INDEX: 41.15 KG/M2

## 2021-09-16 DIAGNOSIS — Z96.652 STATUS POST LEFT KNEE REPLACEMENT: Primary | ICD-10-CM

## 2021-09-16 PROCEDURE — 3017F COLORECTAL CA SCREEN DOC REV: CPT | Performed by: ORTHOPAEDIC SURGERY

## 2021-09-16 PROCEDURE — G8417 CALC BMI ABV UP PARAM F/U: HCPCS | Performed by: ORTHOPAEDIC SURGERY

## 2021-09-16 PROCEDURE — 99212 OFFICE O/P EST SF 10 MIN: CPT | Performed by: ORTHOPAEDIC SURGERY

## 2021-09-16 PROCEDURE — G8428 CUR MEDS NOT DOCUMENT: HCPCS | Performed by: ORTHOPAEDIC SURGERY

## 2021-09-16 PROCEDURE — 4004F PT TOBACCO SCREEN RCVD TLK: CPT | Performed by: ORTHOPAEDIC SURGERY

## 2021-09-16 ASSESSMENT — ENCOUNTER SYMPTOMS
COLOR CHANGE: 0
SHORTNESS OF BREATH: 0
CHEST TIGHTNESS: 0

## 2021-09-16 NOTE — LETTER
[facility]  [address] [facility]  Phone: [phone]  Fax: [phone]    Dion Bradley MD        September 16, 2021     Patient: Isabel Mora   YOB: 1965   Date of Visit: 9/16/2021       To Whom It May Concern: It is my medical opinion that Roxanne Silva may return to work on 9/27/21. If you have any questions or concerns, please don't hesitate to call.     Sincerely,        Dion Bradley MD

## 2021-09-16 NOTE — PROGRESS NOTES
9/16/2021   Chief Complaint   Patient presents with    Knee Pain     left TKA DOS 4/28/21        History of Present Illness:                             George Lujan is a 48 y.o. female returns today for follow-up of her left total knee replacement. She is doing well advancing her activities and feels that her knee has improved. She is interested in returning to work. Medical History  Patient's medications, allergies, past medical, surgical, social and family histories were reviewed and updated as appropriate. Review of Systems   Constitutional: Negative for activity change and fever. Respiratory: Negative for chest tightness and shortness of breath. Cardiovascular: Negative for chest pain. Skin: Negative for color change. Neurological: Negative for dizziness. Psychiatric/Behavioral: The patient is not nervous/anxious. Examination:  General Exam:  Vitals: Pulse 52   Resp 15   Ht 5' 4\" (1.626 m)   Wt 241 lb (109.3 kg)   SpO2 98%   BMI 41.37 kg/m²    Physical Exam     Left Lower Extremity:    The incision is well-healed. No erythema, drainage, or induration. Minimal resolving soft tissue swelling present throughout the soft tissues of the knee. Range of motion is present from full extension to 120 degrees of flexion. Calf is soft and nontender. Negative Homans sign. Sensation and motor function is intact at the knee and ankle. Office Procedures:  No orders of the defined types were placed in this encounter. Assessment and Plan  Left total knee replacement    She will continue her home exercise programs and advance strengthening and endurance to the knee. We discussed home exercise program and weight loss    I given her a note to return to work without restrictions.     Follow-up as needed        Electronically signed by Julian Finney MD on 9/16/2021 at 11:49 AM

## 2021-09-30 ENCOUNTER — APPOINTMENT (OUTPATIENT)
Dept: CT IMAGING | Age: 56
End: 2021-09-30
Payer: MEDICAID

## 2021-09-30 ENCOUNTER — HOSPITAL ENCOUNTER (EMERGENCY)
Age: 56
Discharge: HOME | End: 2021-09-30
Payer: MEDICAID

## 2021-09-30 VITALS
TEMPERATURE: 98.1 F | HEART RATE: 89 BPM | OXYGEN SATURATION: 99 % | SYSTOLIC BLOOD PRESSURE: 142 MMHG | RESPIRATION RATE: 15 BRPM | DIASTOLIC BLOOD PRESSURE: 83 MMHG

## 2021-09-30 DIAGNOSIS — R51.9 NONINTRACTABLE HEADACHE, UNSPECIFIED CHRONICITY PATTERN, UNSPECIFIED HEADACHE TYPE: Primary | ICD-10-CM

## 2021-09-30 LAB
ALBUMIN SERPL-MCNC: 4.2 GM/DL (ref 3.4–5)
ALP BLD-CCNC: 106 IU/L (ref 40–129)
ALT SERPL-CCNC: 31 U/L (ref 10–40)
ANION GAP SERPL CALCULATED.3IONS-SCNC: 10 MMOL/L (ref 4–16)
AST SERPL-CCNC: 33 IU/L (ref 15–37)
BASOPHILS ABSOLUTE: 0.1 K/CU MM
BASOPHILS RELATIVE PERCENT: 0.6 % (ref 0–1)
BILIRUB SERPL-MCNC: 0.3 MG/DL (ref 0–1)
BUN BLDV-MCNC: 14 MG/DL (ref 6–23)
CALCIUM SERPL-MCNC: 9.3 MG/DL (ref 8.3–10.6)
CHLORIDE BLD-SCNC: 104 MMOL/L (ref 99–110)
CO2: 25 MMOL/L (ref 21–32)
CREAT SERPL-MCNC: 0.7 MG/DL (ref 0.6–1.1)
DIFFERENTIAL TYPE: ABNORMAL
EOSINOPHILS ABSOLUTE: 0.3 K/CU MM
EOSINOPHILS RELATIVE PERCENT: 3.4 % (ref 0–3)
GFR AFRICAN AMERICAN: >60 ML/MIN/1.73M2
GFR NON-AFRICAN AMERICAN: >60 ML/MIN/1.73M2
GLUCOSE BLD-MCNC: 155 MG/DL (ref 70–99)
HCT VFR BLD CALC: 36.5 % (ref 37–47)
HEMOGLOBIN: 11.4 GM/DL (ref 12.5–16)
IMMATURE NEUTROPHIL %: 0.3 % (ref 0–0.43)
LYMPHOCYTES ABSOLUTE: 2.7 K/CU MM
LYMPHOCYTES RELATIVE PERCENT: 34 % (ref 24–44)
MCH RBC QN AUTO: 28.4 PG (ref 27–31)
MCHC RBC AUTO-ENTMCNC: 31.2 % (ref 32–36)
MCV RBC AUTO: 90.8 FL (ref 78–100)
MONOCYTES ABSOLUTE: 0.5 K/CU MM
MONOCYTES RELATIVE PERCENT: 6 % (ref 0–4)
NUCLEATED RBC %: 0 %
PDW BLD-RTO: 13.9 % (ref 11.7–14.9)
PLATELET # BLD: 214 K/CU MM (ref 140–440)
PMV BLD AUTO: 9.8 FL (ref 7.5–11.1)
POTASSIUM SERPL-SCNC: 3.8 MMOL/L (ref 3.5–5.1)
RBC # BLD: 4.02 M/CU MM (ref 4.2–5.4)
SEGMENTED NEUTROPHILS ABSOLUTE COUNT: 4.5 K/CU MM
SEGMENTED NEUTROPHILS RELATIVE PERCENT: 55.7 % (ref 36–66)
SODIUM BLD-SCNC: 139 MMOL/L (ref 135–145)
TOTAL IMMATURE NEUTOROPHIL: 0.02 K/CU MM
TOTAL NUCLEATED RBC: 0 K/CU MM
TOTAL PROTEIN: 6.9 GM/DL (ref 6.4–8.2)
WBC # BLD: 8 K/CU MM (ref 4–10.5)

## 2021-09-30 PROCEDURE — 36415 COLL VENOUS BLD VENIPUNCTURE: CPT

## 2021-09-30 PROCEDURE — 85025 COMPLETE CBC W/AUTO DIFF WBC: CPT

## 2021-09-30 PROCEDURE — 6360000002 HC RX W HCPCS: Performed by: PHYSICIAN ASSISTANT

## 2021-09-30 PROCEDURE — 80053 COMPREHEN METABOLIC PANEL: CPT

## 2021-09-30 PROCEDURE — 96374 THER/PROPH/DIAG INJ IV PUSH: CPT

## 2021-09-30 PROCEDURE — 96375 TX/PRO/DX INJ NEW DRUG ADDON: CPT

## 2021-09-30 PROCEDURE — 2580000003 HC RX 258: Performed by: PHYSICIAN ASSISTANT

## 2021-09-30 PROCEDURE — 99282 EMERGENCY DEPT VISIT SF MDM: CPT

## 2021-09-30 RX ORDER — METOCLOPRAMIDE HYDROCHLORIDE 5 MG/ML
10 INJECTION INTRAMUSCULAR; INTRAVENOUS ONCE
Status: COMPLETED | OUTPATIENT
Start: 2021-09-30 | End: 2021-09-30

## 2021-09-30 RX ORDER — 0.9 % SODIUM CHLORIDE 0.9 %
1000 INTRAVENOUS SOLUTION INTRAVENOUS ONCE
Status: COMPLETED | OUTPATIENT
Start: 2021-09-30 | End: 2021-09-30

## 2021-09-30 RX ORDER — KETOROLAC TROMETHAMINE 30 MG/ML
30 INJECTION, SOLUTION INTRAMUSCULAR; INTRAVENOUS ONCE
Status: COMPLETED | OUTPATIENT
Start: 2021-09-30 | End: 2021-09-30

## 2021-09-30 RX ORDER — DIPHENHYDRAMINE HYDROCHLORIDE 50 MG/ML
50 INJECTION INTRAMUSCULAR; INTRAVENOUS ONCE
Status: COMPLETED | OUTPATIENT
Start: 2021-09-30 | End: 2021-09-30

## 2021-09-30 RX ADMIN — KETOROLAC TROMETHAMINE 30 MG: 30 INJECTION, SOLUTION INTRAMUSCULAR; INTRAVENOUS at 20:11

## 2021-09-30 RX ADMIN — SODIUM CHLORIDE 1000 ML: 9 INJECTION, SOLUTION INTRAVENOUS at 20:10

## 2021-09-30 RX ADMIN — DIPHENHYDRAMINE HYDROCHLORIDE 50 MG: 50 INJECTION INTRAMUSCULAR; INTRAVENOUS at 20:10

## 2021-09-30 RX ADMIN — METOCLOPRAMIDE HYDROCHLORIDE 10 MG: 5 INJECTION INTRAMUSCULAR; INTRAVENOUS at 20:11

## 2021-09-30 ASSESSMENT — PAIN SCALES - GENERAL: PAINLEVEL_OUTOF10: 9

## 2021-09-30 NOTE — ED NOTES
Pt reports she took OTC meds for HA.  Denies ever seeing a neurologist.      Elva Cotto RN  09/30/21 1955

## 2021-09-30 NOTE — ED PROVIDER NOTES
eMERGENCY dEPARTMENT eNCOUnter        [address] [facility]    Chief Complaint   Patient presents with    Headache     started last night       HPI    Beni Espinal is a 55 y.o. female who presents with a headache. Onset:  Yesterday. Duration:  Constant. Location:  Occipital with radiation to the frontal aspect. Character:  Aching. Severity is a(n) 9 on a scale of 0-10. Aggravating factors:  Light, noise. Alleviating factors:  None--took Excedrin and Ibuprofen without relief. History of migraines, used to be on Fioricet but no longer takes it. Denies fever, chills, neck pain or stiffness, cp, sob, n/v/d, weakness, numbness, tingling. REVIEW OF SYSTEMS    Constitutional:  Denies fever. Eyes:  + photophobia. HENT:  + headache, + phonophobia. Neck:  Denies neck pain. Respiratory:  Denies any shortness of breath. Cardiovascular:  Denies chest pain. GI:  Denies nausea, denies vomiting. Musculoskeletal:  Denies any edema. Integument:  Denies cyanosis, rashes, lesions. Neurologic:  Denies weakness, denies numbness/tingling. All other systems reviewed and are negative.     PAST MEDICAL & SURGICAL HISTORY    Past Medical History:   Diagnosis Date    Arthritis     Arthritis of left knee     Asthma     last flare up summer 2020    Diabetes mellitus ([address]     \"use to take Metformin been off this since  2019\"    Disorder of rotator cuff syndrome of left shoulder and allied disorder     H/O cardiovascular stress test 2/24/16    EF68% normal study    History of motion sickness     Hx of echocardiogram 2/24/16    EF 55%, normal LV function, moderate LVH    Hyperlipidemia     Hypertension     Kidney stones     \"had kidney stone 11/2020- removed with surg- , back in 2009- had to put tube in my back(nephostomy tube) for kidney stone-had lithotripsy    Migraine     Obesity     PONV (postoperative nausea and vomiting)     hx of motion sickness    UTI (urinary tract infection)     \"last one 11/2020\"  Vertigo     \"have trouble leaning to my left and laying flat with this\"    Wears glasses      Past Surgical History:   Procedure Laterality Date    CHOLECYSTECTOMY  age22's    CYSTOSCOPY INSERTION / REMOVAL STENT / STONE N/A 11/28/2020    CYSTOSCOPY RETROGRADE PYELOGRAM STENT INSERTION performed by Margareth Guajardo MD at [address] / [address] / [facility] Right 1/21/2021    CYSTOSCOPY STENT EXCHANGE URETEROSCOPY EXTRACTION OF STONES performed by Cate Ceja MD at [place] [address] Bilateral     tarsal tunnel, hammer toe, neuroma removed -1990's    HERNIA REPAIR  2019?     ventral hernia repair    HYSTERECTOMY  2001    KIDNEY STONE SURGERY      per old chart had surg for left nephrostolithotomy and cysto /lithotripsy in [facility] [address] Right     \"open surg right shoulder and later had 2 rotator cuff surgeries on my right shoulder\"    TONSILLECTOMY  age 26   [employer] TOTAL KNEE ARTHROPLASTY Left 4/28/2021    LEFT KNEE TOTAL ARTHROPLASTY ROBOTIC performed by Julienne Robles MD at [address] [facility] [place]    Current Outpatient Rx   Medication Sig Dispense Refill    aspirin 325 MG EC tablet Take 1 tablet by mouth 2 times daily 30 tablet 0    docusate sodium (COLACE) 100 MG capsule Take 1 capsule by mouth daily as needed for Constipation 30 capsule 0    ibuprofen (ADVIL;MOTRIN) 800 MG tablet Take 800 mg by mouth every 8 hours as needed for Pain      albuterol sulfate HFA (VENTOLIN HFA) 108 (90 Base) MCG/ACT inhaler Inhale 2 puffs into the lungs every 6 hours as needed for Wheezing      VITAMIN D PO Take 1,000 Units by mouth daily      atorvastatin (LIPITOR) 20 MG tablet Take 20 mg by mouth daily      folic acid (FOLVITE) 1 MG tablet Take 1 mg by mouth daily      dicyclomine (BENTYL) 20 MG tablet Take 1 tablet by mouth 3 times daily as needed (abdominal pain) 20 tablet 3    spironolactone (ALDACTONE) 25 MG tablet TAKE 1 TABLET BY MOUTH ONE TIME A DAY      amLODIPine (NORVASC) 5 MG tablet Take 5 mg by mouth daily         ALLERGIES    Allergies   Allergen Reactions    Pollen Extract Itching     Itchy and watery eyes. Pet dander also causes same symptoms.  Tramadol      Ear ringing       SOCIAL & FAMILY HISTORY    Social History     Socioeconomic History    Marital status: Legally      Spouse name: Not on file    Number of children: Not on file    Years of education: Not on file    Highest education level: Not on file   Occupational History    Not on file   Tobacco Use    Smoking status: Current Every Day Smoker     Packs/day: 1.00     Years: 20.00     Pack years: 20.00     Types: Cigarettes    Smokeless tobacco: Never Used   Vaping Use    Vaping Use: Never used   Substance and Sexual Activity    Alcohol use: No     Alcohol/week: 0.0 standard drinks    Drug use: No    Sexual activity: Not Currently     Partners: Male     Comment: 2 DAUGHTERS   Other Topics Concern    Not on file   Social History Narrative    Not on file     Social Determinants of Health     Financial Resource Strain:     Difficulty of Paying Living Expenses:    Food Insecurity:     Worried About Running Out of Food in the Last Year:     Ran Out of Food in the Last Year:    Transportation Needs:     Lack of Transportation (Medical):      Lack of Transportation (Non-Medical):    Physical Activity:     Days of Exercise per Week:     Minutes of Exercise per Session:    Stress:     Feeling of Stress :    Social Connections:     Frequency of Communication with Friends and Family:     Frequency of Social Gatherings with Friends and Family:     Attends Evangelical Services:     Active Member of Clubs or Organizations:     Attends Club or Organization Meetings:     Marital Status:    Intimate Partner Violence:     Fear of Current or Ex-Partner:     Emotionally Abused:     Physically Abused:     Sexually Abused:      Family History   Problem Relation Age of Onset    Heart Disease Mother     High Blood Pressure Mother     Asthma Mother     Heart Disease Father     Diabetes Sister     Seizures Sister     Asthma Sister     COPD Sister     Heart Disease Maternal Grandmother     High Blood Pressure Maternal Grandmother     Cancer Maternal Grandfather     Emphysema Maternal Grandfather     Asthma Daughter     Depression Daughter    [facility] Migraines Daughter    [facility] Obesity Daughter     Ulcerative Colitis Daughter     High Cholesterol Daughter     Asthma Daughter    [facility] Migraines Daughter     Obesity Daughter     Asthma Brother     Stroke Paternal Uncle        PHYSICAL EXAM    VITAL SIGNS: BP (!) 142/83   Pulse 89   Temp 98.1 °F (36.7 °C) (Oral)   Resp 15   SpO2 99%    Constitutional:  Well developed, well nourished, no acute distress   Eyes: PERRL, EOMI. HENT:  NC/AT. External ears normal.  Nares patent. Oropharynx moist.  No temporal artery tenderness. Neck:  Supple, no tenderness, no meningeal signs. Respiratory:  Normal respiratory effort. Cardiovascular:  RRR. GI:  Soft, non-tender. Bowel sounds active. Musculoskeletal:  No edema or deformities. Equal strength bilateral upper and lower extremities. DP intact and symmetric. Integument:  Warm and dry. Neurologic:  Alert & oriented. Normal sensory and motor functions. Psych: Normal affect. ED COURSE & MEDICAL DECISION MAKING    Pertinent Labs & Imaging studies reviewed and interpreted. (See chart for details)  -  Patient seen and evaluated in the emergency department. -  Triage and nursing notes reviewed and incorporated. -  Old chart records reviewed and incorporated. -  Supervising physician was Dr. Valerie Mancia.  Patient was seen independently.     -  Differential diagnosis includes:  subarachnoid hemorrhage, meningitis, temporal arteritis, pseudotumor cerebri, acute renal failure, migraine, and others  -  Patient treated with NS, Toradol, Reglan, Benadryl in the ED.  - Patient reports resolution of her HA on re-examination. I estimate LOW risk for bacterial meningitis, SAH, ischemic or hemorrhagic CVA, or ACS thus consider discharge disposition reasonable. We discussed diagnosis and risks. F/U with PCP in 1-2 days or return to the ED for new/worsening symptoms. Patient is agreeable with plan of care and disposition.      -  Patient discharged home. In light of current events, I did utilize appropriate PPE (including N95 and surgical face mask, safety glasses, and gloves, as recommended by the health facility/national standard best practice, during my bedside interactions with the patient. FINAL IMPRESSION    1.  Nonintractable headache, unspecified chronicity pattern, unspecified headache type                (Please note that this note was completed with a voice recognition program.  Every attempt was made to edit the dictations, but inevitably there remain words that are mis-transcribed.)        Pia Suazo PA-C  09/30/21 [id number]

## 2021-09-30 NOTE — ED TRIAGE NOTES
Patient states migraine since last night. Has gotten headaches like this before and usually tylenol and excedrin help but she is still having pain. Patient alert & oriented x 4.

## 2021-10-01 NOTE — ED NOTES
Discharge paperwork reviewed with Pt, all questions answered.  Pt ambulated to mehul Malone RN  09/30/21 [id number]

## 2021-10-19 ENCOUNTER — HOSPITAL ENCOUNTER (EMERGENCY)
Age: 56
Discharge: HOME | End: 2021-10-19
Attending: EMERGENCY MEDICINE
Payer: COMMERCIAL

## 2021-10-19 VITALS
OXYGEN SATURATION: 100 % | SYSTOLIC BLOOD PRESSURE: 139 MMHG | TEMPERATURE: 98.1 F | DIASTOLIC BLOOD PRESSURE: 77 MMHG | HEART RATE: 93 BPM

## 2021-10-19 DIAGNOSIS — R19.7 DIARRHEA, UNSPECIFIED TYPE: Primary | ICD-10-CM

## 2021-10-19 DIAGNOSIS — K92.1 BLOOD IN STOOL: ICD-10-CM

## 2021-10-19 LAB
BASOPHILS ABSOLUTE: 0 K/CU MM
BASOPHILS RELATIVE PERCENT: 0.6 % (ref 0–1)
DIFFERENTIAL TYPE: ABNORMAL
EOSINOPHILS ABSOLUTE: 0.1 K/CU MM
EOSINOPHILS RELATIVE PERCENT: 1.7 % (ref 0–3)
HCT VFR BLD CALC: 40.8 % (ref 37–47)
HEMOCCULT STL QL: NEGATIVE
HEMOGLOBIN: 12.8 GM/DL (ref 12.5–16)
IMMATURE NEUTROPHIL %: 0.3 % (ref 0–0.43)
LYMPHOCYTES ABSOLUTE: 2.2 K/CU MM
LYMPHOCYTES RELATIVE PERCENT: 31.2 % (ref 24–44)
MCH RBC QN AUTO: 27.5 PG (ref 27–31)
MCHC RBC AUTO-ENTMCNC: 31.4 % (ref 32–36)
MCV RBC AUTO: 87.7 FL (ref 78–100)
MONOCYTES ABSOLUTE: 0.4 K/CU MM
MONOCYTES RELATIVE PERCENT: 5.4 % (ref 0–4)
PDW BLD-RTO: 13.8 % (ref 11.7–14.9)
PLATELET # BLD: 264 K/CU MM (ref 140–440)
PMV BLD AUTO: 9.4 FL (ref 7.5–11.1)
RBC # BLD: 4.65 M/CU MM (ref 4.2–5.4)
SEGMENTED NEUTROPHILS ABSOLUTE COUNT: 4.2 K/CU MM
SEGMENTED NEUTROPHILS RELATIVE PERCENT: 60.8 % (ref 36–66)
TOTAL IMMATURE NEUTOROPHIL: 0.02 K/CU MM
WBC # BLD: 6.9 K/CU MM (ref 4–10.5)

## 2021-10-19 PROCEDURE — 85025 COMPLETE CBC W/AUTO DIFF WBC: CPT

## 2021-10-19 PROCEDURE — 99283 EMERGENCY DEPT VISIT LOW MDM: CPT

## 2021-10-19 PROCEDURE — 6370000000 HC RX 637 (ALT 250 FOR IP): Performed by: EMERGENCY MEDICINE

## 2021-10-19 PROCEDURE — 82272 OCCULT BLD FECES 1-3 TESTS: CPT

## 2021-10-19 RX ADMIN — HYOSCYAMINE SULFATE 125 MCG: 0.12 TABLET ORAL; SUBLINGUAL at 14:05

## 2021-10-19 ASSESSMENT — PAIN DESCRIPTION - LOCATION: LOCATION: ABDOMEN

## 2021-10-19 ASSESSMENT — PAIN SCALES - GENERAL: PAINLEVEL_OUTOF10: 7

## 2021-10-19 NOTE — LETTER
[address] [facility] [address]  Phone: [phone]  Fax: [phone]               October 19, 2021    Patient: Franchesca Del Valle   YOB: 1965   Date of Visit: 10/19/2021       To Whom It May Concern:    Tiffany Cazares was seen and treated in our emergency department on 10/19/2021. She is released to return to work 10/20/2021.       Sincerely,       Roas Monaco RN         Signature:__________________________________

## 2021-10-19 NOTE — ED PROVIDER NOTES
Triage Chief Complaint:   Rectal Bleeding (bright red)    [place]:  Franchesca Del Valle is a 55 y.o. female that presents with bright red blood per rectum that started yesterday. She states she was constipated prior to this starting. She had 3-4 episodes yesterday and today of diarrhea. She has had diarrhea today but did not have blood in it and is less in each episode. She denies any dizziness or lightheadedness. No chest pain or shortness of breath. No fevers. She is not on blood thinners. She does have a history of IBS. She states she did not use an enema or rectal suppository prior to the bleeding starting. She does use stool softeners. She does have a small amount of associated abdominal cramping for which she is taking Bentyl which is helping. She has not had any nausea or vomiting. No recent antibiotic use, no recent travel, no exposure to farm animals. ROS:   Review of Systems   Constitutional: Negative for chills and fever. HENT: Negative for congestion, rhinorrhea and sore throat. Eyes: Negative for redness and visual disturbance. Respiratory: Negative for cough and shortness of breath. Cardiovascular: Negative for chest pain and leg swelling. Gastrointestinal: Positive for abdominal pain (mild cramping), blood in stool and diarrhea. Negative for nausea and vomiting. Genitourinary: Negative for dysuria and frequency. Musculoskeletal: Negative for arthralgias and back pain. Skin: Negative for rash and wound. Neurological: Negative for syncope and headaches. Psychiatric/Behavioral: Negative. Negative for hallucinations and suicidal ideas.        Past Medical History:   Diagnosis Date    Arthritis     Arthritis of left knee     Asthma     last flare up summer 2020    Diabetes mellitus ([facility] [address]     \"use to take Metformin been off this since  2019\"    Disorder of rotator cuff syndrome of left shoulder and allied disorder     H/O cardiovascular stress test 2/24/16    EF68% normal study    History of motion sickness     Hx of echocardiogram 2/24/16    EF 55%, normal LV function, moderate LVH    Hyperlipidemia     Hypertension     Kidney stones     \"had kidney stone 11/2020- removed with surg- , back in 2009- had to put tube in my back(nephostomy tube) for kidney stone-had lithotripsy    Migraine     Obesity     PONV (postoperative nausea and vomiting)     hx of motion sickness    UTI (urinary tract infection)     \"last one 11/2020\"    Vertigo     \"have trouble leaning to my left and laying flat with this\"    Wears glasses      Past Surgical History:   Procedure Laterality Date    CHOLECYSTECTOMY  age22's    CYSTOSCOPY INSERTION / REMOVAL STENT / STONE N/A 11/28/2020    CYSTOSCOPY RETROGRADE PYELOGRAM STENT INSERTION performed by Ruchi Pimentel MD at [address] [facility] [address] / [place] [facility] / [place] [facility] Right 1/21/2021    CYSTOSCOPY STENT EXCHANGE URETEROSCOPY EXTRACTION OF STONES performed by Pradip Tong MD at [address] Bilateral     tarsal tunnel, hammer toe, neuroma removed -1990's    HERNIA REPAIR  2019?     ventral hernia repair    HYSTERECTOMY  2001    KIDNEY STONE SURGERY      per old chart had surg for left nephrostolithotomy and cysto /[facility]     \"open surg right shoulder and later had 2 rotator cuff surgeries on my right shoulder\"    TONSILLECTOMY  age 26    TOTAL KNEE ARTHROPLASTY Left 4/28/2021    LEFT KNEE TOTAL ARTHROPLASTY ROBOTIC performed by Bridget Samano MD at [facility]     Family History   Problem Relation Age of Onset    Heart Disease Mother     High Blood Pressure Mother     Asthma Mother     Heart Disease Father     Diabetes Sister     Seizures Sister     Asthma Sister     COPD Sister     Heart Disease Maternal Grandmother     High Blood Pressure Maternal Grandmother     Cancer Maternal Grandfather     Emphysema Maternal Grandfather     Asthma Daughter     Depression Daughter     Migraines Daughter     Obesity Daughter     Ulcerative Colitis Daughter     High Cholesterol Daughter     Asthma Daughter     Migraines Daughter     Obesity Daughter     Asthma Brother     Stroke Paternal Uncle      Social History     Socioeconomic History    Marital status: Legally      Spouse name: Not on file    Number of children: Not on file    Years of education: Not on file    Highest education level: Not on file   Occupational History    Not on file   Tobacco Use    Smoking status: Current Every Day Smoker     Packs/day: 1.00     Years: 20.00     Pack years: 20.00     Types: Cigarettes    Smokeless tobacco: Never Used   Vaping Use    Vaping Use: Never used   Substance and Sexual Activity    Alcohol use: No     Alcohol/week: 0.0 standard drinks    Drug use: No    Sexual activity: Not Currently     Partners: Male     Comment: 2 DAUGHTERS   Other Topics Concern    Not on file   Social History Narrative    Not on file     Social Determinants of Health     Financial Resource Strain:     Difficulty of Paying Living Expenses:    Food Insecurity:     Worried About Running Out of Food in the Last Year:     Ran Out of Food in the Last Year:    Transportation Needs:     Lack of Transportation (Medical):      Lack of Transportation (Non-Medical):    Physical Activity:     Days of Exercise per Week:     Minutes of Exercise per Session:    Stress:     Feeling of Stress :    Social Connections:     Frequency of Communication with Friends and Family:     Frequency of Social Gatherings with Friends and Family:     Attends Buddhist Services:     Active Member of Clubs or Organizations:     Attends Club or Organization Meetings:     Marital Status:    Intimate Partner Violence:     Fear of Current or Ex-Partner:     Emotionally Abused:     Physically Abused:     Sexually Abused:      No current facility-administered medications for this encounter. Current Outpatient Medications   Medication Sig Dispense Refill    aspirin 325 MG EC tablet Take 1 tablet by mouth 2 times daily 30 tablet 0    docusate sodium (COLACE) 100 MG capsule Take 1 capsule by mouth daily as needed for Constipation 30 capsule 0    ibuprofen (ADVIL;MOTRIN) 800 MG tablet Take 800 mg by mouth every 8 hours as needed for Pain      albuterol sulfate HFA (VENTOLIN HFA) 108 (90 Base) MCG/ACT inhaler Inhale 2 puffs into the lungs every 6 hours as needed for Wheezing      VITAMIN D PO Take 1,000 Units by mouth daily      atorvastatin (LIPITOR) 20 MG tablet Take 20 mg by mouth daily      folic acid (FOLVITE) 1 MG tablet Take 1 mg by mouth daily      dicyclomine (BENTYL) 20 MG tablet Take 1 tablet by mouth 3 times daily as needed (abdominal pain) 20 tablet 3    spironolactone (ALDACTONE) 25 MG tablet TAKE 1 TABLET BY MOUTH ONE TIME A DAY      amLODIPine (NORVASC) 5 MG tablet Take 5 mg by mouth daily       Allergies   Allergen Reactions    Pollen Extract Itching     Itchy and watery eyes. Pet dander also causes same symptoms.  Tramadol      Ear ringing       Nursing Notes Reviewed     Physical Exam:   ED Triage Vitals   Enc Vitals Group      BP       Pulse       Resp       Temp       Temp src       SpO2       Weight       Height       Head Circumference       Peak Flow       Pain Score       Pain Loc       Pain Edu? Excl. in [address]? /[address]   Pulse 93   Temp 98.1 °F (36.7 °C) (Infrared)   SpO2 100%   My pulse ox interpretation is - normal  Physical Exam  Vitals and nursing note reviewed. Exam conducted with a chaperone present (Medical student, [employer]). Constitutional:       General: She is not in acute distress. Appearance: She is well-developed. She is not toxic-appearing or diaphoretic. HENT:      Head: Normocephalic and atraumatic. Eyes:      General:         Right eye: No discharge. Left eye: No discharge.       Conjunctiva/sclera: Conjunctivae normal.   Cardiovascular:      Rate and Rhythm: Normal rate and regular rhythm. Pulmonary:      Effort: Pulmonary effort is normal. No respiratory distress. Breath sounds: Normal breath sounds. Abdominal:      General: There is no distension. Palpations: Abdomen is soft. Tenderness: There is no abdominal tenderness. There is no guarding or rebound. Genitourinary:     Rectum: Guaiac result negative. No tenderness, external hemorrhoid or internal hemorrhoid. Musculoskeletal:         General: No swelling or signs of injury. Normal range of motion. Skin:     General: Skin is warm and dry. Neurological:      General: No focal deficit present. Mental Status: She is alert. Cranial Nerves: No cranial nerve deficit.    Psychiatric:         Mood and Affect: Mood normal.         Behavior: Behavior normal.         I have reviewed and interpreted all of the currently available lab results from this visit (if applicable):  Results for orders placed or performed during the hospital encounter of 10/19/21   CBC Auto Differential   Result Value Ref Range    WBC 6.9 4.0 - 10.5 K/CU MM    RBC 4.65 4.2 - 5.4 M/CU MM    Hemoglobin 12.8 12.5 - 16.0 GM/DL    Hematocrit 40.8 37 - 47 %    MCV 87.7 78 - 100 FL    MCH 27.5 27 - 31 PG    MCHC 31.4 (L) 32.0 - 36.0 %    RDW 13.8 11.7 - 14.9 %    Platelets 550 395 - 411 K/CU MM    MPV 9.4 7.5 - 11.1 FL    Differential Type AUTOMATED DIFFERENTIAL     Segs Relative 60.8 36 - 66 %    Lymphocytes % 31.2 24 - 44 %    Monocytes % 5.4 (H) 0 - 4 %    Eosinophils % 1.7 0 - 3 %    Basophils % 0.6 0 - 1 %    Segs Absolute 4.2 K/CU MM    Lymphocytes Absolute 2.2 K/CU MM    Monocytes Absolute 0.4 K/CU MM    Eosinophils Absolute 0.1 K/CU MM    Basophils Absolute 0.0 K/CU MM    Immature Neutrophil % 0.3 0 - 0.43 %    Total Immature Neutrophil 0.02 K/CU MM   Blood Occult Stool Diagnostic   Result Value Ref Range    OCCULT BLOOD FECAL NEGATIVE NEGATIVE Radiographs (if obtained):  [] The following radiograph was interpreted by myself in the absence of a radiologist:  [x]Radiologist's Report Reviewed:  No orders to display         EKG (if obtained): (All EKG's are interpreted by myself in the absence of a cardiologist)    MDM:  Differential diagnoses considered include but are not limited to upper versus lower GI bleed, bleeding hemorrhoid, infectious diarrhea, IBS. Patient arrives well-appearing and in no acute distress with normal vital signs. Her abdominal exam is benign. Guaiac is negative. No obvious hemorrhoids or other significant abnormalities on rectal exam.  CBC was obtained which shows a normal hemoglobin level. I do not suspect an acute GI hemorrhage. Patient is well-appearing she has had less and less blood in her stool today. I do not suspect an emergent hemorrhage and as this is only day 2 of her symptoms have a low suspicion for an infectious diarrhea causing her symptoms. I suspect she either has an internal hemorrhoid that was not noted on exam or had a small fissure that is healing. We will discharge patient home in stable condition. She was given strict return precautions. I recommended she follow-up closely with her primary care physician. Plan of care explained to patient. Concerning signs and symptoms warranting a return visit to the Emergency Department were explained in detail. All questions and concerns were addressed to the patient's satisfaction. Patient understood and agreed with plan. I did don appropriate PPE (including face mask, protective eye ware/safety glasses and gloves), as recommended by the health facility/national standard best practice, during my bedside interactions with the patient. The likelihood of other entities in the differential is insufficient to justify any further testing for them. This was explained to the patient.  The patient was advised that persistent or worsening symptoms would requirefurther evaluation. Clinical Impression:  1. Diarrhea, unspecified type    2. Blood in stool          Sarika Mar MD       Please note that portions of this note may have been complete with a voice recognition program.  Effortswere made to edit the dictations, but occasional words are mis-transcribed.           Sarika Mar MD  10/21/21 [id number]

## 2021-10-21 ASSESSMENT — ENCOUNTER SYMPTOMS
DIARRHEA: 1
BACK PAIN: 0
RHINORRHEA: 0
NAUSEA: 0
ABDOMINAL PAIN: 1
COUGH: 0
EYE REDNESS: 0
SORE THROAT: 0
SHORTNESS OF BREATH: 0
VOMITING: 0
BLOOD IN STOOL: 1

## 2022-01-05 ENCOUNTER — APPOINTMENT (OUTPATIENT)
Dept: CT IMAGING | Age: 57
End: 2022-01-05
Payer: COMMERCIAL

## 2022-01-05 ENCOUNTER — HOSPITAL ENCOUNTER (EMERGENCY)
Age: 57
Discharge: HOME | End: 2022-01-05
Payer: COMMERCIAL

## 2022-01-05 VITALS
RESPIRATION RATE: 16 BRPM | OXYGEN SATURATION: 99 % | WEIGHT: 200 LBS | SYSTOLIC BLOOD PRESSURE: 154 MMHG | HEIGHT: 66 IN | TEMPERATURE: 98.9 F | DIASTOLIC BLOOD PRESSURE: 77 MMHG | HEART RATE: 81 BPM | BODY MASS INDEX: 32.14 KG/M2

## 2022-01-05 DIAGNOSIS — R31.9 URINARY TRACT INFECTION WITH HEMATURIA, SITE UNSPECIFIED: Primary | ICD-10-CM

## 2022-01-05 DIAGNOSIS — N39.0 URINARY TRACT INFECTION WITH HEMATURIA, SITE UNSPECIFIED: Primary | ICD-10-CM

## 2022-01-05 LAB
ALBUMIN SERPL-MCNC: 3.8 GM/DL (ref 3.4–5)
ALP BLD-CCNC: 118 IU/L (ref 40–129)
ALT SERPL-CCNC: 23 U/L (ref 10–40)
ANION GAP SERPL CALCULATED.3IONS-SCNC: 11 MMOL/L (ref 4–16)
AST SERPL-CCNC: 20 IU/L (ref 15–37)
BACTERIA: ABNORMAL /HPF
BASOPHILS ABSOLUTE: 0 K/CU MM
BASOPHILS RELATIVE PERCENT: 0.5 % (ref 0–1)
BILIRUB SERPL-MCNC: 0.2 MG/DL (ref 0–1)
BILIRUBIN URINE: NEGATIVE MG/DL
BLOOD, URINE: NEGATIVE
BUN BLDV-MCNC: 13 MG/DL (ref 6–23)
CALCIUM SERPL-MCNC: 9 MG/DL (ref 8.3–10.6)
CHLORIDE BLD-SCNC: 103 MMOL/L (ref 99–110)
CLARITY: ABNORMAL
CO2: 24 MMOL/L (ref 21–32)
COLOR: YELLOW
CREAT SERPL-MCNC: 0.6 MG/DL (ref 0.6–1.1)
DIFFERENTIAL TYPE: ABNORMAL
EOSINOPHILS ABSOLUTE: 0.2 K/CU MM
EOSINOPHILS RELATIVE PERCENT: 3.5 % (ref 0–3)
GFR AFRICAN AMERICAN: >60 ML/MIN/1.73M2
GFR NON-AFRICAN AMERICAN: >60 ML/MIN/1.73M2
GLUCOSE BLD-MCNC: 124 MG/DL (ref 70–99)
GLUCOSE BLD-MCNC: 131 MG/DL (ref 70–99)
GLUCOSE, URINE: NEGATIVE MG/DL
HCT VFR BLD CALC: 36.9 % (ref 37–47)
HEMOGLOBIN: 12 GM/DL (ref 12.5–16)
IMMATURE NEUTROPHIL %: 0.2 % (ref 0–0.43)
KETONES, URINE: NEGATIVE MG/DL
LEUKOCYTE ESTERASE, URINE: ABNORMAL
LYMPHOCYTES ABSOLUTE: 2.8 K/CU MM
LYMPHOCYTES RELATIVE PERCENT: 46.7 % (ref 24–44)
MCH RBC QN AUTO: 28.5 PG (ref 27–31)
MCHC RBC AUTO-ENTMCNC: 32.5 % (ref 32–36)
MCV RBC AUTO: 87.6 FL (ref 78–100)
MONOCYTES ABSOLUTE: 0.4 K/CU MM
MONOCYTES RELATIVE PERCENT: 7 % (ref 0–4)
MUCUS: ABNORMAL HPF
NITRITE URINE, QUANTITATIVE: NEGATIVE
NUCLEATED RBC %: 0 %
PDW BLD-RTO: 13.5 % (ref 11.7–14.9)
PH, URINE: 7 (ref 5–8)
PLATELET # BLD: 229 K/CU MM (ref 140–440)
PMV BLD AUTO: 8.9 FL (ref 7.5–11.1)
POTASSIUM SERPL-SCNC: 3.6 MMOL/L (ref 3.5–5.1)
PROTEIN UA: NEGATIVE MG/DL
RBC # BLD: 4.21 M/CU MM (ref 4.2–5.4)
RBC URINE: 4 /HPF (ref 0–6)
SEGMENTED NEUTROPHILS ABSOLUTE COUNT: 2.6 K/CU MM
SEGMENTED NEUTROPHILS RELATIVE PERCENT: 42.1 % (ref 36–66)
SODIUM BLD-SCNC: 138 MMOL/L (ref 135–145)
SPECIFIC GRAVITY UA: 1.02 (ref 1–1.03)
SQUAMOUS EPITHELIAL: 5 /HPF
TOTAL IMMATURE NEUTOROPHIL: 0.01 K/CU MM
TOTAL NUCLEATED RBC: 0 K/CU MM
TOTAL PROTEIN: 6.1 GM/DL (ref 6.4–8.2)
TRICHOMONAS: ABNORMAL /HPF
UROBILINOGEN, URINE: NEGATIVE MG/DL (ref 0.2–1)
WBC # BLD: 6 K/CU MM (ref 4–10.5)
WBC UA: 48 /HPF (ref 0–5)
YEAST: ABNORMAL /HPF

## 2022-01-05 PROCEDURE — 85025 COMPLETE CBC W/AUTO DIFF WBC: CPT

## 2022-01-05 PROCEDURE — 82962 GLUCOSE BLOOD TEST: CPT

## 2022-01-05 PROCEDURE — 6370000000 HC RX 637 (ALT 250 FOR IP): Performed by: PHYSICIAN ASSISTANT

## 2022-01-05 PROCEDURE — 74176 CT ABD & PELVIS W/O CONTRAST: CPT

## 2022-01-05 PROCEDURE — 80053 COMPREHEN METABOLIC PANEL: CPT

## 2022-01-05 PROCEDURE — 99283 EMERGENCY DEPT VISIT LOW MDM: CPT

## 2022-01-05 PROCEDURE — 81001 URINALYSIS AUTO W/SCOPE: CPT

## 2022-01-05 RX ORDER — CEPHALEXIN 500 MG/1
500 CAPSULE ORAL 3 TIMES DAILY
Qty: 30 CAPSULE | Refills: 0 | Status: SHIPPED | OUTPATIENT
Start: 2022-01-05 | End: 2022-01-15

## 2022-01-05 RX ORDER — CEPHALEXIN 250 MG/1
500 CAPSULE ORAL ONCE
Status: COMPLETED | OUTPATIENT
Start: 2022-01-05 | End: 2022-01-05

## 2022-01-05 RX ORDER — PHENAZOPYRIDINE HYDROCHLORIDE 100 MG/1
100 TABLET, FILM COATED ORAL 3 TIMES DAILY PRN
Qty: 10 TABLET | Refills: 0 | Status: SHIPPED | OUTPATIENT
Start: 2022-01-05 | End: 2022-01-08

## 2022-01-05 RX ADMIN — CEPHALEXIN 500 MG: 250 CAPSULE ORAL at 19:05

## 2022-01-05 ASSESSMENT — PAIN DESCRIPTION - FREQUENCY: FREQUENCY: CONTINUOUS

## 2022-01-05 ASSESSMENT — PAIN SCALES - GENERAL: PAINLEVEL_OUTOF10: 8

## 2022-01-05 ASSESSMENT — PAIN DESCRIPTION - PROGRESSION: CLINICAL_PROGRESSION: NOT CHANGED

## 2022-01-05 ASSESSMENT — PAIN DESCRIPTION - LOCATION: LOCATION: VAGINA

## 2022-01-05 ASSESSMENT — PAIN DESCRIPTION - ONSET: ONSET: ON-GOING

## 2022-01-05 ASSESSMENT — PAIN DESCRIPTION - PAIN TYPE: TYPE: ACUTE PAIN

## 2022-01-05 ASSESSMENT — PAIN DESCRIPTION - DESCRIPTORS: DESCRIPTORS: BURNING

## 2022-01-05 NOTE — ED PROVIDER NOTES
mg by mouth daily      folic acid (FOLVITE) 1 MG tablet Take 1 mg by mouth daily      dicyclomine (BENTYL) 20 MG tablet Take 1 tablet by mouth 3 times daily as needed (abdominal pain) 20 tablet 3    spironolactone (ALDACTONE) 25 MG tablet TAKE 1 TABLET BY MOUTH ONE TIME A DAY      amLODIPine (NORVASC) 5 MG tablet Take 5 mg by mouth daily         ALLERGIES    Allergies   Allergen Reactions    Pollen Extract Itching     Itchy and watery eyes. Pet dander also causes same symptoms.     Tramadol      Ear ringing       FAMILY HISTORY/SOCIAL HISTORY  Family History   Problem Relation Age of Onset    Heart Disease Mother     High Blood Pressure Mother     Asthma Mother     Heart Disease Father     Diabetes Sister     Seizures Sister     Asthma Sister     COPD Sister     Heart Disease Maternal Grandmother     High Blood Pressure Maternal Grandmother     Cancer Maternal Grandfather     Emphysema Maternal Grandfather     Asthma Daughter     Depression Daughter     Migraines Daughter     Obesity Daughter     Ulcerative Colitis Daughter     High Cholesterol Daughter     Asthma Daughter     Migraines Daughter     Obesity Daughter     Asthma Brother     Stroke Paternal Uncle      Social History     Socioeconomic History    Marital status: Legally      Spouse name: None    Number of children: None    Years of education: None    Highest education level: None   Occupational History    None   Tobacco Use    Smoking status: Current Every Day Smoker     Packs/day: 1.00     Years: 20.00     Pack years: 20.00     Types: Cigarettes    Smokeless tobacco: Never Used   Vaping Use    Vaping Use: Never used   Substance and Sexual Activity    Alcohol use: No     Alcohol/week: 0.0 standard drinks    Drug use: No    Sexual activity: Not Currently     Partners: Male     Comment: 2 DAUGHTERS   Other Topics Concern    None   Social History Narrative    None     Social Determinants of Health Financial Resource Strain:     Difficulty of Paying Living Expenses: Not on file   Food Insecurity:     Worried About Running Out of Food in the Last Year: Not on file    Ruy of Food in the Last Year: Not on file   Transportation Needs:     Lack of Transportation (Medical): Not on file    Lack of Transportation (Non-Medical): Not on file   Physical Activity:     Days of Exercise per Week: Not on file    Minutes of Exercise per Session: Not on file   Stress:     Feeling of Stress : Not on file   Social Connections:     Frequency of Communication with Friends and Family: Not on file    Frequency of Social Gatherings with Friends and Family: Not on file    Attends [employer] Services: Not on file    Active Member of [address] [place] [address] or Organizations: Not on file    Attends Club or Organization Meetings: Not on file    Marital Status: Not on file   Intimate Partner Violence:     Fear of Current or Ex-Partner: Not on file    Emotionally Abused: Not on file    Physically Abused: Not on file    Sexually Abused: Not on file   Housing Stability:     Unable to Pay for Housing in the Last Year: Not on file    Number of [place] in the Last Year: Not on file    Unstable Housing in the Last Year: Not on file       PHYSICAL EXAM    VITAL SIGNS: BP (!) 154/77   Pulse 81   Temp 98.9 °F (37.2 °C) (Oral)   Resp 16   Ht 5' 6\" (1.676 m)   Wt 200 lb (90.7 kg)   SpO2 99%   BMI 32.28 kg/m²    Constitutional:  Well-developed, well-nourished, appears comfortable.   Eyes:  Non-icteric sclera, no conjunctival injection   HENT:  Atraumatic, external ears normal, nose normal, oropharynx moist. Neck- supple   Neck/Lymphatics: supple, no JVD, no swollen nodes  Respiratory:  No respiratory distress, normal breath sounds   Cardiovascular:  Heart rate regular, normal rhythm, no murmurs  GI:     no gross discoloration.       -no Artis's sign (periumbilical ecchymosis)       -no Grey-Massey's sign (flank ecchymosis)  Bowel sounds present, no audible bruits. Soft,  no guarding,   no abdominal tenderness, no rebound, no palpable pulsatile masses,   No McBurney's point tenderness   Negative Rovsing sign    Negative Downing's sign. :     exam deferred by patient today.     Mild right sidedCVA tenderness    Integument:  Well hydrated,Nondiaphoretic Skin, no obvious rashes,        LABS:  Results for orders placed or performed during the hospital encounter of 01/05/22   Urinalysis (Lab)   Result Value Ref Range    Color, UA YELLOW YELLOW    Clarity, UA HAZY (A) CLEAR    Glucose, Urine NEGATIVE NEGATIVE MG/DL    Bilirubin Urine NEGATIVE NEGATIVE MG/DL    Ketones, Urine NEGATIVE NEGATIVE MG/DL    Specific Gravity, UA 1.017 1.001 - 1.035    Blood, Urine NEGATIVE NEGATIVE    pH, Urine 7.0 5.0 - 8.0    Protein, UA NEGATIVE NEGATIVE MG/DL    Urobilinogen, Urine NEGATIVE 0.2 - 1.0 MG/DL    Nitrite Urine, Quantitative NEGATIVE NEGATIVE    Leukocyte Esterase, Urine MODERATE (A) NEGATIVE    RBC, UA 4 0 - 6 /HPF    WBC, UA 48 (H) 0 - 5 /HPF    Bacteria, UA RARE (A) NEGATIVE /HPF    Yeast, UA RARE /HPF    Squam Epithel, UA 5 /HPF    Mucus, UA RARE (A) NEGATIVE HPF    Trichomonas, UA NONE SEEN NONE SEEN /HPF   CBC auto diff   Result Value Ref Range    WBC 6.0 4.0 - 10.5 K/CU MM    RBC 4.21 4.2 - 5.4 M/CU MM    Hemoglobin 12.0 (L) 12.5 - 16.0 GM/DL    Hematocrit 36.9 (L) 37 - 47 %    MCV 87.6 78 - 100 FL    MCH 28.5 27 - 31 PG    MCHC 32.5 32.0 - 36.0 %    RDW 13.5 11.7 - 14.9 %    Platelets 122 204 - 427 K/CU MM    MPV 8.9 7.5 - 11.1 FL    Differential Type AUTOMATED DIFFERENTIAL     Segs Relative 42.1 36 - 66 %    Lymphocytes % 46.7 (H) 24 - 44 %    Monocytes % 7.0 (H) 0 - 4 %    Eosinophils % 3.5 (H) 0 - 3 %    Basophils % 0.5 0 - 1 %    Segs Absolute 2.6 K/CU MM    Lymphocytes Absolute 2.8 K/CU MM    Monocytes Absolute 0.4 K/CU MM    Eosinophils Absolute 0.2 K/CU MM    Basophils Absolute 0.0 K/CU MM    Nucleated RBC % 0.0 %    Total Nucleated RBC 0.0 K/CU MM    Total Immature Neutrophil 0.01 K/CU MM    Immature Neutrophil % 0.2 0 - 0.43 %   CMP   Result Value Ref Range    Sodium 138 135 - 145 MMOL/L    Potassium 3.6 3.5 - 5.1 MMOL/L    Chloride 103 99 - 110 mMol/L    CO2 24 21 - 32 MMOL/L    BUN 13 6 - 23 MG/DL    CREATININE 0.6 0.6 - 1.1 MG/DL    Glucose 124 (H) 70 - 99 MG/DL    Calcium 9.0 8.3 - 10.6 MG/DL    Albumin 3.8 3.4 - 5.0 GM/DL    Total Protein 6.1 (L) 6.4 - 8.2 GM/DL    Total Bilirubin 0.2 0.0 - 1.0 MG/DL    ALT 23 10 - 40 U/L    AST 20 15 - 37 IU/L    Alkaline Phosphatase 118 40 - 129 IU/L    GFR Non-African American >60 >60 mL/min/1.73m2    GFR African American >60 >60 mL/min/1.73m2    Anion Gap 11 4 - 16   POCT Glucose   Result Value Ref Range    POC Glucose 131 (H) 70 - 99 MG/DL       RADIOLOGY/PROCEDURES    CT ABDOMEN PELVIS WO CONTRAST Additional Contrast? None    Result Date: 1/5/2022  EXAMINATION: CT OF THE ABDOMEN AND PELVIS WITHOUT CONTRAST 1/5/2022 5:29 pm TECHNIQUE: CT of the abdomen and pelvis was performed without the administration of intravenous contrast. Multiplanar reformatted images are provided for review. Dose modulation, iterative reconstruction, and/or weight based adjustment of the mA/kV was utilized to reduce the radiation dose to as low as reasonably achievable. COMPARISON: 11/27/2020 HISTORY: ORDERING SYSTEM PROVIDED HISTORY: Right-sided flank pain, hematuria, dysuria TECHNOLOGIST PROVIDED HISTORY: Reason for exam:->Right-sided flank pain, hematuria, dysuria Additional Contrast?->None Decision Support Exception - unselect if not a suspected or confirmed emergency medical condition->Emergency Medical Condition (MA) Is the patient pregnant?->No Reason for Exam: Right-sided flank pain, hematuria, dysuria Additional signs and symptoms: patient states left flank pain, not right Relevant Medical/Surgical History: hyster, tubal ,nadia, hernia repair FINDINGS: Lower Chest: The lung bases are clear and the heart size is.   There is a small calcified granuloma at the medial left lung base. Organs: The gallbladder is surgically absent. The liver, spleen, pancreas and adrenal glands are grossly normal with the limitations of a noncontrast study. There is severe left renal atrophy. There are multiple left renal calcifications that are unchanged and likely dystrophic cortical calcifications. Unchanged cyst in the lower pole of the left kidney. There are several nonobstructing right renal calculi particularly in the lower pole. Calculi measure up to 5-6 mm. There is a 5.5 mm calcification directly adjacent to the distal right ureter (sagittal image 70). No definite right ureteral calculus. GI/Bowel: Unopacified bowel loops are unremarkable. No bowel obstruction. The appendix is normal.  There are a few sigmoid colon diverticula. No diverticulitis. Pelvis: Urinary bladder is nearly empty and grossly normal. Peritoneum/Retroperitoneum: There is no adenopathy, free air or free fluid. Mild calcific aorto iliac atherosclerotic disease. Infrarenal abdominal aorta is mildly ectatic measuring up to 2.7 cm. Bones/Soft Tissues: There is a minimal degenerative anterolisthesis of L4 on L5 secondary to facet arthropathy. No acute bone finding. There are several nonobstructing right renal calculi measuring up to 5-6 mm. No acute obstructive uropathy. Severe left renal atrophy. There are several unchanged left renal calcifications, likely dystrophic cortical calcifications. Infrarenal abdominal aorta is mildly ectatic measuring up to 2.7 cm in transverse diameter. Follow-up recommendation as below. RECOMMENDATIONS: For management of fusiform AAA: 2.6-2.9 cm aorta, recommend follow-up every 5 years or aortas meeting the criteria for AAA (>1.5 x proximal normal segment; no f/u if < 1.5 x proximal normal segment; no f/u for aortas < 2.6 cm).  Note: Recommend Vascular consultation if a fusiform AAA enlarges by >0.5 cm in 6 months or >1 cm in 1 year or for a saccular AAA of any size. References: Anna Mcwilliams Radiol 2013; 64(28):984-875; J Vasc Surg. 2018; 67:2-77       ED [facility]      Patient presents as above. Patient is having some right-sided flank tenderness and urinary symptoms. Slightly hypertensive, afebrile, no significant flank tenderness. Urine shows signs of infection, CT scan demonstrating no signs of obstructive uropathy, lab work otherwise reassuring. Will initiate her on Pyridium as well as Keflex, 500 mg 3 times a day to cover for developing pyelonephritis. Will advise outpatient management and follow-up. Patient agrees to return emergency department if symptoms worsen or any new symptoms develop. Clinical  IMPRESSION    1. Urinary tract infection with hematuria, site unspecified        Comment: Please note this report has been produced using speech recognition software and may contain errors related to that system including errors in grammar, punctuation, and spelling, as well as words and phrases that may be inappropriate. If there are any questions or concerns please feel free to contact the dictating provider for clarification.        Orlando Queen 411, PA  01/05/22 1986

## 2022-01-05 NOTE — LETTER
[facility] Emergency Department  [address] [id number]  Phone: [phone]  Fax: [phone]               January 5, 2022    Patient: Leo Scruggs   YOB: 1965   Date of Visit: 1/5/2022       To Whom It May Concern:    Rafat Hollins was seen and treated in our emergency department on 1/5/2022. She may return to work on 1/8/2022. Sincerely,       Stiven Lassiter         Signature:__________________________________

## 2022-01-06 NOTE — ED NOTES
Patient discharged to home at this time. Discharge instructions and follow up care discussed, patient voices understanding.       Lincoln Louie RN  01/05/22 2542

## 2022-08-29 ENCOUNTER — INITIAL CONSULT (OUTPATIENT)
Dept: CARDIOLOGY CLINIC | Age: 57
End: 2022-08-29
Payer: MEDICAID

## 2022-08-29 VITALS
DIASTOLIC BLOOD PRESSURE: 80 MMHG | HEART RATE: 80 BPM | BODY MASS INDEX: 39.08 KG/M2 | WEIGHT: 243.2 LBS | SYSTOLIC BLOOD PRESSURE: 140 MMHG | RESPIRATION RATE: 22 BRPM | HEIGHT: 66 IN

## 2022-08-29 DIAGNOSIS — Z82.49 FAMILY HISTORY OF CORONARY ARTERIOSCLEROSIS: ICD-10-CM

## 2022-08-29 DIAGNOSIS — I10 PRIMARY HYPERTENSION: Primary | ICD-10-CM

## 2022-08-29 DIAGNOSIS — E66.9 OBESITY, CLASS I, BMI 30-34.9: ICD-10-CM

## 2022-08-29 DIAGNOSIS — R06.02 SOB (SHORTNESS OF BREATH) ON EXERTION: ICD-10-CM

## 2022-08-29 DIAGNOSIS — E78.2 MIXED HYPERLIPIDEMIA: ICD-10-CM

## 2022-08-29 DIAGNOSIS — F17.200 TOBACCO DEPENDENCE: ICD-10-CM

## 2022-08-29 DIAGNOSIS — M79.89 LEG SWELLING: ICD-10-CM

## 2022-08-29 PROCEDURE — G8417 CALC BMI ABV UP PARAM F/U: HCPCS | Performed by: INTERNAL MEDICINE

## 2022-08-29 PROCEDURE — 93000 ELECTROCARDIOGRAM COMPLETE: CPT | Performed by: INTERNAL MEDICINE

## 2022-08-29 PROCEDURE — 99244 OFF/OP CNSLTJ NEW/EST MOD 40: CPT | Performed by: INTERNAL MEDICINE

## 2022-08-29 PROCEDURE — G8428 CUR MEDS NOT DOCUMENT: HCPCS | Performed by: INTERNAL MEDICINE

## 2022-08-29 RX ORDER — POTASSIUM CHLORIDE 20 MEQ/1
20 TABLET, EXTENDED RELEASE ORAL DAILY
Qty: 90 TABLET | Refills: 1 | Status: SHIPPED | OUTPATIENT
Start: 2022-08-29

## 2022-08-29 RX ORDER — OMEPRAZOLE 20 MG/1
CAPSULE, DELAYED RELEASE ORAL
COMMUNITY
Start: 2022-08-08

## 2022-08-29 RX ORDER — MELOXICAM 7.5 MG/1
TABLET ORAL
Status: ON HOLD | COMMUNITY
Start: 2022-08-19 | End: 2022-10-21

## 2022-08-29 RX ORDER — GABAPENTIN 100 MG/1
CAPSULE ORAL
COMMUNITY
Start: 2022-08-19

## 2022-08-29 RX ORDER — FUROSEMIDE 20 MG/1
20 TABLET ORAL DAILY
Qty: 90 TABLET | Refills: 1 | Status: SHIPPED | OUTPATIENT
Start: 2022-08-29

## 2022-08-29 RX ORDER — AMLODIPINE BESYLATE 2.5 MG/1
2.5 TABLET ORAL DAILY
Qty: 90 TABLET | Refills: 2 | Status: SHIPPED | OUTPATIENT
Start: 2022-08-29 | End: 2022-09-27 | Stop reason: ALTCHOICE

## 2022-08-29 RX ORDER — LOSARTAN POTASSIUM 25 MG/1
25 TABLET ORAL DAILY
Qty: 90 TABLET | Refills: 1 | Status: SHIPPED | OUTPATIENT
Start: 2022-08-29

## 2022-08-29 NOTE — PATIENT INSTRUCTIONS
Please be informed that if you contact our office outside of normal business hours the physician on call cannot help with any scheduling or rescheduling issues, procedure instruction questions or any type of medication issue. We advise you for any urgent/emergency that you go to the nearest emergency room! PLEASE CALL OUR OFFICE DURING NORMAL BUSINESS HOURS    Monday - Friday   8 am to 5 pm    [address]: [phone]    [place]:  [phone]      **It is YOUR responsibilty to bring medication bottles and/or updated medication list to [address] [place] [address]. This will allow us to better serve you and all your healthcare needs**  Please hold on to these instructions the  will call you within 1-9 business days when we receive authorization from your insurance. Venous Ultrasound    WHAT TO EXPECT:   This test will take approximately 60 minutes. It is an ultrasound of the veins. It can look for blood clots in the extremities or  for venous reflux. There is no special instructions for this test.     If you are unable to keep this appointment, please notify us 24 hours prior to test at [phone]. Please hold on to these instructions the  will call you within 1-9 business days when we receive authorization from your insurance. Nuclear Stress Test    WHAT TO EXPECT:   You will need to confirm the test or it could be cancelled. This test will take approximately 2 hours: 1 hour in the AM &    1 hour in the PM. You will be given a time by the Technologist after the first part is completed to come back. You will be given a medication, through an IV in the hand, this will safely simulate exercise. This IV is also needed to inject the radioactive isotope unless you are able toe walk the treadmill. You will receive an injection in the AM & PM before the pictures.    Using a special camera, you will have one set of pictures of your heart taken in the AM and a set of pictures in the PM.     PREPARATION FOR TEST:  Eat a light breakfast such as water or juice and toast.  If you are DIABETIC: Eat a normal breakfast with NO CAFFEINE and take your insulin as normal.   AVOID ALL FOODS & DRINKS containing CAFFEINE 12 HOURS PRIOR TO THE TEST: Including coffee, Tea, Gordy and other soft drinks even those labeled  caffeine free or decaffeinated. HOLD THESE MEDICATIONS Persantine & Theophylline (Theodur)  24 hours prior & bring your inhaler with you.    The physician will specify if the following Beta blockers need to be held for 24 hours prior to test:  N/A

## 2022-08-29 NOTE — ASSESSMENT & PLAN NOTE
Reports bilateral ankle swelling ongoing for quite some time, try Lasix 20 mg daily with potassium.   Check BNP level get echo to evaluate for possible LV dysfunction, also get a stress test to evaluate exercise capacity

## 2022-08-29 NOTE — PROGRESS NOTES
CARDIOLOGY  NOTE    Chief Complaint: ankle swelling    HPI:   Nancy Philip is a 55y.o. year old who has Past medical history as noted below. Nancy Philip comes in for evaluation of possible congestive heart failure as etiology for bilateral lower extremity swelling which is ongoing for quite some time now she reports shortness of breath she cannot lie flat due to worsening vertigo on lying down flat. She has been on amlodipine and has longstanding history of low bilateral lower extremity swelling. She does not walk much and is limited by his walk by her walking ability due to concerns for left ankle pain tendon fracture? Diabetes has been uncontrolled in the past she used to be on Lasix but was taken off of it due to low potassium levels  She is here with her mother who also has history of congestive heart failure.   She continues Smoking a pack a day   Her younger sister had CABG and brother has 3 stents    Current Outpatient Medications   Medication Sig Dispense Refill    furosemide (LASIX) 20 MG tablet Take 1 tablet by mouth daily 90 tablet 1    potassium chloride (KLOR-CON M) 20 MEQ extended release tablet Take 1 tablet by mouth daily 90 tablet 1    amLODIPine (NORVASC) 2.5 MG tablet Take 1 tablet by mouth daily 90 tablet 2    losartan (COZAAR) 25 MG tablet Take 1 tablet by mouth daily 90 tablet 1    docusate sodium (COLACE) 100 MG capsule Take 1 capsule by mouth daily as needed for Constipation 30 capsule 0    ibuprofen (ADVIL;MOTRIN) 800 MG tablet Take 800 mg by mouth every 8 hours as needed for Pain      albuterol sulfate HFA (PROVENTIL;VENTOLIN;PROAIR) 108 (90 Base) MCG/ACT inhaler Inhale 2 puffs into the lungs every 6 hours as needed for Wheezing      VITAMIN D PO Take 1,000 Units by mouth daily      atorvastatin (LIPITOR) 20 MG tablet Take 20 mg by mouth daily      folic acid (FOLVITE) 1 MG tablet Take 1 mg by mouth daily      dicyclomine (BENTYL) 20 MG tablet Take 1 tablet by mouth 3 times daily as needed (abdominal pain) 20 tablet 3    spironolactone (ALDACTONE) 25 MG tablet TAKE 1 TABLET BY MOUTH ONE TIME A DAY      gabapentin (NEURONTIN) 100 MG capsule TAKE ONE CAPSULE BY MOUTH THREE TIMES DAILY      meloxicam (MOBIC) 7.5 MG tablet TAKE ONE TABLET BY MOUTH TWICE DAILY      metFORMIN (GLUCOPHAGE) 500 MG tablet take 1 tablet by oral route 2 times every day with morning and evening meals      omeprazole (PRILOSEC) 20 MG delayed release capsule take 1 capsule by oral route every day before a meal      aspirin 325 MG EC tablet Take 1 tablet by mouth 2 times daily (Patient not taking: Reported on 8/29/2022) 30 tablet 0     No current facility-administered medications for this visit.        Allergies:   Pollen extract and Tramadol    Patient History:  Past Medical History:   Diagnosis Date    Arthritis     Arthritis of left knee     Asthma     last flare up summer 2020    Diabetes mellitus ([facility] [address]     \"use to take Metformin been off this since  2019\"    Disorder of rotator cuff syndrome of left shoulder and allied disorder     H/O cardiovascular stress test 2/24/16    EF68% normal study    History of motion sickness     Hx of echocardiogram 2/24/16    EF 55%, normal LV function, moderate LVH    Hyperlipidemia     Hypertension     Kidney stones     \"had kidney stone 11/2020- removed with surg- , back in 2009- had to put tube in my back(nephostomy tube) for kidney stone-had lithotripsy    Migraine     Obesity     PONV (postoperative nausea and vomiting)     hx of motion sickness    UTI (urinary tract infection)     \"last one 11/2020\"    Vertigo     \"have trouble leaning to my left and laying flat with this\"    Wears glasses      Past Surgical History:   Procedure Laterality Date    CHOLECYSTECTOMY  age22's    CYSTOSCOPY INSERTION / REMOVAL STENT / STONE N/A 11/28/2020    CYSTOSCOPY RETROGRADE PYELOGRAM STENT INSERTION performed by Jr Butts MD [address] / REMOVAL STENT / STONE Right 1/21/2021    CYSTOSCOPY STENT EXCHANGE URETEROSCOPY EXTRACTION OF STONES performed by Shirley Crigler, MD at [address] Bilateral     tarsal tunnel, hammer toe, neuroma removed -1990's    HERNIA REPAIR  2019? ventral hernia repair    HYSTERECTOMY  2001    KIDNEY STONE SURGERY      per old chart had surg for left nephrostolithotomy and cysto /lithotripsy in [address] Right     \"open surg right shoulder and later had 2 rotator cuff surgeries on my right shoulder\"    TONSILLECTOMY  age 26    TOTAL KNEE ARTHROPLASTY Left 4/28/2021    LEFT KNEE TOTAL ARTHROPLASTY ROBOTIC performed by Carri Lomas MD at [address]     Family History   Problem Relation Age of Onset    Heart Disease Mother     High Blood Pressure Mother     Asthma Mother     Heart Disease Father     Diabetes Sister     Seizures Sister     Asthma Sister     COPD Sister     Heart Disease Maternal Grandmother     High Blood Pressure Maternal Grandmother     Cancer Maternal Grandfather     Emphysema Maternal Grandfather     Asthma Daughter     Depression Daughter     Migraines Daughter     Obesity Daughter     Ulcerative Colitis Daughter     High Cholesterol Daughter     Asthma Daughter     Migraines Daughter     Obesity Daughter     Asthma Brother     Stroke Paternal Uncle      Social History     Tobacco Use    Smoking status: Every Day     Packs/day: 1.00     Years: 20.00     Pack years: 20.00     Types: Cigarettes    Smokeless tobacco: Never   Substance Use Topics    Alcohol use: No     Alcohol/week: 0.0 standard drinks        Review of Systems:   Constitutional: No Fever or Weight Loss   Eyes: No Decreased Vision  ENT: No Headaches, Hearing Loss or Vertigo  Cardiovascular: as per note above   Respiratory: No cough or wheezing and as per note above.    Gastrointestinal: No abdominal pain, appetite loss, blood in stools, constipation, diarrhea or heartburn  Genitourinary: No dysuria, trouble voiding, or hematuria  Musculoskeletal:  denies any new  joint aches , swelling  or pain   Integumentary: No rash or pruritis  Neurological: No TIA or stroke symptoms  Psychiatric: No anxiety or depression  Endocrine: No malaise, fatigue or temperature intolerance  Hematologic/Lymphatic: No bleeding problems, blood clots or swollen lymph nodes  Allergic/Immunologic: No nasal congestion or hives    Objective:      Physical Exam:  BP (!) 140/80 (Site: Left Lower Arm, Position: Sitting, Cuff Size: Medium Adult)   Pulse 80   Resp 22   Ht 5' 6\" (1.676 m)   Wt 243 lb 3.2 oz (110.3 kg)   BMI 39.25 kg/m²   Wt Readings from Last 3 Encounters:   08/29/22 243 lb 3.2 oz (110.3 kg)   01/05/22 200 lb (90.7 kg)   09/16/21 241 lb (109.3 kg)     Body mass index is 39.25 kg/m². Vitals:    08/29/22 1007   BP: (!) 140/80   Pulse: 80   Resp: 22        General Appearance:  No distress, conversant  Constitutional:  Well developed, Well nourished, No acute distress, Non-toxic appearance. HENT:  Normocephalic, Atraumatic, Bilateral external ears normal, Oropharynx moist, No oral exudates, Nose normal. Neck- Normal range of motion, No tenderness, Supple, No stridor,no apical-carotid delay  Eyes:  PERRL, EOMI, Conjunctiva normal, No discharge. Respiratory:  Normal breath sounds, No respiratory distress, No wheezing, No chest tenderness. ,no use of accessory muscles, NO crackles  Cardiovascular: (PMI) apex non displaced,no lifts no thrills,S1 and S2 audible, No added heart sounds, No signs of ankle edema, or volume overload, No evidence of JVD, No crackles   GI:  Bowel sounds normal, Soft, No tenderness, No masses, No gross visceromegaly   :  No costovertebral angle tenderness   Musculoskeletal:  No edema, no tenderness, no deformities.  Back- no tenderness  Integument:  Well hydrated, no rash   Lymphatic:  No lymphadenopathy noted   Neurologic:  Alert & oriented x 3, CN 2-12 normal, normal motor function, normal sensory function, no focal deficits noted   Psychiatric:  Speech and behavior appropriate       Medical decision making and Data review:  DATA:  Lab Results   Component Value Date    TROPONINT <0.010 02/21/2016     BNP:  No results found for: PROBNP  PT/INR:  No results found for: PTINR  No results found for: LABA1C  No results found for: CHOL, TRIG, HDL, LDLCALC, LDLDIRECT  Lab Results   Component Value Date    ALT 23 01/05/2022    AST 20 01/05/2022     No results for input(s): WBC, HGB, HCT, MCV, PLT in the last 72 hours. TSH: No results found for: TSH  Lab Results   Component Value Date    AST 20 01/05/2022    ALT 23 01/05/2022    BILITOT 0.2 01/05/2022    ALKPHOS 118 01/05/2022     No results found for: PROBNP  No results found for: LABA1C  Lab Results   Component Value Date    WBC 6.0 01/05/2022    HGB 12.0 (L) 01/05/2022    HCT 36.9 (L) 01/05/2022     01/05/2022     All labs, medications and tests reviewed by myself including data and history from outside source , patient and available family . Assessment & Plan:      1. Primary hypertension    2. Obesity, Class I, BMI 30-34.9    3. Tobacco dependence    4. SOB (shortness of breath) on exertion    5. Leg swelling    6. Mixed hyperlipidemia    7. Family history of coronary arteriosclerosis         Family history of coronary arteriosclerosis   To multiple risk factors and family history we will try to get a treadmill stress test if she is unable to walk on a treadmill we will have to do [place] she is quite concerned that she may not be able to walk on a treadmill due to her ankle pain    SOB (shortness of breath) on exertion   Reports bilateral ankle swelling ongoing for quite some time, try Lasix 20 mg daily with potassium.   Check BNP level get echo to evaluate for possible LV dysfunction, also get a stress test to evaluate exercise capacity    HTN (hypertension)   Bilateral lower extremity swelling cut down on amlodipine 2.5 mg daily and start losartan 25 mg daily advised to check blood pressure at home cut down on salt intake    Tobacco dependence   Wrongly encouraged to quit smoking she was offered nicotine patch but at this time she is not ready     Dyslipidemia :  All available lab work was reviewed. Patient was advised to repeat lab work before next visit. Necessary orders were placed , instructions given by myself       Counseled extensively and medication compliance urged. We discussed that for the  prevention of ASCVD our  goal is aggressive risk modification. Patient is encouraged to exercise if they can , educated about  brisk walk for 30 minutes  at least 3 to 4 times a week if there are no physical limitations  Various goals were discussed and questions answered. Continue current medications. Appropriate prescriptions are addressed and refills ordered. Questions answered and patient verbalizes understanding. Call for any problems, questions, or concerns. Greater than 60 % of time spent counseling besides reviewing data and images     Continue all other medications of all above medical condition listed as is. Return in about 1 month (around 9/29/2022). Please note this report has been partially produced using speech recognition software and may contain errors related to that system including errors in grammar, punctuation, and spelling, as well as words and phrases that may be inappropriate. If there are any questions or concerns please feel free to contact the dictating provider for clarification.

## 2022-08-29 NOTE — ASSESSMENT & PLAN NOTE
Bilateral lower extremity swelling cut down on amlodipine 2.5 mg daily and start losartan 25 mg daily advised to check blood pressure at home cut down on salt intake

## 2022-09-02 ENCOUNTER — PROCEDURE VISIT (OUTPATIENT)
Dept: CARDIOLOGY CLINIC | Age: 57
End: 2022-09-02
Payer: MEDICAID

## 2022-09-02 DIAGNOSIS — E66.9 OBESITY, CLASS I, BMI 30-34.9: ICD-10-CM

## 2022-09-02 DIAGNOSIS — I10 PRIMARY HYPERTENSION: ICD-10-CM

## 2022-09-02 DIAGNOSIS — R06.02 SOB (SHORTNESS OF BREATH) ON EXERTION: ICD-10-CM

## 2022-09-02 LAB
LV EF: 58 %
LV EF: 65 %
LVEF MODALITY: NORMAL
LVEF MODALITY: NORMAL

## 2022-09-02 PROCEDURE — 78452 HT MUSCLE IMAGE SPECT MULT: CPT | Performed by: INTERNAL MEDICINE

## 2022-09-02 PROCEDURE — A9500 TC99M SESTAMIBI: HCPCS | Performed by: INTERNAL MEDICINE

## 2022-09-02 PROCEDURE — 93306 TTE W/DOPPLER COMPLETE: CPT | Performed by: INTERNAL MEDICINE

## 2022-09-02 PROCEDURE — 93016 CV STRESS TEST SUPVJ ONLY: CPT | Performed by: INTERNAL MEDICINE

## 2022-09-02 PROCEDURE — 93017 CV STRESS TEST TRACING ONLY: CPT | Performed by: INTERNAL MEDICINE

## 2022-09-02 PROCEDURE — 93018 CV STRESS TEST I&R ONLY: CPT | Performed by: INTERNAL MEDICINE

## 2022-09-06 ENCOUNTER — TELEPHONE (OUTPATIENT)
Dept: CARDIOLOGY CLINIC | Age: 57
End: 2022-09-06

## 2022-09-06 NOTE — TELEPHONE ENCOUNTER
Supervising physician Dr. Goldie Borrero . ECG portion of stress test is negative for ischemia by diagnostic criteria. Normal EF 65 % with normal ventricular contractility. No infarct or ischemia  noted. Normal stress myocardial perfusion. This is a normal study. Summary   Left ventricular function and size is normal, EF is estimated at 55-60%. Moderate left ventricular hypertrophy. E/A reversal; indeterminate diastolic function. No regional wall motion abnormalities were detected. Mildly dilated left atrium. No significant valvular disease noted. Mild tricuspid regurgitation; RVSP is 27 mmHg. No evidence of pericardial effusion.     Pt verbally understood

## 2022-09-19 ENCOUNTER — HOSPITAL ENCOUNTER (OUTPATIENT)
Age: 57
Discharge: HOME | End: 2022-09-19
Payer: MEDICAID

## 2022-09-19 ENCOUNTER — PROCEDURE VISIT (OUTPATIENT)
Dept: CARDIOLOGY CLINIC | Age: 57
End: 2022-09-19
Payer: MEDICAID

## 2022-09-19 ENCOUNTER — HOSPITAL ENCOUNTER (OUTPATIENT)
Dept: GENERAL RADIOLOGY | Age: 57
Discharge: HOME | End: 2022-09-19
Payer: MEDICAID

## 2022-09-19 DIAGNOSIS — M54.16 LUMBAR RADICULOPATHY: ICD-10-CM

## 2022-09-19 DIAGNOSIS — M79.89 LEG SWELLING: ICD-10-CM

## 2022-09-19 LAB
ANION GAP SERPL CALCULATED.3IONS-SCNC: 12 MMOL/L (ref 4–16)
BUN BLDV-MCNC: 14 MG/DL (ref 6–23)
CALCIUM SERPL-MCNC: 9.9 MG/DL (ref 8.3–10.6)
CHLORIDE BLD-SCNC: 101 MMOL/L (ref 99–110)
CHOLESTEROL: 188 MG/DL
CO2: 27 MMOL/L (ref 21–32)
CREAT SERPL-MCNC: 0.9 MG/DL (ref 0.6–1.1)
GFR AFRICAN AMERICAN: >60 ML/MIN/1.73M2
GFR NON-AFRICAN AMERICAN: >60 ML/MIN/1.73M2
GLUCOSE BLD-MCNC: 86 MG/DL (ref 70–99)
HDLC SERPL-MCNC: 37 MG/DL
LDL CHOLESTEROL CALCULATED: 130 MG/DL
POTASSIUM SERPL-SCNC: 4.4 MMOL/L (ref 3.5–5.1)
SODIUM BLD-SCNC: 140 MMOL/L (ref 135–145)
TRIGL SERPL-MCNC: 103 MG/DL

## 2022-09-19 PROCEDURE — 80061 LIPID PANEL: CPT

## 2022-09-19 PROCEDURE — 93970 EXTREMITY STUDY: CPT | Performed by: INTERNAL MEDICINE

## 2022-09-19 PROCEDURE — 36415 COLL VENOUS BLD VENIPUNCTURE: CPT

## 2022-09-19 PROCEDURE — 72100 X-RAY EXAM L-S SPINE 2/3 VWS: CPT

## 2022-09-19 PROCEDURE — 80048 BASIC METABOLIC PNL TOTAL CA: CPT

## 2022-09-20 ENCOUNTER — TELEPHONE (OUTPATIENT)
Dept: CARDIOLOGY CLINIC | Age: 57
End: 2022-09-20

## 2022-09-27 ENCOUNTER — OFFICE VISIT (OUTPATIENT)
Dept: CARDIOLOGY CLINIC | Age: 57
End: 2022-09-27
Payer: MEDICAID

## 2022-09-27 VITALS
BODY MASS INDEX: 40.21 KG/M2 | SYSTOLIC BLOOD PRESSURE: 118 MMHG | OXYGEN SATURATION: 99 % | HEART RATE: 69 BPM | HEIGHT: 66 IN | DIASTOLIC BLOOD PRESSURE: 62 MMHG | WEIGHT: 250.2 LBS

## 2022-09-27 DIAGNOSIS — E78.2 MIXED HYPERLIPIDEMIA: ICD-10-CM

## 2022-09-27 DIAGNOSIS — F17.200 TOBACCO DEPENDENCE: Primary | ICD-10-CM

## 2022-09-27 DIAGNOSIS — R06.02 SOB (SHORTNESS OF BREATH) ON EXERTION: ICD-10-CM

## 2022-09-27 DIAGNOSIS — M79.89 LEG SWELLING: ICD-10-CM

## 2022-09-27 DIAGNOSIS — I10 PRIMARY HYPERTENSION: ICD-10-CM

## 2022-09-27 DIAGNOSIS — Z82.49 FAMILY HISTORY OF CORONARY ARTERIOSCLEROSIS: ICD-10-CM

## 2022-09-27 PROCEDURE — G8417 CALC BMI ABV UP PARAM F/U: HCPCS | Performed by: INTERNAL MEDICINE

## 2022-09-27 PROCEDURE — 3017F COLORECTAL CA SCREEN DOC REV: CPT | Performed by: INTERNAL MEDICINE

## 2022-09-27 PROCEDURE — 4004F PT TOBACCO SCREEN RCVD TLK: CPT | Performed by: INTERNAL MEDICINE

## 2022-09-27 PROCEDURE — 99214 OFFICE O/P EST MOD 30 MIN: CPT | Performed by: INTERNAL MEDICINE

## 2022-09-27 PROCEDURE — G8427 DOCREV CUR MEDS BY ELIG CLIN: HCPCS | Performed by: INTERNAL MEDICINE

## 2022-09-27 RX ORDER — ATORVASTATIN CALCIUM 40 MG/1
40 TABLET, FILM COATED ORAL DAILY
Qty: 90 TABLET | Refills: 3 | Status: SHIPPED | OUTPATIENT
Start: 2022-09-27

## 2022-09-27 RX ORDER — SERTRALINE HYDROCHLORIDE 25 MG/1
TABLET, FILM COATED ORAL
COMMUNITY
Start: 2022-09-22

## 2022-09-27 NOTE — PATIENT INSTRUCTIONS
Please be informed that if you contact our office outside of normal business hours the physician on call cannot help with any scheduling or rescheduling issues, procedure instruction questions or any type of medication issue. We advise you for any urgent/emergency that you go to the nearest emergency room! PLEASE CALL OUR OFFICE DURING NORMAL BUSINESS HOURS    Monday - Friday   8 am to 5 pm    [place]: [place] [address]: [phone]    [place]:  [phone]    **It is YOUR responsibilty to bring medication bottles and/or updated medication list to [address] [place] [address].  This will allow us to better serve you and all your healthcare needs**

## 2022-09-27 NOTE — PROGRESS NOTES
Constipation 30 capsule 0    ibuprofen (ADVIL;MOTRIN) 800 MG tablet Take 800 mg by mouth every 8 hours as needed for Pain      albuterol sulfate HFA (PROVENTIL;VENTOLIN;PROAIR) 108 (90 Base) MCG/ACT inhaler Inhale 2 puffs into the lungs every 6 hours as needed for Wheezing      VITAMIN D PO Take 1,000 Units by mouth daily      folic acid (FOLVITE) 1 MG tablet Take 1 mg by mouth daily      dicyclomine (BENTYL) 20 MG tablet Take 1 tablet by mouth 3 times daily as needed (abdominal pain) 20 tablet 3    spironolactone (ALDACTONE) 25 MG tablet TAKE 1 TABLET BY MOUTH ONE TIME A DAY      aspirin 325 MG EC tablet Take 1 tablet by mouth 2 times daily (Patient not taking: No sig reported) 30 tablet 0     No current facility-administered medications for this visit.        Allergies:   Pollen extract and Tramadol    Patient History:  Past Medical History:   Diagnosis Date    Arthritis     Arthritis of left knee     Asthma     last flare up summer 2020    Diabetes mellitus ([facility] [address]     \"use to take Metformin been off this since  2019\"    Disorder of rotator cuff syndrome of left shoulder and allied disorder     H/O cardiovascular stress test 2/24/16    EF68% normal study    History of motion sickness     Hx of echocardiogram 2/24/16    EF 55%, normal LV function, moderate LVH    Hyperlipidemia     Hypertension     Kidney stones     \"had kidney stone 11/2020- removed with surg- , back in 2009- had to put tube in my back(nephostomy tube) for kidney stone-had lithotripsy    Migraine     Obesity     PONV (postoperative nausea and vomiting)     hx of motion sickness    UTI (urinary tract infection)     \"last one 11/2020\"    Vertigo     \"have trouble leaning to my left and laying flat with this\"    Wears glasses      Past Surgical History:   Procedure Laterality Date    CHOLECYSTECTOMY  age20's    CYSTOSCOPY INSERTION / REMOVAL STENT / STONE N/A 11/28/2020    CYSTOSCOPY RETROGRADE PYELOGRAM STENT INSERTION performed by Radha Mejia MD at [address] / REMOVAL STENT / STONE Right 1/21/2021    CYSTOSCOPY STENT EXCHANGE URETEROSCOPY EXTRACTION OF STONES performed by Pradip Tong MD at [facility] Bilateral     tarsal tunnel, hammer toe, neuroma removed -1990's    HERNIA REPAIR  2019? ventral hernia repair    HYSTERECTOMY (CERVIX STATUS UNKNOWN)  2001    KIDNEY STONE SURGERY      per old chart had surg for left nephrostolithotomy and cysto /lithotripsy in [address] Right     \"open surg right shoulder and later had 2 rotator cuff surgeries on my right shoulder\"    TONSILLECTOMY  age 26    TOTAL KNEE ARTHROPLASTY Left 4/28/2021    LEFT KNEE TOTAL ARTHROPLASTY ROBOTIC performed by Bridget Samano MD [place] [address] [facility] [address]     Family History   Problem Relation Age of Onset    Heart Disease Mother     High Blood Pressure Mother     Asthma Mother     Heart Disease Father     Diabetes Sister     Seizures Sister     Asthma Sister     COPD Sister     Heart Disease Maternal Grandmother     High Blood Pressure Maternal Grandmother     Cancer Maternal Grandfather     Emphysema Maternal Grandfather     Asthma Daughter     Depression Daughter     Migraines Daughter     Obesity Daughter     Ulcerative Colitis Daughter     High Cholesterol Daughter     Asthma Daughter     Migraines Daughter     Obesity Daughter     Asthma Brother     Stroke Paternal Uncle      Social History     Tobacco Use    Smoking status: Every Day     Packs/day: 1.00     Years: 20.00     Pack years: 20.00     Types: Cigarettes    Smokeless tobacco: Never   Substance Use Topics    Alcohol use: No     Alcohol/week: 0.0 standard drinks        Review of Systems:   Constitutional: No Fever or Weight Loss   Eyes: No Decreased Vision  ENT: No Headaches, Hearing Loss or Vertigo  Cardiovascular: as per note above   Respiratory: No cough or wheezing and as per note above.    Gastrointestinal: No abdominal pain, appetite loss, blood in stools, constipation, diarrhea or heartburn  Genitourinary: No dysuria, trouble voiding, or hematuria  Musculoskeletal:  denies any new  joint aches , swelling  or pain   Integumentary: No rash or pruritis  Neurological: No TIA or stroke symptoms  Psychiatric: No anxiety or depression  Endocrine: No malaise, fatigue or temperature intolerance  Hematologic/Lymphatic: No bleeding problems, blood clots or swollen lymph nodes  Allergic/Immunologic: No nasal congestion or hives    Objective:      Physical Exam:  /62   Pulse 69   Ht 5' 6\" (1.676 m)   Wt 250 lb 3.2 oz (113.5 kg)   SpO2 99%   BMI 40.38 kg/m²   Wt Readings from Last 3 Encounters:   09/27/22 250 lb 3.2 oz (113.5 kg)   08/29/22 243 lb 3.2 oz (110.3 kg)   01/05/22 200 lb (90.7 kg)     Body mass index is 40.38 kg/m². Vitals:    09/27/22 0929   BP: 118/62   Pulse: 69   SpO2: 99%        General Appearance:  No distress, conversant  Constitutional:  Well developed, Well nourished, No acute distress, Non-toxic appearance. HENT:  Normocephalic, Atraumatic, Bilateral external ears normal, Oropharynx moist, No oral exudates, Nose normal. Neck- Normal range of motion, No tenderness, Supple, No stridor,no apical-carotid delay  Eyes:  PERRL, EOMI, Conjunctiva normal, No discharge. Respiratory:  Normal breath sounds, No respiratory distress, No wheezing, No chest tenderness. ,no use of accessory muscles, NO crackles  Cardiovascular: (PMI) apex non displaced,no lifts no thrills,S1 and S2 audible, No added heart sounds, No signs of ankle edema, or volume overload, No evidence of JVD, No crackles   GI:  Bowel sounds normal, Soft, No tenderness, No masses, No gross visceromegaly   :  No costovertebral angle tenderness   Musculoskeletal:  No edema, no tenderness, no deformities.  Back- no tenderness  Integument:  Well hydrated, no rash   Lymphatic:  No lymphadenopathy noted   Neurologic:  Alert & oriented x 3, CN 2-12 normal, normal motor function, normal sensory function, no focal deficits noted   Psychiatric:  Speech and behavior appropriate       Medical decision making and Data review:  DATA:  Lab Results   Component Value Date    TROPONINT <0.010 02/21/2016     BNP:  No results found for: PROBNP  PT/INR:  No results found for: PTINR  No results found for: LABA1C  Lab Results   Component Value Date    CHOL 188 09/19/2022    TRIG 103 09/19/2022    HDL 37 (L) 09/19/2022    LDLCALC 130 (H) 09/19/2022     Lab Results   Component Value Date    ALT 23 01/05/2022    AST 20 01/05/2022     No results for input(s): WBC, HGB, HCT, MCV, PLT in the last 72 hours. TSH: No results found for: TSH  Lab Results   Component Value Date    AST 20 01/05/2022    ALT 23 01/05/2022    BILITOT 0.2 01/05/2022    ALKPHOS 118 01/05/2022     No results found for: PROBNP  No results found for: LABA1C  Lab Results   Component Value Date    WBC 6.0 01/05/2022    HGB 12.0 (L) 01/05/2022    HCT 36.9 (L) 01/05/2022     01/05/2022     Stress test 8/2022     Summary    Supervising physician Dr. Gavino Nieto . ECG portion of stress test is negative for ischemia by diagnostic criteria. Normal EF 65 % with normal ventricular contractility. No infarct or ischemia    noted. Normal stress myocardial perfusion. This is a normal study. Echo 8/2022  Summary   Left ventricular function and size is normal, EF is estimated at 55-60%. Moderate left ventricular hypertrophy. E/A reversal; indeterminate diastolic function. No regional wall motion abnormalities were detected. Mildly dilated left atrium. No significant valvular disease noted. Mild tricuspid regurgitation; RVSP is 27 mmHg. No evidence of pericardial effusion. All labs, medications and tests reviewed by myself including data and history from outside source , patient and available family . Assessment & Plan:      1. Tobacco dependence    2. SOB (shortness of breath) on exertion    3.  Mixed hyperlipidemia 4. Primary hypertension    5. Family history of coronary arteriosclerosis    6. Leg swelling           Family history of coronary arteriosclerosis  She had lexiscan showing no ischemia on Sept 2022     SOB (shortness of breath) on exertion   Reports bilateral ankle swelling ongoing for quite some time, started  Lasix 20 mg daily with potassium. Echo shows  possible LV dysfunction, advisee to reduce salt intake to 3 gm,s a day she eats lot of chinese food     HTN (hypertension)   Bilateral lower extremity swelling stop amlodipine  . improved on  losartan 25 mg daily advised to check blood pressure at home cut down on salt intake    Tobacco dependence   Srongly encouraged to quit smoking she was offered nicotine patch but at this time she is not ready     Dyslipidemia :  All available lab work was reviewed. Increase lipitor 40 mg daily  Patient was advised to repeat lab work before next visit. Necessary orders were placed , instructions given by myself   The 10-year ASCVD risk score (Magali MEDRANO, et al., 2019) is: 7.9%    Values used to calculate the score:      Age: 65 years      Sex: Female      Is Non- : No      Diabetic: No      Tobacco smoker: Yes      Systolic Blood Pressure: 195 mmHg      Is BP treated: Yes      HDL Cholesterol: 37 MG/DL      Total Cholesterol: 188 MG/DL      Counseled extensively and medication compliance urged. We discussed that for the  prevention of ASCVD our  goal is aggressive risk modification. Patient is encouraged to exercise if they can , educated about  brisk walk for 30 minutes  at least 3 to 4 times a week if there are no physical limitations  Various goals were discussed and questions answered. Continue current medications. Appropriate prescriptions are addressed and refills ordered. Questions answered and patient verbalizes understanding. Call for any problems, questions, or concerns. Greater than 60 % of time spent counseling besides reviewing data and images     Continue all other medications of all above medical condition listed as is. Return in about 6 months (around 3/27/2023). Please note this report has been partially produced using speech recognition software and may contain errors related to that system including errors in grammar, punctuation, and spelling, as well as words and phrases that may be inappropriate. If there are any questions or concerns please feel free to contact the dictating provider for clarification.

## 2022-10-17 ENCOUNTER — HOSPITAL ENCOUNTER (OUTPATIENT)
Dept: GENERAL RADIOLOGY | Age: 57
Discharge: HOME | End: 2022-10-17
Payer: MEDICAID

## 2022-10-17 ENCOUNTER — HOSPITAL ENCOUNTER (OUTPATIENT)
Age: 57
Discharge: HOME | End: 2022-10-17
Payer: MEDICAID

## 2022-10-17 DIAGNOSIS — Z01.818 PRE-OPERATIVE CLEARANCE: ICD-10-CM

## 2022-10-17 PROCEDURE — 71046 X-RAY EXAM CHEST 2 VIEWS: CPT

## 2022-10-20 ENCOUNTER — TELEPHONE (OUTPATIENT)
Dept: CARDIOLOGY CLINIC | Age: 57
End: 2022-10-20

## 2022-10-20 NOTE — TELEPHONE ENCOUNTER
Cardiologist: Dr. Zohra Pires  Surgeon: Dr. Smith Hahn. Surgery: Repair extensor tendon foot, Endoscopic planter fasciotomy -left  Anesthesia: General  Date: 10/21/2022  FAX# [phone]  Ph#     Last OV 9/27/2022 w/Alexandro      1. Tobacco dependence    2. SOB (shortness of breath) on exertion    3. Mixed hyperlipidemia    4. Primary hypertension    5. Family history of coronary arteriosclerosis    6. Leg swelling          Last EKG- 8/29/2022      Stress test 8/2022      Summary    Supervising physician Dr. Frank Houston . ECG portion of stress test is negative for ischemia by diagnostic criteria. Normal EF 65 % with normal ventricular contractility. No infarct or ischemia    noted. Normal stress myocardial perfusion. This is a normal study. Echo 8/2022  Summary   Left ventricular function and size is normal, EF is estimated at 55-60%. Moderate left ventricular hypertrophy. E/A reversal; indeterminate diastolic function. No regional wall motion abnormalities were detected. Mildly dilated left atrium. No significant valvular disease noted. Mild tricuspid regurgitation; RVSP is 27 mmHg. No evidence of pericardial effusion.         Aspirin

## 2022-10-20 NOTE — TELEPHONE ENCOUNTER
Proceed with surgery no further testing needed   Can hold anticoagulation for 2 days before surgery  Low  / moderate  risk for surgery  Hold aspirin for procedure

## 2022-10-21 ENCOUNTER — HOSPITAL ENCOUNTER (OUTPATIENT)
Age: 57
Setting detail: OUTPATIENT SURGERY
Discharge: HOME | End: 2022-10-21
Attending: PODIATRIST | Admitting: PODIATRIST
Payer: MEDICAID

## 2022-10-21 ENCOUNTER — ANESTHESIA (OUTPATIENT)
Dept: OPERATING ROOM | Age: 57
End: 2022-10-21
Payer: MEDICAID

## 2022-10-21 ENCOUNTER — ANESTHESIA EVENT (OUTPATIENT)
Dept: OPERATING ROOM | Age: 57
End: 2022-10-21
Payer: MEDICAID

## 2022-10-21 VITALS
OXYGEN SATURATION: 96 % | SYSTOLIC BLOOD PRESSURE: 132 MMHG | DIASTOLIC BLOOD PRESSURE: 73 MMHG | BODY MASS INDEX: 39.37 KG/M2 | HEART RATE: 89 BPM | TEMPERATURE: 98.2 F | RESPIRATION RATE: 18 BRPM | WEIGHT: 245 LBS | HEIGHT: 66 IN

## 2022-10-21 DIAGNOSIS — S86.302A PERONEAL TENDON INJURY, LEFT, INITIAL ENCOUNTER: ICD-10-CM

## 2022-10-21 DIAGNOSIS — M72.2 PLANTAR FASCIITIS OF LEFT FOOT: Primary | ICD-10-CM

## 2022-10-21 LAB — GLUCOSE BLD-MCNC: 94 MG/DL (ref 70–99)

## 2022-10-21 PROCEDURE — 3600000013 HC SURGERY LEVEL 3 ADDTL 15MIN: Performed by: PODIATRIST

## 2022-10-21 PROCEDURE — 6370000000 HC RX 637 (ALT 250 FOR IP): Performed by: PODIATRIST

## 2022-10-21 PROCEDURE — 3700000000 HC ANESTHESIA ATTENDED CARE: Performed by: PODIATRIST

## 2022-10-21 PROCEDURE — 3600000003 HC SURGERY LEVEL 3 BASE: Performed by: PODIATRIST

## 2022-10-21 PROCEDURE — 2500000003 HC RX 250 WO HCPCS: Performed by: NURSE ANESTHETIST, CERTIFIED REGISTERED

## 2022-10-21 PROCEDURE — 7100000010 HC PHASE II RECOVERY - FIRST 15 MIN: Performed by: PODIATRIST

## 2022-10-21 PROCEDURE — 6360000002 HC RX W HCPCS: Performed by: NURSE ANESTHETIST, CERTIFIED REGISTERED

## 2022-10-21 PROCEDURE — 2580000003 HC RX 258: Performed by: PODIATRIST

## 2022-10-21 PROCEDURE — 6360000002 HC RX W HCPCS: Performed by: PODIATRIST

## 2022-10-21 PROCEDURE — 7100000011 HC PHASE II RECOVERY - ADDTL 15 MIN: Performed by: PODIATRIST

## 2022-10-21 PROCEDURE — 7100000001 HC PACU RECOVERY - ADDTL 15 MIN: Performed by: PODIATRIST

## 2022-10-21 PROCEDURE — 6370000000 HC RX 637 (ALT 250 FOR IP): Performed by: NURSE ANESTHETIST, CERTIFIED REGISTERED

## 2022-10-21 PROCEDURE — 3700000001 HC ADD 15 MINUTES (ANESTHESIA): Performed by: PODIATRIST

## 2022-10-21 PROCEDURE — 82962 GLUCOSE BLOOD TEST: CPT

## 2022-10-21 PROCEDURE — 2709999900 HC NON-CHARGEABLE SUPPLY: Performed by: PODIATRIST

## 2022-10-21 PROCEDURE — 7100000000 HC PACU RECOVERY - FIRST 15 MIN: Performed by: PODIATRIST

## 2022-10-21 RX ORDER — SODIUM CHLORIDE, SODIUM LACTATE, POTASSIUM CHLORIDE, CALCIUM CHLORIDE 600; 310; 30; 20 MG/100ML; MG/100ML; MG/100ML; MG/100ML
1000 INJECTION, SOLUTION INTRAVENOUS CONTINUOUS
Status: DISCONTINUED | OUTPATIENT
Start: 2022-10-21 | End: 2022-10-21 | Stop reason: HOSPADM

## 2022-10-21 RX ORDER — SODIUM CHLORIDE 0.9 % (FLUSH) 0.9 %
5-40 SYRINGE (ML) INJECTION ONCE
Status: DISCONTINUED | OUTPATIENT
Start: 2022-10-21 | End: 2022-10-21 | Stop reason: HOSPADM

## 2022-10-21 RX ORDER — SCOLOPAMINE TRANSDERMAL SYSTEM 1 MG/1
PATCH, EXTENDED RELEASE TRANSDERMAL PRN
Status: DISCONTINUED | OUTPATIENT
Start: 2022-10-21 | End: 2022-10-21 | Stop reason: SDUPTHER

## 2022-10-21 RX ORDER — ACETAMINOPHEN 325 MG/1
650 TABLET ORAL ONCE
Status: COMPLETED | OUTPATIENT
Start: 2022-10-21 | End: 2022-10-21

## 2022-10-21 RX ORDER — AMLODIPINE BESYLATE 5 MG/1
5 TABLET ORAL DAILY
COMMUNITY

## 2022-10-21 RX ORDER — ROCURONIUM BROMIDE 10 MG/ML
INJECTION, SOLUTION INTRAVENOUS PRN
Status: DISCONTINUED | OUTPATIENT
Start: 2022-10-21 | End: 2022-10-21 | Stop reason: SDUPTHER

## 2022-10-21 RX ORDER — LIDOCAINE HYDROCHLORIDE 20 MG/ML
INJECTION, SOLUTION EPIDURAL; INFILTRATION; INTRACAUDAL; PERINEURAL PRN
Status: DISCONTINUED | OUTPATIENT
Start: 2022-10-21 | End: 2022-10-21 | Stop reason: SDUPTHER

## 2022-10-21 RX ORDER — ESMOLOL HYDROCHLORIDE 10 MG/ML
INJECTION INTRAVENOUS PRN
Status: DISCONTINUED | OUTPATIENT
Start: 2022-10-21 | End: 2022-10-21 | Stop reason: SDUPTHER

## 2022-10-21 RX ORDER — LABETALOL HYDROCHLORIDE 5 MG/ML
INJECTION, SOLUTION INTRAVENOUS PRN
Status: DISCONTINUED | OUTPATIENT
Start: 2022-10-21 | End: 2022-10-21 | Stop reason: SDUPTHER

## 2022-10-21 RX ORDER — FENTANYL CITRATE 50 UG/ML
INJECTION, SOLUTION INTRAMUSCULAR; INTRAVENOUS PRN
Status: DISCONTINUED | OUTPATIENT
Start: 2022-10-21 | End: 2022-10-21 | Stop reason: SDUPTHER

## 2022-10-21 RX ORDER — PROPOFOL 10 MG/ML
INJECTION, EMULSION INTRAVENOUS PRN
Status: DISCONTINUED | OUTPATIENT
Start: 2022-10-21 | End: 2022-10-21 | Stop reason: SDUPTHER

## 2022-10-21 RX ADMIN — SCOLOPAMINE TRANSDERMAL SYSTEM 1 PATCH: 1 PATCH, EXTENDED RELEASE TRANSDERMAL at 12:21

## 2022-10-21 RX ADMIN — ROCURONIUM BROMIDE 20 MG: 50 INJECTION, SOLUTION INTRAVENOUS at 13:03

## 2022-10-21 RX ADMIN — HYDROMORPHONE HYDROCHLORIDE 1 MG: 1 INJECTION, SOLUTION INTRAMUSCULAR; INTRAVENOUS; SUBCUTANEOUS at 12:54

## 2022-10-21 RX ADMIN — SODIUM CHLORIDE, POTASSIUM CHLORIDE, SODIUM LACTATE AND CALCIUM CHLORIDE 1000 ML: 600; 310; 30; 20 INJECTION, SOLUTION INTRAVENOUS at 11:47

## 2022-10-21 RX ADMIN — LABETALOL HYDROCHLORIDE 5 MG: 5 INJECTION, SOLUTION INTRAVENOUS at 13:01

## 2022-10-21 RX ADMIN — LIDOCAINE HYDROCHLORIDE 1000 MG: 20 INJECTION, SOLUTION EPIDURAL; INFILTRATION; INTRACAUDAL; PERINEURAL at 12:26

## 2022-10-21 RX ADMIN — PROPOFOL 200 MG: 10 INJECTION, EMULSION INTRAVENOUS at 12:26

## 2022-10-21 RX ADMIN — CEFAZOLIN 2000 MG: 2 INJECTION, POWDER, FOR SOLUTION INTRAMUSCULAR; INTRAVENOUS at 12:32

## 2022-10-21 RX ADMIN — ESMOLOL HYDROCHLORIDE 20 MG: 100 INJECTION, SOLUTION INTRAVENOUS at 12:46

## 2022-10-21 RX ADMIN — ACETAMINOPHEN 650 MG: 325 TABLET ORAL at 15:08

## 2022-10-21 RX ADMIN — FENTANYL CITRATE 100 MCG: 50 INJECTION, SOLUTION INTRAMUSCULAR; INTRAVENOUS at 12:28

## 2022-10-21 RX ADMIN — FENTANYL CITRATE 50 MCG: 50 INJECTION, SOLUTION INTRAMUSCULAR; INTRAVENOUS at 12:32

## 2022-10-21 RX ADMIN — FENTANYL CITRATE 50 MCG: 50 INJECTION, SOLUTION INTRAMUSCULAR; INTRAVENOUS at 12:37

## 2022-10-21 RX ADMIN — SUGAMMADEX 200 MG: 100 INJECTION, SOLUTION INTRAVENOUS at 13:46

## 2022-10-21 RX ADMIN — ROCURONIUM BROMIDE 50 MG: 50 INJECTION, SOLUTION INTRAVENOUS at 12:42

## 2022-10-21 ASSESSMENT — PAIN SCALES - GENERAL
PAINLEVEL_OUTOF10: 0
PAINLEVEL_OUTOF10: 4
PAINLEVEL_OUTOF10: 0

## 2022-10-21 ASSESSMENT — PAIN DESCRIPTION - DESCRIPTORS
DESCRIPTORS: ACHING
DESCRIPTORS: ACHING

## 2022-10-21 ASSESSMENT — PAIN - FUNCTIONAL ASSESSMENT
PAIN_FUNCTIONAL_ASSESSMENT: 0-10
PAIN_FUNCTIONAL_ASSESSMENT: PREVENTS OR INTERFERES SOME ACTIVE ACTIVITIES AND ADLS

## 2022-10-21 ASSESSMENT — PAIN DESCRIPTION - LOCATION: LOCATION: HEAD

## 2022-10-21 ASSESSMENT — LIFESTYLE VARIABLES: SMOKING_STATUS: 1

## 2022-10-21 NOTE — ANESTHESIA PRE PROCEDURE
Department of Anesthesiology  Preprocedure Note       Name:  Kavon Stevens   Age:  65 y.o.  :  1965                                          MRN:  [id number]         Date:  10/21/2022      Surgeon: Carisa Jarquin):  Alanis Hwang DPM    Procedure: Procedure(s):  LEFT PERONEAL TENDON REPAIR    Medications prior to admission:   Prior to Admission medications    Medication Sig Start Date End Date Taking?  Authorizing Provider   amLODIPine (NORVASC) 5 MG tablet Take 5 mg by mouth daily    Historical Provider, MD   sertraline (ZOLOFT) 25 MG tablet take 1 tablet by oral route every day 22   Historical Provider, MD   atorvastatin (LIPITOR) 40 MG tablet Take 1 tablet by mouth daily 22   Dasha Sim MD   gabapentin (NEURONTIN) 100 MG capsule TAKE ONE CAPSULE BY MOUTH THREE TIMES DAILY 22   Historical Provider, MD   metFORMIN (GLUCOPHAGE) 500 MG tablet take 1 tablet by oral route 2 times every day with morning and evening meals 22   Historical Provider, MD   omeprazole (PRILOSEC) 20 MG delayed release capsule take 1 capsule by oral route every day before a meal 22   Historical Provider, MD   furosemide (LASIX) 20 MG tablet Take 1 tablet by mouth daily 22   Dasha Sim MD   potassium chloride (KLOR-CON M) 20 MEQ extended release tablet Take 1 tablet by mouth daily 22   Dasha Sim MD   losartan (COZAAR) 25 MG tablet Take 1 tablet by mouth daily  Patient not taking: Reported on 10/21/2022 8/29/22   Dasha Sim MD   docusate sodium (COLACE) 100 MG capsule Take 1 capsule by mouth daily as needed for Constipation 21   Raymond Mandel MD   ibuprofen (ADVIL;MOTRIN) 800 MG tablet Take 800 mg by mouth every 8 hours as needed for Pain    Historical Provider, MD   albuterol sulfate HFA (PROVENTIL;VENTOLIN;PROAIR) 108 (90 Base) MCG/ACT inhaler Inhale 2 puffs into the lungs every 6 hours as needed for Wheezing  Patient not taking: Reported on 10/21/2022 Historical Provider, MD   VITAMIN D PO Take 1,000 Units by mouth daily    Historical Provider, MD   folic acid (FOLVITE) 1 MG tablet Take 1 mg by mouth daily    Historical Provider, MD   dicyclomine (BENTYL) 20 MG tablet Take 1 tablet by mouth 3 times daily as needed (abdominal pain) 8/13/20   Miguel Amin MD   spironolactone (ALDACTONE) 25 MG tablet TAKE 1 TABLET BY MOUTH ONE TIME A DAY 5/28/20   Historical Provider, MD       Current medications:    No current facility-administered medications for this visit. No current outpatient medications on file. Facility-Administered Medications Ordered in Other Visits   Medication Dose Route Frequency Provider Last Rate Last Admin    sodium chloride flush 0.9 % injection 5-40 mL  5-40 mL IntraVENous Once Everlina Furbish, DPM        lactated ringers infusion 1,000 mL  1,000 mL IntraVENous Continuous Everlina Furbish,  mL/hr at 10/21/22 1147 1,000 mL at 10/21/22 1147    ceFAZolin (ANCEF) 2,000 mg in dextrose 5 % 50 mL IVPB (mini-bag)  2,000 mg IntraVENous Once Everlina Furbish, DPM           Allergies: Allergies   Allergen Reactions    Pollen Extract Itching     Itchy and watery eyes. Pet dander also causes same symptoms.     Tramadol      Ear ringing       Problem List:    Patient Active Problem List   Diagnosis Code    Obesity, Class I, BMI 30-34.9 E66.9    HTN (hypertension) I10    Right knee pain M25.561    Hyperlipidemia E78.5    GERD (gastroesophageal reflux disease) K21.9    Patellofemoral syndrome M22.2X9    DJD (degenerative joint disease) of knee M17.9    Tobacco dependence F17.200    Major depressive disorder, recurrent episode with anxious distress (HCC) F33.9    Mild persistent asthma without complication R06.95    Urinary incontinence R32    Irritable bowel syndrome with diarrhea K58.0    Right ureteral stone N20.1    Primary osteoarthritis of left knee M17.12    Status post left knee replacement Z96.652    SOB (shortness of breath) on exertion R06.02    Leg swelling M79.89    Family history of coronary arteriosclerosis Z82.49       Past Medical History:        Diagnosis Date    Arthritis     Arthritis of left knee     Asthma     last flare up summer 2020    Diabetes mellitus ([address]     \"use to take Metformin been off this since  2019\"    Disorder of rotator cuff syndrome of left shoulder and allied disorder     H/O cardiovascular stress test 2/24/16    EF68% normal study    History of motion sickness     Hx of echocardiogram 2/24/16    EF 55%, normal LV function, moderate LVH    Hyperlipidemia     Hypertension     Kidney stones     \"had kidney stone 11/2020- removed with surg- , back in 2009- had to put tube in my back(nephostomy tube) for kidney stone-had lithotripsy    Migraine     Obesity     PONV (postoperative nausea and vomiting)     hx of motion sickness    UTI (urinary tract infection)     \"last one 11/2020\"    Vertigo     \"have trouble leaning to my left and laying flat with this\"    Wears glasses        Past Surgical History:        Procedure Laterality Date    CHOLECYSTECTOMY  age22's    CYSTOSCOPY INSERTION / REMOVAL STENT / STONE N/A 11/28/2020    CYSTOSCOPY RETROGRADE PYELOGRAM STENT INSERTION performed by Gricelda Srivastava MD at [address] / [place] / STONE Right 1/21/2021    CYSTOSCOPY STENT EXCHANGE URETEROSCOPY EXTRACTION OF STONES performed by Mel Sibley MD at [address] Bilateral     tarsal tunnel, hammer toe, neuroma removed -1990's    HERNIA REPAIR  2019?     ventral hernia repair    HYSTERECTOMY (CERVIX STATUS UNKNOWN)  2001    KIDNEY STONE SURGERY      per old chart had surg for left nephrostolithotomy and cysto /lithotripsy in 2013at [address] Right     \"open surg right shoulder and later had 2 rotator cuff surgeries on my right shoulder\"    TONSILLECTOMY  age 26    TOTAL KNEE ARTHROPLASTY Left 4/28/2021    LEFT KNEE TOTAL ARTHROPLASTY ROBOTIC performed by Blair Suggs MD at [facility]       Social History:    Social History     Tobacco Use    Smoking status: Every Day     Packs/day: 1.00     Years: 20.00     Pack years: 20.00     Types: Cigarettes    Smokeless tobacco: Never   Substance Use Topics    Alcohol use: No     Alcohol/week: 0.0 standard drinks                                Ready to quit: Not Answered  Counseling given: Not Answered      Vital Signs (Current): There were no vitals filed for this visit. BP Readings from Last 3 Encounters:   10/21/22 (!) 158/90   09/27/22 118/62   08/29/22 (!) 140/80       NPO Status:                                                                                 BMI:   Wt Readings from Last 3 Encounters:   10/21/22 245 lb (111.1 kg)   09/27/22 250 lb 3.2 oz (113.5 kg)   08/29/22 243 lb 3.2 oz (110.3 kg)     There is no height or weight on file to calculate BMI.    CBC:   Lab Results   Component Value Date/Time    WBC 6.0 01/05/2022 05:21 PM    RBC 4.21 01/05/2022 05:21 PM    HGB 12.0 01/05/2022 05:21 PM    HCT 36.9 01/05/2022 05:21 PM    MCV 87.6 01/05/2022 05:21 PM    RDW 13.5 01/05/2022 05:21 PM     01/05/2022 05:21 PM       CMP:   Lab Results   Component Value Date/Time     09/19/2022 09:59 AM    K 4.4 09/19/2022 09:59 AM     09/19/2022 09:59 AM    CO2 27 09/19/2022 09:59 AM    BUN 14 09/19/2022 09:59 AM    CREATININE 0.9 09/19/2022 09:59 AM    GFRAA >60 09/19/2022 09:59 AM    LABGLOM >60 09/19/2022 09:59 AM    GLUCOSE 86 09/19/2022 09:59 AM    PROT 6.1 01/05/2022 05:21 PM    PROT 7.4 04/30/2012 06:23 AM    CALCIUM 9.9 09/19/2022 09:59 AM    BILITOT 0.2 01/05/2022 05:21 PM    ALKPHOS 118 01/05/2022 05:21 PM    AST 20 01/05/2022 05:21 PM    ALT 23 01/05/2022 05:21 PM       POC Tests: No results for input(s): POCGLU, POCNA, POCK, POCCL, POCBUN, POCHEMO, POCHCT in the last 72 hours.     Coags: Lab Results   Component Value Date/Time    PROTIME 13.6 11/28/2020 03:45 AM    PROTIME 10.4 04/30/2012 06:23 AM    INR 1.12 11/28/2020 03:45 AM    APTT 24.4 07/16/2013 02:42 AM       HCG (If Applicable):   Lab Results   Component Value Date    PREGTESTUR NEGATIVE 03/20/2016        ABGs: No results found for: PHART, PO2ART, YUG6FYH, LTV7KNZ, BEART, C2BILKMC     Type & Screen (If Applicable):  No results found for: LABABO, LABRH    Drug/Infectious Status (If Applicable):  No results found for: HIV, HEPCAB    COVID-19 Screening (If Applicable):   Lab Results   Component Value Date/Time    COVID19 NOT DETECTED 04/21/2021 08:47 AM           Anesthesia Evaluation  Patient summary reviewed   history of anesthetic complications: PONV. Airway: Mallampati: II  TM distance: >3 FB   Neck ROM: full  Mouth opening: > = 3 FB   Dental:          Pulmonary:normal exam    (+) asthma: current smoker          Patient smoked on day of surgery. Cardiovascular:  Exercise tolerance: good (>4 METS),   (+) hypertension:, hyperlipidemia        Rhythm: regular  Rate: normal                 ROS comment: Normal sinus rhythm   Normal ECG   No previous ECGs available   Confirmed by EagleRISHABH Rain ([id number]) on 4/21/2021 3:47:26 PM     cardiovascular stress test EF68% normal study     echocardiogram EF 55%, normal LV function, moderate LVH      Neuro/Psych:   (+) headaches: migraine headaches, psychiatric history:            GI/Hepatic/Renal:   (+) GERD:, renal disease: kidney stones, morbid obesity          Endo/Other:    (+) Diabetes, . Abdominal:   (+) obese,           Vascular: negative vascular ROS. Other Findings:             Anesthesia Plan      general     ASA 3       Induction: intravenous. Anesthetic plan and risks discussed with patient. Plan discussed with CRNA. ALEJANDRO Worrell - ARETHA   10/21/2022         Pre Anesthesia Assessment complete.  Chart reviewed on 10/21/2022

## 2022-10-21 NOTE — BRIEF OP NOTE
Brief Postoperative Note      Patient: Sharon Holcomb  YOB: 1965  MRN: [id number]    Date of Procedure: 10/21/2022    Pre-Op Diagnosis: Left foot pain [M79.672]    Post-Op Diagnosis: Same       Procedure(s):  LEFT EXTENSOR  TENDON REPAIR  PLANTAR FASCIOTOMY ENDOSCOPIC    Surgeon(s):  Noreen Ruelas DPM    Assistant:  * No surgical staff found *    Anesthesia: General    Estimated Blood Loss (mL): Minimal    Complications: None    Specimens:   * No specimens in log *    Implants:  * No implants in log *      Drains: * No LDAs found *    Findings: as expected     Electronically signed by Noreen Ruelas DPM on 10/21/2022 at 2:00 PM

## 2022-10-21 NOTE — ANESTHESIA PRE PROCEDURE
Department of Anesthesiology  Preprocedure Note       Name:  Patrica Brown   Age:  65 y.o.  :  1965                                          MRN:  [id number]         Date:  10/21/2022      Surgeon: Onur Manzo):  Ange Maldonado DPM    Procedure: Procedure(s):  LEFT PERONEAL TENDON REPAIR    Medications prior to admission:   Prior to Admission medications    Medication Sig Start Date End Date Taking?  Authorizing Provider   sertraline (ZOLOFT) 25 MG tablet take 1 tablet by oral route every day 22   Historical Provider, MD   atorvastatin (LIPITOR) 40 MG tablet Take 1 tablet by mouth daily 22   Niraj Orona MD   gabapentin (NEURONTIN) 100 MG capsule TAKE ONE CAPSULE BY MOUTH THREE TIMES DAILY 22   Historical Provider, MD   meloxicam (MOBIC) 7.5 MG tablet TAKE ONE TABLET BY MOUTH TWICE DAILY 22   Historical Provider, MD   metFORMIN (GLUCOPHAGE) 500 MG tablet take 1 tablet by oral route 2 times every day with morning and evening meals 22   Historical Provider, MD   omeprazole (PRILOSEC) 20 MG delayed release capsule take 1 capsule by oral route every day before a meal 22   Historical Provider, MD   furosemide (LASIX) 20 MG tablet Take 1 tablet by mouth daily 22   Niraj Orona MD   potassium chloride (KLOR-CON M) 20 MEQ extended release tablet Take 1 tablet by mouth daily 22   Niraj Orona MD   losartan (COZAAR) 25 MG tablet Take 1 tablet by mouth daily 22   Niraj Orona MD   aspirin 325 MG EC tablet Take 1 tablet by mouth 2 times daily  Patient not taking: No sig reported 21   Nikki Donald MD   docusate sodium (COLACE) 100 MG capsule Take 1 capsule by mouth daily as needed for Constipation 21   Nikki Donald MD   ibuprofen (ADVIL;MOTRIN) 800 MG tablet Take 800 mg by mouth every 8 hours as needed for Pain    Historical Provider, MD   albuterol sulfate HFA (PROVENTIL;VENTOLIN;PROAIR) 108 (90 Base) MCG/ACT inhaler Inhale 2 puffs into the lungs every 6 hours as needed for Wheezing    Historical Provider, MD   VITAMIN D PO Take 1,000 Units by mouth daily    Historical Provider, MD   folic acid (FOLVITE) 1 MG tablet Take 1 mg by mouth daily    Historical Provider, MD   dicyclomine (BENTYL) 20 MG tablet Take 1 tablet by mouth 3 times daily as needed (abdominal pain) 8/13/20   Linwood Streeter MD   spironolactone (ALDACTONE) 25 MG tablet TAKE 1 TABLET BY MOUTH ONE TIME A DAY 5/28/20   Historical Provider, MD       Current medications:    Current Outpatient Medications   Medication Sig Dispense Refill    sertraline (ZOLOFT) 25 MG tablet take 1 tablet by oral route every day      atorvastatin (LIPITOR) 40 MG tablet Take 1 tablet by mouth daily 90 tablet 3    gabapentin (NEURONTIN) 100 MG capsule TAKE ONE CAPSULE BY MOUTH THREE TIMES DAILY      meloxicam (MOBIC) 7.5 MG tablet TAKE ONE TABLET BY MOUTH TWICE DAILY      metFORMIN (GLUCOPHAGE) 500 MG tablet take 1 tablet by oral route 2 times every day with morning and evening meals      omeprazole (PRILOSEC) 20 MG delayed release capsule take 1 capsule by oral route every day before a meal      furosemide (LASIX) 20 MG tablet Take 1 tablet by mouth daily 90 tablet 1    potassium chloride (KLOR-CON M) 20 MEQ extended release tablet Take 1 tablet by mouth daily 90 tablet 1    losartan (COZAAR) 25 MG tablet Take 1 tablet by mouth daily 90 tablet 1    aspirin 325 MG EC tablet Take 1 tablet by mouth 2 times daily (Patient not taking: No sig reported) 30 tablet 0    docusate sodium (COLACE) 100 MG capsule Take 1 capsule by mouth daily as needed for Constipation 30 capsule 0    ibuprofen (ADVIL;MOTRIN) 800 MG tablet Take 800 mg by mouth every 8 hours as needed for Pain      albuterol sulfate HFA (PROVENTIL;VENTOLIN;PROAIR) 108 (90 Base) MCG/ACT inhaler Inhale 2 puffs into the lungs every 6 hours as needed for Wheezing      VITAMIN D PO Take 1,000 Units by mouth daily      folic acid (FOLVITE) 1 MG tablet Take 1 mg by mouth daily      dicyclomine (BENTYL) 20 MG tablet Take 1 tablet by mouth 3 times daily as needed (abdominal pain) 20 tablet 3    spironolactone (ALDACTONE) 25 MG tablet TAKE 1 TABLET BY MOUTH ONE TIME A DAY       No current facility-administered medications for this visit. Allergies: Allergies   Allergen Reactions    Pollen Extract Itching     Itchy and watery eyes. Pet dander also causes same symptoms.     Tramadol      Ear ringing       Problem List:    Patient Active Problem List   Diagnosis Code    Obesity, Class I, BMI 30-34.9 E66.9    HTN (hypertension) I10    Right knee pain M25.561    Hyperlipidemia E78.5    GERD (gastroesophageal reflux disease) K21.9    Patellofemoral syndrome M22.2X9    DJD (degenerative joint disease) of knee M17.9    Tobacco dependence F17.200    Major depressive disorder, recurrent episode with anxious distress (HCC) F33.9    Mild persistent asthma without complication H20.66    Urinary incontinence R32    Irritable bowel syndrome with diarrhea K58.0    Right ureteral stone N20.1    Primary osteoarthritis of left knee M17.12    Status post left knee replacement Z96.652    SOB (shortness of breath) on exertion R06.02    Leg swelling M79.89    Family history of coronary arteriosclerosis Z82.49       Past Medical History:        Diagnosis Date    Arthritis     Arthritis of left knee     Asthma     last flare up summer 2020    Diabetes mellitus ([facility]     \"use to take Metformin been off this since  2019\"    Disorder of rotator cuff syndrome of left shoulder and allied disorder     H/O cardiovascular stress test 2/24/16    EF68% normal study    History of motion sickness     Hx of echocardiogram 2/24/16    EF 55%, normal LV function, moderate LVH    Hyperlipidemia     Hypertension     Kidney stones     \"had kidney stone 11/2020- removed with surg- , back in 2009- had to put tube in my back(nephostomy tube) for kidney stone-had lithotripsy    Migraine     Obesity     PONV (postoperative nausea and vomiting)     hx of motion sickness    UTI (urinary tract infection)     \"last one 11/2020\"    Vertigo     \"have trouble leaning to my left and laying flat with this\"    Wears glasses        Past Surgical History:        Procedure Laterality Date    CHOLECYSTECTOMY  age20's    CYSTOSCOPY INSERTION / REMOVAL STENT / STONE N/A 11/28/2020    CYSTOSCOPY RETROGRADE PYELOGRAM STENT INSERTION performed by Naren Garibay MD at [address] / [facility] / [place] Right 1/21/2021    CYSTOSCOPY STENT EXCHANGE URETEROSCOPY EXTRACTION OF STONES performed by Keeley Lainez MD at [address] Bilateral     tarsal tunnel, hammer toe, neuroma removed -1990's    HERNIA REPAIR  2019? ventral hernia repair    HYSTERECTOMY (CERVIX STATUS UNKNOWN)  2001    KIDNEY STONE SURGERY      per old chart had surg for left nephrostolithotomy and cysto /lithotripsy in 2013at [address] [facility] Right     \"open surg right shoulder and later had 2 rotator cuff surgeries on my right shoulder\"    TONSILLECTOMY  age 26   Georganne Acre TOTAL KNEE ARTHROPLASTY Left 4/28/2021    LEFT KNEE TOTAL ARTHROPLASTY ROBOTIC performed by Johanny Castañeda MD at [facility]       Social History:    Social History     Tobacco Use    Smoking status: Every Day     Packs/day: 1.00     Years: 20.00     Pack years: 20.00     Types: Cigarettes    Smokeless tobacco: Never   Substance Use Topics    Alcohol use: No     Alcohol/week: 0.0 standard drinks                                Ready to quit: Not Answered  Counseling given: Not Answered      Vital Signs (Current): There were no vitals filed for this visit.                                            BP Readings from Last 3 Encounters:   09/27/22 118/62   08/29/22 (!) 140/80   01/05/22 (!) 154/77       NPO Status: BMI:   Wt Readings from Last 3 Encounters:   09/27/22 250 lb 3.2 oz (113.5 kg)   08/29/22 243 lb 3.2 oz (110.3 kg)   01/05/22 200 lb (90.7 kg)     There is no height or weight on file to calculate BMI.    CBC:   Lab Results   Component Value Date/Time    WBC 6.0 01/05/2022 05:21 PM    RBC 4.21 01/05/2022 05:21 PM    HGB 12.0 01/05/2022 05:21 PM    HCT 36.9 01/05/2022 05:21 PM    MCV 87.6 01/05/2022 05:21 PM    RDW 13.5 01/05/2022 05:21 PM     01/05/2022 05:21 PM       CMP:   Lab Results   Component Value Date/Time     09/19/2022 09:59 AM    K 4.4 09/19/2022 09:59 AM     09/19/2022 09:59 AM    CO2 27 09/19/2022 09:59 AM    BUN 14 09/19/2022 09:59 AM    CREATININE 0.9 09/19/2022 09:59 AM    GFRAA >60 09/19/2022 09:59 AM    LABGLOM >60 09/19/2022 09:59 AM    GLUCOSE 86 09/19/2022 09:59 AM    PROT 6.1 01/05/2022 05:21 PM    PROT 7.4 04/30/2012 06:23 AM    CALCIUM 9.9 09/19/2022 09:59 AM    BILITOT 0.2 01/05/2022 05:21 PM    ALKPHOS 118 01/05/2022 05:21 PM    AST 20 01/05/2022 05:21 PM    ALT 23 01/05/2022 05:21 PM       POC Tests: No results for input(s): POCGLU, POCNA, POCK, POCCL, POCBUN, POCHEMO, POCHCT in the last 72 hours. Coags:   Lab Results   Component Value Date/Time    PROTIME 13.6 11/28/2020 03:45 AM    PROTIME 10.4 04/30/2012 06:23 AM    INR 1.12 11/28/2020 03:45 AM    APTT 24.4 07/16/2013 02:42 AM       HCG (If Applicable):   Lab Results   Component Value Date    PREGTESTUR NEGATIVE 03/20/2016        ABGs: No results found for: PHART, PO2ART, MJS2OWM, ICC7KXR, BEART, F4RYTTTZ     Type & Screen (If Applicable):  No results found for: LABABO, LABRH    Drug/Infectious Status (If Applicable):  No results found for: HIV, HEPCAB    COVID-19 Screening (If Applicable):   Lab Results   Component Value Date/Time    COVID19 NOT DETECTED 04/21/2021 08:47 AM           Anesthesia Evaluation  Patient summary reviewed   history of anesthetic complications: PONV.   Airway:           Dental: Pulmonary:   (+) asthma: current smoker                           Cardiovascular:    (+) hypertension:, hyperlipidemia               ROS comment: Normal sinus rhythm   Normal ECG   No previous ECGs available   Confirmed by RISHABH Corona ([id number]) on 4/21/2021 3:47:26 PM     cardiovascular stress test EF68% normal study     echocardiogram EF 55%, normal LV function, moderate LVH      Neuro/Psych:   (+) headaches: migraine headaches, psychiatric history:            GI/Hepatic/Renal:   (+) GERD:, morbid obesity          Endo/Other:    (+) Diabetes, . Abdominal:             Vascular: negative vascular ROS. Other Findings:             Anesthesia Plan      regional and general     ASA 2     ( Pre Anesthesia Assessment complete. Chart reviewed on 10/21/2022)  Induction: intravenous. Attending anesthesiologist reviewed and agrees with [address] [place], [place] [address]ALEJANDRO - CRNA   10/21/2022         Pre Anesthesia Assessment complete.  Chart reviewed on 10/21/2022

## 2022-10-21 NOTE — PROGRESS NOTES
Pt. Awake, alert, and oriented x 4. On room air, vs stable. Pt. Denies nausea. Does c/o a headache, but no pain in her left foot. Tolerating ice chips. Dressing to left foot is dry/intact. No drainage noted. IV infusing without difficulty. SCD to RLE. Pt. Ready for d/c from PACU at this time.

## 2022-10-21 NOTE — PROGRESS NOTES
Pt. Awake, alert, and oriented x 4. On room air, vs stable. Pt. Denies pain in her foot and denies nausea. Pt. Has tolerated a pepsi. Dressing is dry/intact on left foot. IV infusing without difficulty. Pt. Does c/o headache and requesting Tylenol. Family at bedside. Will get discharge paperwork ready and review instructions.

## 2022-10-21 NOTE — DISCHARGE INSTRUCTIONS
*PLEASE FOLLOW INSTRUCTIONS GIVEN TO YOU BY DR. Laura Lorenzana. [facility] in [facility]. [address] (Same Day Surgery)    Do not drive, work around [address] [facility] or use equipment. Do not drink any alcoholic beverages. Do not smoke while alone. Avoid making important decisions. Plan to spend a quiet, relaxed evening @ home. Resume normal activities as you begin to feel better. Eat lightly for your first meal, then gradually increase your diet to what is normal for you. In case of nausea, avoid food and drink only clear liquids. Resume food as nausea ceases. Notify your surgeon if you experience fever, chills, large amount of bleeding, difficulty breathing, persistent nausea and vomiting or any other disturbing problem. Call for a follow-up appointment with your surgeon. Advance Care Planning  People with COVID-19 may have no symptoms, mild symptoms, such as fever, cough, and shortness of breath or they may have more severe illness, developing severe and fatal pneumonia. As a result, Advance Care Planning with attention to naming a health care decision maker (someone you trust to make healthcare decisions for you if you could not speak for yourself) and sharing other health care preferences is important BEFORE a possible health crisis. Please contact your Primary Care Provider to discuss Advance Care Planning. Preventing the Spread of Coronavirus Disease 2019 in Homes and Residential Communities  For the most recent information go to [employer]    Prevention steps for People with confirmed or suspected COVID-19 (including persons under investigation) who do not need to be hospitalized  and   People with confirmed COVID-19 who were hospitalized and determined to be medically stable to go home    Your healthcare provider and public health staff will evaluate whether you can be cared for at home.  If it is determined that you do not need to be hospitalized and can be isolated at home, you will be monitored by staff from your local or state health department. You should follow the prevention steps below until a healthcare provider or local or state health department says you can return to your normal activities. Stay home except to get medical care  People who are mildly ill with COVID-19 are able to isolate at home during their illness. You should restrict activities outside your home, except for getting medical care. Do not go to work, school, or public areas. Avoid using public transportation, ride-sharing, or taxis. Separate yourself from other people and animals in your home  People: As much as possible, you should stay in a specific room and away from other people in your home. Also, you should use a separate bathroom, if available. Animals: You should restrict contact with pets and other animals while you are sick with COVID-19, just like you would around other people. Although there have not been reports of pets or other animals becoming sick with COVID-19, it is still recommended that people sick with COVID-19 limit contact with animals until more information is known about the virus. When possible, have another member of your household care for your animals while you are sick. If you are sick with COVID-19, avoid contact with your pet, including petting, snuggling, being kissed or licked, and sharing food. If you must care for your pet or be around animals while you are sick, wash your hands before and after you interact with pets and wear a facemask. Call ahead before visiting your doctor  If you have a medical appointment, call the healthcare provider and tell them that you have or may have COVID-19. This will help the healthcare providers office take steps to keep other people from getting infected or exposed.   Wear a facemask  You should wear a facemask when you are around other people (e.g., sharing a room or vehicle) or pets and before you enter a healthcare providers office. If you are not able to wear a facemask (for example, because it causes trouble breathing), then people who live with you should not stay in the same room with you, or they should wear a facemask if they enter your room. Cover your coughs and sneezes  Cover your mouth and nose with a tissue when you cough or sneeze. Throw used tissues in a lined trash can. Immediately wash your hands with soap and water for at least 20 seconds or, if soap and water are not available, clean your hands with an alcohol-based hand  that contains at least 60% alcohol. Clean your hands often  Wash your hands often with soap and water for at least 20 seconds, especially after blowing your nose, coughing, or sneezing; going to the bathroom; and before eating or preparing food. If soap and water are not readily available, use an alcohol-based hand  with at least 60% alcohol, covering all surfaces of your hands and rubbing them together until they feel dry. Soap and water are the best option if hands are visibly dirty. Avoid touching your eyes, nose, and mouth with unwashed hands. Avoid sharing personal household items  You should not share dishes, drinking glasses, cups, eating utensils, towels, or bedding with other people or pets in your home. After using these items, they should be washed thoroughly with soap and water. Clean all high-touch surfaces everyday  High touch surfaces include counters, tabletops, doorknobs, bathroom fixtures, toilets, phones, keyboards, tablets, and bedside tables. Also, clean any surfaces that may have blood, stool, or body fluids on them. Use a household cleaning spray or wipe, according to the label instructions.  Labels contain instructions for safe and effective use of the cleaning product including precautions you should take when applying the product, such as wearing gloves and making sure you have good ventilation during use of the product. Monitor your symptoms  Seek prompt medical attention if your illness is worsening (e.g., difficulty breathing). Before seeking care, call your healthcare provider and tell them that you have, or are being evaluated for, COVID-19. Put on a facemask before you enter the facility. These steps will help the healthcare providers office to keep other people in the office or waiting room from getting infected or exposed. Ask your healthcare provider to call the local or state health department. Persons who are placed under active monitoring or facilitated self-monitoring should follow instructions provided by their local health department or occupational health professionals, as appropriate. When working with your local health department check their available hours. If you have a medical emergency and need to call 911, notify the dispatch personnel that you have, or are being evaluated for COVID-19. If possible, put on a facemask before emergency medical services arrive. Discontinuing home isolation  Patients with confirmed COVID-19 should remain under home isolation precautions until the risk of secondary transmission to others is thought to be low. The decision to discontinue home isolation precautions should be made on a case-by-case basis, in consultation with healthcare providers and state and local health departments.

## 2022-10-21 NOTE — H&P
..The patient was counseled at length about the risks of yunior Covid-19 during their perioperative period and any recovery window from their procedure. The patient was made aware that yunior Covid-19  may worsen their prognosis for recovering from their procedure  and lend to a higher morbidity and/or mortality risk. All material risks, benefits, and reasonable alternatives including postponing the procedure were discussed. The patient does wish to proceed with the procedure at this time.

## 2022-10-21 NOTE — ANESTHESIA POSTPROCEDURE EVALUATION
Department of Anesthesiology  Postprocedure Note    Patient: Yady Mendez  MRN: [id number]  YOB: 1965  Date of evaluation: 10/21/2022      Procedure Summary     Date: 10/21/22 Room / Location: [address] [place] [address] 01 / [facility]    Anesthesia Start: 1217 Anesthesia Stop: 1404    Procedures:       LEFT EXTENSOR  TENDON REPAIR (Left)      PLANTAR FASCIOTOMY ENDOSCOPIC (Foot) Diagnosis:       Left foot pain      (Left foot pain [M79.672])    Surgeons: Norman Liu DPM Responsible Provider: ALEJANDRO Barrios CRNA    Anesthesia Type: general ASA Status: 3          Anesthesia Type: No value filed.     Khris Phase I:      Khris Phase II:        Anesthesia Post Evaluation    Patient location during evaluation: PACU  Patient participation: complete - patient participated  Level of consciousness: awake and alert  Pain score: 3  Airway patency: patent  Nausea & Vomiting: no vomiting and no nausea  Complications: no  Cardiovascular status: blood pressure returned to baseline and hemodynamically stable  Respiratory status: acceptable, spontaneous ventilation, nonlabored ventilation and nasal cannula  Hydration status: stable

## 2022-10-22 NOTE — OP NOTE
[facility]                  [address] [facility], [address] [facility]                                OPERATIVE REPORT    PATIENT NAME: Carlos Short                      :        1965  MED REC NO:   [id number]                          ROOM:  ACCOUNT NO:   [de-identified]                           ADMIT DATE: 10/21/2022  PROVIDER:     Naseem James DPM    DATE OF PROCEDURE:  10/21/2022    ATTENDING SURGEON:  Naseem James DPM    PREOPERATIVE DIAGNOSES:  Peroneal extensor tendon tear and tendonitis on  the left and also plantar fasciitis on the left. The tendonitis is  M76.72 and plantar fasciitis is M72.2. Pain in the left foot as well  M79.672. POSTOPERATIVE DIAGNOSES:  Peroneal extensor tendon tear and tendonitis  on the left and also plantar fasciitis on the left. The tendonitis is  M76.72 and plantar fasciitis is M72.2. Pain in the left foot as well  M79.672. OPERATIONS PERFORMED:  1. Peroneal tendon repair/extensive tendon repair of left foot and  ankle, 32498. 2.  Endoscopic plantar fasciotomy on the left, 24140. COMPLICATIONS:  None. CONDITION:  Stable. INJECTIONS:  The patient had Marcaine plain 10 mL utilized  postoperatively. INDICATION FOR THE PROCEDURE:  The patient is a very pleasant  50-year-old female who has been seen in my office for quite some time. The patient has had continuing pain associated to the left lower  extremity and had tried and only partially responded to conservative  therapy. The patient subsequently got an MRI and it showed that she had  definitive tendon tear of peroneus brevis tendon on the left and also  plantar fasciitis. I went over all risks, benefits, and complications  of the surgical intervention in detail. The patient understands both  conservative options and surgical intervention and wished to proceed  with surgery. Consent was signed and submitted to chart.   The patient  also understands the increased risk of COVID. DESCRIPTION OF PROCEDURE:  Under mild sedation, the patient was brought  to the OR and placed on the operating room table in a supine position. Following the induction of general anesthesia, the left foot was  prepped, scrubbed, and draped in normal aseptic technique. Attention  was then directed to the medial heel region where we did our typical 5.5  cm from the posterior and about 2 cm up to just initially identify our  landmark. We then inflated the tourniquet and did our little stab  incision, carried it down deeper with a curved stat. We then put our  Cook across. We did notice our pucker like we were always looking for  and then we removed the Cook and then utilized the obturator and  cannula to recreate this and then made our incision laterally and pushed  through. Next, we irrigated the region and we did a couple of passes of  a Q-tip to the region of the cannula and then placed our arthroscopy. We evaluated the area. We were able to easily identify the medial and  central band of the plantar fascia. At this time, we released that  first with the triangle blade and then the hook blade. We irrigated. We made sure we took pictures of the muscle belly underneath and  confirmed that. At that time, we irrigated, pulled all  instrumentations, and then closed the medial and lateral portals with  4-0 nylon two simple interrupted sutures. We then redirected our  thoughts and progression to the lateral ankle and foot. At this time,  we did careful dissection down to the region of the peroneal tendon  sheath with care to cauterize all bleeders. We went down, we entered  the sheath, extended it both proximal and distal and at this time, we  saw a large flap tear in the peroneus brevis tendon. We went ahead at  this time and cut out approximately 50% of the tendon and we retubalized  the tendon with 4-0 Prolene in continuous interlocking technique.   This  worked really well to repair the tendon. I was very happy with the  repair. At this time, we irrigated with copious amounts of normal  sterile saline. We then did our closure with 0 pop-off Vicryl for the  retinaculum, 3-0 Vicryl for our sheath, 3-0 and 4-0 Vicryl for  subcutaneous and subcuticular regions, and 4-0 nylon for the skin. The  patient had the above listed injectable. We then dressed it with  Betadine-soaked Adaptic, 4 x 4s, Kerlix x2, Webril x3, a posterior  splint, and a double 6-inch Ace just at 90 degrees. The patient  tolerated the procedure and anesthesia well and left the OR with vital  signs stable and vascular status intact to the left foot. The patient  will be seen in our office next week for a followup appointment. The  patient has Percocet for pain. She will be taught Lovenox injections to  begin tomorrow. She said to see me on 10/27/2022 at 2:15. I will give  her a courtesy call on Sunday to check on her and see how she is doing  and we shall go from there. Surgery went very well.         Titus Lehman DPM    D: 10/21/2022 14:26:35       T: 10/22/2022 6:07:48     [email or web address]  Job#: [id number]     Doc#: [id number]    CC:

## 2023-01-12 ENCOUNTER — HOSPITAL ENCOUNTER (OUTPATIENT)
Dept: MRI IMAGING | Age: 58
Discharge: HOME | End: 2023-01-12
Payer: MEDICAID

## 2023-01-12 DIAGNOSIS — M54.16 LUMBAR RADICULOPATHY: ICD-10-CM

## 2023-01-12 PROCEDURE — 72148 MRI LUMBAR SPINE W/O DYE: CPT

## 2023-03-13 RX ORDER — FUROSEMIDE 20 MG/1
20 TABLET ORAL DAILY
Qty: 30 TABLET | Refills: 5 | Status: SHIPPED | OUTPATIENT
Start: 2023-03-13

## 2023-04-14 ENCOUNTER — HOSPITAL ENCOUNTER (EMERGENCY)
Age: 58
Discharge: HOME | End: 2023-04-14
Attending: EMERGENCY MEDICINE
Payer: MEDICAID

## 2023-04-14 VITALS
HEART RATE: 86 BPM | BODY MASS INDEX: 43.39 KG/M2 | TEMPERATURE: 98 F | WEIGHT: 270 LBS | OXYGEN SATURATION: 97 % | HEIGHT: 66 IN | DIASTOLIC BLOOD PRESSURE: 90 MMHG | SYSTOLIC BLOOD PRESSURE: 158 MMHG | RESPIRATION RATE: 20 BRPM

## 2023-04-14 DIAGNOSIS — M79.89 LEG SWELLING: Primary | ICD-10-CM

## 2023-04-14 DIAGNOSIS — I87.8 VENOUS STASIS: ICD-10-CM

## 2023-04-14 DIAGNOSIS — L03.119 CELLULITIS OF LOWER EXTREMITY, UNSPECIFIED LATERALITY: ICD-10-CM

## 2023-04-14 PROCEDURE — 99283 EMERGENCY DEPT VISIT LOW MDM: CPT

## 2023-04-14 PROCEDURE — 6370000000 HC RX 637 (ALT 250 FOR IP): Performed by: EMERGENCY MEDICINE

## 2023-04-14 RX ORDER — CLINDAMYCIN HYDROCHLORIDE 300 MG/1
300 CAPSULE ORAL 3 TIMES DAILY
Qty: 30 CAPSULE | Refills: 0 | Status: SHIPPED | OUTPATIENT
Start: 2023-04-14 | End: 2023-04-24

## 2023-04-14 RX ORDER — FLUCONAZOLE 100 MG/1
200 TABLET ORAL ONCE
Status: COMPLETED | OUTPATIENT
Start: 2023-04-14 | End: 2023-04-14

## 2023-04-14 RX ORDER — FLUCONAZOLE 150 MG/1
150 TABLET ORAL ONCE
Qty: 1 TABLET | Refills: 0 | Status: SHIPPED | OUTPATIENT
Start: 2023-04-14 | End: 2023-04-14

## 2023-04-14 RX ORDER — CLINDAMYCIN HYDROCHLORIDE 150 MG/1
300 CAPSULE ORAL ONCE
Status: COMPLETED | OUTPATIENT
Start: 2023-04-14 | End: 2023-04-14

## 2023-04-14 RX ORDER — ONDANSETRON 4 MG/1
4 TABLET, ORALLY DISINTEGRATING ORAL 3 TIMES DAILY PRN
Qty: 21 TABLET | Refills: 0 | Status: SHIPPED | OUTPATIENT
Start: 2023-04-14

## 2023-04-14 RX ADMIN — CLINDAMYCIN HYDROCHLORIDE 300 MG: 150 CAPSULE ORAL at 19:43

## 2023-04-14 RX ADMIN — FLUCONAZOLE 200 MG: 100 TABLET ORAL at 19:43

## 2023-04-14 ASSESSMENT — ENCOUNTER SYMPTOMS
WHEEZING: 0
COLOR CHANGE: 1
FACIAL SWELLING: 0
SINUS PRESSURE: 0
RHINORRHEA: 0
BACK PAIN: 0
CHEST TIGHTNESS: 0
SINUS PAIN: 0
SORE THROAT: 0
RESPIRATORY NEGATIVE: 1
EYE DISCHARGE: 0
ABDOMINAL PAIN: 0
DIARRHEA: 0
SHORTNESS OF BREATH: 0
COUGH: 0
CONSTIPATION: 0
EYE PAIN: 0
EYE REDNESS: 0
NAUSEA: 0
VOMITING: 0
GASTROINTESTINAL NEGATIVE: 1
EYES NEGATIVE: 1

## 2023-04-14 ASSESSMENT — PAIN SCALES - GENERAL: PAINLEVEL_OUTOF10: 8

## 2023-04-14 ASSESSMENT — PAIN - FUNCTIONAL ASSESSMENT: PAIN_FUNCTIONAL_ASSESSMENT: 0-10

## 2023-04-14 ASSESSMENT — PAIN DESCRIPTION - ORIENTATION: ORIENTATION: RIGHT;LEFT

## 2023-04-14 ASSESSMENT — PAIN DESCRIPTION - DESCRIPTORS: DESCRIPTORS: ACHING

## 2023-04-14 ASSESSMENT — PAIN DESCRIPTION - LOCATION: LOCATION: LEG

## 2023-04-15 NOTE — ED PROVIDER NOTES
mouth 3 times daily as needed (abdominal pain) 8/13/20   Timothy Vicente MD   spironolactone (ALDACTONE) 25 MG tablet TAKE 1 TABLET BY MOUTH ONE TIME A DAY 5/28/20   Historical Provider, MD        Patient Active Problem List   Diagnosis    Obesity, Class I, BMI 30-34.9    HTN (hypertension)    Right knee pain    Hyperlipidemia    GERD (gastroesophageal reflux disease)    Patellofemoral syndrome    DJD (degenerative joint disease) of knee    Tobacco dependence    Major depressive disorder, recurrent episode with anxious distress (HCC)    Mild persistent asthma without complication    Urinary incontinence    Irritable bowel syndrome with diarrhea    Right ureteral stone    Primary osteoarthritis of left knee    Status post left knee replacement    SOB (shortness of breath) on exertion    Leg swelling    Family history of coronary arteriosclerosis         SURGICAL HISTORY       Past Surgical History:   Procedure Laterality Date    CHOLECYSTECTOMY  age20's    CYSTOSCOPY INSERTION / REMOVAL STENT / STONE N/A 11/28/2020    CYSTOSCOPY RETROGRADE PYELOGRAM STENT INSERTION [facility]ma [facility] Right 1/21/2021    CYSTOSCOPY STENT EXCHANGE URETEROSCOPY EXTRACTION OF STONES performed by Marialuisa Cleary MD at [address] [facility] Bilateral     tarsal tunnel, hammer toe, neuroma removed -1990's    FOOT TENDON SURGERY Left 10/21/2022    LEFT EXTENSOR  TENDON REPAIR performed by Leoncio Garrido DPM at [address]  2019?     ventral hernia repair    HYSTERECTOMY (CERVIX STATUS UNKNOWN)  2001    KIDNEY STONE SURGERY      per old chart had surg for left nephrostolithotomy and cysto /lithotripsy in [address] [place] [address] Left 10/21/2022    PLANTAR FASCIOTOMY ENDOSCOPIC performed by Leoncio Garrido DPM at [address]     \"open surg right shoulder and later had 2 rotator cuff surgeries on my right shoulder\"

## 2023-04-24 ENCOUNTER — APPOINTMENT (OUTPATIENT)
Dept: GENERAL RADIOLOGY | Age: 58
DRG: 720 | End: 2023-04-24
Payer: MEDICAID

## 2023-04-24 ENCOUNTER — APPOINTMENT (OUTPATIENT)
Dept: ULTRASOUND IMAGING | Age: 58
DRG: 720 | End: 2023-04-24
Payer: MEDICAID

## 2023-04-24 ENCOUNTER — HOSPITAL ENCOUNTER (INPATIENT)
Age: 58
LOS: 7 days | Discharge: HOME | DRG: 720 | End: 2023-05-01
Attending: EMERGENCY MEDICINE | Admitting: STUDENT IN AN ORGANIZED HEALTH CARE EDUCATION/TRAINING PROGRAM
Payer: MEDICAID

## 2023-04-24 DIAGNOSIS — R51.9 ACUTE NONINTRACTABLE HEADACHE, UNSPECIFIED HEADACHE TYPE: ICD-10-CM

## 2023-04-24 DIAGNOSIS — L03.116 BILATERAL LOWER LEG CELLULITIS: ICD-10-CM

## 2023-04-24 DIAGNOSIS — A41.9 SEPSIS WITHOUT ACUTE ORGAN DYSFUNCTION, DUE TO UNSPECIFIED ORGANISM (HCC): ICD-10-CM

## 2023-04-24 DIAGNOSIS — L03.115 BILATERAL LOWER LEG CELLULITIS: ICD-10-CM

## 2023-04-24 DIAGNOSIS — L03.119 CELLULITIS OF LOWER EXTREMITY, UNSPECIFIED LATERALITY: Primary | ICD-10-CM

## 2023-04-24 LAB
ALBUMIN SERPL-MCNC: 3.9 GM/DL (ref 3.4–5)
ALP BLD-CCNC: 137 IU/L (ref 40–129)
ALT SERPL-CCNC: 47 U/L (ref 10–40)
ANION GAP SERPL CALCULATED.3IONS-SCNC: 14 MMOL/L (ref 4–16)
AST SERPL-CCNC: 48 IU/L (ref 15–37)
BASOPHILS ABSOLUTE: 0 K/CU MM
BASOPHILS RELATIVE PERCENT: 0.2 % (ref 0–1)
BILIRUB SERPL-MCNC: 1 MG/DL (ref 0–1)
BUN SERPL-MCNC: 14 MG/DL (ref 6–23)
CALCIUM SERPL-MCNC: 8.9 MG/DL (ref 8.3–10.6)
CHLORIDE BLD-SCNC: 93 MMOL/L (ref 99–110)
CHP ED QC CHECK: YES
CO2: 27 MMOL/L (ref 21–32)
CREAT SERPL-MCNC: 1 MG/DL (ref 0.6–1.1)
DIFFERENTIAL TYPE: ABNORMAL
EOSINOPHILS ABSOLUTE: 0.1 K/CU MM
EOSINOPHILS RELATIVE PERCENT: 0.4 % (ref 0–3)
ESTIMATED AVERAGE GLUCOSE: 180 MG/DL
GFR SERPL CREATININE-BSD FRML MDRD: >60 ML/MIN/1.73M2
GLUCOSE BLD-MCNC: 223 MG/DL (ref 70–99)
GLUCOSE BLD-MCNC: 315 MG/DL (ref 70–99)
GLUCOSE BLD-MCNC: 337 MG/DL
GLUCOSE BLD-MCNC: 337 MG/DL (ref 70–99)
GLUCOSE SERPL-MCNC: 237 MG/DL (ref 70–99)
HBA1C MFR BLD: 7.9 % (ref 4.2–6.3)
HCT VFR BLD CALC: 37.5 % (ref 37–47)
HEMOGLOBIN: 11.9 GM/DL (ref 12.5–16)
IMMATURE NEUTROPHIL %: 0.9 % (ref 0–0.43)
INFLUENZA A ANTIGEN: NOT DETECTED
INFLUENZA B ANTIGEN: NOT DETECTED
LACTATE: 2.8 MMOL/L (ref 0.5–1.9)
LACTIC ACID, SEPSIS: 1.2 MMOL/L (ref 0.5–1.9)
LYMPHOCYTES ABSOLUTE: 0.7 K/CU MM
LYMPHOCYTES RELATIVE PERCENT: 3.6 % (ref 24–44)
MCH RBC QN AUTO: 28.1 PG (ref 27–31)
MCHC RBC AUTO-ENTMCNC: 31.7 % (ref 32–36)
MCV RBC AUTO: 88.7 FL (ref 78–100)
MONOCYTES ABSOLUTE: 0.7 K/CU MM
MONOCYTES RELATIVE PERCENT: 3.5 % (ref 0–4)
NUCLEATED RBC %: 0 %
PDW BLD-RTO: 14.3 % (ref 11.7–14.9)
PLATELET # BLD: 247 K/CU MM (ref 140–440)
PMV BLD AUTO: 9.4 FL (ref 7.5–11.1)
POTASSIUM SERPL-SCNC: 4.5 MMOL/L (ref 3.5–5.1)
PROCALCITONIN SERPL-MCNC: 0.98 NG/ML
RBC # BLD: 4.23 M/CU MM (ref 4.2–5.4)
REASON FOR REJECTION: NORMAL
REJECTED TEST: NORMAL
SARS-COV-2 RDRP RESP QL NAA+PROBE: NOT DETECTED
SEGMENTED NEUTROPHILS ABSOLUTE COUNT: 18.2 K/CU MM
SEGMENTED NEUTROPHILS RELATIVE PERCENT: 91.4 % (ref 36–66)
SODIUM BLD-SCNC: 134 MMOL/L (ref 135–145)
SOURCE: NORMAL
TOTAL IMMATURE NEUTOROPHIL: 0.18 K/CU MM
TOTAL NUCLEATED RBC: 0 K/CU MM
TOTAL PROTEIN: 7.4 GM/DL (ref 6.4–8.2)
WBC # BLD: 19.9 K/CU MM (ref 4–10.5)

## 2023-04-24 PROCEDURE — 82962 GLUCOSE BLOOD TEST: CPT

## 2023-04-24 PROCEDURE — 6370000000 HC RX 637 (ALT 250 FOR IP): Performed by: PHYSICIAN ASSISTANT

## 2023-04-24 PROCEDURE — 83036 HEMOGLOBIN GLYCOSYLATED A1C: CPT

## 2023-04-24 PROCEDURE — 80076 HEPATIC FUNCTION PANEL: CPT

## 2023-04-24 PROCEDURE — 84145 PROCALCITONIN (PCT): CPT

## 2023-04-24 PROCEDURE — 6360000002 HC RX W HCPCS: Performed by: EMERGENCY MEDICINE

## 2023-04-24 PROCEDURE — 96375 TX/PRO/DX INJ NEW DRUG ADDON: CPT

## 2023-04-24 PROCEDURE — 87635 SARS-COV-2 COVID-19 AMP PRB: CPT

## 2023-04-24 PROCEDURE — 80202 ASSAY OF VANCOMYCIN: CPT

## 2023-04-24 PROCEDURE — 6370000000 HC RX 637 (ALT 250 FOR IP): Performed by: EMERGENCY MEDICINE

## 2023-04-24 PROCEDURE — 83605 ASSAY OF LACTIC ACID: CPT

## 2023-04-24 PROCEDURE — 96365 THER/PROPH/DIAG IV INF INIT: CPT

## 2023-04-24 PROCEDURE — 99285 EMERGENCY DEPT VISIT HI MDM: CPT

## 2023-04-24 PROCEDURE — 92610 EVALUATE SWALLOWING FUNCTION: CPT

## 2023-04-24 PROCEDURE — 2700000000 HC OXYGEN THERAPY PER DAY

## 2023-04-24 PROCEDURE — 87150 DNA/RNA AMPLIFIED PROBE: CPT

## 2023-04-24 PROCEDURE — 94664 DEMO&/EVAL PT USE INHALER: CPT

## 2023-04-24 PROCEDURE — 71045 X-RAY EXAM CHEST 1 VIEW: CPT

## 2023-04-24 PROCEDURE — 87502 INFLUENZA DNA AMP PROBE: CPT

## 2023-04-24 PROCEDURE — 94640 AIRWAY INHALATION TREATMENT: CPT

## 2023-04-24 PROCEDURE — 36415 COLL VENOUS BLD VENIPUNCTURE: CPT

## 2023-04-24 PROCEDURE — 93971 EXTREMITY STUDY: CPT

## 2023-04-24 PROCEDURE — 6360000002 HC RX W HCPCS: Performed by: PHYSICIAN ASSISTANT

## 2023-04-24 PROCEDURE — 94761 N-INVAS EAR/PLS OXIMETRY MLT: CPT

## 2023-04-24 PROCEDURE — 2060000000 HC ICU INTERMEDIATE R&B

## 2023-04-24 PROCEDURE — 2580000003 HC RX 258: Performed by: EMERGENCY MEDICINE

## 2023-04-24 PROCEDURE — 80053 COMPREHEN METABOLIC PANEL: CPT

## 2023-04-24 PROCEDURE — 87040 BLOOD CULTURE FOR BACTERIA: CPT

## 2023-04-24 PROCEDURE — 2580000003 HC RX 258: Performed by: PHYSICIAN ASSISTANT

## 2023-04-24 PROCEDURE — 85025 COMPLETE CBC W/AUTO DIFF WBC: CPT

## 2023-04-24 RX ORDER — FUROSEMIDE 20 MG/1
20 TABLET ORAL DAILY
Status: DISCONTINUED | OUTPATIENT
Start: 2023-04-25 | End: 2023-04-27 | Stop reason: ALTCHOICE

## 2023-04-24 RX ORDER — BECLOMETHASONE DIPROPIONATE HFA 40 UG/1
2 AEROSOL, METERED RESPIRATORY (INHALATION) 2 TIMES DAILY
COMMUNITY
Start: 2023-03-31

## 2023-04-24 RX ORDER — INSULIN LISPRO 100 [IU]/ML
0-8 INJECTION, SOLUTION INTRAVENOUS; SUBCUTANEOUS
Status: DISCONTINUED | OUTPATIENT
Start: 2023-04-24 | End: 2023-04-30

## 2023-04-24 RX ORDER — PANTOPRAZOLE SODIUM 40 MG/1
40 TABLET, DELAYED RELEASE ORAL EVERY MORNING
Status: DISCONTINUED | OUTPATIENT
Start: 2023-04-24 | End: 2023-05-01 | Stop reason: HOSPADM

## 2023-04-24 RX ORDER — ALBUTEROL SULFATE 90 UG/1
2 AEROSOL, METERED RESPIRATORY (INHALATION) 4 TIMES DAILY
Status: DISCONTINUED | OUTPATIENT
Start: 2023-04-24 | End: 2023-04-26

## 2023-04-24 RX ORDER — SODIUM CHLORIDE 0.9 % (FLUSH) 0.9 %
5-40 SYRINGE (ML) INJECTION EVERY 12 HOURS SCHEDULED
Status: DISCONTINUED | OUTPATIENT
Start: 2023-04-24 | End: 2023-05-01 | Stop reason: HOSPADM

## 2023-04-24 RX ORDER — FOLIC ACID 1 MG/1
1 TABLET ORAL DAILY
Status: DISCONTINUED | OUTPATIENT
Start: 2023-04-24 | End: 2023-05-01 | Stop reason: HOSPADM

## 2023-04-24 RX ORDER — SPIRONOLACTONE 50 MG/1
25 TABLET, FILM COATED ORAL DAILY
Status: DISCONTINUED | OUTPATIENT
Start: 2023-04-25 | End: 2023-05-01 | Stop reason: HOSPADM

## 2023-04-24 RX ORDER — ACETAMINOPHEN 500 MG
1000 TABLET ORAL ONCE
Status: COMPLETED | OUTPATIENT
Start: 2023-04-24 | End: 2023-04-24

## 2023-04-24 RX ORDER — FLUTICASONE PROPIONATE 50 MCG
1 SPRAY, SUSPENSION (ML) NASAL DAILY
Status: DISCONTINUED | OUTPATIENT
Start: 2023-04-24 | End: 2023-05-01 | Stop reason: HOSPADM

## 2023-04-24 RX ORDER — TIZANIDINE 4 MG/1
4 TABLET ORAL NIGHTLY
COMMUNITY
Start: 2023-04-13

## 2023-04-24 RX ORDER — TIZANIDINE 4 MG/1
4 TABLET ORAL NIGHTLY
Status: DISCONTINUED | OUTPATIENT
Start: 2023-04-24 | End: 2023-05-01 | Stop reason: HOSPADM

## 2023-04-24 RX ORDER — GABAPENTIN 300 MG/1
300 CAPSULE ORAL 3 TIMES DAILY
Status: DISCONTINUED | OUTPATIENT
Start: 2023-04-24 | End: 2023-05-01 | Stop reason: HOSPADM

## 2023-04-24 RX ORDER — ALBUTEROL SULFATE 90 UG/1
2 AEROSOL, METERED RESPIRATORY (INHALATION)
COMMUNITY

## 2023-04-24 RX ORDER — ENOXAPARIN SODIUM 100 MG/ML
30 INJECTION SUBCUTANEOUS 2 TIMES DAILY
Status: DISCONTINUED | OUTPATIENT
Start: 2023-04-24 | End: 2023-05-01 | Stop reason: HOSPADM

## 2023-04-24 RX ORDER — POLYETHYLENE GLYCOL 3350 17 G/17G
17 POWDER, FOR SOLUTION ORAL DAILY PRN
Status: DISCONTINUED | OUTPATIENT
Start: 2023-04-24 | End: 2023-05-01 | Stop reason: HOSPADM

## 2023-04-24 RX ORDER — SODIUM CHLORIDE 0.9 % (FLUSH) 0.9 %
5-40 SYRINGE (ML) INJECTION PRN
Status: DISCONTINUED | OUTPATIENT
Start: 2023-04-24 | End: 2023-05-01 | Stop reason: HOSPADM

## 2023-04-24 RX ORDER — ATORVASTATIN CALCIUM 40 MG/1
40 TABLET, FILM COATED ORAL DAILY
Status: CANCELLED | OUTPATIENT
Start: 2023-04-24

## 2023-04-24 RX ORDER — KETOROLAC TROMETHAMINE 30 MG/ML
15 INJECTION, SOLUTION INTRAMUSCULAR; INTRAVENOUS ONCE
Status: COMPLETED | OUTPATIENT
Start: 2023-04-24 | End: 2023-04-24

## 2023-04-24 RX ORDER — GLUCAGON 1 MG/ML
1 KIT INJECTION PRN
Status: DISCONTINUED | OUTPATIENT
Start: 2023-04-24 | End: 2023-05-01 | Stop reason: HOSPADM

## 2023-04-24 RX ORDER — ACETAMINOPHEN 650 MG/1
650 SUPPOSITORY RECTAL EVERY 6 HOURS PRN
Status: DISCONTINUED | OUTPATIENT
Start: 2023-04-24 | End: 2023-05-01 | Stop reason: HOSPADM

## 2023-04-24 RX ORDER — ONDANSETRON 4 MG/1
4 TABLET, ORALLY DISINTEGRATING ORAL EVERY 8 HOURS PRN
Status: DISCONTINUED | OUTPATIENT
Start: 2023-04-24 | End: 2023-05-01 | Stop reason: HOSPADM

## 2023-04-24 RX ORDER — ACETAMINOPHEN 325 MG/1
650 TABLET ORAL EVERY 6 HOURS PRN
Status: DISCONTINUED | OUTPATIENT
Start: 2023-04-24 | End: 2023-05-01 | Stop reason: HOSPADM

## 2023-04-24 RX ORDER — INSULIN LISPRO 100 [IU]/ML
0-4 INJECTION, SOLUTION INTRAVENOUS; SUBCUTANEOUS NIGHTLY
Status: DISCONTINUED | OUTPATIENT
Start: 2023-04-24 | End: 2023-04-30

## 2023-04-24 RX ORDER — ATORVASTATIN CALCIUM 10 MG/1
20 TABLET, FILM COATED ORAL DAILY
Status: DISCONTINUED | OUTPATIENT
Start: 2023-04-24 | End: 2023-05-01 | Stop reason: HOSPADM

## 2023-04-24 RX ORDER — MONTELUKAST SODIUM 10 MG/1
10 TABLET ORAL NIGHTLY
COMMUNITY

## 2023-04-24 RX ORDER — DICYCLOMINE HCL 20 MG
20 TABLET ORAL 3 TIMES DAILY PRN
Status: DISCONTINUED | OUTPATIENT
Start: 2023-04-24 | End: 2023-05-01 | Stop reason: HOSPADM

## 2023-04-24 RX ORDER — 0.9 % SODIUM CHLORIDE 0.9 %
1000 INTRAVENOUS SOLUTION INTRAVENOUS ONCE
Status: COMPLETED | OUTPATIENT
Start: 2023-04-24 | End: 2023-04-24

## 2023-04-24 RX ORDER — ATORVASTATIN CALCIUM 20 MG/1
20 TABLET, FILM COATED ORAL DAILY
COMMUNITY
Start: 2023-03-31

## 2023-04-24 RX ORDER — DEXTROSE MONOHYDRATE 100 MG/ML
INJECTION, SOLUTION INTRAVENOUS CONTINUOUS PRN
Status: DISCONTINUED | OUTPATIENT
Start: 2023-04-24 | End: 2023-05-01 | Stop reason: HOSPADM

## 2023-04-24 RX ORDER — ONDANSETRON 2 MG/ML
4 INJECTION INTRAMUSCULAR; INTRAVENOUS EVERY 6 HOURS PRN
Status: DISCONTINUED | OUTPATIENT
Start: 2023-04-24 | End: 2023-05-01 | Stop reason: HOSPADM

## 2023-04-24 RX ORDER — SODIUM CHLORIDE 9 MG/ML
INJECTION, SOLUTION INTRAVENOUS PRN
Status: DISCONTINUED | OUTPATIENT
Start: 2023-04-24 | End: 2023-05-01 | Stop reason: HOSPADM

## 2023-04-24 RX ORDER — CLINDAMYCIN HYDROCHLORIDE 300 MG/1
300 CAPSULE ORAL 3 TIMES DAILY
Status: ON HOLD | COMMUNITY
End: 2023-05-01 | Stop reason: HOSPADM

## 2023-04-24 RX ADMIN — ALBUTEROL SULFATE 2 PUFF: 90 AEROSOL, METERED RESPIRATORY (INHALATION) at 20:17

## 2023-04-24 RX ADMIN — GABAPENTIN 300 MG: 300 CAPSULE ORAL at 15:17

## 2023-04-24 RX ADMIN — CEFEPIME 2000 MG: 2 INJECTION, POWDER, FOR SOLUTION INTRAVENOUS at 22:23

## 2023-04-24 RX ADMIN — CEFEPIME 2000 MG: 2 INJECTION, POWDER, FOR SOLUTION INTRAVENOUS at 06:35

## 2023-04-24 RX ADMIN — VANCOMYCIN HYDROCHLORIDE 750 MG: 750 INJECTION, POWDER, LYOPHILIZED, FOR SOLUTION INTRAVENOUS at 23:28

## 2023-04-24 RX ADMIN — GABAPENTIN 300 MG: 300 CAPSULE ORAL at 22:22

## 2023-04-24 RX ADMIN — ACETAMINOPHEN 650 MG: 325 TABLET ORAL at 22:32

## 2023-04-24 RX ADMIN — ATORVASTATIN CALCIUM 20 MG: 10 TABLET, FILM COATED ORAL at 22:22

## 2023-04-24 RX ADMIN — PANTOPRAZOLE SODIUM 40 MG: 40 TABLET, DELAYED RELEASE ORAL at 15:17

## 2023-04-24 RX ADMIN — ALBUTEROL SULFATE 2 PUFF: 90 AEROSOL, METERED RESPIRATORY (INHALATION) at 12:53

## 2023-04-24 RX ADMIN — SERTRALINE HYDROCHLORIDE 25 MG: 50 TABLET ORAL at 15:17

## 2023-04-24 RX ADMIN — INSULIN LISPRO 6 UNITS: 100 INJECTION, SOLUTION INTRAVENOUS; SUBCUTANEOUS at 18:55

## 2023-04-24 RX ADMIN — KETOROLAC TROMETHAMINE 15 MG: 30 INJECTION, SOLUTION INTRAMUSCULAR; INTRAVENOUS at 06:36

## 2023-04-24 RX ADMIN — VANCOMYCIN HYDROCHLORIDE 2500 MG: 1.25 INJECTION, POWDER, LYOPHILIZED, FOR SOLUTION INTRAVENOUS at 08:11

## 2023-04-24 RX ADMIN — ACETAMINOPHEN 1000 MG: 500 TABLET ORAL at 06:36

## 2023-04-24 RX ADMIN — TIZANIDINE 4 MG: 4 TABLET ORAL at 22:22

## 2023-04-24 RX ADMIN — POTASSIUM BICARBONATE 20 MEQ: 782 TABLET, EFFERVESCENT ORAL at 15:24

## 2023-04-24 RX ADMIN — ENOXAPARIN SODIUM 30 MG: 100 INJECTION SUBCUTANEOUS at 22:22

## 2023-04-24 RX ADMIN — SODIUM CHLORIDE 1000 ML: 9 INJECTION, SOLUTION INTRAVENOUS at 08:11

## 2023-04-24 RX ADMIN — FOLIC ACID 1 MG: 1 TABLET ORAL at 15:16

## 2023-04-24 RX ADMIN — ALBUTEROL SULFATE 2 PUFF: 90 AEROSOL, METERED RESPIRATORY (INHALATION) at 17:00

## 2023-04-24 ASSESSMENT — PAIN DESCRIPTION - LOCATION
LOCATION: HEAD

## 2023-04-24 ASSESSMENT — PAIN SCALES - GENERAL
PAINLEVEL_OUTOF10: 3
PAINLEVEL_OUTOF10: 3
PAINLEVEL_OUTOF10: 8

## 2023-04-24 ASSESSMENT — PAIN DESCRIPTION - ORIENTATION: ORIENTATION: OTHER (COMMENT)

## 2023-04-24 ASSESSMENT — LIFESTYLE VARIABLES
HOW MANY STANDARD DRINKS CONTAINING ALCOHOL DO YOU HAVE ON A TYPICAL DAY: PATIENT DOES NOT DRINK
HOW OFTEN DO YOU HAVE A DRINK CONTAINING ALCOHOL: NEVER

## 2023-04-25 LAB
ALBUMIN SERPL-MCNC: 4.1 GM/DL (ref 3.4–5)
ALBUMIN SERPL-MCNC: 4.1 GM/DL (ref 3.4–5)
ALP BLD-CCNC: 138 IU/L (ref 40–128)
ALP BLD-CCNC: 138 IU/L (ref 40–129)
ALT SERPL-CCNC: 49 U/L (ref 10–40)
ALT SERPL-CCNC: 49 U/L (ref 10–40)
ANION GAP SERPL CALCULATED.3IONS-SCNC: 16 MMOL/L (ref 4–16)
AST SERPL-CCNC: 50 IU/L (ref 15–37)
AST SERPL-CCNC: 50 IU/L (ref 15–37)
BILIRUB SERPL-MCNC: 0.9 MG/DL (ref 0–1)
BILIRUB SERPL-MCNC: 0.9 MG/DL (ref 0–1)
BILIRUBIN DIRECT: 0.5 MG/DL (ref 0–0.3)
BILIRUBIN, INDIRECT: 0.4 MG/DL (ref 0–0.7)
BUN SERPL-MCNC: 14 MG/DL (ref 6–23)
CALCIUM SERPL-MCNC: 8.9 MG/DL (ref 8.3–10.6)
CHLORIDE BLD-SCNC: 93 MMOL/L (ref 99–110)
CO2: 25 MMOL/L (ref 21–32)
CREAT SERPL-MCNC: 0.9 MG/DL (ref 0.6–1.1)
DOSE AMOUNT: NORMAL
DOSE TIME: NORMAL
GFR SERPL CREATININE-BSD FRML MDRD: >60 ML/MIN/1.73M2
GLUCOSE BLD-MCNC: 211 MG/DL (ref 70–99)
GLUCOSE BLD-MCNC: 240 MG/DL (ref 70–99)
GLUCOSE BLD-MCNC: 267 MG/DL (ref 70–99)
GLUCOSE BLD-MCNC: 319 MG/DL (ref 70–99)
GLUCOSE SERPL-MCNC: 220 MG/DL (ref 70–99)
POTASSIUM SERPL-SCNC: 4.9 MMOL/L (ref 3.5–5.1)
SODIUM BLD-SCNC: 134 MMOL/L (ref 135–145)
TOTAL PROTEIN: 6.7 GM/DL (ref 6.4–8.2)
TOTAL PROTEIN: 6.7 GM/DL (ref 6.4–8.2)
VANCOMYCIN RANDOM: <4 UG/ML

## 2023-04-25 PROCEDURE — 2580000003 HC RX 258: Performed by: PHYSICIAN ASSISTANT

## 2023-04-25 PROCEDURE — 99255 IP/OBS CONSLTJ NEW/EST HI 80: CPT | Performed by: INTERNAL MEDICINE

## 2023-04-25 PROCEDURE — 2580000003 HC RX 258: Performed by: INTERNAL MEDICINE

## 2023-04-25 PROCEDURE — 94761 N-INVAS EAR/PLS OXIMETRY MLT: CPT

## 2023-04-25 PROCEDURE — 6370000000 HC RX 637 (ALT 250 FOR IP): Performed by: INTERNAL MEDICINE

## 2023-04-25 PROCEDURE — 94640 AIRWAY INHALATION TREATMENT: CPT

## 2023-04-25 PROCEDURE — 82962 GLUCOSE BLOOD TEST: CPT

## 2023-04-25 PROCEDURE — 2060000000 HC ICU INTERMEDIATE R&B

## 2023-04-25 PROCEDURE — 87075 CULTR BACTERIA EXCEPT BLOOD: CPT

## 2023-04-25 PROCEDURE — 2700000000 HC OXYGEN THERAPY PER DAY

## 2023-04-25 PROCEDURE — 87077 CULTURE AEROBIC IDENTIFY: CPT

## 2023-04-25 PROCEDURE — 6370000000 HC RX 637 (ALT 250 FOR IP): Performed by: PHYSICIAN ASSISTANT

## 2023-04-25 PROCEDURE — 6360000002 HC RX W HCPCS: Performed by: PHYSICIAN ASSISTANT

## 2023-04-25 PROCEDURE — 87070 CULTURE OTHR SPECIMN AEROBIC: CPT

## 2023-04-25 PROCEDURE — 6360000002 HC RX W HCPCS: Performed by: INTERNAL MEDICINE

## 2023-04-25 RX ORDER — IBUPROFEN 400 MG/1
400 TABLET ORAL
Status: COMPLETED | OUTPATIENT
Start: 2023-04-25 | End: 2023-04-30

## 2023-04-25 RX ADMIN — GABAPENTIN 300 MG: 300 CAPSULE ORAL at 15:09

## 2023-04-25 RX ADMIN — ENOXAPARIN SODIUM 30 MG: 100 INJECTION SUBCUTANEOUS at 09:13

## 2023-04-25 RX ADMIN — POTASSIUM BICARBONATE 20 MEQ: 782 TABLET, EFFERVESCENT ORAL at 09:13

## 2023-04-25 RX ADMIN — CEFEPIME 2000 MG: 2 INJECTION, POWDER, FOR SOLUTION INTRAVENOUS at 09:22

## 2023-04-25 RX ADMIN — INSULIN LISPRO 2 UNITS: 100 INJECTION, SOLUTION INTRAVENOUS; SUBCUTANEOUS at 09:13

## 2023-04-25 RX ADMIN — INSULIN LISPRO 2 UNITS: 100 INJECTION, SOLUTION INTRAVENOUS; SUBCUTANEOUS at 17:26

## 2023-04-25 RX ADMIN — TIZANIDINE 4 MG: 4 TABLET ORAL at 20:12

## 2023-04-25 RX ADMIN — ONDANSETRON 4 MG: 4 TABLET, ORALLY DISINTEGRATING ORAL at 17:26

## 2023-04-25 RX ADMIN — CEFTRIAXONE SODIUM 2000 MG: 2 INJECTION, POWDER, FOR SOLUTION INTRAMUSCULAR; INTRAVENOUS at 15:13

## 2023-04-25 RX ADMIN — ALBUTEROL SULFATE 2 PUFF: 90 AEROSOL, METERED RESPIRATORY (INHALATION) at 20:41

## 2023-04-25 RX ADMIN — PANTOPRAZOLE SODIUM 40 MG: 40 TABLET, DELAYED RELEASE ORAL at 09:13

## 2023-04-25 RX ADMIN — VANCOMYCIN HYDROCHLORIDE 750 MG: 750 INJECTION, POWDER, LYOPHILIZED, FOR SOLUTION INTRAVENOUS at 10:39

## 2023-04-25 RX ADMIN — FUROSEMIDE 20 MG: 20 TABLET ORAL at 09:12

## 2023-04-25 RX ADMIN — ENOXAPARIN SODIUM 30 MG: 100 INJECTION SUBCUTANEOUS at 20:14

## 2023-04-25 RX ADMIN — ATORVASTATIN CALCIUM 20 MG: 10 TABLET, FILM COATED ORAL at 20:12

## 2023-04-25 RX ADMIN — SERTRALINE HYDROCHLORIDE 25 MG: 50 TABLET ORAL at 09:12

## 2023-04-25 RX ADMIN — SPIRONOLACTONE 25 MG: 50 TABLET ORAL at 09:13

## 2023-04-25 RX ADMIN — GABAPENTIN 300 MG: 300 CAPSULE ORAL at 20:13

## 2023-04-25 RX ADMIN — SODIUM CHLORIDE, PRESERVATIVE FREE 10 ML: 5 INJECTION INTRAVENOUS at 09:15

## 2023-04-25 RX ADMIN — INSULIN LISPRO 6 UNITS: 100 INJECTION, SOLUTION INTRAVENOUS; SUBCUTANEOUS at 12:13

## 2023-04-25 RX ADMIN — ACETAMINOPHEN 650 MG: 325 TABLET ORAL at 15:09

## 2023-04-25 RX ADMIN — SODIUM CHLORIDE, PRESERVATIVE FREE 10 ML: 5 INJECTION INTRAVENOUS at 20:14

## 2023-04-25 RX ADMIN — ALBUTEROL SULFATE 2 PUFF: 90 AEROSOL, METERED RESPIRATORY (INHALATION) at 15:25

## 2023-04-25 RX ADMIN — IBUPROFEN 400 MG: 400 TABLET, FILM COATED ORAL at 17:26

## 2023-04-25 RX ADMIN — FOLIC ACID 1 MG: 1 TABLET ORAL at 09:13

## 2023-04-25 RX ADMIN — IBUPROFEN 400 MG: 400 TABLET, FILM COATED ORAL at 20:14

## 2023-04-25 RX ADMIN — GABAPENTIN 300 MG: 300 CAPSULE ORAL at 09:12

## 2023-04-25 RX ADMIN — ALBUTEROL SULFATE 2 PUFF: 90 AEROSOL, METERED RESPIRATORY (INHALATION) at 08:46

## 2023-04-25 ASSESSMENT — PAIN SCALES - GENERAL
PAINLEVEL_OUTOF10: 8
PAINLEVEL_OUTOF10: 8
PAINLEVEL_OUTOF10: 2
PAINLEVEL_OUTOF10: 0

## 2023-04-25 ASSESSMENT — PAIN DESCRIPTION - DESCRIPTORS
DESCRIPTORS: THROBBING
DESCRIPTORS: ACHING

## 2023-04-25 ASSESSMENT — PAIN - FUNCTIONAL ASSESSMENT
PAIN_FUNCTIONAL_ASSESSMENT: ACTIVITIES ARE NOT PREVENTED
PAIN_FUNCTIONAL_ASSESSMENT: PREVENTS OR INTERFERES SOME ACTIVE ACTIVITIES AND ADLS

## 2023-04-25 ASSESSMENT — PAIN DESCRIPTION - LOCATION
LOCATION: HEAD;LEG
LOCATION: HEAD

## 2023-04-25 ASSESSMENT — PAIN DESCRIPTION - ORIENTATION: ORIENTATION: RIGHT;LEFT

## 2023-04-26 LAB
ALBUMIN SERPL-MCNC: 3.3 GM/DL (ref 3.4–5)
ALP BLD-CCNC: 129 IU/L (ref 40–128)
ALT SERPL-CCNC: 37 U/L (ref 10–40)
ANION GAP SERPL CALCULATED.3IONS-SCNC: 11 MMOL/L (ref 4–16)
AST SERPL-CCNC: 23 IU/L (ref 15–37)
BILIRUB SERPL-MCNC: 0.4 MG/DL (ref 0–1)
BUN SERPL-MCNC: 16 MG/DL (ref 6–23)
CALCIUM SERPL-MCNC: 8.5 MG/DL (ref 8.3–10.6)
CHLORIDE BLD-SCNC: 98 MMOL/L (ref 99–110)
CO2: 25 MMOL/L (ref 21–32)
CREAT SERPL-MCNC: 0.7 MG/DL (ref 0.6–1.1)
CRP SERPL HS-MCNC: 160.9 MG/L
DOSE AMOUNT: NORMAL
DOSE TIME: NORMAL
GFR SERPL CREATININE-BSD FRML MDRD: >60 ML/MIN/1.73M2
GLUCOSE BLD-MCNC: 216 MG/DL (ref 70–99)
GLUCOSE BLD-MCNC: 235 MG/DL (ref 70–99)
GLUCOSE BLD-MCNC: 264 MG/DL (ref 70–99)
GLUCOSE BLD-MCNC: 295 MG/DL (ref 70–99)
GLUCOSE SERPL-MCNC: 236 MG/DL (ref 70–99)
LV EF: 58 %
LVEF MODALITY: NORMAL
POTASSIUM SERPL-SCNC: 4.3 MMOL/L (ref 3.5–5.1)
PROCALCITONIN SERPL-MCNC: 1.19 NG/ML
SODIUM BLD-SCNC: 134 MMOL/L (ref 135–145)
TOTAL PROTEIN: 5.8 GM/DL (ref 6.4–8.2)
VANCOMYCIN RANDOM: 5.2 UG/ML

## 2023-04-26 PROCEDURE — 2060000000 HC ICU INTERMEDIATE R&B

## 2023-04-26 PROCEDURE — 6370000000 HC RX 637 (ALT 250 FOR IP): Performed by: INTERNAL MEDICINE

## 2023-04-26 PROCEDURE — 99233 SBSQ HOSP IP/OBS HIGH 50: CPT | Performed by: INTERNAL MEDICINE

## 2023-04-26 PROCEDURE — 84145 PROCALCITONIN (PCT): CPT

## 2023-04-26 PROCEDURE — 6370000000 HC RX 637 (ALT 250 FOR IP): Performed by: NURSE PRACTITIONER

## 2023-04-26 PROCEDURE — 94640 AIRWAY INHALATION TREATMENT: CPT

## 2023-04-26 PROCEDURE — 6360000002 HC RX W HCPCS: Performed by: PHYSICIAN ASSISTANT

## 2023-04-26 PROCEDURE — 2580000003 HC RX 258: Performed by: INTERNAL MEDICINE

## 2023-04-26 PROCEDURE — 87040 BLOOD CULTURE FOR BACTERIA: CPT

## 2023-04-26 PROCEDURE — 2580000003 HC RX 258: Performed by: PHYSICIAN ASSISTANT

## 2023-04-26 PROCEDURE — 82962 GLUCOSE BLOOD TEST: CPT

## 2023-04-26 PROCEDURE — 80053 COMPREHEN METABOLIC PANEL: CPT

## 2023-04-26 PROCEDURE — 6370000000 HC RX 637 (ALT 250 FOR IP): Performed by: PHYSICIAN ASSISTANT

## 2023-04-26 PROCEDURE — 36415 COLL VENOUS BLD VENIPUNCTURE: CPT

## 2023-04-26 PROCEDURE — 94761 N-INVAS EAR/PLS OXIMETRY MLT: CPT

## 2023-04-26 PROCEDURE — 6360000002 HC RX W HCPCS: Performed by: INTERNAL MEDICINE

## 2023-04-26 PROCEDURE — 80202 ASSAY OF VANCOMYCIN: CPT

## 2023-04-26 PROCEDURE — 93306 TTE W/DOPPLER COMPLETE: CPT

## 2023-04-26 PROCEDURE — 86140 C-REACTIVE PROTEIN: CPT

## 2023-04-26 PROCEDURE — 2700000000 HC OXYGEN THERAPY PER DAY

## 2023-04-26 RX ORDER — ALBUTEROL SULFATE 90 UG/1
2 AEROSOL, METERED RESPIRATORY (INHALATION) 2 TIMES DAILY
Status: DISCONTINUED | OUTPATIENT
Start: 2023-04-26 | End: 2023-05-01 | Stop reason: HOSPADM

## 2023-04-26 RX ORDER — OXYCODONE HYDROCHLORIDE AND ACETAMINOPHEN 5; 325 MG/1; MG/1
1 TABLET ORAL ONCE
Status: COMPLETED | OUTPATIENT
Start: 2023-04-26 | End: 2023-04-26

## 2023-04-26 RX ADMIN — GABAPENTIN 300 MG: 300 CAPSULE ORAL at 15:35

## 2023-04-26 RX ADMIN — FOLIC ACID 1 MG: 1 TABLET ORAL at 09:39

## 2023-04-26 RX ADMIN — SODIUM CHLORIDE, PRESERVATIVE FREE 10 ML: 5 INJECTION INTRAVENOUS at 09:40

## 2023-04-26 RX ADMIN — ENOXAPARIN SODIUM 30 MG: 100 INJECTION SUBCUTANEOUS at 21:04

## 2023-04-26 RX ADMIN — ENOXAPARIN SODIUM 30 MG: 100 INJECTION SUBCUTANEOUS at 09:39

## 2023-04-26 RX ADMIN — ATORVASTATIN CALCIUM 20 MG: 10 TABLET, FILM COATED ORAL at 21:05

## 2023-04-26 RX ADMIN — INSULIN LISPRO 4 UNITS: 100 INJECTION, SOLUTION INTRAVENOUS; SUBCUTANEOUS at 09:40

## 2023-04-26 RX ADMIN — ALBUTEROL SULFATE 2 PUFF: 90 AEROSOL, METERED RESPIRATORY (INHALATION) at 08:21

## 2023-04-26 RX ADMIN — GABAPENTIN 300 MG: 300 CAPSULE ORAL at 09:38

## 2023-04-26 RX ADMIN — IBUPROFEN 400 MG: 400 TABLET, FILM COATED ORAL at 18:18

## 2023-04-26 RX ADMIN — ALBUTEROL SULFATE 2 PUFF: 90 AEROSOL, METERED RESPIRATORY (INHALATION) at 11:58

## 2023-04-26 RX ADMIN — CEFTRIAXONE SODIUM 2000 MG: 2 INJECTION, POWDER, FOR SOLUTION INTRAMUSCULAR; INTRAVENOUS at 15:38

## 2023-04-26 RX ADMIN — IBUPROFEN 400 MG: 400 TABLET, FILM COATED ORAL at 12:34

## 2023-04-26 RX ADMIN — TIZANIDINE 4 MG: 4 TABLET ORAL at 21:04

## 2023-04-26 RX ADMIN — POTASSIUM BICARBONATE 20 MEQ: 782 TABLET, EFFERVESCENT ORAL at 09:39

## 2023-04-26 RX ADMIN — INSULIN LISPRO 2 UNITS: 100 INJECTION, SOLUTION INTRAVENOUS; SUBCUTANEOUS at 18:18

## 2023-04-26 RX ADMIN — OXYCODONE HYDROCHLORIDE AND ACETAMINOPHEN 1 TABLET: 5; 325 TABLET ORAL at 21:52

## 2023-04-26 RX ADMIN — PANTOPRAZOLE SODIUM 40 MG: 40 TABLET, DELAYED RELEASE ORAL at 09:38

## 2023-04-26 RX ADMIN — INSULIN LISPRO 4 UNITS: 100 INJECTION, SOLUTION INTRAVENOUS; SUBCUTANEOUS at 12:34

## 2023-04-26 RX ADMIN — FUROSEMIDE 20 MG: 20 TABLET ORAL at 09:39

## 2023-04-26 RX ADMIN — ALBUTEROL SULFATE 2 PUFF: 90 AEROSOL, METERED RESPIRATORY (INHALATION) at 20:11

## 2023-04-26 RX ADMIN — SPIRONOLACTONE 25 MG: 50 TABLET ORAL at 09:38

## 2023-04-26 RX ADMIN — IBUPROFEN 400 MG: 400 TABLET, FILM COATED ORAL at 21:05

## 2023-04-26 RX ADMIN — GABAPENTIN 300 MG: 300 CAPSULE ORAL at 21:05

## 2023-04-26 RX ADMIN — IBUPROFEN 400 MG: 400 TABLET, FILM COATED ORAL at 09:38

## 2023-04-26 RX ADMIN — SERTRALINE HYDROCHLORIDE 25 MG: 50 TABLET ORAL at 09:38

## 2023-04-26 RX ADMIN — ACETAMINOPHEN 650 MG: 325 TABLET ORAL at 03:25

## 2023-04-26 RX ADMIN — SODIUM CHLORIDE, PRESERVATIVE FREE 10 ML: 5 INJECTION INTRAVENOUS at 21:04

## 2023-04-26 ASSESSMENT — PAIN DESCRIPTION - DESCRIPTORS
DESCRIPTORS: BURNING;DISCOMFORT
DESCRIPTORS: ACHING
DESCRIPTORS: BURNING

## 2023-04-26 ASSESSMENT — PAIN SCALES - GENERAL
PAINLEVEL_OUTOF10: 7
PAINLEVEL_OUTOF10: 10
PAINLEVEL_OUTOF10: 3
PAINLEVEL_OUTOF10: 6
PAINLEVEL_OUTOF10: 10
PAINLEVEL_OUTOF10: 10

## 2023-04-26 ASSESSMENT — PAIN DESCRIPTION - ORIENTATION
ORIENTATION: RIGHT
ORIENTATION: RIGHT
ORIENTATION: RIGHT;LEFT;MID

## 2023-04-26 ASSESSMENT — PAIN - FUNCTIONAL ASSESSMENT
PAIN_FUNCTIONAL_ASSESSMENT: PREVENTS OR INTERFERES SOME ACTIVE ACTIVITIES AND ADLS
PAIN_FUNCTIONAL_ASSESSMENT: ACTIVITIES ARE NOT PREVENTED
PAIN_FUNCTIONAL_ASSESSMENT: PREVENTS OR INTERFERES SOME ACTIVE ACTIVITIES AND ADLS

## 2023-04-26 ASSESSMENT — PAIN DESCRIPTION - PAIN TYPE: TYPE: ACUTE PAIN

## 2023-04-26 ASSESSMENT — PAIN DESCRIPTION - FREQUENCY: FREQUENCY: CONTINUOUS

## 2023-04-26 ASSESSMENT — PAIN DESCRIPTION - ONSET: ONSET: ON-GOING

## 2023-04-26 ASSESSMENT — PAIN DESCRIPTION - LOCATION
LOCATION: LEG
LOCATION: HEAD
LOCATION: LEG

## 2023-04-27 LAB
ALBUMIN SERPL-MCNC: 3.3 GM/DL (ref 3.4–5)
ALP BLD-CCNC: 116 IU/L (ref 40–129)
ALT SERPL-CCNC: 29 U/L (ref 10–40)
ANION GAP SERPL CALCULATED.3IONS-SCNC: 7 MMOL/L (ref 4–16)
AST SERPL-CCNC: 15 IU/L (ref 15–37)
BASOPHILS ABSOLUTE: 0 K/CU MM
BASOPHILS RELATIVE PERCENT: 0.4 % (ref 0–1)
BILIRUB SERPL-MCNC: 0.2 MG/DL (ref 0–1)
BUN SERPL-MCNC: 17 MG/DL (ref 6–23)
CALCIUM SERPL-MCNC: 8.6 MG/DL (ref 8.3–10.6)
CHLORIDE BLD-SCNC: 92 MMOL/L (ref 99–110)
CO2: 27 MMOL/L (ref 21–32)
CREAT SERPL-MCNC: 0.8 MG/DL (ref 0.6–1.1)
CRP SERPL HS-MCNC: 103.1 MG/L
CULTURE: ABNORMAL
DIFFERENTIAL TYPE: ABNORMAL
EOSINOPHILS ABSOLUTE: 0.1 K/CU MM
EOSINOPHILS RELATIVE PERCENT: 2.4 % (ref 0–3)
GFR SERPL CREATININE-BSD FRML MDRD: >60 ML/MIN/1.73M2
GLUCOSE BLD-MCNC: 238 MG/DL (ref 70–99)
GLUCOSE BLD-MCNC: 241 MG/DL (ref 70–99)
GLUCOSE BLD-MCNC: 270 MG/DL (ref 70–99)
GLUCOSE BLD-MCNC: 376 MG/DL (ref 70–99)
GLUCOSE SERPL-MCNC: 304 MG/DL (ref 70–99)
HCT VFR BLD CALC: 30.2 % (ref 37–47)
HEMOGLOBIN: 9.6 GM/DL (ref 12.5–16)
IMMATURE NEUTROPHIL %: 0.6 % (ref 0–0.43)
LYMPHOCYTES ABSOLUTE: 1.9 K/CU MM
LYMPHOCYTES RELATIVE PERCENT: 33.9 % (ref 24–44)
Lab: ABNORMAL
Lab: ABNORMAL
MCH RBC QN AUTO: 27.8 PG (ref 27–31)
MCHC RBC AUTO-ENTMCNC: 31.8 % (ref 32–36)
MCV RBC AUTO: 87.5 FL (ref 78–100)
MONOCYTES ABSOLUTE: 0.3 K/CU MM
MONOCYTES RELATIVE PERCENT: 5.1 % (ref 0–4)
NUCLEATED RBC %: 0 %
PDW BLD-RTO: 14.6 % (ref 11.7–14.9)
PLATELET # BLD: 211 K/CU MM (ref 140–440)
PMV BLD AUTO: 9.9 FL (ref 7.5–11.1)
POTASSIUM SERPL-SCNC: 4.4 MMOL/L (ref 3.5–5.1)
RBC # BLD: 3.45 M/CU MM (ref 4.2–5.4)
SEGMENTED NEUTROPHILS ABSOLUTE COUNT: 3.1 K/CU MM
SEGMENTED NEUTROPHILS RELATIVE PERCENT: 57.6 % (ref 36–66)
SODIUM BLD-SCNC: 126 MMOL/L (ref 135–145)
SPECIMEN: ABNORMAL
SPECIMEN: ABNORMAL
TOTAL IMMATURE NEUTOROPHIL: 0.03 K/CU MM
TOTAL NUCLEATED RBC: 0 K/CU MM
TOTAL PROTEIN: 5.8 GM/DL (ref 6.4–8.2)
WBC # BLD: 5.5 K/CU MM (ref 4–10.5)

## 2023-04-27 PROCEDURE — 6370000000 HC RX 637 (ALT 250 FOR IP): Performed by: INTERNAL MEDICINE

## 2023-04-27 PROCEDURE — 85025 COMPLETE CBC W/AUTO DIFF WBC: CPT

## 2023-04-27 PROCEDURE — 82962 GLUCOSE BLOOD TEST: CPT

## 2023-04-27 PROCEDURE — 2580000003 HC RX 258: Performed by: INTERNAL MEDICINE

## 2023-04-27 PROCEDURE — 6360000002 HC RX W HCPCS: Performed by: PHYSICIAN ASSISTANT

## 2023-04-27 PROCEDURE — 99233 SBSQ HOSP IP/OBS HIGH 50: CPT | Performed by: INTERNAL MEDICINE

## 2023-04-27 PROCEDURE — 36415 COLL VENOUS BLD VENIPUNCTURE: CPT

## 2023-04-27 PROCEDURE — 6370000000 HC RX 637 (ALT 250 FOR IP): Performed by: STUDENT IN AN ORGANIZED HEALTH CARE EDUCATION/TRAINING PROGRAM

## 2023-04-27 PROCEDURE — 2060000000 HC ICU INTERMEDIATE R&B

## 2023-04-27 PROCEDURE — 6360000002 HC RX W HCPCS: Performed by: INTERNAL MEDICINE

## 2023-04-27 PROCEDURE — 2580000003 HC RX 258: Performed by: PHYSICIAN ASSISTANT

## 2023-04-27 PROCEDURE — 94761 N-INVAS EAR/PLS OXIMETRY MLT: CPT

## 2023-04-27 PROCEDURE — 6370000000 HC RX 637 (ALT 250 FOR IP): Performed by: PHYSICIAN ASSISTANT

## 2023-04-27 PROCEDURE — 80053 COMPREHEN METABOLIC PANEL: CPT

## 2023-04-27 PROCEDURE — 94640 AIRWAY INHALATION TREATMENT: CPT

## 2023-04-27 PROCEDURE — 6360000002 HC RX W HCPCS: Performed by: STUDENT IN AN ORGANIZED HEALTH CARE EDUCATION/TRAINING PROGRAM

## 2023-04-27 PROCEDURE — 86140 C-REACTIVE PROTEIN: CPT

## 2023-04-27 RX ORDER — NICOTINE 21 MG/24HR
1 PATCH, TRANSDERMAL 24 HOURS TRANSDERMAL DAILY
Status: DISCONTINUED | OUTPATIENT
Start: 2023-04-27 | End: 2023-05-01 | Stop reason: HOSPADM

## 2023-04-27 RX ORDER — HYDROCODONE BITARTRATE AND ACETAMINOPHEN 7.5; 325 MG/1; MG/1
1 TABLET ORAL EVERY 6 HOURS PRN
Status: DISCONTINUED | OUTPATIENT
Start: 2023-04-27 | End: 2023-05-01 | Stop reason: HOSPADM

## 2023-04-27 RX ORDER — FUROSEMIDE 10 MG/ML
40 INJECTION INTRAMUSCULAR; INTRAVENOUS DAILY
Status: DISCONTINUED | OUTPATIENT
Start: 2023-04-27 | End: 2023-05-01 | Stop reason: HOSPADM

## 2023-04-27 RX ADMIN — GABAPENTIN 300 MG: 300 CAPSULE ORAL at 09:52

## 2023-04-27 RX ADMIN — SODIUM CHLORIDE, PRESERVATIVE FREE 10 ML: 5 INJECTION INTRAVENOUS at 09:53

## 2023-04-27 RX ADMIN — SODIUM CHLORIDE, PRESERVATIVE FREE 10 ML: 5 INJECTION INTRAVENOUS at 20:13

## 2023-04-27 RX ADMIN — HYDROCODONE BITARTRATE AND ACETAMINOPHEN 1 TABLET: 7.5; 325 TABLET ORAL at 16:29

## 2023-04-27 RX ADMIN — ENOXAPARIN SODIUM 30 MG: 100 INJECTION SUBCUTANEOUS at 09:53

## 2023-04-27 RX ADMIN — IBUPROFEN 400 MG: 400 TABLET, FILM COATED ORAL at 09:52

## 2023-04-27 RX ADMIN — HYDROCODONE BITARTRATE AND ACETAMINOPHEN 1 TABLET: 7.5; 325 TABLET ORAL at 23:21

## 2023-04-27 RX ADMIN — INSULIN LISPRO 2 UNITS: 100 INJECTION, SOLUTION INTRAVENOUS; SUBCUTANEOUS at 14:07

## 2023-04-27 RX ADMIN — ATORVASTATIN CALCIUM 20 MG: 10 TABLET, FILM COATED ORAL at 20:13

## 2023-04-27 RX ADMIN — HYDROCODONE BITARTRATE AND ACETAMINOPHEN 1 TABLET: 7.5; 325 TABLET ORAL at 09:52

## 2023-04-27 RX ADMIN — GABAPENTIN 300 MG: 300 CAPSULE ORAL at 20:13

## 2023-04-27 RX ADMIN — INSULIN LISPRO 4 UNITS: 100 INJECTION, SOLUTION INTRAVENOUS; SUBCUTANEOUS at 09:53

## 2023-04-27 RX ADMIN — POTASSIUM BICARBONATE 20 MEQ: 782 TABLET, EFFERVESCENT ORAL at 09:53

## 2023-04-27 RX ADMIN — IBUPROFEN 400 MG: 400 TABLET, FILM COATED ORAL at 14:06

## 2023-04-27 RX ADMIN — ENOXAPARIN SODIUM 30 MG: 100 INJECTION SUBCUTANEOUS at 20:13

## 2023-04-27 RX ADMIN — ALBUTEROL SULFATE 2 PUFF: 90 AEROSOL, METERED RESPIRATORY (INHALATION) at 20:11

## 2023-04-27 RX ADMIN — INSULIN LISPRO 2 UNITS: 100 INJECTION, SOLUTION INTRAVENOUS; SUBCUTANEOUS at 17:38

## 2023-04-27 RX ADMIN — SERTRALINE HYDROCHLORIDE 25 MG: 50 TABLET ORAL at 09:52

## 2023-04-27 RX ADMIN — INSULIN LISPRO 4 UNITS: 100 INJECTION, SOLUTION INTRAVENOUS; SUBCUTANEOUS at 20:12

## 2023-04-27 RX ADMIN — PANTOPRAZOLE SODIUM 40 MG: 40 TABLET, DELAYED RELEASE ORAL at 09:52

## 2023-04-27 RX ADMIN — CEFTRIAXONE SODIUM 2000 MG: 2 INJECTION, POWDER, FOR SOLUTION INTRAMUSCULAR; INTRAVENOUS at 14:12

## 2023-04-27 RX ADMIN — FOLIC ACID 1 MG: 1 TABLET ORAL at 09:52

## 2023-04-27 RX ADMIN — IBUPROFEN 400 MG: 400 TABLET, FILM COATED ORAL at 20:13

## 2023-04-27 RX ADMIN — ALBUTEROL SULFATE 2 PUFF: 90 AEROSOL, METERED RESPIRATORY (INHALATION) at 09:01

## 2023-04-27 RX ADMIN — SPIRONOLACTONE 25 MG: 50 TABLET ORAL at 09:52

## 2023-04-27 RX ADMIN — IBUPROFEN 400 MG: 400 TABLET, FILM COATED ORAL at 17:38

## 2023-04-27 RX ADMIN — GABAPENTIN 300 MG: 300 CAPSULE ORAL at 14:06

## 2023-04-27 RX ADMIN — FUROSEMIDE 40 MG: 10 INJECTION, SOLUTION INTRAMUSCULAR; INTRAVENOUS at 09:53

## 2023-04-27 RX ADMIN — TIZANIDINE 4 MG: 4 TABLET ORAL at 20:13

## 2023-04-27 ASSESSMENT — PAIN DESCRIPTION - DESCRIPTORS
DESCRIPTORS: BURNING;ACHING;DISCOMFORT
DESCRIPTORS: DISCOMFORT;ACHING
DESCRIPTORS: BURNING;ACHING;DISCOMFORT

## 2023-04-27 ASSESSMENT — PAIN DESCRIPTION - ORIENTATION
ORIENTATION: RIGHT
ORIENTATION: RIGHT
ORIENTATION: LEFT

## 2023-04-27 ASSESSMENT — PAIN DESCRIPTION - ONSET
ONSET: ON-GOING
ONSET: ON-GOING

## 2023-04-27 ASSESSMENT — PAIN DESCRIPTION - LOCATION
LOCATION: LEG;HEAD
LOCATION: LEG
LOCATION: LEG

## 2023-04-27 ASSESSMENT — PAIN SCALES - GENERAL
PAINLEVEL_OUTOF10: 7
PAINLEVEL_OUTOF10: 8
PAINLEVEL_OUTOF10: 8

## 2023-04-27 ASSESSMENT — PAIN DESCRIPTION - PAIN TYPE
TYPE: ACUTE PAIN
TYPE: ACUTE PAIN

## 2023-04-27 ASSESSMENT — PAIN DESCRIPTION - FREQUENCY
FREQUENCY: CONTINUOUS
FREQUENCY: CONTINUOUS

## 2023-04-28 LAB
ANION GAP SERPL CALCULATED.3IONS-SCNC: 14 MMOL/L (ref 4–16)
BUN SERPL-MCNC: 15 MG/DL (ref 6–23)
CALCIUM SERPL-MCNC: 8.6 MG/DL (ref 8.3–10.6)
CHLORIDE BLD-SCNC: 98 MMOL/L (ref 99–110)
CO2: 24 MMOL/L (ref 21–32)
CREAT SERPL-MCNC: 0.7 MG/DL (ref 0.6–1.1)
CRP SERPL HS-MCNC: 55.8 MG/L
GFR SERPL CREATININE-BSD FRML MDRD: >60 ML/MIN/1.73M2
GLUCOSE BLD-MCNC: 244 MG/DL (ref 70–99)
GLUCOSE BLD-MCNC: 331 MG/DL (ref 70–99)
GLUCOSE BLD-MCNC: 335 MG/DL (ref 70–99)
GLUCOSE BLD-MCNC: 438 MG/DL (ref 70–99)
GLUCOSE SERPL-MCNC: 225 MG/DL (ref 70–99)
HCT VFR BLD CALC: 35 % (ref 37–47)
HEMOGLOBIN: 10.9 GM/DL (ref 12.5–16)
MCH RBC QN AUTO: 27.9 PG (ref 27–31)
MCHC RBC AUTO-ENTMCNC: 31.1 % (ref 32–36)
MCV RBC AUTO: 89.7 FL (ref 78–100)
PDW BLD-RTO: 14.7 % (ref 11.7–14.9)
PLATELET # BLD: 240 K/CU MM (ref 140–440)
PMV BLD AUTO: 9.7 FL (ref 7.5–11.1)
POTASSIUM SERPL-SCNC: 4.6 MMOL/L (ref 3.5–5.1)
PROCALCITONIN SERPL-MCNC: 0.49 NG/ML
RBC # BLD: 3.9 M/CU MM (ref 4.2–5.4)
SODIUM BLD-SCNC: 136 MMOL/L (ref 135–145)
WBC # BLD: 5.5 K/CU MM (ref 4–10.5)

## 2023-04-28 PROCEDURE — 94761 N-INVAS EAR/PLS OXIMETRY MLT: CPT

## 2023-04-28 PROCEDURE — 80048 BASIC METABOLIC PNL TOTAL CA: CPT

## 2023-04-28 PROCEDURE — 2580000003 HC RX 258: Performed by: PHYSICIAN ASSISTANT

## 2023-04-28 PROCEDURE — 6360000002 HC RX W HCPCS: Performed by: STUDENT IN AN ORGANIZED HEALTH CARE EDUCATION/TRAINING PROGRAM

## 2023-04-28 PROCEDURE — 6370000000 HC RX 637 (ALT 250 FOR IP): Performed by: INTERNAL MEDICINE

## 2023-04-28 PROCEDURE — 2500000003 HC RX 250 WO HCPCS: Performed by: INTERNAL MEDICINE

## 2023-04-28 PROCEDURE — 86140 C-REACTIVE PROTEIN: CPT

## 2023-04-28 PROCEDURE — 6370000000 HC RX 637 (ALT 250 FOR IP): Performed by: STUDENT IN AN ORGANIZED HEALTH CARE EDUCATION/TRAINING PROGRAM

## 2023-04-28 PROCEDURE — 82962 GLUCOSE BLOOD TEST: CPT

## 2023-04-28 PROCEDURE — 6360000002 HC RX W HCPCS: Performed by: NURSE PRACTITIONER

## 2023-04-28 PROCEDURE — 94640 AIRWAY INHALATION TREATMENT: CPT

## 2023-04-28 PROCEDURE — 6360000002 HC RX W HCPCS: Performed by: PHYSICIAN ASSISTANT

## 2023-04-28 PROCEDURE — 99233 SBSQ HOSP IP/OBS HIGH 50: CPT | Performed by: INTERNAL MEDICINE

## 2023-04-28 PROCEDURE — 36415 COLL VENOUS BLD VENIPUNCTURE: CPT

## 2023-04-28 PROCEDURE — 99211 OFF/OP EST MAY X REQ PHY/QHP: CPT

## 2023-04-28 PROCEDURE — 84145 PROCALCITONIN (PCT): CPT

## 2023-04-28 PROCEDURE — 85027 COMPLETE CBC AUTOMATED: CPT

## 2023-04-28 PROCEDURE — 6370000000 HC RX 637 (ALT 250 FOR IP): Performed by: PHYSICIAN ASSISTANT

## 2023-04-28 PROCEDURE — 1200000000 HC SEMI PRIVATE

## 2023-04-28 RX ORDER — PREDNISONE 10 MG/1
40 TABLET ORAL DAILY
Status: DISCONTINUED | OUTPATIENT
Start: 2023-04-28 | End: 2023-04-29

## 2023-04-28 RX ORDER — DIPHENHYDRAMINE HYDROCHLORIDE 50 MG/ML
25 INJECTION INTRAMUSCULAR; INTRAVENOUS ONCE
Status: COMPLETED | OUTPATIENT
Start: 2023-04-28 | End: 2023-04-28

## 2023-04-28 RX ORDER — DIPHENHYDRAMINE HYDROCHLORIDE 50 MG/ML
25 INJECTION INTRAMUSCULAR; INTRAVENOUS EVERY 6 HOURS PRN
Status: DISCONTINUED | OUTPATIENT
Start: 2023-04-28 | End: 2023-05-01 | Stop reason: HOSPADM

## 2023-04-28 RX ORDER — INSULIN GLARGINE 100 [IU]/ML
10 INJECTION, SOLUTION SUBCUTANEOUS NIGHTLY
Status: DISCONTINUED | OUTPATIENT
Start: 2023-04-28 | End: 2023-04-30

## 2023-04-28 RX ORDER — LACTOBACILLUS RHAMNOSUS GG 10B CELL
1 CAPSULE ORAL
Status: DISCONTINUED | OUTPATIENT
Start: 2023-04-28 | End: 2023-05-01 | Stop reason: HOSPADM

## 2023-04-28 RX ADMIN — DIPHENHYDRAMINE HYDROCHLORIDE 25 MG: 50 INJECTION INTRAMUSCULAR; INTRAVENOUS at 05:30

## 2023-04-28 RX ADMIN — SODIUM CHLORIDE, PRESERVATIVE FREE 10 ML: 5 INJECTION INTRAVENOUS at 10:43

## 2023-04-28 RX ADMIN — ENOXAPARIN SODIUM 30 MG: 100 INJECTION SUBCUTANEOUS at 21:15

## 2023-04-28 RX ADMIN — ATORVASTATIN CALCIUM 20 MG: 10 TABLET, FILM COATED ORAL at 21:15

## 2023-04-28 RX ADMIN — DIPHENHYDRAMINE HYDROCHLORIDE 25 MG: 50 INJECTION, SOLUTION INTRAMUSCULAR; INTRAVENOUS at 13:11

## 2023-04-28 RX ADMIN — ALBUTEROL SULFATE 2 PUFF: 90 AEROSOL, METERED RESPIRATORY (INHALATION) at 20:40

## 2023-04-28 RX ADMIN — INSULIN LISPRO 2 UNITS: 100 INJECTION, SOLUTION INTRAVENOUS; SUBCUTANEOUS at 10:44

## 2023-04-28 RX ADMIN — TIZANIDINE 4 MG: 4 TABLET ORAL at 21:15

## 2023-04-28 RX ADMIN — ALBUTEROL SULFATE 2 PUFF: 90 AEROSOL, METERED RESPIRATORY (INHALATION) at 09:08

## 2023-04-28 RX ADMIN — IBUPROFEN 400 MG: 400 TABLET, FILM COATED ORAL at 21:15

## 2023-04-28 RX ADMIN — INSULIN LISPRO 8 UNITS: 100 INJECTION, SOLUTION INTRAVENOUS; SUBCUTANEOUS at 18:19

## 2023-04-28 RX ADMIN — FOLIC ACID 1 MG: 1 TABLET ORAL at 10:38

## 2023-04-28 RX ADMIN — SPIRONOLACTONE 25 MG: 50 TABLET ORAL at 10:38

## 2023-04-28 RX ADMIN — IBUPROFEN 400 MG: 400 TABLET, FILM COATED ORAL at 10:41

## 2023-04-28 RX ADMIN — Medication 1 CAPSULE: at 10:40

## 2023-04-28 RX ADMIN — SERTRALINE HYDROCHLORIDE 25 MG: 50 TABLET ORAL at 10:38

## 2023-04-28 RX ADMIN — GABAPENTIN 300 MG: 300 CAPSULE ORAL at 13:12

## 2023-04-28 RX ADMIN — SODIUM CHLORIDE, PRESERVATIVE FREE 10 ML: 5 INJECTION INTRAVENOUS at 21:15

## 2023-04-28 RX ADMIN — INSULIN LISPRO 4 UNITS: 100 INJECTION, SOLUTION INTRAVENOUS; SUBCUTANEOUS at 21:16

## 2023-04-28 RX ADMIN — Medication 1 CAPSULE: at 18:18

## 2023-04-28 RX ADMIN — ENOXAPARIN SODIUM 30 MG: 100 INJECTION SUBCUTANEOUS at 10:42

## 2023-04-28 RX ADMIN — POTASSIUM BICARBONATE 20 MEQ: 782 TABLET, EFFERVESCENT ORAL at 10:38

## 2023-04-28 RX ADMIN — INSULIN GLARGINE 10 UNITS: 100 INJECTION, SOLUTION SUBCUTANEOUS at 21:16

## 2023-04-28 RX ADMIN — PANTOPRAZOLE SODIUM 40 MG: 40 TABLET, DELAYED RELEASE ORAL at 10:40

## 2023-04-28 RX ADMIN — Medication 1 CAPSULE: at 13:11

## 2023-04-28 RX ADMIN — DIPHENHYDRAMINE HYDROCHLORIDE 25 MG: 50 INJECTION, SOLUTION INTRAMUSCULAR; INTRAVENOUS at 18:59

## 2023-04-28 RX ADMIN — GABAPENTIN 300 MG: 300 CAPSULE ORAL at 10:38

## 2023-04-28 RX ADMIN — IBUPROFEN 400 MG: 400 TABLET, FILM COATED ORAL at 13:12

## 2023-04-28 RX ADMIN — FUROSEMIDE 40 MG: 10 INJECTION, SOLUTION INTRAMUSCULAR; INTRAVENOUS at 10:42

## 2023-04-28 RX ADMIN — INSULIN LISPRO 6 UNITS: 100 INJECTION, SOLUTION INTRAVENOUS; SUBCUTANEOUS at 13:13

## 2023-04-28 RX ADMIN — HYDROCODONE BITARTRATE AND ACETAMINOPHEN 1 TABLET: 7.5; 325 TABLET ORAL at 12:58

## 2023-04-28 RX ADMIN — GABAPENTIN 300 MG: 300 CAPSULE ORAL at 21:15

## 2023-04-28 RX ADMIN — HYDROCODONE BITARTRATE AND ACETAMINOPHEN 1 TABLET: 7.5; 325 TABLET ORAL at 05:24

## 2023-04-28 RX ADMIN — IBUPROFEN 400 MG: 400 TABLET, FILM COATED ORAL at 18:18

## 2023-04-28 RX ADMIN — HYDROCODONE BITARTRATE AND ACETAMINOPHEN 1 TABLET: 7.5; 325 TABLET ORAL at 18:59

## 2023-04-28 RX ADMIN — PREDNISONE 40 MG: 10 TABLET ORAL at 10:40

## 2023-04-28 RX ADMIN — CLINDAMYCIN PHOSPHATE 900 MG: 900 INJECTION, SOLUTION INTRAVENOUS at 18:28

## 2023-04-28 RX ADMIN — CLINDAMYCIN PHOSPHATE 900 MG: 900 INJECTION, SOLUTION INTRAVENOUS at 11:02

## 2023-04-28 ASSESSMENT — PAIN DESCRIPTION - FREQUENCY: FREQUENCY: CONTINUOUS

## 2023-04-28 ASSESSMENT — PAIN DESCRIPTION - ORIENTATION
ORIENTATION: POSTERIOR;RIGHT
ORIENTATION: INNER
ORIENTATION: POSTERIOR;RIGHT

## 2023-04-28 ASSESSMENT — PAIN DESCRIPTION - ONSET: ONSET: ON-GOING

## 2023-04-28 ASSESSMENT — PAIN - FUNCTIONAL ASSESSMENT
PAIN_FUNCTIONAL_ASSESSMENT: ACTIVITIES ARE NOT PREVENTED
PAIN_FUNCTIONAL_ASSESSMENT: PREVENTS OR INTERFERES SOME ACTIVE ACTIVITIES AND ADLS
PAIN_FUNCTIONAL_ASSESSMENT: PREVENTS OR INTERFERES SOME ACTIVE ACTIVITIES AND ADLS

## 2023-04-28 ASSESSMENT — PAIN SCALES - GENERAL
PAINLEVEL_OUTOF10: 4
PAINLEVEL_OUTOF10: 7
PAINLEVEL_OUTOF10: 7
PAINLEVEL_OUTOF10: 6

## 2023-04-28 ASSESSMENT — PAIN DESCRIPTION - DESCRIPTORS
DESCRIPTORS: BURNING;THROBBING
DESCRIPTORS: BURNING;THROBBING
DESCRIPTORS: ACHING;DISCOMFORT

## 2023-04-28 ASSESSMENT — PAIN DESCRIPTION - LOCATION
LOCATION: LEG
LOCATION: LEG
LOCATION: GENERALIZED

## 2023-04-28 ASSESSMENT — PAIN DESCRIPTION - PAIN TYPE: TYPE: ACUTE PAIN

## 2023-04-29 LAB
BACTERIA: NEGATIVE /HPF
BILIRUBIN URINE: NEGATIVE MG/DL
BLOOD, URINE: ABNORMAL
CLARITY: CLEAR
COLOR: YELLOW
GLUCOSE BLD-MCNC: 242 MG/DL (ref 70–99)
GLUCOSE BLD-MCNC: 334 MG/DL (ref 70–99)
GLUCOSE BLD-MCNC: 362 MG/DL (ref 70–99)
GLUCOSE BLD-MCNC: 377 MG/DL (ref 70–99)
GLUCOSE, URINE: >1000 MG/DL
KETONES, URINE: NEGATIVE MG/DL
LEUKOCYTE ESTERASE, URINE: NEGATIVE
MUCUS: ABNORMAL HPF
NITRITE URINE, QUANTITATIVE: NEGATIVE
PH, URINE: 6.5 (ref 5–8)
PROTEIN UA: NEGATIVE MG/DL
RBC URINE: 7 /HPF (ref 0–6)
SPECIFIC GRAVITY UA: 1.02 (ref 1–1.03)
SQUAMOUS EPITHELIAL: 2 /HPF
TRICHOMONAS: ABNORMAL /HPF
UROBILINOGEN, URINE: 0.2 MG/DL (ref 0.2–1)
WBC UA: 21 /HPF (ref 0–5)

## 2023-04-29 PROCEDURE — 2500000003 HC RX 250 WO HCPCS: Performed by: INTERNAL MEDICINE

## 2023-04-29 PROCEDURE — 6360000002 HC RX W HCPCS: Performed by: STUDENT IN AN ORGANIZED HEALTH CARE EDUCATION/TRAINING PROGRAM

## 2023-04-29 PROCEDURE — 1200000000 HC SEMI PRIVATE

## 2023-04-29 PROCEDURE — 6370000000 HC RX 637 (ALT 250 FOR IP): Performed by: NURSE PRACTITIONER

## 2023-04-29 PROCEDURE — 6370000000 HC RX 637 (ALT 250 FOR IP): Performed by: STUDENT IN AN ORGANIZED HEALTH CARE EDUCATION/TRAINING PROGRAM

## 2023-04-29 PROCEDURE — 94640 AIRWAY INHALATION TREATMENT: CPT

## 2023-04-29 PROCEDURE — 6370000000 HC RX 637 (ALT 250 FOR IP): Performed by: INTERNAL MEDICINE

## 2023-04-29 PROCEDURE — 87086 URINE CULTURE/COLONY COUNT: CPT

## 2023-04-29 PROCEDURE — 87077 CULTURE AEROBIC IDENTIFY: CPT

## 2023-04-29 PROCEDURE — 82962 GLUCOSE BLOOD TEST: CPT

## 2023-04-29 PROCEDURE — 87186 SC STD MICRODIL/AGAR DIL: CPT

## 2023-04-29 PROCEDURE — 6370000000 HC RX 637 (ALT 250 FOR IP): Performed by: PHYSICIAN ASSISTANT

## 2023-04-29 PROCEDURE — 6360000002 HC RX W HCPCS: Performed by: PHYSICIAN ASSISTANT

## 2023-04-29 PROCEDURE — 87181 SC STD AGAR DILUTION PER AGT: CPT

## 2023-04-29 PROCEDURE — 94761 N-INVAS EAR/PLS OXIMETRY MLT: CPT

## 2023-04-29 PROCEDURE — 81001 URINALYSIS AUTO W/SCOPE: CPT

## 2023-04-29 PROCEDURE — 2580000003 HC RX 258: Performed by: PHYSICIAN ASSISTANT

## 2023-04-29 RX ORDER — DIAPER,BRIEF,INFANT-TODD,DISP
EACH MISCELLANEOUS 2 TIMES DAILY
Status: DISCONTINUED | OUTPATIENT
Start: 2023-04-29 | End: 2023-05-01 | Stop reason: HOSPADM

## 2023-04-29 RX ORDER — CLINDAMYCIN PHOSPHATE 900 MG/50ML
900 INJECTION INTRAVENOUS EVERY 8 HOURS
Status: DISCONTINUED | OUTPATIENT
Start: 2023-04-30 | End: 2023-05-01

## 2023-04-29 RX ORDER — DIPHENHYDRAMINE HYDROCHLORIDE, ZINC ACETATE 2; .1 G/100G; G/100G
CREAM TOPICAL 3 TIMES DAILY PRN
Status: DISCONTINUED | OUTPATIENT
Start: 2023-04-29 | End: 2023-05-01 | Stop reason: HOSPADM

## 2023-04-29 RX ADMIN — INSULIN LISPRO 6 UNITS: 100 INJECTION, SOLUTION INTRAVENOUS; SUBCUTANEOUS at 11:29

## 2023-04-29 RX ADMIN — INSULIN GLARGINE 10 UNITS: 100 INJECTION, SOLUTION SUBCUTANEOUS at 21:45

## 2023-04-29 RX ADMIN — DIPHENHYDRAMINE HYDROCHLORIDE 25 MG: 50 INJECTION, SOLUTION INTRAMUSCULAR; INTRAVENOUS at 00:03

## 2023-04-29 RX ADMIN — HYDROCODONE BITARTRATE AND ACETAMINOPHEN 1 TABLET: 7.5; 325 TABLET ORAL at 15:16

## 2023-04-29 RX ADMIN — Medication 900 MG: at 16:00

## 2023-04-29 RX ADMIN — Medication 1 CAPSULE: at 08:56

## 2023-04-29 RX ADMIN — POTASSIUM BICARBONATE 20 MEQ: 782 TABLET, EFFERVESCENT ORAL at 08:55

## 2023-04-29 RX ADMIN — ALBUTEROL SULFATE 2 PUFF: 90 AEROSOL, METERED RESPIRATORY (INHALATION) at 07:19

## 2023-04-29 RX ADMIN — INSULIN LISPRO 2 UNITS: 100 INJECTION, SOLUTION INTRAVENOUS; SUBCUTANEOUS at 09:08

## 2023-04-29 RX ADMIN — INSULIN LISPRO 8 UNITS: 100 INJECTION, SOLUTION INTRAVENOUS; SUBCUTANEOUS at 17:25

## 2023-04-29 RX ADMIN — TIZANIDINE 4 MG: 4 TABLET ORAL at 21:47

## 2023-04-29 RX ADMIN — GABAPENTIN 300 MG: 300 CAPSULE ORAL at 13:46

## 2023-04-29 RX ADMIN — ATORVASTATIN CALCIUM 20 MG: 10 TABLET, FILM COATED ORAL at 21:48

## 2023-04-29 RX ADMIN — SODIUM CHLORIDE, PRESERVATIVE FREE 10 ML: 5 INJECTION INTRAVENOUS at 08:47

## 2023-04-29 RX ADMIN — IBUPROFEN 400 MG: 400 TABLET, FILM COATED ORAL at 17:24

## 2023-04-29 RX ADMIN — SODIUM CHLORIDE, PRESERVATIVE FREE 5 ML: 5 INJECTION INTRAVENOUS at 21:55

## 2023-04-29 RX ADMIN — IBUPROFEN 400 MG: 400 TABLET, FILM COATED ORAL at 08:56

## 2023-04-29 RX ADMIN — DIPHENHYDRAMINE HYDROCHLORIDE, ZINC ACETATE: 2; .1 CREAM TOPICAL at 06:18

## 2023-04-29 RX ADMIN — ENOXAPARIN SODIUM 30 MG: 100 INJECTION SUBCUTANEOUS at 08:55

## 2023-04-29 RX ADMIN — SERTRALINE HYDROCHLORIDE 25 MG: 50 TABLET ORAL at 08:56

## 2023-04-29 RX ADMIN — HYDROCORTISONE SODIUM SUCCINATE 100 MG: 100 INJECTION, POWDER, FOR SOLUTION INTRAMUSCULAR; INTRAVENOUS at 08:47

## 2023-04-29 RX ADMIN — HYDROCODONE BITARTRATE AND ACETAMINOPHEN 1 TABLET: 7.5; 325 TABLET ORAL at 09:05

## 2023-04-29 RX ADMIN — FOLIC ACID 1 MG: 1 TABLET ORAL at 08:56

## 2023-04-29 RX ADMIN — HYDROCODONE BITARTRATE AND ACETAMINOPHEN 1 TABLET: 7.5; 325 TABLET ORAL at 03:39

## 2023-04-29 RX ADMIN — Medication 900 MG: at 09:08

## 2023-04-29 RX ADMIN — HYDROCORTISONE: 1 OINTMENT TOPICAL at 08:46

## 2023-04-29 RX ADMIN — FUROSEMIDE 40 MG: 10 INJECTION, SOLUTION INTRAMUSCULAR; INTRAVENOUS at 08:47

## 2023-04-29 RX ADMIN — ALBUTEROL SULFATE 2 PUFF: 90 AEROSOL, METERED RESPIRATORY (INHALATION) at 21:37

## 2023-04-29 RX ADMIN — ENOXAPARIN SODIUM 30 MG: 100 INJECTION SUBCUTANEOUS at 21:53

## 2023-04-29 RX ADMIN — GABAPENTIN 300 MG: 300 CAPSULE ORAL at 08:56

## 2023-04-29 RX ADMIN — DIPHENHYDRAMINE HYDROCHLORIDE 25 MG: 50 INJECTION, SOLUTION INTRAMUSCULAR; INTRAVENOUS at 06:19

## 2023-04-29 RX ADMIN — IBUPROFEN 400 MG: 400 TABLET, FILM COATED ORAL at 11:28

## 2023-04-29 RX ADMIN — IBUPROFEN 400 MG: 400 TABLET, FILM COATED ORAL at 21:47

## 2023-04-29 RX ADMIN — SPIRONOLACTONE 25 MG: 50 TABLET ORAL at 08:56

## 2023-04-29 RX ADMIN — PANTOPRAZOLE SODIUM 40 MG: 40 TABLET, DELAYED RELEASE ORAL at 08:56

## 2023-04-29 RX ADMIN — Medication 1 CAPSULE: at 17:24

## 2023-04-29 RX ADMIN — DIPHENHYDRAMINE HYDROCHLORIDE 25 MG: 50 INJECTION, SOLUTION INTRAMUSCULAR; INTRAVENOUS at 22:20

## 2023-04-29 RX ADMIN — Medication 900 MG: at 00:05

## 2023-04-29 RX ADMIN — INSULIN LISPRO 4 UNITS: 100 INJECTION, SOLUTION INTRAVENOUS; SUBCUTANEOUS at 21:46

## 2023-04-29 RX ADMIN — HYDROCORTISONE: 1 OINTMENT TOPICAL at 21:56

## 2023-04-29 RX ADMIN — Medication 1 CAPSULE: at 11:29

## 2023-04-29 RX ADMIN — GABAPENTIN 300 MG: 300 CAPSULE ORAL at 21:48

## 2023-04-29 RX ADMIN — HYDROCODONE BITARTRATE AND ACETAMINOPHEN 1 TABLET: 7.5; 325 TABLET ORAL at 21:47

## 2023-04-29 ASSESSMENT — PAIN SCALES - GENERAL
PAINLEVEL_OUTOF10: 7
PAINLEVEL_OUTOF10: 6
PAINLEVEL_OUTOF10: 8
PAINLEVEL_OUTOF10: 7
PAINLEVEL_OUTOF10: 6
PAINLEVEL_OUTOF10: 10
PAINLEVEL_OUTOF10: 7

## 2023-04-29 ASSESSMENT — PAIN DESCRIPTION - LOCATION
LOCATION: GENERALIZED
LOCATION: GENERALIZED
LOCATION: LEG;GENERALIZED
LOCATION: LEG;GENERALIZED
LOCATION: GENERALIZED
LOCATION: GENERALIZED

## 2023-04-29 ASSESSMENT — PAIN DESCRIPTION - DESCRIPTORS
DESCRIPTORS: ACHING;DISCOMFORT
DESCRIPTORS: BURNING
DESCRIPTORS: ACHING;DISCOMFORT;ITCHING
DESCRIPTORS: ACHING;DISCOMFORT;BURNING
DESCRIPTORS: ACHING;DISCOMFORT
DESCRIPTORS: ACHING;BURNING

## 2023-04-29 ASSESSMENT — PAIN DESCRIPTION - FREQUENCY: FREQUENCY: CONTINUOUS

## 2023-04-29 ASSESSMENT — PAIN - FUNCTIONAL ASSESSMENT
PAIN_FUNCTIONAL_ASSESSMENT: PREVENTS OR INTERFERES WITH MANY ACTIVE NOT PASSIVE ACTIVITIES
PAIN_FUNCTIONAL_ASSESSMENT: ACTIVITIES ARE NOT PREVENTED
PAIN_FUNCTIONAL_ASSESSMENT: ACTIVITIES ARE NOT PREVENTED

## 2023-04-29 ASSESSMENT — PAIN DESCRIPTION - ORIENTATION
ORIENTATION: RIGHT
ORIENTATION: RIGHT;LEFT;ANTERIOR;DISTAL
ORIENTATION: RIGHT

## 2023-04-30 LAB
ANION GAP SERPL CALCULATED.3IONS-SCNC: 12 MMOL/L (ref 4–16)
BUN SERPL-MCNC: 19 MG/DL (ref 6–23)
CALCIUM SERPL-MCNC: 8.6 MG/DL (ref 8.3–10.6)
CHLORIDE BLD-SCNC: 93 MMOL/L (ref 99–110)
CO2: 24 MMOL/L (ref 21–32)
CREAT SERPL-MCNC: 0.9 MG/DL (ref 0.6–1.1)
CULTURE: ABNORMAL
CULTURE: ABNORMAL
GFR SERPL CREATININE-BSD FRML MDRD: >60 ML/MIN/1.73M2
GLUCOSE BLD-MCNC: 243 MG/DL (ref 70–99)
GLUCOSE BLD-MCNC: 332 MG/DL (ref 70–99)
GLUCOSE BLD-MCNC: 380 MG/DL (ref 70–99)
GLUCOSE BLD-MCNC: 405 MG/DL (ref 70–99)
GLUCOSE BLD-MCNC: 424 MG/DL (ref 70–99)
GLUCOSE BLD-MCNC: 461 MG/DL (ref 70–99)
GLUCOSE SERPL-MCNC: 419 MG/DL (ref 70–99)
HCT VFR BLD CALC: 36 % (ref 37–47)
HEMOGLOBIN: 11.3 GM/DL (ref 12.5–16)
Lab: ABNORMAL
MCH RBC QN AUTO: 27.4 PG (ref 27–31)
MCHC RBC AUTO-ENTMCNC: 31.4 % (ref 32–36)
MCV RBC AUTO: 87.4 FL (ref 78–100)
PDW BLD-RTO: 14.6 % (ref 11.7–14.9)
PLATELET # BLD: 317 K/CU MM (ref 140–440)
PMV BLD AUTO: 9.5 FL (ref 7.5–11.1)
POTASSIUM SERPL-SCNC: 5 MMOL/L (ref 3.5–5.1)
RBC # BLD: 4.12 M/CU MM (ref 4.2–5.4)
SODIUM BLD-SCNC: 129 MMOL/L (ref 135–145)
SPECIMEN: ABNORMAL
WBC # BLD: 17.9 K/CU MM (ref 4–10.5)

## 2023-04-30 PROCEDURE — 6370000000 HC RX 637 (ALT 250 FOR IP): Performed by: STUDENT IN AN ORGANIZED HEALTH CARE EDUCATION/TRAINING PROGRAM

## 2023-04-30 PROCEDURE — 6360000002 HC RX W HCPCS: Performed by: STUDENT IN AN ORGANIZED HEALTH CARE EDUCATION/TRAINING PROGRAM

## 2023-04-30 PROCEDURE — 6370000000 HC RX 637 (ALT 250 FOR IP): Performed by: PHYSICIAN ASSISTANT

## 2023-04-30 PROCEDURE — 6360000002 HC RX W HCPCS: Performed by: PHYSICIAN ASSISTANT

## 2023-04-30 PROCEDURE — 6370000000 HC RX 637 (ALT 250 FOR IP): Performed by: NURSE PRACTITIONER

## 2023-04-30 PROCEDURE — 6370000000 HC RX 637 (ALT 250 FOR IP): Performed by: INTERNAL MEDICINE

## 2023-04-30 PROCEDURE — 2580000003 HC RX 258: Performed by: PHYSICIAN ASSISTANT

## 2023-04-30 PROCEDURE — 94640 AIRWAY INHALATION TREATMENT: CPT

## 2023-04-30 PROCEDURE — 94761 N-INVAS EAR/PLS OXIMETRY MLT: CPT

## 2023-04-30 PROCEDURE — 1200000000 HC SEMI PRIVATE

## 2023-04-30 PROCEDURE — 94664 DEMO&/EVAL PT USE INHALER: CPT

## 2023-04-30 PROCEDURE — 80048 BASIC METABOLIC PNL TOTAL CA: CPT

## 2023-04-30 PROCEDURE — 82962 GLUCOSE BLOOD TEST: CPT

## 2023-04-30 PROCEDURE — 85027 COMPLETE CBC AUTOMATED: CPT

## 2023-04-30 PROCEDURE — 36415 COLL VENOUS BLD VENIPUNCTURE: CPT

## 2023-04-30 PROCEDURE — 2500000003 HC RX 250 WO HCPCS: Performed by: INTERNAL MEDICINE

## 2023-04-30 RX ORDER — INSULIN LISPRO 100 [IU]/ML
4 INJECTION, SOLUTION INTRAVENOUS; SUBCUTANEOUS ONCE
Status: COMPLETED | OUTPATIENT
Start: 2023-04-30 | End: 2023-04-30

## 2023-04-30 RX ORDER — METHYLPREDNISOLONE SODIUM SUCCINATE 125 MG/2ML
80 INJECTION, POWDER, LYOPHILIZED, FOR SOLUTION INTRAMUSCULAR; INTRAVENOUS DAILY
Status: DISCONTINUED | OUTPATIENT
Start: 2023-05-01 | End: 2023-04-30

## 2023-04-30 RX ORDER — METHYLPREDNISOLONE SODIUM SUCCINATE 40 MG/ML
40 INJECTION, POWDER, LYOPHILIZED, FOR SOLUTION INTRAMUSCULAR; INTRAVENOUS DAILY
Status: DISCONTINUED | OUTPATIENT
Start: 2023-04-30 | End: 2023-04-30

## 2023-04-30 RX ORDER — INSULIN LISPRO 100 [IU]/ML
0-16 INJECTION, SOLUTION INTRAVENOUS; SUBCUTANEOUS
Status: DISCONTINUED | OUTPATIENT
Start: 2023-04-30 | End: 2023-05-01 | Stop reason: HOSPADM

## 2023-04-30 RX ORDER — INSULIN LISPRO 100 [IU]/ML
0-4 INJECTION, SOLUTION INTRAVENOUS; SUBCUTANEOUS NIGHTLY
Status: DISCONTINUED | OUTPATIENT
Start: 2023-04-30 | End: 2023-05-01 | Stop reason: HOSPADM

## 2023-04-30 RX ORDER — PREDNISONE 20 MG/1
40 TABLET ORAL DAILY
Status: DISCONTINUED | OUTPATIENT
Start: 2023-04-30 | End: 2023-04-30

## 2023-04-30 RX ORDER — METHYLPREDNISOLONE SODIUM SUCCINATE 125 MG/2ML
80 INJECTION, POWDER, LYOPHILIZED, FOR SOLUTION INTRAMUSCULAR; INTRAVENOUS DAILY
Status: DISCONTINUED | OUTPATIENT
Start: 2023-04-30 | End: 2023-04-30

## 2023-04-30 RX ORDER — PREDNISONE 20 MG/1
80 TABLET ORAL DAILY
Status: DISCONTINUED | OUTPATIENT
Start: 2023-05-01 | End: 2023-05-01 | Stop reason: HOSPADM

## 2023-04-30 RX ORDER — METHYLPREDNISOLONE SODIUM SUCCINATE 40 MG/ML
40 INJECTION, POWDER, LYOPHILIZED, FOR SOLUTION INTRAMUSCULAR; INTRAVENOUS ONCE
Status: COMPLETED | OUTPATIENT
Start: 2023-04-30 | End: 2023-04-30

## 2023-04-30 RX ORDER — INSULIN GLARGINE 100 [IU]/ML
20 INJECTION, SOLUTION SUBCUTANEOUS NIGHTLY
Status: DISCONTINUED | OUTPATIENT
Start: 2023-04-30 | End: 2023-05-01 | Stop reason: HOSPADM

## 2023-04-30 RX ADMIN — HYDROCODONE BITARTRATE AND ACETAMINOPHEN 1 TABLET: 7.5; 325 TABLET ORAL at 14:36

## 2023-04-30 RX ADMIN — IBUPROFEN 400 MG: 400 TABLET, FILM COATED ORAL at 11:59

## 2023-04-30 RX ADMIN — CLINDAMYCIN PHOSPHATE 900 MG: 900 INJECTION, SOLUTION INTRAVENOUS at 09:42

## 2023-04-30 RX ADMIN — FUROSEMIDE 40 MG: 10 INJECTION, SOLUTION INTRAMUSCULAR; INTRAVENOUS at 09:35

## 2023-04-30 RX ADMIN — ALBUTEROL SULFATE 2 PUFF: 90 AEROSOL, METERED RESPIRATORY (INHALATION) at 07:32

## 2023-04-30 RX ADMIN — DIPHENHYDRAMINE HYDROCHLORIDE, ZINC ACETATE: 2; .1 CREAM TOPICAL at 09:45

## 2023-04-30 RX ADMIN — HYDROCODONE BITARTRATE AND ACETAMINOPHEN 1 TABLET: 7.5; 325 TABLET ORAL at 20:34

## 2023-04-30 RX ADMIN — SODIUM CHLORIDE 25 ML: 9 INJECTION, SOLUTION INTRAVENOUS at 00:31

## 2023-04-30 RX ADMIN — GABAPENTIN 300 MG: 300 CAPSULE ORAL at 09:37

## 2023-04-30 RX ADMIN — ALBUTEROL SULFATE 2 PUFF: 90 AEROSOL, METERED RESPIRATORY (INHALATION) at 22:19

## 2023-04-30 RX ADMIN — INSULIN LISPRO 4 UNITS: 100 INJECTION, SOLUTION INTRAVENOUS; SUBCUTANEOUS at 21:11

## 2023-04-30 RX ADMIN — ATORVASTATIN CALCIUM 20 MG: 10 TABLET, FILM COATED ORAL at 20:34

## 2023-04-30 RX ADMIN — CLINDAMYCIN PHOSPHATE 900 MG: 900 INJECTION, SOLUTION INTRAVENOUS at 00:32

## 2023-04-30 RX ADMIN — GABAPENTIN 300 MG: 300 CAPSULE ORAL at 20:34

## 2023-04-30 RX ADMIN — POTASSIUM BICARBONATE 20 MEQ: 782 TABLET, EFFERVESCENT ORAL at 09:37

## 2023-04-30 RX ADMIN — Medication 1 CAPSULE: at 09:36

## 2023-04-30 RX ADMIN — INSULIN HUMAN 10 UNITS: 100 INJECTION, SOLUTION PARENTERAL at 15:37

## 2023-04-30 RX ADMIN — INSULIN LISPRO 2 UNITS: 100 INJECTION, SOLUTION INTRAVENOUS; SUBCUTANEOUS at 09:38

## 2023-04-30 RX ADMIN — SODIUM CHLORIDE, PRESERVATIVE FREE 5 ML: 5 INJECTION INTRAVENOUS at 20:37

## 2023-04-30 RX ADMIN — Medication 1 CAPSULE: at 18:21

## 2023-04-30 RX ADMIN — PREDNISONE 40 MG: 20 TABLET ORAL at 09:37

## 2023-04-30 RX ADMIN — ENOXAPARIN SODIUM 30 MG: 100 INJECTION SUBCUTANEOUS at 20:35

## 2023-04-30 RX ADMIN — SODIUM CHLORIDE, PRESERVATIVE FREE 10 ML: 5 INJECTION INTRAVENOUS at 09:44

## 2023-04-30 RX ADMIN — CLINDAMYCIN PHOSPHATE 900 MG: 900 INJECTION, SOLUTION INTRAVENOUS at 18:20

## 2023-04-30 RX ADMIN — INSULIN LISPRO 4 UNITS: 100 INJECTION, SOLUTION INTRAVENOUS; SUBCUTANEOUS at 21:10

## 2023-04-30 RX ADMIN — Medication 1 CAPSULE: at 12:00

## 2023-04-30 RX ADMIN — INSULIN LISPRO 8 UNITS: 100 INJECTION, SOLUTION INTRAVENOUS; SUBCUTANEOUS at 12:32

## 2023-04-30 RX ADMIN — SPIRONOLACTONE 25 MG: 50 TABLET ORAL at 09:36

## 2023-04-30 RX ADMIN — PANTOPRAZOLE SODIUM 40 MG: 40 TABLET, DELAYED RELEASE ORAL at 09:36

## 2023-04-30 RX ADMIN — METHYLPREDNISOLONE SODIUM SUCCINATE 40 MG: 40 INJECTION, POWDER, FOR SOLUTION INTRAMUSCULAR; INTRAVENOUS at 12:00

## 2023-04-30 RX ADMIN — FOLIC ACID 1 MG: 1 TABLET ORAL at 09:36

## 2023-04-30 RX ADMIN — INSULIN GLARGINE 20 UNITS: 100 INJECTION, SOLUTION SUBCUTANEOUS at 21:10

## 2023-04-30 RX ADMIN — TIZANIDINE 4 MG: 4 TABLET ORAL at 20:34

## 2023-04-30 RX ADMIN — SERTRALINE HYDROCHLORIDE 25 MG: 50 TABLET ORAL at 09:37

## 2023-04-30 RX ADMIN — GABAPENTIN 300 MG: 300 CAPSULE ORAL at 14:36

## 2023-04-30 RX ADMIN — HYDROCODONE BITARTRATE AND ACETAMINOPHEN 1 TABLET: 7.5; 325 TABLET ORAL at 06:50

## 2023-04-30 RX ADMIN — INSULIN LISPRO 8 UNITS: 100 INJECTION, SOLUTION INTRAVENOUS; SUBCUTANEOUS at 18:48

## 2023-04-30 RX ADMIN — ENOXAPARIN SODIUM 30 MG: 100 INJECTION SUBCUTANEOUS at 09:35

## 2023-04-30 RX ADMIN — IBUPROFEN 400 MG: 400 TABLET, FILM COATED ORAL at 09:36

## 2023-04-30 RX ADMIN — HYDROCORTISONE: 1 OINTMENT TOPICAL at 20:38

## 2023-04-30 RX ADMIN — HYDROCORTISONE: 1 OINTMENT TOPICAL at 09:45

## 2023-04-30 ASSESSMENT — PAIN SCALES - GENERAL
PAINLEVEL_OUTOF10: 8
PAINLEVEL_OUTOF10: 8
PAINLEVEL_OUTOF10: 9

## 2023-04-30 ASSESSMENT — PAIN DESCRIPTION - DESCRIPTORS
DESCRIPTORS: BURNING
DESCRIPTORS: BURNING

## 2023-04-30 ASSESSMENT — PAIN - FUNCTIONAL ASSESSMENT: PAIN_FUNCTIONAL_ASSESSMENT: ACTIVITIES ARE NOT PREVENTED

## 2023-04-30 ASSESSMENT — PAIN DESCRIPTION - LOCATION
LOCATION: GENERALIZED
LOCATION: GENERALIZED

## 2023-05-01 VITALS
HEART RATE: 80 BPM | OXYGEN SATURATION: 100 % | HEIGHT: 66 IN | BODY MASS INDEX: 44.57 KG/M2 | RESPIRATION RATE: 16 BRPM | TEMPERATURE: 98 F | DIASTOLIC BLOOD PRESSURE: 80 MMHG | SYSTOLIC BLOOD PRESSURE: 135 MMHG | WEIGHT: 277.34 LBS

## 2023-05-01 LAB
CULTURE: ABNORMAL
CULTURE: NORMAL
CULTURE: NORMAL
GLUCOSE BLD-MCNC: 262 MG/DL (ref 70–99)
GLUCOSE BLD-MCNC: 328 MG/DL (ref 70–99)
Lab: ABNORMAL
Lab: NORMAL
Lab: NORMAL
SPECIMEN: ABNORMAL
SPECIMEN: NORMAL
SPECIMEN: NORMAL

## 2023-05-01 PROCEDURE — 6370000000 HC RX 637 (ALT 250 FOR IP): Performed by: STUDENT IN AN ORGANIZED HEALTH CARE EDUCATION/TRAINING PROGRAM

## 2023-05-01 PROCEDURE — 6370000000 HC RX 637 (ALT 250 FOR IP): Performed by: PHYSICIAN ASSISTANT

## 2023-05-01 PROCEDURE — 82962 GLUCOSE BLOOD TEST: CPT

## 2023-05-01 PROCEDURE — 2580000003 HC RX 258: Performed by: PHYSICIAN ASSISTANT

## 2023-05-01 PROCEDURE — 2500000003 HC RX 250 WO HCPCS: Performed by: INTERNAL MEDICINE

## 2023-05-01 PROCEDURE — 94761 N-INVAS EAR/PLS OXIMETRY MLT: CPT

## 2023-05-01 PROCEDURE — 6370000000 HC RX 637 (ALT 250 FOR IP): Performed by: INTERNAL MEDICINE

## 2023-05-01 PROCEDURE — 99232 SBSQ HOSP IP/OBS MODERATE 35: CPT | Performed by: INTERNAL MEDICINE

## 2023-05-01 PROCEDURE — 6360000002 HC RX W HCPCS: Performed by: STUDENT IN AN ORGANIZED HEALTH CARE EDUCATION/TRAINING PROGRAM

## 2023-05-01 PROCEDURE — 6360000002 HC RX W HCPCS: Performed by: PHYSICIAN ASSISTANT

## 2023-05-01 RX ORDER — METHYLPREDNISOLONE 4 MG/1
TABLET ORAL
Qty: 1 KIT | Refills: 0 | Status: SHIPPED | OUTPATIENT
Start: 2023-05-01 | End: 2023-05-07

## 2023-05-01 RX ORDER — DIAPER,BRIEF,INFANT-TODD,DISP
EACH MISCELLANEOUS
Qty: 30 G | Refills: 0 | Status: SHIPPED | OUTPATIENT
Start: 2023-05-01

## 2023-05-01 RX ORDER — KETOROLAC TROMETHAMINE 10 MG/1
10 TABLET, FILM COATED ORAL EVERY 6 HOURS PRN
Qty: 20 TABLET | Refills: 0 | Status: SHIPPED | OUTPATIENT
Start: 2023-05-01 | End: 2024-04-30

## 2023-05-01 RX ADMIN — FUROSEMIDE 40 MG: 10 INJECTION, SOLUTION INTRAMUSCULAR; INTRAVENOUS at 09:48

## 2023-05-01 RX ADMIN — Medication 1 CAPSULE: at 12:34

## 2023-05-01 RX ADMIN — INSULIN LISPRO 8 UNITS: 100 INJECTION, SOLUTION INTRAVENOUS; SUBCUTANEOUS at 09:48

## 2023-05-01 RX ADMIN — FOLIC ACID 1 MG: 1 TABLET ORAL at 09:36

## 2023-05-01 RX ADMIN — ENOXAPARIN SODIUM 30 MG: 100 INJECTION SUBCUTANEOUS at 09:36

## 2023-05-01 RX ADMIN — GABAPENTIN 300 MG: 300 CAPSULE ORAL at 09:35

## 2023-05-01 RX ADMIN — SODIUM CHLORIDE, PRESERVATIVE FREE 10 ML: 5 INJECTION INTRAVENOUS at 09:39

## 2023-05-01 RX ADMIN — Medication 1 CAPSULE: at 09:35

## 2023-05-01 RX ADMIN — HYDROCODONE BITARTRATE AND ACETAMINOPHEN 1 TABLET: 7.5; 325 TABLET ORAL at 02:39

## 2023-05-01 RX ADMIN — PANTOPRAZOLE SODIUM 40 MG: 40 TABLET, DELAYED RELEASE ORAL at 09:36

## 2023-05-01 RX ADMIN — POTASSIUM BICARBONATE 20 MEQ: 782 TABLET, EFFERVESCENT ORAL at 09:35

## 2023-05-01 RX ADMIN — HYDROCORTISONE: 1 OINTMENT TOPICAL at 12:35

## 2023-05-01 RX ADMIN — PREDNISONE 80 MG: 20 TABLET ORAL at 09:35

## 2023-05-01 RX ADMIN — SPIRONOLACTONE 25 MG: 50 TABLET ORAL at 09:36

## 2023-05-01 RX ADMIN — HYDROCODONE BITARTRATE AND ACETAMINOPHEN 1 TABLET: 7.5; 325 TABLET ORAL at 09:35

## 2023-05-01 RX ADMIN — INSULIN LISPRO 12 UNITS: 100 INJECTION, SOLUTION INTRAVENOUS; SUBCUTANEOUS at 12:34

## 2023-05-01 RX ADMIN — CLINDAMYCIN PHOSPHATE 900 MG: 900 INJECTION, SOLUTION INTRAVENOUS at 00:16

## 2023-05-01 RX ADMIN — SODIUM CHLORIDE 25 ML: 9 INJECTION, SOLUTION INTRAVENOUS at 00:16

## 2023-05-01 RX ADMIN — CLINDAMYCIN PHOSPHATE 900 MG: 900 INJECTION, SOLUTION INTRAVENOUS at 09:41

## 2023-05-01 RX ADMIN — SERTRALINE HYDROCHLORIDE 25 MG: 50 TABLET ORAL at 09:35

## 2023-05-01 ASSESSMENT — PAIN DESCRIPTION - DESCRIPTORS: DESCRIPTORS: BURNING

## 2023-05-01 ASSESSMENT — PAIN SCALES - GENERAL
PAINLEVEL_OUTOF10: 8
PAINLEVEL_OUTOF10: 8

## 2023-05-01 ASSESSMENT — PAIN - FUNCTIONAL ASSESSMENT: PAIN_FUNCTIONAL_ASSESSMENT: ACTIVITIES ARE NOT PREVENTED

## 2023-05-01 ASSESSMENT — PAIN DESCRIPTION - LOCATION
LOCATION: GENERALIZED
LOCATION: GENERALIZED

## 2023-05-01 NOTE — CARE COORDINATION
Reviewed chart , discussed in IDR, spoke with pt about discharge needs/plans. Plans to return home with  and daughter to assist her as needed. Discussed Home care and still declining. Plan remains home with family. Pt would like to leave today. PS to Dr Bridger Stearns  CM available if any needs arise.

## 2023-05-01 NOTE — PROGRESS NOTES
Nutrition Assessment     Type and Reason for Visit: Initial, RD Nutrition Re-Screen/LOS    Nutrition Recommendations/Plan:   Recommend Carb Control 4-5, Low Sodium (2 gm/day) Meal Plan for home      Nutrition Assessment:  Admitted with bilateral cellulitis, sepsis. Started on steroids and antibiotics dos. Good appetite dos and pta. Currently on low sodium diet with elevated BS, reports better control at home with metformin. Pt admits difficulty of giving up regular pop or sugar. D/w pt still need to monitor carb control if steroid use continues. Provided brief education over My Plate, adequate fiber/protein intake with carbs. Pt consuming carb-loaded meal at visit. Left Carb Counting for People with Diabetes, Rethink Your Drink/Snacks & Meals. Provided contact name and number for further questions. Recommend to f/u PCP for BS mgt and fluids. Currently on lasix. Plans for d/c. Follow as low nutrition risk at this time.     Nutrition Related Findings:   A1c 7.9, POCT 244-461 dos Wound Type: Venous Stasis    Nutrition Interventions:   Nutrition Education/Counseling: Education initiated, Survival skills/brief education completed     Discharge Planning:    Recommend pursue outpatient diabetes education     Jose Burris, [address] [place] [address]  Contact: [phone]

## 2023-05-01 NOTE — PROGRESS NOTES
Infectious Disease Progress Note  2023   Patient Name: Maria Del Rosario Salmon : 1965     Assessment  Sepsis secondary to right leg cellulitis  Streptococcus agalactiae bacteremia  Right leg cellulitis is much improved. Will discontinue abx and see her in the office in a week  T2DM, and chronic cigarette smoking  2023, wound cx: S agalactiae  2023; Blood cx 0/2  Echocardiogram no vegetations seen   Completed vancomycin and cefepime  Delayed hypersensitivity reaction  May have started on 2023, with rash spreading from her right leg. Seen to have a generalized maculopapular pruritic rash on 2023  Rash has become more erythematous and uniform. Its involved both palmar surfaces. Its curious that there is  relative sparing of her left leg, face and both forearms  Received cefepime for 2 days and ceftriaxone  ? Clindamycin allergy  Pseudomonas aeruginosa asymptomatic bacteriuria  Comorbid conditions: GERD, hypertension, hyperlipidemia, irritable bowel syndrome diabetes mellitus, left total knee arthroplasty (). Plan  Therapeutic:  D/c clindamycin   Continue topical corticosteroids  Diagnostic: trend CRP and pct. F/u:   Other:Allergist referral to evaluate for cephalosporin and penicillin allergy as an outpatient  Patient may be discharged from infectious diseases service viewpoint. Follow in the office in 1 weeks. Reason for visit: F/u Sepsis secondary to right leg cellulitis? History:? Interval history noted  Denies n/v/d/f or untoward effects of antimicrobials  Physical Exam:  Vital Signs: BP (!) 114/57   Pulse 68   Temp 98.1 °F (36.7 °C) (Oral)   Resp 18   Ht 5' 6\" (1.676 m)   Wt 277 lb 5.4 oz (125.8 kg)   SpO2 99%   BMI 44.76 kg/m²     Gen: alert and oriented X3, no distress  Skin: no stigmata of endocarditis  Wounds: C/D/I  HEMT: AT/NC Oropharynx pink, moist, and without lesions or exudates; edentulous  Eyes: PERRLA, EOMI, conjunctiva pink, sclera anicteric. Neck: Supple.

## 2023-05-01 NOTE — PROGRESS NOTES
Outpatient Pharmacy Progress Note for Meds-to-Beds    Total number of Prescriptions Filled: 3  The following medications were dispensed to the patient during the discharge process:  Hydrocortisone  Ketorolac  Methlprednisolone     Additional Documentation:  Patient picked-up the medication(s) in the OP Pharmacy      Thank you for letting us serve your patients.   [address]    [address], [address] [facility]    Phone: [phone]    Fax: [phone]

## 2023-05-01 NOTE — DISCHARGE SUMMARY
V2.0  Discharge Summary    Name:  Wood Alva /Age/Sex: 1965 (65 y.o. female)   Admit Date: 2023  Discharge Date: 23    MRN & CSN:  [id number] & [id number] Encounter Date and Time 23 1:21 PM EDT    Attending:  Fran Schaffer MD Discharging Provider: Fran Schaffer MD       Hospital Course:     Brief HPI:  Wood Alva is a 65 y.o. female with pmh of GERD, hypertension, hyperlipidemia, IBS, depression, asthma, obesity, peripheral edema, vertigo, diabetes who presents to the ER for evaluation of swelling and pain in right leg as well as feeling shaky and elevated heart rate. Patient states that on  she went to the ER due to swelling in her leg and concern for infection was placed on clindamycin which she has been taking and states she has several doses left. Patient states she is dealt with edema in her legs and her cardiologist has been working with her for medication changes, adding Lasix and Aldactone, and recommending low-salt diet. She denies history of CHF. She denies fevers, chills. She has been eating normally and states she is compliant with her home medications. She denies abdominal pain, nausea, vomiting, diarrhea. She states she has noticed drainage out of her right leg that has been clear from several chronic wounds. Her right leg became warm, erythematous, and is more swollen for the past several weeks and has not improved with oral antibiotics. Her left leg is slightly red and warm to the touch but she denies it being as symptomatic as the right leg. She denies urinary symptoms, dysuria, hematuria, flank pain. No recent procedures but she did have a left foot surgery in 2022. Patient was found to be in severe sepsis with lactic acidosis, tachycardia, leukocytosis, and hypotension. She was started on vancomycin and cefepime and given 1 L of fluid due to the lactic acidosis, but was not given the sepsis fluid bolus given her peripheral edema.   Blood

## 2023-05-04 ENCOUNTER — OFFICE VISIT (OUTPATIENT)
Dept: INFECTIOUS DISEASES | Age: 58
End: 2023-05-04

## 2023-05-04 VITALS
WEIGHT: 262.2 LBS | DIASTOLIC BLOOD PRESSURE: 81 MMHG | HEART RATE: 84 BPM | SYSTOLIC BLOOD PRESSURE: 177 MMHG | RESPIRATION RATE: 18 BRPM | BODY MASS INDEX: 42.32 KG/M2

## 2023-05-04 DIAGNOSIS — L03.115 CELLULITIS OF RIGHT LOWER EXTREMITY: ICD-10-CM

## 2023-05-04 DIAGNOSIS — Z09 HOSPITAL DISCHARGE FOLLOW-UP: Primary | ICD-10-CM

## 2023-05-04 DIAGNOSIS — Z88.9 DRUG ALLERGY: ICD-10-CM

## 2023-05-04 DIAGNOSIS — L27.0: ICD-10-CM

## 2023-05-04 RX ORDER — CLOTRIMAZOLE AND BETAMETHASONE DIPROPIONATE 10; .64 MG/G; MG/G
CREAM TOPICAL
Qty: 45 G | Refills: 0 | Status: SHIPPED | OUTPATIENT
Start: 2023-05-04

## 2023-05-04 NOTE — ASSESSMENT & PLAN NOTE
improving bu there are areas that may have fungal or bacterial superinfection. Topical mupirocin and clotrimazole-beclomethasone ordered. Referral to allergist was made.

## 2023-05-04 NOTE — PROGRESS NOTES
particularly painful. Posterior of right leg is swollen. With these problematic areas, her skin rash has improved. Review of Systems   All other systems reviewed and are negative. Allergies   Allergen Reactions    Rocephin [Ceftriaxone] Hives    Pollen Extract Itching     Itchy and watery eyes. Pet dander also causes same symptoms. Tramadol      Ear ringing    Cephalosporins Rash     Delayed hypersensitivity reaction: generalized maculopapular rash       Patient Active Problem List   Diagnosis    Obesity, Class I, BMI 30-34.9    HTN (hypertension)    Right knee pain    Hyperlipidemia    GERD (gastroesophageal reflux disease)    Patellofemoral syndrome    DJD (degenerative joint disease) of knee    Tobacco dependence    Major depressive disorder, recurrent episode with anxious distress (HCC)    Mild persistent asthma without complication    Urinary incontinence    Irritable bowel syndrome with diarrhea    Right ureteral stone    Primary osteoarthritis of left knee    Status post left knee replacement    SOB (shortness of breath) on exertion    Leg swelling    Family history of coronary arteriosclerosis    Sepsis [facility])    Hospital discharge follow-up    Drug allergy    Drug-induced erythroderma    Cellulitis of right lower extremity       Current Outpatient Medications   Medication Sig Dispense Refill    mupirocin (BACTROBAN) 2 % ointment Apply topically 3 times daily. 30 g 0    clotrimazole-betamethasone (LOTRISONE) 1-0.05 % cream Apply topically 2 times daily to inner thighs, posterior of right leg. 45 g 0    ketorolac (TORADOL) 10 MG tablet Take 1 tablet by mouth every 6 hours as needed for Pain 20 tablet 0    hydrocortisone 1 % ointment Apply topically 2 times daily. 30 g 0    methylPREDNISolone (MEDROL DOSEPACK) 4 MG tablet Take by mouth.  1 kit 0    QVAR REDIHALER 40 MCG/ACT AERB inhaler Inhale 2 puffs into the lungs 2 times daily      albuterol sulfate HFA (PROVENTIL;VENTOLIN;PROAIR) 108 (90 Base)

## 2023-05-09 NOTE — PROGRESS NOTES
5/10/2023         Referring Physician: No ref. provider found  Primary Care Physician: No primary care provider on file. Impression/Plan:   Diagnosis Orders   1. Drug-induced erythroderma  levoFLOXacin (LEVAQUIN) 500 MG tablet    predniSONE (DELTASONE) 20 MG tablet      2. Cellulitis of right lower extremity            Discussion:  Drug-induced erythroderma  Suspect residual cellulitis. Give 5 day course of prednisone and levofloxacin. Return in about 2 weeks (around 5/24/2023). 29 minutes was spent in the encounter; more than 50% of the face-to-face time was spent with the patient providing counseling and coordination of care. History: Aby Man is a 65 y.o.  female presenting today for follow-up. She has a medical history of type 2 diabetes mellitus, obesity, hypertension and was seen in the hospital during her 4/24/2023 admission for sepsis secondary to right leg cellulitis due to Streptococcus agalactiae complicated by Streptococcus agalactiae bacteremia. She received intravenous vancomycin and cefepime and subsequently received ceftriaxone and ibuprofen. She developed a maculopapular skin rash thought to be an allergic reaction to cephalosporins. Ceftriaxone was discontinued and clindamycin started. However, she developed a generalized blanching erythema and raised plaques on her skin. Her cellulitis did improve. Clindamycin was discontinued. She was discharged on 5/1/2023 and advised to apply topical hydrocortisone. In addition he was advised to see dermatologist. Has desquamation of skin. Left inner and right inner thighs are particularly painful. Posterior of right leg is swollen. With these problematic areas, her skin rash has improved. 5/10/2023: some improvement in rash but her right leg remains swollen and continues to ooze out fluid. She feels her skin is burning. Review of Systems   All other systems reviewed and are negative.     Allergies   Allergen

## 2023-05-10 ENCOUNTER — OFFICE VISIT (OUTPATIENT)
Dept: INFECTIOUS DISEASES | Age: 58
End: 2023-05-10
Payer: MEDICAID

## 2023-05-10 VITALS
SYSTOLIC BLOOD PRESSURE: 119 MMHG | WEIGHT: 263.6 LBS | HEART RATE: 97 BPM | RESPIRATION RATE: 18 BRPM | BODY MASS INDEX: 42.55 KG/M2 | DIASTOLIC BLOOD PRESSURE: 62 MMHG

## 2023-05-10 DIAGNOSIS — L03.115 CELLULITIS OF RIGHT LOWER EXTREMITY: ICD-10-CM

## 2023-05-10 DIAGNOSIS — L27.0: Primary | ICD-10-CM

## 2023-05-10 PROCEDURE — G8417 CALC BMI ABV UP PARAM F/U: HCPCS | Performed by: INTERNAL MEDICINE

## 2023-05-10 PROCEDURE — 3074F SYST BP LT 130 MM HG: CPT | Performed by: INTERNAL MEDICINE

## 2023-05-10 PROCEDURE — 3017F COLORECTAL CA SCREEN DOC REV: CPT | Performed by: INTERNAL MEDICINE

## 2023-05-10 PROCEDURE — 99213 OFFICE O/P EST LOW 20 MIN: CPT | Performed by: INTERNAL MEDICINE

## 2023-05-10 PROCEDURE — 3078F DIAST BP <80 MM HG: CPT | Performed by: INTERNAL MEDICINE

## 2023-05-10 PROCEDURE — 1111F DSCHRG MED/CURRENT MED MERGE: CPT | Performed by: INTERNAL MEDICINE

## 2023-05-10 PROCEDURE — G8427 DOCREV CUR MEDS BY ELIG CLIN: HCPCS | Performed by: INTERNAL MEDICINE

## 2023-05-10 PROCEDURE — 4004F PT TOBACCO SCREEN RCVD TLK: CPT | Performed by: INTERNAL MEDICINE

## 2023-05-10 RX ORDER — LEVOFLOXACIN 500 MG/1
500 TABLET, FILM COATED ORAL DAILY
Qty: 5 TABLET | Refills: 0 | Status: SHIPPED | OUTPATIENT
Start: 2023-05-10 | End: 2023-05-15

## 2023-05-10 RX ORDER — PREDNISONE 20 MG/1
20 TABLET ORAL DAILY
Qty: 5 TABLET | Refills: 0 | Status: SHIPPED | OUTPATIENT
Start: 2023-05-10 | End: 2023-05-15

## 2023-05-16 ENCOUNTER — ANESTHESIA (OUTPATIENT)
Dept: OPERATING ROOM | Age: 58
DRG: 720 | End: 2023-05-16
Payer: MEDICAID

## 2023-05-16 ENCOUNTER — APPOINTMENT (OUTPATIENT)
Dept: CT IMAGING | Age: 58
DRG: 720 | End: 2023-05-16
Payer: MEDICAID

## 2023-05-16 ENCOUNTER — HOSPITAL ENCOUNTER (INPATIENT)
Age: 58
LOS: 2 days | Discharge: HOME | DRG: 720 | End: 2023-05-18
Attending: EMERGENCY MEDICINE | Admitting: INTERNAL MEDICINE
Payer: MEDICAID

## 2023-05-16 ENCOUNTER — ANESTHESIA EVENT (OUTPATIENT)
Dept: OPERATING ROOM | Age: 58
DRG: 720 | End: 2023-05-16
Payer: MEDICAID

## 2023-05-16 DIAGNOSIS — R11.2 NAUSEA AND VOMITING, UNSPECIFIED VOMITING TYPE: ICD-10-CM

## 2023-05-16 DIAGNOSIS — A41.9 SEPTICEMIA (HCC): ICD-10-CM

## 2023-05-16 DIAGNOSIS — N20.0 KIDNEY STONE: Primary | ICD-10-CM

## 2023-05-16 DIAGNOSIS — N17.9 ACUTE RENAL FAILURE, UNSPECIFIED ACUTE RENAL FAILURE TYPE (HCC): ICD-10-CM

## 2023-05-16 DIAGNOSIS — R10.9 FLANK PAIN: ICD-10-CM

## 2023-05-16 PROBLEM — N39.0 UTI (URINARY TRACT INFECTION): Status: ACTIVE | Noted: 2023-05-16

## 2023-05-16 LAB
ALBUMIN SERPL-MCNC: 4 GM/DL (ref 3.4–5)
ALP BLD-CCNC: 134 IU/L (ref 40–129)
ALT SERPL-CCNC: 35 U/L (ref 10–40)
ANION GAP SERPL CALCULATED.3IONS-SCNC: 13 MMOL/L (ref 4–16)
AST SERPL-CCNC: 25 IU/L (ref 15–37)
BASOPHILS ABSOLUTE: 0 K/CU MM
BASOPHILS RELATIVE PERCENT: 0.2 % (ref 0–1)
BILIRUB SERPL-MCNC: 0.6 MG/DL (ref 0–1)
BUN SERPL-MCNC: 32 MG/DL (ref 6–23)
CALCIUM SERPL-MCNC: 9.3 MG/DL (ref 8.3–10.6)
CHLORIDE BLD-SCNC: 95 MMOL/L (ref 99–110)
CO2: 25 MMOL/L (ref 21–32)
CREAT SERPL-MCNC: 3 MG/DL (ref 0.6–1.1)
DIFFERENTIAL TYPE: ABNORMAL
EOSINOPHILS ABSOLUTE: 0.1 K/CU MM
EOSINOPHILS RELATIVE PERCENT: 0.3 % (ref 0–3)
GFR SERPL CREATININE-BSD FRML MDRD: 18 ML/MIN/1.73M2
GLUCOSE BLD-MCNC: 276 MG/DL (ref 70–99)
GLUCOSE SERPL-MCNC: 287 MG/DL (ref 70–99)
HCT VFR BLD CALC: 44.3 % (ref 37–47)
HEMOGLOBIN: 13.4 GM/DL (ref 12.5–16)
IMMATURE NEUTROPHIL %: 0.5 % (ref 0–0.43)
LACTATE: 3.2 MMOL/L (ref 0.5–1.9)
LIPASE: 15 IU/L (ref 13–60)
LYMPHOCYTES ABSOLUTE: 1 K/CU MM
LYMPHOCYTES RELATIVE PERCENT: 5.3 % (ref 24–44)
MCH RBC QN AUTO: 27.6 PG (ref 27–31)
MCHC RBC AUTO-ENTMCNC: 30.2 % (ref 32–36)
MCV RBC AUTO: 91.2 FL (ref 78–100)
MONOCYTES ABSOLUTE: 1.2 K/CU MM
MONOCYTES RELATIVE PERCENT: 6.2 % (ref 0–4)
NUCLEATED RBC %: 0 %
PDW BLD-RTO: 14.6 % (ref 11.7–14.9)
PLATELET # BLD: 277 K/CU MM (ref 140–440)
PMV BLD AUTO: 9.1 FL (ref 7.5–11.1)
POTASSIUM SERPL-SCNC: 5.4 MMOL/L (ref 3.5–5.1)
RBC # BLD: 4.86 M/CU MM (ref 4.2–5.4)
SEGMENTED NEUTROPHILS ABSOLUTE COUNT: 16.9 K/CU MM
SEGMENTED NEUTROPHILS RELATIVE PERCENT: 87.5 % (ref 36–66)
SODIUM BLD-SCNC: 133 MMOL/L (ref 135–145)
TOTAL IMMATURE NEUTOROPHIL: 0.1 K/CU MM
TOTAL NUCLEATED RBC: 0 K/CU MM
TOTAL PROTEIN: 7.4 GM/DL (ref 6.4–8.2)
WBC # BLD: 19.3 K/CU MM (ref 4–10.5)

## 2023-05-16 PROCEDURE — 2140000000 HC CCU INTERMEDIATE R&B

## 2023-05-16 PROCEDURE — 7100000001 HC PACU RECOVERY - ADDTL 15 MIN: Performed by: SPECIALIST

## 2023-05-16 PROCEDURE — 3600000003 HC SURGERY LEVEL 3 BASE: Performed by: SPECIALIST

## 2023-05-16 PROCEDURE — 83690 ASSAY OF LIPASE: CPT

## 2023-05-16 PROCEDURE — 83605 ASSAY OF LACTIC ACID: CPT

## 2023-05-16 PROCEDURE — C1769 GUIDE WIRE: HCPCS | Performed by: SPECIALIST

## 2023-05-16 PROCEDURE — 85025 COMPLETE CBC W/AUTO DIFF WBC: CPT

## 2023-05-16 PROCEDURE — 3600000013 HC SURGERY LEVEL 3 ADDTL 15MIN: Performed by: SPECIALIST

## 2023-05-16 PROCEDURE — C1758 CATHETER, URETERAL: HCPCS | Performed by: SPECIALIST

## 2023-05-16 PROCEDURE — 99285 EMERGENCY DEPT VISIT HI MDM: CPT

## 2023-05-16 PROCEDURE — BT141ZZ FLUOROSCOPY OF KIDNEYS, URETERS AND BLADDER USING LOW OSMOLAR CONTRAST: ICD-10-PCS | Performed by: SPECIALIST

## 2023-05-16 PROCEDURE — 6370000000 HC RX 637 (ALT 250 FOR IP): Performed by: EMERGENCY MEDICINE

## 2023-05-16 PROCEDURE — 7100000000 HC PACU RECOVERY - FIRST 15 MIN: Performed by: SPECIALIST

## 2023-05-16 PROCEDURE — 3700000000 HC ANESTHESIA ATTENDED CARE: Performed by: SPECIALIST

## 2023-05-16 PROCEDURE — 0T768DZ DILATION OF RIGHT URETER WITH INTRALUMINAL DEVICE, VIA NATURAL OR ARTIFICIAL OPENING ENDOSCOPIC: ICD-10-PCS | Performed by: SPECIALIST

## 2023-05-16 PROCEDURE — 6360000002 HC RX W HCPCS: Performed by: EMERGENCY MEDICINE

## 2023-05-16 PROCEDURE — 96365 THER/PROPH/DIAG IV INF INIT: CPT

## 2023-05-16 PROCEDURE — C2617 STENT, NON-COR, TEM W/O DEL: HCPCS | Performed by: SPECIALIST

## 2023-05-16 PROCEDURE — 2709999900 HC NON-CHARGEABLE SUPPLY: Performed by: SPECIALIST

## 2023-05-16 PROCEDURE — 80053 COMPREHEN METABOLIC PANEL: CPT

## 2023-05-16 PROCEDURE — 3700000001 HC ADD 15 MINUTES (ANESTHESIA): Performed by: SPECIALIST

## 2023-05-16 PROCEDURE — 82962 GLUCOSE BLOOD TEST: CPT

## 2023-05-16 PROCEDURE — 74176 CT ABD & PELVIS W/O CONTRAST: CPT

## 2023-05-16 PROCEDURE — 2580000003 HC RX 258: Performed by: EMERGENCY MEDICINE

## 2023-05-16 RX ORDER — 0.9 % SODIUM CHLORIDE 0.9 %
1000 INTRAVENOUS SOLUTION INTRAVENOUS ONCE
Status: COMPLETED | OUTPATIENT
Start: 2023-05-16 | End: 2023-05-16

## 2023-05-16 RX ORDER — 0.9 % SODIUM CHLORIDE 0.9 %
2000 INTRAVENOUS SOLUTION INTRAVENOUS ONCE
Status: DISCONTINUED | OUTPATIENT
Start: 2023-05-16 | End: 2023-05-18 | Stop reason: HOSPADM

## 2023-05-16 RX ORDER — FENTANYL CITRATE 50 UG/ML
50 INJECTION, SOLUTION INTRAMUSCULAR; INTRAVENOUS
Status: DISCONTINUED | OUTPATIENT
Start: 2023-05-16 | End: 2023-05-17 | Stop reason: ALTCHOICE

## 2023-05-16 RX ORDER — ACETAMINOPHEN 500 MG
1000 TABLET ORAL ONCE
Status: COMPLETED | OUTPATIENT
Start: 2023-05-16 | End: 2023-05-16

## 2023-05-16 RX ORDER — ONDANSETRON 2 MG/ML
4 INJECTION INTRAMUSCULAR; INTRAVENOUS EVERY 30 MIN PRN
Status: DISCONTINUED | OUTPATIENT
Start: 2023-05-16 | End: 2023-05-17

## 2023-05-16 RX ADMIN — VANCOMYCIN HYDROCHLORIDE 2000 MG: 10 INJECTION, POWDER, LYOPHILIZED, FOR SOLUTION INTRAVENOUS at 21:12

## 2023-05-16 RX ADMIN — MEROPENEM 1000 MG: 1 INJECTION, POWDER, FOR SOLUTION INTRAVENOUS at 20:24

## 2023-05-16 RX ADMIN — SODIUM CHLORIDE 1000 ML: 9 INJECTION, SOLUTION INTRAVENOUS at 19:47

## 2023-05-16 RX ADMIN — SODIUM CHLORIDE 1000 ML: 9 INJECTION, SOLUTION INTRAVENOUS at 21:12

## 2023-05-16 RX ADMIN — ACETAMINOPHEN 1000 MG: 500 TABLET ORAL at 21:40

## 2023-05-16 ASSESSMENT — ENCOUNTER SYMPTOMS
ALLERGIC/IMMUNOLOGIC NEGATIVE: 1
EYES NEGATIVE: 1
RESPIRATORY NEGATIVE: 1
NAUSEA: 1
ABDOMINAL PAIN: 1
VOMITING: 1

## 2023-05-16 ASSESSMENT — LIFESTYLE VARIABLES: SMOKING_STATUS: 1

## 2023-05-16 NOTE — ED PROVIDER NOTES
[facility]      TRIAGE CHIEF COMPLAINT:   Emesis and Flank Pain (Right Side x 1 day)      [place]:  Lucien Holloway is a 65 y.o. female that presents with complaint of nausea vomiting abdominal pain flank pain on the right side for the last day. History of kidney stones UTIs this feels similar. She has had a fever on arrival no chest pain shortness of breath no travel sick contacts no diarrhea constipation. Constant pain has been vomiting cannot keep anything down no other questions or concerns. Vania Ruffing REVIEW OF SYSTEMS:  At least 10 systems reviewed and otherwise acutely negative except as in the [address] Review of Systems   Constitutional:  Positive for chills and fever. HENT: Negative. Eyes: Negative. Respiratory: Negative. Cardiovascular: Negative. Gastrointestinal:  Positive for abdominal pain, nausea and vomiting. Endocrine: Negative. Genitourinary:  Positive for dysuria, flank pain and frequency. Skin: Negative. Allergic/Immunologic: Negative. Neurological: Negative. Hematological: Negative. Psychiatric/Behavioral: Negative. All other systems reviewed and are negative.     Past Medical History:   Diagnosis Date    Arthritis     Arthritis of left knee     Asthma     last flare up summer 2020    Diabetes mellitus ([facility] [address]     \"use to take Metformin been off this since  2019\"    Disorder of rotator cuff syndrome of left shoulder and allied disorder     H/O cardiovascular stress test 2/24/16    EF68% normal study    History of motion sickness     Hx of echocardiogram 2/24/16    EF 55%, normal LV function, moderate LVH    Hyperlipidemia     Hypertension     Kidney stones     \"had kidney stone 11/2020- removed with surg- , back in 2009- had to put tube in my back(nephostomy tube) for kidney stone-had lithotripsy    Migraine     Obesity     PONV (postoperative nausea and vomiting)     hx of motion sickness    UTI (urinary tract infection)     \"last one 11/2020\"

## 2023-05-16 NOTE — ED TRIAGE NOTES
Pt states that she started having right side flank pain and emesis yesterday evening. [facility] [address] [facility].

## 2023-05-17 LAB
ALBUMIN SERPL-MCNC: 3.5 GM/DL (ref 3.4–5)
ALP BLD-CCNC: 114 IU/L (ref 40–129)
ALT SERPL-CCNC: 27 U/L (ref 10–40)
ANION GAP SERPL CALCULATED.3IONS-SCNC: 9 MMOL/L (ref 4–16)
AST SERPL-CCNC: 21 IU/L (ref 15–37)
BACTERIA: NEGATIVE /HPF
BASOPHILS ABSOLUTE: 0 K/CU MM
BASOPHILS RELATIVE PERCENT: 0.1 % (ref 0–1)
BILIRUB SERPL-MCNC: 0.6 MG/DL (ref 0–1)
BILIRUBIN DIRECT: 0.2 MG/DL (ref 0–0.3)
BILIRUBIN URINE: NEGATIVE
BILIRUBIN, INDIRECT: 0.4 MG/DL (ref 0–0.7)
BLOOD, URINE: NORMAL
BUN SERPL-MCNC: 28 MG/DL (ref 6–23)
CALCIUM SERPL-MCNC: 7.9 MG/DL (ref 8.3–10.6)
CHLORIDE BLD-SCNC: 98 MMOL/L (ref 99–110)
CLARITY: CLEAR
CO2: 22 MMOL/L (ref 21–32)
COLOR: YELLOW
CREAT SERPL-MCNC: 1.8 MG/DL (ref 0.6–1.1)
DIFFERENTIAL TYPE: ABNORMAL
EOSINOPHILS ABSOLUTE: 0 K/CU MM
EOSINOPHILS RELATIVE PERCENT: 0 % (ref 0–3)
ESTIMATED AVERAGE GLUCOSE: 206 MG/DL
GFR SERPL CREATININE-BSD FRML MDRD: 33 ML/MIN/1.73M2
GLUCOSE BLD-MCNC: 211 MG/DL (ref 70–99)
GLUCOSE BLD-MCNC: 245 MG/DL (ref 70–99)
GLUCOSE BLD-MCNC: 311 MG/DL (ref 70–99)
GLUCOSE BLD-MCNC: 403 MG/DL (ref 70–99)
GLUCOSE BLD-MCNC: 446 MG/DL (ref 70–99)
GLUCOSE SERPL-MCNC: 430 MG/DL (ref 70–99)
GLUCOSE, URINE: 100 MG/DL
HBA1C MFR BLD: 8.8 % (ref 4.2–6.3)
HCT VFR BLD CALC: 35.4 % (ref 37–47)
HEMOGLOBIN: 11 GM/DL (ref 12.5–16)
IMMATURE NEUTROPHIL %: 0.4 % (ref 0–0.43)
KETONES, URINE: NEGATIVE MG/DL
LACTATE: 1.8 MMOL/L (ref 0.5–1.9)
LACTIC ACID, SEPSIS: 1.6 MMOL/L (ref 0.5–1.9)
LEUKOCYTE ESTERASE, URINE: NORMAL
LYMPHOCYTES ABSOLUTE: 0.8 K/CU MM
LYMPHOCYTES RELATIVE PERCENT: 5.2 % (ref 24–44)
MCH RBC QN AUTO: 27.4 PG (ref 27–31)
MCHC RBC AUTO-ENTMCNC: 31.1 % (ref 32–36)
MCV RBC AUTO: 88.1 FL (ref 78–100)
MONOCYTES ABSOLUTE: 0.4 K/CU MM
MONOCYTES RELATIVE PERCENT: 2.7 % (ref 0–4)
NITRITE URINE, QUANTITATIVE: NEGATIVE
NUCLEATED RBC %: 0 %
PDW BLD-RTO: 14.6 % (ref 11.7–14.9)
PH, URINE: 7
PLATELET # BLD: 246 K/CU MM (ref 140–440)
PMV BLD AUTO: 9.2 FL (ref 7.5–11.1)
POTASSIUM SERPL-SCNC: 4.4 MMOL/L (ref 3.5–5.1)
PROCALCITONIN SERPL-MCNC: 1.07 NG/ML
PROTEIN UA: 100 MG/DL
RBC # BLD: 4.02 M/CU MM (ref 4.2–5.4)
RBC URINE: 173 /HPF
SEGMENTED NEUTROPHILS ABSOLUTE COUNT: 13.4 K/CU MM
SEGMENTED NEUTROPHILS RELATIVE PERCENT: 91.6 % (ref 36–66)
SODIUM BLD-SCNC: 129 MMOL/L (ref 135–145)
SPECIFIC GRAVITY UA: 1.01
TOTAL IMMATURE NEUTOROPHIL: 0.06 K/CU MM
TOTAL NUCLEATED RBC: 0 K/CU MM
TOTAL PROTEIN: 5.8 GM/DL (ref 6.4–8.2)
TRICHOMONAS: NORMAL /HPF
UROBILINOGEN, URINE: 0.2 MG/DL
WBC # BLD: 14.7 K/CU MM (ref 4–10.5)
WBC CLUMP: NORMAL /HPF
WBC UA: 107 /HPF
YEAST: NORMAL /HPF

## 2023-05-17 PROCEDURE — 6370000000 HC RX 637 (ALT 250 FOR IP): Performed by: INTERNAL MEDICINE

## 2023-05-17 PROCEDURE — 87040 BLOOD CULTURE FOR BACTERIA: CPT

## 2023-05-17 PROCEDURE — 2500000003 HC RX 250 WO HCPCS: Performed by: ANESTHESIOLOGY

## 2023-05-17 PROCEDURE — 2580000003 HC RX 258

## 2023-05-17 PROCEDURE — 80076 HEPATIC FUNCTION PANEL: CPT

## 2023-05-17 PROCEDURE — 84145 PROCALCITONIN (PCT): CPT

## 2023-05-17 PROCEDURE — 85025 COMPLETE CBC W/AUTO DIFF WBC: CPT

## 2023-05-17 PROCEDURE — 87106 FUNGI IDENTIFICATION YEAST: CPT

## 2023-05-17 PROCEDURE — 83605 ASSAY OF LACTIC ACID: CPT

## 2023-05-17 PROCEDURE — 94761 N-INVAS EAR/PLS OXIMETRY MLT: CPT

## 2023-05-17 PROCEDURE — 6360000002 HC RX W HCPCS

## 2023-05-17 PROCEDURE — 1200000000 HC SEMI PRIVATE

## 2023-05-17 PROCEDURE — 94640 AIRWAY INHALATION TREATMENT: CPT

## 2023-05-17 PROCEDURE — 94664 DEMO&/EVAL PT USE INHALER: CPT

## 2023-05-17 PROCEDURE — 81001 URINALYSIS AUTO W/SCOPE: CPT

## 2023-05-17 PROCEDURE — 6360000002 HC RX W HCPCS: Performed by: ANESTHESIOLOGY

## 2023-05-17 PROCEDURE — 82962 GLUCOSE BLOOD TEST: CPT

## 2023-05-17 PROCEDURE — 2140000000 HC CCU INTERMEDIATE R&B

## 2023-05-17 PROCEDURE — 6360000002 HC RX W HCPCS: Performed by: INTERNAL MEDICINE

## 2023-05-17 PROCEDURE — 83036 HEMOGLOBIN GLYCOSYLATED A1C: CPT

## 2023-05-17 PROCEDURE — 6370000000 HC RX 637 (ALT 250 FOR IP)

## 2023-05-17 PROCEDURE — 6360000002 HC RX W HCPCS: Performed by: EMERGENCY MEDICINE

## 2023-05-17 PROCEDURE — 2580000003 HC RX 258: Performed by: INTERNAL MEDICINE

## 2023-05-17 PROCEDURE — 36415 COLL VENOUS BLD VENIPUNCTURE: CPT

## 2023-05-17 PROCEDURE — 87086 URINE CULTURE/COLONY COUNT: CPT

## 2023-05-17 PROCEDURE — 80048 BASIC METABOLIC PNL TOTAL CA: CPT

## 2023-05-17 DEVICE — VARIABLE LENGTH URETERAL STENT
Type: IMPLANTABLE DEVICE | Site: URETER | Status: FUNCTIONAL
Brand: CONTOUR VL™

## 2023-05-17 RX ORDER — ONDANSETRON 2 MG/ML
4 INJECTION INTRAMUSCULAR; INTRAVENOUS
Status: DISCONTINUED | OUTPATIENT
Start: 2023-05-17 | End: 2023-05-17 | Stop reason: HOSPADM

## 2023-05-17 RX ORDER — ACETAMINOPHEN 650 MG/1
650 SUPPOSITORY RECTAL EVERY 6 HOURS PRN
Status: DISCONTINUED | OUTPATIENT
Start: 2023-05-17 | End: 2023-05-18 | Stop reason: HOSPADM

## 2023-05-17 RX ORDER — ATORVASTATIN CALCIUM 10 MG/1
20 TABLET, FILM COATED ORAL DAILY
Status: DISCONTINUED | OUTPATIENT
Start: 2023-05-17 | End: 2023-05-18 | Stop reason: HOSPADM

## 2023-05-17 RX ORDER — INSULIN LISPRO 100 [IU]/ML
4 INJECTION, SOLUTION INTRAVENOUS; SUBCUTANEOUS ONCE
Status: DISCONTINUED | OUTPATIENT
Start: 2023-05-17 | End: 2023-05-17

## 2023-05-17 RX ORDER — SODIUM CHLORIDE 9 MG/ML
INJECTION, SOLUTION INTRAVENOUS PRN
Status: DISCONTINUED | OUTPATIENT
Start: 2023-05-17 | End: 2023-05-18 | Stop reason: HOSPADM

## 2023-05-17 RX ORDER — MEPERIDINE HYDROCHLORIDE 25 MG/ML
12.5 INJECTION INTRAMUSCULAR; INTRAVENOUS; SUBCUTANEOUS EVERY 5 MIN PRN
Status: DISCONTINUED | OUTPATIENT
Start: 2023-05-17 | End: 2023-05-17 | Stop reason: HOSPADM

## 2023-05-17 RX ORDER — FUROSEMIDE 20 MG/1
20 TABLET ORAL DAILY
Status: DISCONTINUED | OUTPATIENT
Start: 2023-05-17 | End: 2023-05-18 | Stop reason: HOSPADM

## 2023-05-17 RX ORDER — NICOTINE 21 MG/24HR
1 PATCH, TRANSDERMAL 24 HOURS TRANSDERMAL DAILY
Status: DISCONTINUED | OUTPATIENT
Start: 2023-05-17 | End: 2023-05-18 | Stop reason: HOSPADM

## 2023-05-17 RX ORDER — INSULIN LISPRO 100 [IU]/ML
0-4 INJECTION, SOLUTION INTRAVENOUS; SUBCUTANEOUS NIGHTLY
Status: DISCONTINUED | OUTPATIENT
Start: 2023-05-17 | End: 2023-05-17

## 2023-05-17 RX ORDER — PANTOPRAZOLE SODIUM 40 MG/1
40 TABLET, DELAYED RELEASE ORAL
Status: DISCONTINUED | OUTPATIENT
Start: 2023-05-17 | End: 2023-05-18 | Stop reason: HOSPADM

## 2023-05-17 RX ORDER — SODIUM CHLORIDE 0.9 % (FLUSH) 0.9 %
5-40 SYRINGE (ML) INJECTION EVERY 12 HOURS SCHEDULED
Status: DISCONTINUED | OUTPATIENT
Start: 2023-05-17 | End: 2023-05-18 | Stop reason: HOSPADM

## 2023-05-17 RX ORDER — ROCURONIUM BROMIDE 10 MG/ML
INJECTION, SOLUTION INTRAVENOUS PRN
Status: DISCONTINUED | OUTPATIENT
Start: 2023-05-17 | End: 2023-05-17 | Stop reason: SDUPTHER

## 2023-05-17 RX ORDER — HYDRALAZINE HYDROCHLORIDE 20 MG/ML
10 INJECTION INTRAMUSCULAR; INTRAVENOUS
Status: DISCONTINUED | OUTPATIENT
Start: 2023-05-17 | End: 2023-05-17 | Stop reason: HOSPADM

## 2023-05-17 RX ORDER — ALBUTEROL SULFATE 90 UG/1
2 AEROSOL, METERED RESPIRATORY (INHALATION) 4 TIMES DAILY
Status: DISCONTINUED | OUTPATIENT
Start: 2023-05-17 | End: 2023-05-17

## 2023-05-17 RX ORDER — DROPERIDOL 2.5 MG/ML
0.62 INJECTION, SOLUTION INTRAMUSCULAR; INTRAVENOUS
Status: DISCONTINUED | OUTPATIENT
Start: 2023-05-17 | End: 2023-05-17 | Stop reason: HOSPADM

## 2023-05-17 RX ORDER — FOLIC ACID 1 MG/1
1 TABLET ORAL DAILY
Status: DISCONTINUED | OUTPATIENT
Start: 2023-05-17 | End: 2023-05-18 | Stop reason: HOSPADM

## 2023-05-17 RX ORDER — SODIUM CHLORIDE 9 MG/ML
INJECTION, SOLUTION INTRAVENOUS PRN
Status: DISCONTINUED | OUTPATIENT
Start: 2023-05-17 | End: 2023-05-17 | Stop reason: HOSPADM

## 2023-05-17 RX ORDER — GLUCAGON 1 MG/ML
1 KIT INJECTION PRN
Status: DISCONTINUED | OUTPATIENT
Start: 2023-05-17 | End: 2023-05-18 | Stop reason: HOSPADM

## 2023-05-17 RX ORDER — PROPOFOL 10 MG/ML
INJECTION, EMULSION INTRAVENOUS PRN
Status: DISCONTINUED | OUTPATIENT
Start: 2023-05-17 | End: 2023-05-17 | Stop reason: SDUPTHER

## 2023-05-17 RX ORDER — ALBUTEROL SULFATE 90 UG/1
2 AEROSOL, METERED RESPIRATORY (INHALATION) 2 TIMES DAILY
Status: DISCONTINUED | OUTPATIENT
Start: 2023-05-17 | End: 2023-05-18 | Stop reason: HOSPADM

## 2023-05-17 RX ORDER — INSULIN LISPRO 100 [IU]/ML
0-8 INJECTION, SOLUTION INTRAVENOUS; SUBCUTANEOUS
Status: DISCONTINUED | OUTPATIENT
Start: 2023-05-17 | End: 2023-05-17

## 2023-05-17 RX ORDER — METOCLOPRAMIDE HYDROCHLORIDE 5 MG/ML
INJECTION INTRAMUSCULAR; INTRAVENOUS PRN
Status: DISCONTINUED | OUTPATIENT
Start: 2023-05-17 | End: 2023-05-17 | Stop reason: SDUPTHER

## 2023-05-17 RX ORDER — INSULIN LISPRO 100 [IU]/ML
0-16 INJECTION, SOLUTION INTRAVENOUS; SUBCUTANEOUS
Status: DISCONTINUED | OUTPATIENT
Start: 2023-05-17 | End: 2023-05-17

## 2023-05-17 RX ORDER — OXYCODONE HYDROCHLORIDE 5 MG/1
5 TABLET ORAL
Status: DISCONTINUED | OUTPATIENT
Start: 2023-05-17 | End: 2023-05-17 | Stop reason: HOSPADM

## 2023-05-17 RX ORDER — FENTANYL CITRATE 50 UG/ML
50 INJECTION, SOLUTION INTRAMUSCULAR; INTRAVENOUS EVERY 5 MIN PRN
Status: DISCONTINUED | OUTPATIENT
Start: 2023-05-17 | End: 2023-05-17 | Stop reason: HOSPADM

## 2023-05-17 RX ORDER — ENOXAPARIN SODIUM 100 MG/ML
30 INJECTION SUBCUTANEOUS DAILY
Status: DISCONTINUED | OUTPATIENT
Start: 2023-05-17 | End: 2023-05-18 | Stop reason: HOSPADM

## 2023-05-17 RX ORDER — SODIUM CHLORIDE 9 MG/ML
INJECTION, SOLUTION INTRAVENOUS CONTINUOUS
Status: DISCONTINUED | OUTPATIENT
Start: 2023-05-17 | End: 2023-05-17

## 2023-05-17 RX ORDER — INSULIN LISPRO 100 [IU]/ML
0-4 INJECTION, SOLUTION INTRAVENOUS; SUBCUTANEOUS NIGHTLY
Status: DISCONTINUED | OUTPATIENT
Start: 2023-05-17 | End: 2023-05-18 | Stop reason: HOSPADM

## 2023-05-17 RX ORDER — ACETAMINOPHEN 325 MG/1
650 TABLET ORAL EVERY 6 HOURS PRN
Status: DISCONTINUED | OUTPATIENT
Start: 2023-05-17 | End: 2023-05-18 | Stop reason: HOSPADM

## 2023-05-17 RX ORDER — INSULIN LISPRO 100 [IU]/ML
0-16 INJECTION, SOLUTION INTRAVENOUS; SUBCUTANEOUS ONCE
Status: COMPLETED | OUTPATIENT
Start: 2023-05-17 | End: 2023-05-17

## 2023-05-17 RX ORDER — MONTELUKAST SODIUM 10 MG/1
10 TABLET ORAL NIGHTLY
Status: DISCONTINUED | OUTPATIENT
Start: 2023-05-17 | End: 2023-05-18 | Stop reason: HOSPADM

## 2023-05-17 RX ORDER — GABAPENTIN 300 MG/1
300 CAPSULE ORAL 3 TIMES DAILY
Status: DISCONTINUED | OUTPATIENT
Start: 2023-05-17 | End: 2023-05-18 | Stop reason: HOSPADM

## 2023-05-17 RX ORDER — DEXAMETHASONE SODIUM PHOSPHATE 4 MG/ML
INJECTION, SOLUTION INTRA-ARTICULAR; INTRALESIONAL; INTRAMUSCULAR; INTRAVENOUS; SOFT TISSUE PRN
Status: DISCONTINUED | OUTPATIENT
Start: 2023-05-17 | End: 2023-05-17 | Stop reason: SDUPTHER

## 2023-05-17 RX ORDER — SODIUM CHLORIDE 0.9 % (FLUSH) 0.9 %
5-40 SYRINGE (ML) INJECTION PRN
Status: DISCONTINUED | OUTPATIENT
Start: 2023-05-17 | End: 2023-05-18 | Stop reason: HOSPADM

## 2023-05-17 RX ORDER — DICYCLOMINE HCL 20 MG
20 TABLET ORAL 3 TIMES DAILY PRN
Status: DISCONTINUED | OUTPATIENT
Start: 2023-05-17 | End: 2023-05-18 | Stop reason: HOSPADM

## 2023-05-17 RX ORDER — FENTANYL CITRATE 50 UG/ML
INJECTION, SOLUTION INTRAMUSCULAR; INTRAVENOUS PRN
Status: DISCONTINUED | OUTPATIENT
Start: 2023-05-17 | End: 2023-05-17 | Stop reason: SDUPTHER

## 2023-05-17 RX ORDER — INSULIN GLARGINE 100 [IU]/ML
10 INJECTION, SOLUTION SUBCUTANEOUS NIGHTLY
Status: DISCONTINUED | OUTPATIENT
Start: 2023-05-17 | End: 2023-05-18 | Stop reason: HOSPADM

## 2023-05-17 RX ORDER — SODIUM CHLORIDE 0.9 % (FLUSH) 0.9 %
5-40 SYRINGE (ML) INJECTION PRN
Status: DISCONTINUED | OUTPATIENT
Start: 2023-05-17 | End: 2023-05-17 | Stop reason: HOSPADM

## 2023-05-17 RX ORDER — DEXTROSE MONOHYDRATE 100 MG/ML
INJECTION, SOLUTION INTRAVENOUS CONTINUOUS PRN
Status: DISCONTINUED | OUTPATIENT
Start: 2023-05-17 | End: 2023-05-18 | Stop reason: HOSPADM

## 2023-05-17 RX ORDER — LIDOCAINE HYDROCHLORIDE 20 MG/ML
INJECTION, SOLUTION EPIDURAL; INFILTRATION; INTRACAUDAL; PERINEURAL PRN
Status: DISCONTINUED | OUTPATIENT
Start: 2023-05-17 | End: 2023-05-17 | Stop reason: SDUPTHER

## 2023-05-17 RX ORDER — INSULIN LISPRO 100 [IU]/ML
0-16 INJECTION, SOLUTION INTRAVENOUS; SUBCUTANEOUS EVERY 4 HOURS
Status: DISCONTINUED | OUTPATIENT
Start: 2023-05-17 | End: 2023-05-18 | Stop reason: HOSPADM

## 2023-05-17 RX ORDER — IPRATROPIUM BROMIDE AND ALBUTEROL SULFATE 2.5; .5 MG/3ML; MG/3ML
1 SOLUTION RESPIRATORY (INHALATION)
Status: DISCONTINUED | OUTPATIENT
Start: 2023-05-17 | End: 2023-05-17 | Stop reason: HOSPADM

## 2023-05-17 RX ORDER — SODIUM CHLORIDE 0.9 % (FLUSH) 0.9 %
5-40 SYRINGE (ML) INJECTION EVERY 12 HOURS SCHEDULED
Status: DISCONTINUED | OUTPATIENT
Start: 2023-05-17 | End: 2023-05-17 | Stop reason: HOSPADM

## 2023-05-17 RX ORDER — LABETALOL HYDROCHLORIDE 5 MG/ML
10 INJECTION, SOLUTION INTRAVENOUS
Status: DISCONTINUED | OUTPATIENT
Start: 2023-05-17 | End: 2023-05-17 | Stop reason: HOSPADM

## 2023-05-17 RX ADMIN — ROCURONIUM BROMIDE 15 MG: 10 INJECTION INTRAVENOUS at 00:05

## 2023-05-17 RX ADMIN — ATORVASTATIN CALCIUM 20 MG: 10 TABLET, FILM COATED ORAL at 02:18

## 2023-05-17 RX ADMIN — FENTANYL CITRATE 100 MCG: 50 INJECTION, SOLUTION INTRAMUSCULAR; INTRAVENOUS at 00:05

## 2023-05-17 RX ADMIN — GABAPENTIN 300 MG: 300 CAPSULE ORAL at 21:29

## 2023-05-17 RX ADMIN — MEROPENEM 1000 MG: 1 INJECTION, POWDER, FOR SOLUTION INTRAVENOUS at 09:12

## 2023-05-17 RX ADMIN — SODIUM CHLORIDE: 9 INJECTION, SOLUTION INTRAVENOUS at 21:27

## 2023-05-17 RX ADMIN — METOCLOPRAMIDE 10 MG: 5 INJECTION, SOLUTION INTRAMUSCULAR; INTRAVENOUS at 00:12

## 2023-05-17 RX ADMIN — INSULIN LISPRO 4 UNITS: 100 INJECTION, SOLUTION INTRAVENOUS; SUBCUTANEOUS at 17:16

## 2023-05-17 RX ADMIN — FUROSEMIDE 20 MG: 20 TABLET ORAL at 08:59

## 2023-05-17 RX ADMIN — ALBUTEROL SULFATE 2 PUFF: 90 AEROSOL, METERED RESPIRATORY (INHALATION) at 19:53

## 2023-05-17 RX ADMIN — GABAPENTIN 300 MG: 300 CAPSULE ORAL at 08:59

## 2023-05-17 RX ADMIN — FOLIC ACID 1 MG: 1 TABLET ORAL at 08:59

## 2023-05-17 RX ADMIN — LIDOCAINE HYDROCHLORIDE 100 MG: 20 INJECTION, SOLUTION EPIDURAL; INFILTRATION; INTRACAUDAL; PERINEURAL at 00:05

## 2023-05-17 RX ADMIN — INSULIN LISPRO 16 UNITS: 100 INJECTION, SOLUTION INTRAVENOUS; SUBCUTANEOUS at 12:11

## 2023-05-17 RX ADMIN — ALBUTEROL SULFATE 2 PUFF: 90 AEROSOL, METERED RESPIRATORY (INHALATION) at 08:18

## 2023-05-17 RX ADMIN — INSULIN LISPRO 4 UNITS: 100 INJECTION, SOLUTION INTRAVENOUS; SUBCUTANEOUS at 02:19

## 2023-05-17 RX ADMIN — INSULIN LISPRO 8 UNITS: 100 INJECTION, SOLUTION INTRAVENOUS; SUBCUTANEOUS at 09:47

## 2023-05-17 RX ADMIN — ATORVASTATIN CALCIUM 20 MG: 10 TABLET, FILM COATED ORAL at 21:29

## 2023-05-17 RX ADMIN — PROPOFOL 200 MG: 10 INJECTION, EMULSION INTRAVENOUS at 00:05

## 2023-05-17 RX ADMIN — SODIUM CHLORIDE, PRESERVATIVE FREE 10 ML: 5 INJECTION INTRAVENOUS at 21:30

## 2023-05-17 RX ADMIN — ACETAMINOPHEN 650 MG: 325 TABLET ORAL at 21:29

## 2023-05-17 RX ADMIN — PIPERACILLIN AND TAZOBACTAM 3375 MG: 3; .375 INJECTION, POWDER, LYOPHILIZED, FOR SOLUTION INTRAVENOUS at 21:28

## 2023-05-17 RX ADMIN — DEXAMETHASONE SODIUM PHOSPHATE 8 MG: 4 INJECTION, SOLUTION INTRAMUSCULAR; INTRAVENOUS at 00:12

## 2023-05-17 RX ADMIN — ENOXAPARIN SODIUM 30 MG: 100 INJECTION SUBCUTANEOUS at 08:59

## 2023-05-17 RX ADMIN — INSULIN GLARGINE 10 UNITS: 100 INJECTION, SOLUTION SUBCUTANEOUS at 22:30

## 2023-05-17 RX ADMIN — ONDANSETRON 4 MG: 2 INJECTION INTRAMUSCULAR; INTRAVENOUS at 00:12

## 2023-05-17 RX ADMIN — MONTELUKAST 10 MG: 10 TABLET, FILM COATED ORAL at 21:29

## 2023-05-17 RX ADMIN — MONTELUKAST 10 MG: 10 TABLET, FILM COATED ORAL at 02:18

## 2023-05-17 RX ADMIN — INSULIN GLARGINE 10 UNITS: 100 INJECTION, SOLUTION SUBCUTANEOUS at 09:47

## 2023-05-17 RX ADMIN — PANTOPRAZOLE SODIUM 40 MG: 40 TABLET, DELAYED RELEASE ORAL at 06:38

## 2023-05-17 RX ADMIN — SODIUM CHLORIDE, PRESERVATIVE FREE 10 ML: 5 INJECTION INTRAVENOUS at 09:14

## 2023-05-17 RX ADMIN — SODIUM CHLORIDE: 9 INJECTION, SOLUTION INTRAVENOUS at 01:26

## 2023-05-17 RX ADMIN — GABAPENTIN 300 MG: 300 CAPSULE ORAL at 16:14

## 2023-05-17 ASSESSMENT — PAIN - FUNCTIONAL ASSESSMENT: PAIN_FUNCTIONAL_ASSESSMENT: PREVENTS OR INTERFERES WITH MANY ACTIVE NOT PASSIVE ACTIVITIES

## 2023-05-17 ASSESSMENT — PAIN SCALES - GENERAL
PAINLEVEL_OUTOF10: 0
PAINLEVEL_OUTOF10: 4

## 2023-05-17 ASSESSMENT — PAIN DESCRIPTION - LOCATION: LOCATION: ABDOMEN;FLANK

## 2023-05-17 NOTE — ED NOTES
ED TO INPATIENT SBAR HANDOFF    Patient Name: Oliva Bynum   :  1965  65 y.o. Preferred Name  mile  Family/Caregiver Present no   Restraints no   C-SSRS: Risk of Suicide: No Risk  Sitter no   Sepsis Risk Score Sepsis Risk Score: 4.06      Situation  Chief Complaint   Patient presents with    Emesis    Flank Pain     Right Side x 1 day     Brief Description of Patient's Condition:   Right flank pain and emesis since yesterday, 2mm stone on CT  Mental Status: oriented and alert  Arrived from: home    Imaging:   CT ABDOMEN PELVIS WO CONTRAST Additional Contrast? None   Final Result   2 mm stone in the proximal right ureter with mild hydronephrosis.            Abnormal labs:   Abnormal Labs Reviewed   CBC WITH AUTO DIFFERENTIAL - Abnormal; Notable for the following components:       Result Value    WBC 19.3 (*)     MCHC 30.2 (*)     Segs Relative 87.5 (*)     Lymphocytes % 5.3 (*)     Monocytes % 6.2 (*)     Immature Neutrophil % 0.5 (*)     All other components within normal limits   COMPREHENSIVE METABOLIC PANEL - Abnormal; Notable for the following components:    Sodium 133 (*)     Potassium 5.4 (*)     Chloride 95 (*)     BUN 32 (*)     Creatinine 3.0 (*)     Est, Glom Filt Rate 18 (*)     Glucose 287 (*)     Alkaline Phosphatase 134 (*)     All other components within normal limits   LACTIC ACID - Abnormal; Notable for the following components:    Lactate 3.2 (*)     All other components within normal limits   POCT GLUCOSE - Abnormal; Notable for the following components:    POC Glucose 276 (*)     All other components within normal limits       Background  History:   Past Medical History:   Diagnosis Date    Arthritis     Arthritis of left knee     Asthma     last flare up summer 2020    Diabetes mellitus ([facility] [address]     \"use to take Metformin been off this since  2019\"    Disorder of rotator cuff syndrome of left shoulder and allied disorder     H/O cardiovascular stress test 16    EF68% normal study

## 2023-05-17 NOTE — ANESTHESIA PRE PROCEDURE
Packs/day: 1.00     Years: 20.00     Pack years: 20.00     Types: Cigarettes    Smokeless tobacco: Never   Substance Use Topics    Alcohol use: No     Alcohol/week: 0.0 standard drinks                                Ready to quit: Not Answered  Counseling given: Not Answered      Vital Signs (Current): There were no vitals filed for this visit.                                            BP Readings from Last 3 Encounters:   05/16/23 (!) 142/74   05/10/23 119/62   05/04/23 (!) 177/81       NPO Status:                                                                                 BMI:   Wt Readings from Last 3 Encounters:   05/16/23 262 lb (118.8 kg)   05/10/23 263 lb 9.6 oz (119.6 kg)   05/04/23 262 lb 3.2 oz (118.9 kg)     There is no height or weight on file to calculate BMI.    CBC:   Lab Results   Component Value Date/Time    WBC 19.3 05/16/2023 05:35 PM    RBC 4.86 05/16/2023 05:35 PM    HGB 13.4 05/16/2023 05:35 PM    HCT 44.3 05/16/2023 05:35 PM    MCV 91.2 05/16/2023 05:35 PM    RDW 14.6 05/16/2023 05:35 PM     05/16/2023 05:35 PM       CMP:   Lab Results   Component Value Date/Time     05/16/2023 05:35 PM    K 5.4 05/16/2023 05:35 PM    CL 95 05/16/2023 05:35 PM    CO2 25 05/16/2023 05:35 PM    BUN 32 05/16/2023 05:35 PM    CREATININE 3.0 05/16/2023 05:35 PM    GFRAA >60 09/19/2022 09:59 AM    LABGLOM 18 05/16/2023 05:35 PM    GLUCOSE 287 05/16/2023 05:35 PM    PROT 7.4 05/16/2023 05:35 PM    PROT 7.4 04/30/2012 06:23 AM    CALCIUM 9.3 05/16/2023 05:35 PM    BILITOT 0.6 05/16/2023 05:35 PM    ALKPHOS 134 05/16/2023 05:35 PM    AST 25 05/16/2023 05:35 PM    ALT 35 05/16/2023 05:35 PM       POC Tests:   Recent Labs     05/16/23  1822   POCGLU 276*       Coags:   Lab Results   Component Value Date/Time    PROTIME 13.6 11/28/2020 03:45 AM    PROTIME 10.4 04/30/2012 06:23 AM    INR 1.12 11/28/2020 03:45 AM    APTT 24.4 07/16/2013 02:42 AM       HCG (If Applicable):   Lab Results   Component Value

## 2023-05-17 NOTE — CONSULTS
[facility] [address]   Consult Note  [facility] 1 2 3 4 5    Date: 2023   Patient: Celia Bazan   : 1965   DOA: 2023   MRN: [id number]   ROOM#: ED25/ED-25     Reason for Consult:  right ureteral stone  Requesting Physician:  Dr. Yury Serna  Collaborating Urologist on Call at time of admission: [address] [facility]:  \"I hurt\"    History Obtained From:  patient    HISTORY OF PRESENT ILLNESS:                The patient is a 65 y.o. female with significant past medical history of kidney stones who presented with right flank pain - fever - and low BP.   CT scan with 2mm right ureteral stone    Past Medical History:        Diagnosis Date    Arthritis     Arthritis of left knee     Asthma     last flare up summer 2020    Diabetes mellitus ([facility] [address]     \"use to take Metformin been off this since  \"    Disorder of rotator cuff syndrome of left shoulder and allied disorder     H/O cardiovascular stress test 16    EF68% normal study    History of motion sickness     Hx of echocardiogram 16    EF 55%, normal LV function, moderate LVH    Hyperlipidemia     Hypertension     Kidney stones     \"had kidney stone 2020- removed with surg- , back in - had to put tube in my back(nephostomy tube) for kidney stone-had lithotripsy    Migraine     Obesity     PONV (postoperative nausea and vomiting)     hx of motion sickness    UTI (urinary tract infection)     \"last one 2020\"    Vertigo     \"have trouble leaning to my left and laying flat with this\"    Wears glasses      Past Surgical History:        Procedure Laterality Date    CHOLECYSTECTOMY  age20's    CYSTOSCOPY INSERTION / REMOVAL STENT / STONE N/A 2020    CYSTOSCOPY RETROGRADE PYELOGRAM STENT INSERTION performed by Kelsie Bello MD at [address] / [place] Right 2021    CYSTOSCOPY STENT EXCHANGE URETEROSCOPY EXTRACTION OF STONES performed by Charisma Jaimes MD at [place] [address]

## 2023-05-17 NOTE — BRIEF OP NOTE
Brief Postoperative Note      Patient: Chip Chan  YOB: 1965  MRN: [id number]    Date of Procedure: 5/16/2023    Pre-Op Diagnosis Codes: * Hydronephrosis of right kidney [N13.30]     * Right ureteral calculus [N20.1]    Post-Op Diagnosis: Same       Procedure(s):  CYSTOSCOPY RETROGRADE PYELOGRAM STENT INSERTION    Surgeon(s):  Hui Desai MD    Assistant:  * No surgical staff found *    Anesthesia: General    Estimated Blood Loss (mL): Minimal    Complications: None    Specimens:   * No specimens in log *    Implants:  Implant Name Type Inv.  Item Serial No.  Lot No. LRB No. Used Action   STENT URET 4.8FR [id number] PERCFLX HYDR+ SFT PGTL TAPR TIP - [id number]  STENT URET 4.8FR [id number] PERCFLX HYDR+ SFT PGTL TAPR TIP  [employer]- [id number] Right 1 Implanted         Drains:   Urinary Catheter 05/17/23 M[place] (Active)       [REMOVED] External Urinary Catheter (Removed)   Site Assessment Other (Comment) 04/29/23 2000   Placement Initiated 04/25/23 0000   Catheter Care Suction Canister/Tubing changed 04/28/23 1929   Perineal Care Yes 04/25/23 0000   Suction 40 mmgHg continuous 04/25/23 0000   Urine Color Yellow 04/28/23 1929   Urine Appearance Clear 04/28/23 1929   Output (mL) 1100 mL 04/28/23 1929       Findings: right pyohydronephrosis      Electronically signed by Hui Desai MD on 5/17/2023 at 12:15 AM

## 2023-05-17 NOTE — OP NOTE
[address] [place] [address], [address] [place] [address]                                OPERATIVE REPORT    PATIENT NAME: Subhash Niño                      :        1965  MED REC NO:   [id number]                          ROOM:       1155  ACCOUNT NO:   [de-identified]                           ADMIT DATE: 2023  PROVIDER:     Alejandro Bacon MD    DATE OF PROCEDURE:  2023    PREOPERATIVE DIAGNOSES:  1. Right ureteral calculus. 2.  Urinary tract infection. 3.  Acute kidney injury. POSTOPERATIVE DIAGNOSES:  1. Right ureteral calculus. 2.  Urinary tract infection. 3.  Acute kidney injury. OPERATION PERFORMED:  Cystoscopy, right ureteral stent placement. SURGEON:  Alejandro Bacon MD.    ANESTHESIA:  General anesthesia. ESTIMATED BLOOD LOSS:  None. COMPLICATIONS:  None. FINDING:  Right pyohydronephrosis. BRIEF HISTORY/INDICATIONS:  This is a 41-year-old female who presented  to the hospital with right flank pain, was diagnosed with a 2-mm stone,  creatinine of 3. The patient with fever at home, low-grade temperate in  the ER with low blood pressure, so she was admitted and urgent cysto and  right stent placement due to obstructing stone and sepsis syndrome from  urinary source. DESCRIPTION OF THE PROCEDURE:  The patient was identified in the holding  area, taken to procedure room, and placed in the dorsal lithotomy  position. The patient's genital region was prepped and draped in the  usual sterile fashion. A 21-Luxembourgish cystoscope sheath was introduced per  urethra under direct visualization. There was _____ at the base of the  bladder. Right ureteral orifice was identified. A sensor wire was  passed through the right ureter all the way up into kidney under direct  fluoroscopy. A 4.8-Luxembourgish variable length double-J ureteral stent was  passed over the guidewire in a standard fashion.   Position was confirmed  using

## 2023-05-17 NOTE — CARE COORDINATION
[facility] criteria for Acute Renal Failure reviewed at this time, criteria supports Inpatient Admission. LORIE,RN/CM

## 2023-05-17 NOTE — ANESTHESIA POSTPROCEDURE EVALUATION
Department of Anesthesiology  Postprocedure Note    Patient: Helena Guzman  MRN: [id number]  YOB: 1965  Date of evaluation: 5/17/2023      Procedure Summary     Date: 05/16/23 Room / Location: [address]    Anesthesia Start: 2321 Anesthesia Stop:     Procedure: [address] (Right) Diagnosis:       Hydronephrosis of right kidney      Right ureteral calculus      (Right Ureteral Stone & Right Hydronephrosis)    Surgeons: Neomia Rinne, MD Responsible Provider: Cooper Camargo MD    Anesthesia Type: general ASA Status: 3 - Emergent          Anesthesia Type: No value filed.     Khris Phase I:      Khris Phase II:        Anesthesia Post Evaluation    Patient location during evaluation: PACU  Patient participation: complete - patient participated  Level of consciousness: awake  Pain score: 1  Airway patency: patent  Nausea & Vomiting: no nausea  Complications: no  Cardiovascular status: hemodynamically stable  Respiratory status: nasal cannula  Hydration status: euvolemic  Multimodal analgesia pain management approach

## 2023-05-18 VITALS
HEIGHT: 66 IN | OXYGEN SATURATION: 95 % | BODY MASS INDEX: 41.38 KG/M2 | SYSTOLIC BLOOD PRESSURE: 114 MMHG | DIASTOLIC BLOOD PRESSURE: 60 MMHG | HEART RATE: 92 BPM | WEIGHT: 257.5 LBS | RESPIRATION RATE: 20 BRPM | TEMPERATURE: 98.2 F

## 2023-05-18 LAB
ANION GAP SERPL CALCULATED.3IONS-SCNC: 11 MMOL/L (ref 4–16)
BASOPHILS ABSOLUTE: 0 K/CU MM
BASOPHILS RELATIVE PERCENT: 0.1 % (ref 0–1)
BUN SERPL-MCNC: 24 MG/DL (ref 6–23)
CALCIUM SERPL-MCNC: 8.6 MG/DL (ref 8.3–10.6)
CHLORIDE BLD-SCNC: 101 MMOL/L (ref 99–110)
CO2: 24 MMOL/L (ref 21–32)
CREAT SERPL-MCNC: 1.1 MG/DL (ref 0.6–1.1)
CULTURE: ABNORMAL
CULTURE: ABNORMAL
CULTURE: NORMAL
DIFFERENTIAL TYPE: ABNORMAL
EOSINOPHILS ABSOLUTE: 0.2 K/CU MM
EOSINOPHILS RELATIVE PERCENT: 2.4 % (ref 0–3)
GFR SERPL CREATININE-BSD FRML MDRD: 59 ML/MIN/1.73M2
GLUCOSE BLD-MCNC: 189 MG/DL (ref 70–99)
GLUCOSE BLD-MCNC: 229 MG/DL (ref 70–99)
GLUCOSE BLD-MCNC: 250 MG/DL (ref 70–99)
GLUCOSE BLD-MCNC: 301 MG/DL (ref 70–99)
GLUCOSE SERPL-MCNC: 249 MG/DL (ref 70–99)
HCT VFR BLD CALC: 32.3 % (ref 37–47)
HEMOGLOBIN: 10.3 GM/DL (ref 12.5–16)
IMMATURE NEUTROPHIL %: 0.4 % (ref 0–0.43)
LYMPHOCYTES ABSOLUTE: 1.5 K/CU MM
LYMPHOCYTES RELATIVE PERCENT: 16.1 % (ref 24–44)
Lab: ABNORMAL
Lab: NORMAL
MCH RBC QN AUTO: 27.5 PG (ref 27–31)
MCHC RBC AUTO-ENTMCNC: 31.9 % (ref 32–36)
MCV RBC AUTO: 86.4 FL (ref 78–100)
MONOCYTES ABSOLUTE: 0.6 K/CU MM
MONOCYTES RELATIVE PERCENT: 6.4 % (ref 0–4)
NUCLEATED RBC %: 0 %
PDW BLD-RTO: 14.5 % (ref 11.7–14.9)
PLATELET # BLD: 223 K/CU MM (ref 140–440)
PMV BLD AUTO: 9 FL (ref 7.5–11.1)
POTASSIUM SERPL-SCNC: 3.9 MMOL/L (ref 3.5–5.1)
RBC # BLD: 3.74 M/CU MM (ref 4.2–5.4)
SEGMENTED NEUTROPHILS ABSOLUTE COUNT: 6.9 K/CU MM
SEGMENTED NEUTROPHILS RELATIVE PERCENT: 74.6 % (ref 36–66)
SODIUM BLD-SCNC: 136 MMOL/L (ref 135–145)
SPECIMEN: ABNORMAL
SPECIMEN: NORMAL
TOTAL IMMATURE NEUTOROPHIL: 0.04 K/CU MM
TOTAL NUCLEATED RBC: 0 K/CU MM
WBC # BLD: 9.3 K/CU MM (ref 4–10.5)

## 2023-05-18 PROCEDURE — 94761 N-INVAS EAR/PLS OXIMETRY MLT: CPT

## 2023-05-18 PROCEDURE — 6360000002 HC RX W HCPCS

## 2023-05-18 PROCEDURE — 85025 COMPLETE CBC W/AUTO DIFF WBC: CPT

## 2023-05-18 PROCEDURE — 80048 BASIC METABOLIC PNL TOTAL CA: CPT

## 2023-05-18 PROCEDURE — 6370000000 HC RX 637 (ALT 250 FOR IP): Performed by: INTERNAL MEDICINE

## 2023-05-18 PROCEDURE — 36415 COLL VENOUS BLD VENIPUNCTURE: CPT

## 2023-05-18 PROCEDURE — 2580000003 HC RX 258

## 2023-05-18 PROCEDURE — 6370000000 HC RX 637 (ALT 250 FOR IP)

## 2023-05-18 PROCEDURE — 2580000003 HC RX 258: Performed by: INTERNAL MEDICINE

## 2023-05-18 PROCEDURE — 6360000002 HC RX W HCPCS: Performed by: INTERNAL MEDICINE

## 2023-05-18 PROCEDURE — 82962 GLUCOSE BLOOD TEST: CPT

## 2023-05-18 PROCEDURE — 94640 AIRWAY INHALATION TREATMENT: CPT

## 2023-05-18 RX ORDER — PHENAZOPYRIDINE HYDROCHLORIDE 200 MG/1
200 TABLET, FILM COATED ORAL 3 TIMES DAILY PRN
Qty: 6 TABLET | Refills: 0 | Status: SHIPPED | OUTPATIENT
Start: 2023-05-18

## 2023-05-18 RX ORDER — OXYBUTYNIN CHLORIDE 5 MG/1
5 TABLET ORAL 3 TIMES DAILY
Status: DISCONTINUED | OUTPATIENT
Start: 2023-05-18 | End: 2023-05-18 | Stop reason: HOSPADM

## 2023-05-18 RX ORDER — OXYBUTYNIN CHLORIDE 5 MG/1
5 TABLET ORAL 3 TIMES DAILY
Qty: 30 TABLET | Refills: 0 | Status: SHIPPED | OUTPATIENT
Start: 2023-05-18

## 2023-05-18 RX ADMIN — ACETAMINOPHEN 650 MG: 325 TABLET ORAL at 10:02

## 2023-05-18 RX ADMIN — PIPERACILLIN AND TAZOBACTAM 3375 MG: 3; .375 INJECTION, POWDER, LYOPHILIZED, FOR SOLUTION INTRAVENOUS at 06:09

## 2023-05-18 RX ADMIN — FOLIC ACID 1 MG: 1 TABLET ORAL at 09:53

## 2023-05-18 RX ADMIN — INSULIN LISPRO 8 UNITS: 100 INJECTION, SOLUTION INTRAVENOUS; SUBCUTANEOUS at 12:27

## 2023-05-18 RX ADMIN — ENOXAPARIN SODIUM 30 MG: 100 INJECTION SUBCUTANEOUS at 09:53

## 2023-05-18 RX ADMIN — INSULIN LISPRO 4 UNITS: 100 INJECTION, SOLUTION INTRAVENOUS; SUBCUTANEOUS at 10:01

## 2023-05-18 RX ADMIN — ALBUTEROL SULFATE 2 PUFF: 90 AEROSOL, METERED RESPIRATORY (INHALATION) at 09:00

## 2023-05-18 RX ADMIN — PANTOPRAZOLE SODIUM 40 MG: 40 TABLET, DELAYED RELEASE ORAL at 06:08

## 2023-05-18 RX ADMIN — FUROSEMIDE 20 MG: 20 TABLET ORAL at 09:53

## 2023-05-18 RX ADMIN — INSULIN LISPRO 12 UNITS: 100 INJECTION, SOLUTION INTRAVENOUS; SUBCUTANEOUS at 01:39

## 2023-05-18 RX ADMIN — SODIUM CHLORIDE, PRESERVATIVE FREE 10 ML: 5 INJECTION INTRAVENOUS at 10:03

## 2023-05-18 RX ADMIN — GABAPENTIN 300 MG: 300 CAPSULE ORAL at 09:53

## 2023-05-18 ASSESSMENT — PAIN SCALES - GENERAL
PAINLEVEL_OUTOF10: 8
PAINLEVEL_OUTOF10: 5

## 2023-05-18 ASSESSMENT — PAIN DESCRIPTION - FREQUENCY: FREQUENCY: CONTINUOUS

## 2023-05-18 ASSESSMENT — PAIN DESCRIPTION - ORIENTATION: ORIENTATION: LOWER

## 2023-05-18 ASSESSMENT — PAIN DESCRIPTION - ONSET: ONSET: ON-GOING

## 2023-05-18 ASSESSMENT — PAIN DESCRIPTION - DESCRIPTORS: DESCRIPTORS: PRESSURE

## 2023-05-18 ASSESSMENT — PAIN DESCRIPTION - PAIN TYPE: TYPE: ACUTE PAIN

## 2023-05-18 ASSESSMENT — PAIN - FUNCTIONAL ASSESSMENT: PAIN_FUNCTIONAL_ASSESSMENT: ACTIVITIES ARE NOT PREVENTED

## 2023-05-18 ASSESSMENT — PAIN DESCRIPTION - LOCATION: LOCATION: GROIN;ABDOMEN

## 2023-05-18 NOTE — PLAN OF CARE
Problem: Chronic Conditions and Co-morbidities  Goal: Patient's chronic conditions and co-morbidity symptoms are monitored and maintained or improved  5/18/2023 1244 by Eduar Polo RN  Outcome: Adequate for Discharge  Flowsheets (Taken 5/18/2023 1002)  Care Plan - Patient's Chronic Conditions and Co-Morbidity Symptoms are Monitored and Maintained or Improved:   Collaborate with multidisciplinary team to address chronic and comorbid conditions and prevent exacerbation or deterioration   Monitor and assess patient's chronic conditions and comorbid symptoms for stability, deterioration, or improvement   Update acute care plan with appropriate goals if chronic or comorbid symptoms are exacerbated and prevent overall improvement and discharge  5/18/2023 0053 by Wilfrid Sevilla RN  Outcome: Progressing     Problem: Discharge Planning  Goal: Discharge to home or other facility with appropriate resources  5/18/2023 1244 by Eduar Polo RN  Outcome: Adequate for Discharge  Flowsheets (Taken 5/18/2023 1002)  Discharge to home or other facility with appropriate resources:   Identify barriers to discharge with patient and caregiver   Arrange for needed discharge resources and transportation as appropriate   Identify discharge learning needs (meds, wound care, etc)   Arrange for interpreters to assist at discharge as needed   Refer to discharge planning if patient needs post-hospital services based on physician order or complex needs related to functional status, cognitive ability or social support system  5/18/2023 0053 by Wilfrid Sevilla RN  Outcome: Progressing     Problem: Safety - Adult  Goal: Free from fall injury  5/18/2023 1244 by Eduar Polo RN  Outcome: Adequate for Discharge  5/18/2023 0053 by iWlfrid Sevilla RN  Outcome: Progressing     Problem: Pain  Goal: Verbalizes/displays adequate comfort level or baseline comfort level  5/18/2023 1244 by Eduar Polo RN  Outcome: Adequate for

## 2023-05-18 NOTE — PROGRESS NOTES
.4 Eyes Skin Assessment     NAME:  Kavon Stevens  YOB: 1965  MEDICAL RECORD NUMBER:  [id number]    The patient is being assessed for  Admission    I agree that at least one RN has performed a thorough Head to Toe Skin Assessment on the patient. ALL assessment sites listed below have been assessed. Areas assessed by both nurses:    Head, Face, Ears, Shoulders, Back, Chest, Arms, Elbows, Hands, Sacrum. Buttock, Coccyx, Ischium, Legs. Feet and Heels, and Under Medical Devices         Does the Patient have a Wound? Yes wound(s) were present on assessment.  LDA wound assessment was Initiated and completed by RN       Jhonatan Prevention initiated by RN: No  Wound Care Orders initiated by RN: No    Pressure Injury (Stage 3,4, Unstageable, DTI, NWPT, and Complex wounds) if present, place Wound referral order by RN under : No    New Ostomies, if present place, Ostomy referral order under : No     Nurse 1 eSignature: Electronically signed by Henry Barboza RN on 5/17/23 at 2:41 AM EDT    **SHARE this note so that the co-signing nurse can place an eSignature**    Nurse 2 eSignature: Electronically signed by Marilyn Gaspar RN on 5/17/23 at 3:19 AM EDT
0022 Patient arrived to pacu, montiors placed and alarms on. Received report from [facility] Rn/Dr. Keyana Raya. Patient drowsy from anesthesia but arouses easily. 0030 Patient more wakeful. Patient cleaned up for large soft bowel movement. Patient turned and repositioned, tolerated well. Patient able to move/assist while in bed. 0045 Patient continues to rest wihtout complaints. Report called to [facility]. Family in waiting area updated on patient status and escorted to patient room. All belongings sent with patient.
Case Management Assessment  Initial Evaluation    Date/Time of Evaluation: 5/17/2023 7:40 PM  Assessment Completed by: NETTA Jaramillo    If patient is discharged prior to next notation, then this note serves as note for discharge by case management. Patient Name: Randolph Contreras                   YOB: 1965  Diagnosis: Kidney stone [N20.0]  UTI (urinary tract infection) [N39.0]  Septicemia ([address] [A41.9]  Flank pain [R10.9]  Acute renal failure, unspecified acute renal failure type ([address] [N17.9]  Nausea and vomiting, unspecified vomiting type [R11.2]                   Date / Time: 5/16/2023  4:44 PM    Patient Admission Status: Inpatient   Readmission Risk (Low < 19, Mod (19-27), High > 27): Readmission Risk Score: 24.1    Current PCP: No primary care provider on file. PCP verified by CM? Yes (Pt stated she goes to [address] [place] [address] for PCP needs.)    Chart Reviewed: Yes      History Provided by: Patient  Patient Orientation: Alert and Oriented    Patient Cognition: Alert    Hospitalization in the last 30 days (Readmission):  No    If yes, Readmission Assessment in CM Navigator will be completed. Advance Directives:      Code Status: Full Code   Patient's Primary Decision Maker is:      Primary Decision Maker: Rohini Thornton - [facility] - [phone]    Discharge Planning:    Patient lives with: Spouse/Significant Other, Children Type of Home: House  Primary Care Giver: Self  Patient Support Systems include: Spouse/Significant Other, Children   Current Financial resources:    Current community resources:    Current services prior to admission: None            Current DME:              Type of Home Care services:  None    ADLS  Prior functional level: Independent in ADLs/IADLs  Current functional level: Independent in ADLs/IADLs    PT AM-PAC:   /24  OT AM-PAC:   /24    Family can provide assistance at DC:  Yes  Would you like Case Management to discuss the discharge plan with any other family
Outpatient Pharmacy Progress Note for Meds-to-Beds    Total number of Prescriptions Filled: 2  The following medications were dispensed to the patient during the discharge process:  Oxybutynin  Phenazopyridine     Additional Documentation:  Patient picked-up the medication(s) in the OP Pharmacy      Thank you for letting us serve your patients.   [address]    [address], [address] [facility]    Phone: [phone]    Fax: [phone]
RENAL DOSE ADJUSTMENT MADE PER P/T PROTOCOL    PREVIOUS ORDER:  Meropenem 1 g q8h    Indication: UTI    Estimated Creatinine Clearance: 27 mL/min (A) (based on SCr of 3 mg/dL (H)).   Recent Labs     05/16/23  1735   BUN 32*   CREATININE 3.0*        NEW RENALLY ADJUSTED ORDER:  Meropenem 1 g q12h    [facility]  5/17/2023 3:39 AM
Upon chart review Lantus 10units scheduled nightly, was given per dayshift d/t hyperglycemia. Provider made aware of current BG and BG checks as well as sliding scale orders. Provider advised to give 10 units of lantus as scheduled and monitor for s/sx of hypo/hyperglycemia. Will continue to closely monitor.
09/19/2022 09:59 AM    LABGLOM 33 05/17/2023 08:59 AM       Blood Culture:  NGTD  Urine Culture: No growth at 18 to 36 hours     Imaging:  CT ABDOMEN PELVIS WO CONTRAST Additional Contrast? None    Result Date: 5/16/2023  EXAMINATION: CT OF THE ABDOMEN AND PELVIS WITHOUT CONTRAST 5/16/2023 7:38 pm TECHNIQUE: CT of the abdomen and pelvis was performed without the administration of intravenous contrast. Multiplanar reformatted images are provided for review. Automated exposure control, iterative reconstruction, and/or weight based adjustment of the mA/kV was utilized to reduce the radiation dose to as low as reasonably achievable. COMPARISON: CT abdomen and pelvis 01/05/2022. HISTORY: ORDERING SYSTEM PROVIDED HISTORY: flank pain TECHNOLOGIST PROVIDED HISTORY: Reason for exam:->flank pain Additional Contrast?->None Decision Support Exception - unselect if not a suspected or confirmed emergency medical condition->Emergency Medical Condition (MA) Reason for Exam: flank pain FINDINGS: Lower Chest: Mild bibasilar atelectasis right greater than left. Organs: Lack of intravenous contrast limits evaluation of the solid organs, vascular structures, and bowel. Diffuse hepatic steatosis. No focal hepatic lesion or surface nodularity. Status post cholecystectomy. The biliary tree is within normal postoperative limits. The pancreas, spleen, and bilateral adrenal glands are unremarkable. 2 mm stone in the proximal right ureter with mild right hydronephrosis and moderate perinephric stranding new from the previous study. Several small nonobstructing right intrarenal calculi. Chronic phlebolith adjacent to the distal right ureter. No left-sided obstructive uropathy. Severe chronic atrophy of the left kidney. 2.4 cm simple appearing left renal cyst without significant change and no further evaluation recommended. Chronic dystrophic calcifications in the left kidney. GI/Bowel: Normal appendix. The colon is unremarkable.
known as: NEURONTIN     hydrocortisone 1 % ointment  Apply topically 2 times daily. metFORMIN 500 MG tablet  Commonly known as: GLUCOPHAGE     montelukast 10 MG tablet  Commonly known as: SINGULAIR     mupirocin 2 % ointment  Commonly known as: BACTROBAN  Apply topically 3 times daily. omeprazole 20 MG delayed release capsule  Commonly known as: PRILOSEC     ondansetron 4 MG disintegrating tablet  Commonly known as: ZOFRAN-ODT  Take 1 tablet by mouth 3 times daily as needed for Nausea or Vomiting     Qvar RediHaler 40 MCG/ACT Aerb inhaler  Generic drug: beclomethasone     sertraline 25 MG tablet  Commonly known as: ZOLOFT     spironolactone 25 MG tablet  Commonly known as: ALDACTONE     tiZANidine 4 MG tablet  Commonly known as: ZANAFLEX     VITAMIN D PO            STOP taking these medications      ibuprofen 800 MG tablet  Commonly known as: ADVIL;MOTRIN     ketorolac 10 MG tablet  Commonly known as: TORADOL     potassium chloride 20 MEQ extended release tablet  Commonly known as: KLOR-CON M               Where to Get Your Medications        These medications were sent to [address] [place] [address] 25, [address] [id number]      Phone: [phone]   oxybutynin 5 MG tablet  phenazopyridine 200 MG tablet         Objective Findings at Discharge:       BMP/CBC  Recent Labs     05/16/23  1735 05/17/23  0859 05/17/23  1138 05/18/23  0832   * 129*  --  136   K 5.4* 4.4  --  3.9   CL 95* 98*  --  101   CO2 25 22  --  24   BUN 32* 28*  --  24*   CREATININE 3.0* 1.8*  --  1.1   WBC 19.3*  --  14.7* 9.3   HCT 44.3  --  35.4* 32.3*     --  246 223       IMAGING:      Additional Information: Patient seen and examined day of discharge.  For more information regarding patient's care please contact [facility] records [phone]    Discharge Time of 35 minutes    Electronically signed by
stomach and small bowel are normal in appearance. No obstruction or wall thickening identified. Pelvis: Urinary bladder is normal in appearance. Status post hysterectomy. No free fluid in the pelvis. No pelvic or inguinal lymphadenopathy. Peritoneum/Retroperitoneum: The abdominal aorta is normal in caliber with moderate calcific plaquing. No retroperitoneal or mesenteric lymphadenopathy is identified. No free air or fluid is seen in the abdomen. Bones/Soft Tissues: No acute osseous or soft tissue abnormality. 2 mm stone in the proximal right ureter with mild hydronephrosis. XR CHEST PORTABLE    Result Date: 4/24/2023  EXAMINATION: ONE XRAY VIEW OF THE CHEST 4/24/2023 6:52 am COMPARISON: 10/17/2022 HISTORY: ORDERING SYSTEM PROVIDED HISTORY: Cough, leukocytosis TECHNOLOGIST PROVIDED HISTORY: Reason for exam:->Cough, leukocytosis Reason for Exam: COUGH, LEUKOCYTOSIS Additional signs and symptoms: COUGH, LEG SWELLING AND REDNESS, TACHYCARDIA, COUGH, LEUKOCYTOSIS FINDINGS: No focal consolidation, pleural effusion or pneumothorax. The cardiomediastinal silhouette is unremarkable. No overt pulmonary edema. No acute osseous abnormality. No acute cardiopulmonary findings. VL DUP LOWER EXTREMITY VENOUS RIGHT    Result Date: 4/24/2023  EXAMINATION: DUPLEX VENOUS ULTRASOUND OF THE RIGHT LOWER EXTREMITY 4/24/2023 6:13 am TECHNIQUE: Duplex ultrasound using B-mode/gray scaled imaging and Doppler spectral analysis and color flow was obtained of the deep venous structures of the right extremity. COMPARISON: CT study on 01/05/2022 HISTORY: ORDERING SYSTEM PROVIDED HISTORY: pain and swelling TECHNOLOGIST PROVIDED HISTORY: Reason for exam:->pain and swelling FINDINGS: The visualized veins of the right lower extremity are patent and free of echogenic thrombus. The veins demonstrate good compressibility with normal color flow study and spectral analysis.   A right inguinal lymph node is seen, with a similar contour
mA/kV was utilized to reduce the radiation dose to as low as reasonably achievable. COMPARISON: CT abdomen and pelvis 01/05/2022. HISTORY: ORDERING SYSTEM PROVIDED HISTORY: flank pain TECHNOLOGIST PROVIDED HISTORY: Reason for exam:->flank pain Additional Contrast?->None Decision Support Exception - unselect if not a suspected or confirmed emergency medical condition->Emergency Medical Condition (MA) Reason for Exam: flank pain FINDINGS: Lower Chest: Mild bibasilar atelectasis right greater than left. Organs: Lack of intravenous contrast limits evaluation of the solid organs, vascular structures, and bowel. Diffuse hepatic steatosis. No focal hepatic lesion or surface nodularity. Status post cholecystectomy. The biliary tree is within normal postoperative limits. The pancreas, spleen, and bilateral adrenal glands are unremarkable. 2 mm stone in the proximal right ureter with mild right hydronephrosis and moderate perinephric stranding new from the previous study. Several small nonobstructing right intrarenal calculi. Chronic phlebolith adjacent to the distal right ureter. No left-sided obstructive uropathy. Severe chronic atrophy of the left kidney. 2.4 cm simple appearing left renal cyst without significant change and no further evaluation recommended. Chronic dystrophic calcifications in the left kidney. GI/Bowel: Normal appendix. The colon is unremarkable. The stomach and small bowel are normal in appearance. No obstruction or wall thickening identified. Pelvis: Urinary bladder is normal in appearance. Status post hysterectomy. No free fluid in the pelvis. No pelvic or inguinal lymphadenopathy. Peritoneum/Retroperitoneum: The abdominal aorta is normal in caliber with moderate calcific plaquing. No retroperitoneal or mesenteric lymphadenopathy is identified. No free air or fluid is seen in the abdomen. Bones/Soft Tissues: No acute osseous or soft tissue abnormality.      2 mm stone in the proximal

## 2023-05-20 NOTE — DISCHARGE SUMMARY
Discharge Summary    Name:  Izabela Vasquez /Age/Sex: 1965  (65 y.o. female)   MRN & CSN:  [id number] & [id number] Admission Date/Time: 2023  4:44 PM   Attending:  No att. providers found Discharging Physician: Penny Louis MD       Discharge Diagnosis:  Sepsis  UTI  SHEYLA  Right ureteral calculus  Hypokalemia  Type II DM  Diabetic neuropathy  Mild intermittent asthma  Hypertension  Bilateral lower extremity edema  Morbid obesity        Discharge Exam  Physical Exam  Vitals          Vitals:     23 0455 23 0600 23 0903 23 1002   BP: 112/62     114/60   Pulse: 76     92   Resp: 17     20   Temp: 98 °F (36.7 °C)     98.2 °F (36.8 °C)   TempSrc:       Oral   SpO2: 95%   96% 95%   Weight:   257 lb 8 oz (116.8 kg)       Height:               General: NAD  Eyes: EOMI  ENT: neck supple  Cardiovascular: Regular rate. Respiratory: Clear to auscultation  Gastrointestinal: Soft, non tender  Genitourinary: no suprapubic tenderness  Musculoskeletal: No edema  Skin: warm, dry  Neuro: Alert. Psych: Mood appropriate. Hospital Course:   Izabela Vasquez is a 65 y.o.  female  who presents with UTI (urinary tract infection)    #. Sepsis secondary to UTI secondary to obstructive stone  -CT abdomen/pelvis-2 mm stone in the proximal right ureter with hydronephrosis. -Continue IV fluids  -Blood cultures, UA ordered in ER.   -Final cultures negative. Antibiotics stopped at discharge  -Oxybutynin and Pyridium for few days for symptomatic treatment     #. Leukocytosis-secondary to above, improved     #. Right ureteral calculus  -S/P cystoscopy and stent placement by urology  -Follow-up with urology outpatient for definitive treatment     #. SHEYLA-secondary to above  -Baseline creatinine 0.7-0.9, 3 today  -Creatinine improved after IVF  - Creatinine 1.1 NG/DL prior to discharge     #. Hyperkalemia  -K normal at the time of discharge     #.   Diabetes-A1c 7.9-2023  -Resume metformin at

## 2023-05-21 LAB
CULTURE: NORMAL
CULTURE: NORMAL
Lab: NORMAL
Lab: NORMAL
SPECIMEN: NORMAL
SPECIMEN: NORMAL

## 2023-05-22 LAB
CULTURE: NORMAL
CULTURE: NORMAL
Lab: NORMAL
Lab: NORMAL
SPECIMEN: NORMAL
SPECIMEN: NORMAL

## 2023-05-25 ENCOUNTER — OFFICE VISIT (OUTPATIENT)
Dept: INFECTIOUS DISEASES | Age: 58
End: 2023-05-25
Payer: MEDICAID

## 2023-05-25 VITALS
HEART RATE: 93 BPM | WEIGHT: 260 LBS | DIASTOLIC BLOOD PRESSURE: 96 MMHG | RESPIRATION RATE: 18 BRPM | SYSTOLIC BLOOD PRESSURE: 188 MMHG | BODY MASS INDEX: 41.97 KG/M2

## 2023-05-25 DIAGNOSIS — L03.115 CELLULITIS OF RIGHT LOWER EXTREMITY: ICD-10-CM

## 2023-05-25 DIAGNOSIS — L27.0: Primary | ICD-10-CM

## 2023-05-25 PROCEDURE — 3074F SYST BP LT 130 MM HG: CPT | Performed by: INTERNAL MEDICINE

## 2023-05-25 PROCEDURE — G8427 DOCREV CUR MEDS BY ELIG CLIN: HCPCS | Performed by: INTERNAL MEDICINE

## 2023-05-25 PROCEDURE — 1111F DSCHRG MED/CURRENT MED MERGE: CPT | Performed by: INTERNAL MEDICINE

## 2023-05-25 PROCEDURE — 99213 OFFICE O/P EST LOW 20 MIN: CPT | Performed by: INTERNAL MEDICINE

## 2023-05-25 PROCEDURE — 3078F DIAST BP <80 MM HG: CPT | Performed by: INTERNAL MEDICINE

## 2023-05-25 PROCEDURE — 4004F PT TOBACCO SCREEN RCVD TLK: CPT | Performed by: INTERNAL MEDICINE

## 2023-05-25 PROCEDURE — G8417 CALC BMI ABV UP PARAM F/U: HCPCS | Performed by: INTERNAL MEDICINE

## 2023-05-25 PROCEDURE — 3017F COLORECTAL CA SCREEN DOC REV: CPT | Performed by: INTERNAL MEDICINE

## 2023-05-25 NOTE — PROGRESS NOTES
Socioeconomic History    Marital status: Legally      Spouse name: Not on file    Number of children: Not on file    Years of education: Not on file    Highest education level: Not on file   Occupational History    Not on file   Tobacco Use    Smoking status: Every Day     Packs/day: 1.00     Years: 20.00     Pack years: 20.00     Types: Cigarettes    Smokeless tobacco: Never   Vaping Use    Vaping Use: Never used   Substance and Sexual Activity    Alcohol use: No     Alcohol/week: 0.0 standard drinks    Drug use: No    Sexual activity: Not Currently     Partners: Male     Comment: 2 DAUGHTERS   Other Topics Concern    Not on file   Social History Narrative    Not on file     Social Determinants of Health     Financial Resource Strain: Not on file   Food Insecurity: Not on file   Transportation Needs: Not on file   Physical Activity: Not on file   Stress: Not on file   Social Connections: Not on file   Intimate Partner Violence: Not on file   Housing Stability: Not on file       Family History   Problem Relation Age of Onset    Heart Disease Mother     High Blood Pressure Mother     Asthma Mother     Heart Disease Father     Diabetes Sister     Seizures Sister     Asthma Sister     COPD Sister     Heart Disease Maternal Grandmother     High Blood Pressure Maternal Grandmother     Cancer Maternal Grandfather     Emphysema Maternal Grandfather     Asthma Daughter     Depression Daughter     Migraines Daughter     Obesity Daughter     Ulcerative Colitis Daughter     High Cholesterol Daughter     Asthma Daughter     Migraines Daughter     Obesity Daughter     Asthma Brother     Stroke Paternal Uncle        Vital Signs:  Vitals:    05/25/23 0959   BP: (!) 188/96   Site: Right Lower Arm   Position: Sitting   Cuff Size: Medium Adult   Pulse: 93   Resp: 18   Weight: 260 lb (117.9 kg)          Wt Readings from Last 3 Encounters:   05/25/23 260 lb (117.9 kg)   05/18/23 257 lb 8 oz (116.8 kg)   05/10/23 263 lb 9.6

## 2023-05-30 ENCOUNTER — TELEPHONE (OUTPATIENT)
Dept: CARDIOLOGY CLINIC | Age: 58
End: 2023-05-30

## 2023-06-03 PROBLEM — Z09 HOSPITAL DISCHARGE FOLLOW-UP: Status: RESOLVED | Noted: 2023-05-04 | Resolved: 2023-06-03

## 2023-06-13 RX ORDER — IBUPROFEN 800 MG/1
800 TABLET ORAL EVERY 6 HOURS PRN
COMMUNITY

## 2023-06-15 PROBLEM — N39.0 UTI (URINARY TRACT INFECTION): Status: RESOLVED | Noted: 2023-05-16 | Resolved: 2023-06-15

## 2023-06-19 ENCOUNTER — ANESTHESIA EVENT (OUTPATIENT)
Dept: OPERATING ROOM | Age: 58
End: 2023-06-19
Payer: MEDICAID

## 2023-06-19 ASSESSMENT — LIFESTYLE VARIABLES: SMOKING_STATUS: 1

## 2023-06-19 NOTE — PROGRESS NOTES
Patient notified of surgery time at [facility] 6/20/23 0915 arrival 0715, understanding verbalized

## 2023-06-20 ENCOUNTER — HOSPITAL ENCOUNTER (OUTPATIENT)
Age: 58
Setting detail: OUTPATIENT SURGERY
Discharge: HOME | End: 2023-06-20
Attending: SPECIALIST | Admitting: SPECIALIST
Payer: MEDICAID

## 2023-06-20 ENCOUNTER — APPOINTMENT (OUTPATIENT)
Dept: GENERAL RADIOLOGY | Age: 58
End: 2023-06-20
Attending: SPECIALIST
Payer: MEDICAID

## 2023-06-20 ENCOUNTER — ANESTHESIA (OUTPATIENT)
Dept: OPERATING ROOM | Age: 58
End: 2023-06-20
Payer: MEDICAID

## 2023-06-20 VITALS
OXYGEN SATURATION: 93 % | WEIGHT: 260 LBS | DIASTOLIC BLOOD PRESSURE: 80 MMHG | RESPIRATION RATE: 18 BRPM | BODY MASS INDEX: 41.78 KG/M2 | TEMPERATURE: 97.3 F | HEART RATE: 81 BPM | HEIGHT: 66 IN | SYSTOLIC BLOOD PRESSURE: 172 MMHG

## 2023-06-20 DIAGNOSIS — G89.18 POST-OPERATIVE PAIN: ICD-10-CM

## 2023-06-20 DIAGNOSIS — Z01.818 PRE-OP TESTING: Primary | ICD-10-CM

## 2023-06-20 LAB
GLUCOSE BLD-MCNC: 183 MG/DL (ref 70–99)
GLUCOSE BLD-MCNC: 210 MG/DL (ref 70–99)

## 2023-06-20 PROCEDURE — 2500000003 HC RX 250 WO HCPCS: Performed by: NURSE ANESTHETIST, CERTIFIED REGISTERED

## 2023-06-20 PROCEDURE — 7100000000 HC PACU RECOVERY - FIRST 15 MIN: Performed by: SPECIALIST

## 2023-06-20 PROCEDURE — 6360000002 HC RX W HCPCS: Performed by: NURSE ANESTHETIST, CERTIFIED REGISTERED

## 2023-06-20 PROCEDURE — C1769 GUIDE WIRE: HCPCS | Performed by: SPECIALIST

## 2023-06-20 PROCEDURE — 2580000003 HC RX 258: Performed by: SPECIALIST

## 2023-06-20 PROCEDURE — 2720000010 HC SURG SUPPLY STERILE: Performed by: SPECIALIST

## 2023-06-20 PROCEDURE — 3600000013 HC SURGERY LEVEL 3 ADDTL 15MIN: Performed by: SPECIALIST

## 2023-06-20 PROCEDURE — 7100000001 HC PACU RECOVERY - ADDTL 15 MIN: Performed by: SPECIALIST

## 2023-06-20 PROCEDURE — C1758 CATHETER, URETERAL: HCPCS | Performed by: SPECIALIST

## 2023-06-20 PROCEDURE — 6360000002 HC RX W HCPCS: Performed by: SPECIALIST

## 2023-06-20 PROCEDURE — 2709999900 HC NON-CHARGEABLE SUPPLY: Performed by: SPECIALIST

## 2023-06-20 PROCEDURE — 7100000011 HC PHASE II RECOVERY - ADDTL 15 MIN: Performed by: SPECIALIST

## 2023-06-20 PROCEDURE — 7100000010 HC PHASE II RECOVERY - FIRST 15 MIN: Performed by: SPECIALIST

## 2023-06-20 PROCEDURE — 3700000000 HC ANESTHESIA ATTENDED CARE: Performed by: SPECIALIST

## 2023-06-20 PROCEDURE — 6370000000 HC RX 637 (ALT 250 FOR IP): Performed by: ANESTHESIOLOGY

## 2023-06-20 PROCEDURE — 3700000001 HC ADD 15 MINUTES (ANESTHESIA): Performed by: SPECIALIST

## 2023-06-20 PROCEDURE — 76000 FLUOROSCOPY <1 HR PHYS/QHP: CPT

## 2023-06-20 PROCEDURE — 82962 GLUCOSE BLOOD TEST: CPT

## 2023-06-20 PROCEDURE — 3600000003 HC SURGERY LEVEL 3 BASE: Performed by: SPECIALIST

## 2023-06-20 RX ORDER — DEXAMETHASONE SODIUM PHOSPHATE 4 MG/ML
INJECTION, SOLUTION INTRA-ARTICULAR; INTRALESIONAL; INTRAMUSCULAR; INTRAVENOUS; SOFT TISSUE PRN
Status: DISCONTINUED | OUTPATIENT
Start: 2023-06-20 | End: 2023-06-20 | Stop reason: SDUPTHER

## 2023-06-20 RX ORDER — FENTANYL CITRATE 50 UG/ML
INJECTION, SOLUTION INTRAMUSCULAR; INTRAVENOUS PRN
Status: DISCONTINUED | OUTPATIENT
Start: 2023-06-20 | End: 2023-06-20 | Stop reason: SDUPTHER

## 2023-06-20 RX ORDER — SODIUM CHLORIDE 0.9 % (FLUSH) 0.9 %
5-40 SYRINGE (ML) INJECTION PRN
Status: DISCONTINUED | OUTPATIENT
Start: 2023-06-20 | End: 2023-06-20 | Stop reason: HOSPADM

## 2023-06-20 RX ORDER — PHENYLEPHRINE HCL IN 0.9% NACL 1 MG/10 ML
SYRINGE (ML) INTRAVENOUS PRN
Status: DISCONTINUED | OUTPATIENT
Start: 2023-06-20 | End: 2023-06-20 | Stop reason: SDUPTHER

## 2023-06-20 RX ORDER — OXYCODONE HYDROCHLORIDE 5 MG/1
10 TABLET ORAL PRN
Status: DISCONTINUED | OUTPATIENT
Start: 2023-06-20 | End: 2023-06-20 | Stop reason: HOSPADM

## 2023-06-20 RX ORDER — CIPROFLOXACIN 2 MG/ML
400 INJECTION, SOLUTION INTRAVENOUS
Status: COMPLETED | OUTPATIENT
Start: 2023-06-20 | End: 2023-06-20

## 2023-06-20 RX ORDER — PROPOFOL 10 MG/ML
INJECTION, EMULSION INTRAVENOUS PRN
Status: DISCONTINUED | OUTPATIENT
Start: 2023-06-20 | End: 2023-06-20 | Stop reason: SDUPTHER

## 2023-06-20 RX ORDER — PROCHLORPERAZINE EDISYLATE 5 MG/ML
5 INJECTION INTRAMUSCULAR; INTRAVENOUS
Status: DISCONTINUED | OUTPATIENT
Start: 2023-06-20 | End: 2023-06-20 | Stop reason: HOSPADM

## 2023-06-20 RX ORDER — HYDROCODONE BITARTRATE AND ACETAMINOPHEN 5; 325 MG/1; MG/1
1 TABLET ORAL EVERY 6 HOURS PRN
Qty: 12 TABLET | Refills: 0 | Status: SHIPPED | OUTPATIENT
Start: 2023-06-20 | End: 2023-06-23

## 2023-06-20 RX ORDER — SCOLOPAMINE TRANSDERMAL SYSTEM 1 MG/1
1 PATCH, EXTENDED RELEASE TRANSDERMAL
Status: DISCONTINUED | OUTPATIENT
Start: 2023-06-20 | End: 2023-06-20 | Stop reason: HOSPADM

## 2023-06-20 RX ORDER — MEPERIDINE HYDROCHLORIDE 25 MG/ML
12.5 INJECTION INTRAMUSCULAR; INTRAVENOUS; SUBCUTANEOUS EVERY 5 MIN PRN
Status: DISCONTINUED | OUTPATIENT
Start: 2023-06-20 | End: 2023-06-20 | Stop reason: HOSPADM

## 2023-06-20 RX ORDER — LIDOCAINE HYDROCHLORIDE 20 MG/ML
INJECTION, SOLUTION INTRAVENOUS PRN
Status: DISCONTINUED | OUTPATIENT
Start: 2023-06-20 | End: 2023-06-20 | Stop reason: SDUPTHER

## 2023-06-20 RX ORDER — ONDANSETRON 2 MG/ML
4 INJECTION INTRAMUSCULAR; INTRAVENOUS
Status: DISCONTINUED | OUTPATIENT
Start: 2023-06-20 | End: 2023-06-20 | Stop reason: HOSPADM

## 2023-06-20 RX ORDER — IPRATROPIUM BROMIDE AND ALBUTEROL SULFATE 2.5; .5 MG/3ML; MG/3ML
1 SOLUTION RESPIRATORY (INHALATION)
Status: DISCONTINUED | OUTPATIENT
Start: 2023-06-20 | End: 2023-06-20 | Stop reason: HOSPADM

## 2023-06-20 RX ORDER — SODIUM CHLORIDE 9 MG/ML
INJECTION, SOLUTION INTRAVENOUS PRN
Status: DISCONTINUED | OUTPATIENT
Start: 2023-06-20 | End: 2023-06-20 | Stop reason: HOSPADM

## 2023-06-20 RX ORDER — SODIUM CHLORIDE, SODIUM LACTATE, POTASSIUM CHLORIDE, CALCIUM CHLORIDE 600; 310; 30; 20 MG/100ML; MG/100ML; MG/100ML; MG/100ML
INJECTION, SOLUTION INTRAVENOUS CONTINUOUS
Status: DISCONTINUED | OUTPATIENT
Start: 2023-06-20 | End: 2023-06-20 | Stop reason: HOSPADM

## 2023-06-20 RX ORDER — OXYCODONE HYDROCHLORIDE 5 MG/1
5 TABLET ORAL
Status: DISCONTINUED | OUTPATIENT
Start: 2023-06-20 | End: 2023-06-20 | Stop reason: HOSPADM

## 2023-06-20 RX ORDER — SODIUM CHLORIDE 0.9 % (FLUSH) 0.9 %
5-40 SYRINGE (ML) INJECTION EVERY 12 HOURS SCHEDULED
Status: DISCONTINUED | OUTPATIENT
Start: 2023-06-20 | End: 2023-06-20 | Stop reason: HOSPADM

## 2023-06-20 RX ORDER — ONDANSETRON 2 MG/ML
INJECTION INTRAMUSCULAR; INTRAVENOUS PRN
Status: DISCONTINUED | OUTPATIENT
Start: 2023-06-20 | End: 2023-06-20 | Stop reason: SDUPTHER

## 2023-06-20 RX ADMIN — SODIUM CHLORIDE, POTASSIUM CHLORIDE, SODIUM LACTATE AND CALCIUM CHLORIDE: 600; 310; 30; 20 INJECTION, SOLUTION INTRAVENOUS at 09:29

## 2023-06-20 RX ADMIN — Medication 0.1 MG: at 10:04

## 2023-06-20 RX ADMIN — DEXAMETHASONE SODIUM PHOSPHATE 8 MG: 4 INJECTION, SOLUTION INTRAMUSCULAR; INTRAVENOUS at 09:45

## 2023-06-20 RX ADMIN — ONDANSETRON 4 MG: 2 INJECTION INTRAMUSCULAR; INTRAVENOUS at 10:08

## 2023-06-20 RX ADMIN — Medication 0.1 MG: at 10:00

## 2023-06-20 RX ADMIN — CIPROFLOXACIN 400 MG: 2 INJECTION, SOLUTION INTRAVENOUS at 09:43

## 2023-06-20 RX ADMIN — PROPOFOL 50 MG: 10 INJECTION, EMULSION INTRAVENOUS at 09:44

## 2023-06-20 RX ADMIN — OXYCODONE HYDROCHLORIDE 10 MG: 5 TABLET ORAL at 10:54

## 2023-06-20 RX ADMIN — FENTANYL CITRATE 50 MCG: 50 INJECTION, SOLUTION INTRAMUSCULAR; INTRAVENOUS at 09:35

## 2023-06-20 RX ADMIN — FENTANYL CITRATE 50 MCG: 50 INJECTION, SOLUTION INTRAMUSCULAR; INTRAVENOUS at 09:45

## 2023-06-20 RX ADMIN — CIPROFLOXACIN 400 MG: 2 INJECTION, SOLUTION INTRAVENOUS at 09:29

## 2023-06-20 RX ADMIN — LIDOCAINE HYDROCHLORIDE 100 MG: 20 INJECTION, SOLUTION INTRAVENOUS at 09:39

## 2023-06-20 RX ADMIN — PROPOFOL 150 MG: 10 INJECTION, EMULSION INTRAVENOUS at 09:39

## 2023-06-20 ASSESSMENT — PAIN DESCRIPTION - ONSET
ONSET: ON-GOING
ONSET: ON-GOING

## 2023-06-20 ASSESSMENT — PAIN DESCRIPTION - ORIENTATION
ORIENTATION: RIGHT
ORIENTATION: RIGHT

## 2023-06-20 ASSESSMENT — PAIN DESCRIPTION - DESCRIPTORS
DESCRIPTORS: ACHING
DESCRIPTORS: ACHING

## 2023-06-20 ASSESSMENT — PAIN DESCRIPTION - PAIN TYPE
TYPE: SURGICAL PAIN
TYPE: SURGICAL PAIN

## 2023-06-20 ASSESSMENT — PAIN - FUNCTIONAL ASSESSMENT
PAIN_FUNCTIONAL_ASSESSMENT: PREVENTS OR INTERFERES SOME ACTIVE ACTIVITIES AND ADLS
PAIN_FUNCTIONAL_ASSESSMENT: 0-10
PAIN_FUNCTIONAL_ASSESSMENT: PREVENTS OR INTERFERES SOME ACTIVE ACTIVITIES AND ADLS

## 2023-06-20 ASSESSMENT — PAIN SCALES - GENERAL
PAINLEVEL_OUTOF10: 9
PAINLEVEL_OUTOF10: 8
PAINLEVEL_OUTOF10: 8

## 2023-06-20 ASSESSMENT — PAIN DESCRIPTION - FREQUENCY
FREQUENCY: CONTINUOUS
FREQUENCY: CONTINUOUS

## 2023-06-20 ASSESSMENT — LIFESTYLE VARIABLES: SMOKING_STATUS: 1

## 2023-06-20 ASSESSMENT — PAIN DESCRIPTION - LOCATION
LOCATION: ABDOMEN
LOCATION: ABDOMEN

## 2023-06-20 NOTE — PROGRESS NOTES
1018- Patient arrived in PACU in bed on room air. Patient placed on monitor at arrival. Patient A&Ox4 with no c/o pain. SCDs on bilateral. Patient denies pain at this time    1020- Blood sugar was 210. Dr. Link Martinez notified no new orders    1025- Patient sitting up in bed on room air. No c/o pain at this time. 1030- Patient tolerating ice chips. Denies any pain    1040- Patient resting quietly eating ice chips. Denies any pain. 1045- Patient A&Ox4 with no c/o pain at this time. Patient is ready for transport at this time      1050- Patient transported to same day at this time.  Bed side report gave to [facility] RN

## 2023-06-20 NOTE — OP NOTE
[address] [place] [address], [address] [place] [address]                                OPERATIVE REPORT    PATIENT NAME: Ruby Guardado                      :        1965  MED REC NO:   [id number]                          ROOM:  ACCOUNT NO:   [de-identified]                           ADMIT DATE: 2023  PROVIDER:     Jamey Maldonado MD    DATE OF PROCEDURE:  2023    PREOPERATIVE DIAGNOSIS:  Right ureteral calculus. POSTOPERATIVE DIAGNOSIS:  Right ureteral calculus. PROCEDURE:  Cystoscopy, right ureteral stent removal, right diagnostic  uteroscopy. SURGEON:  Jamey Maldonado MD    ESTIMATED BLOOD LOSS:  None. COMPLICATIONS:  None. BRIEF HISTORY:  This is a 52-year-old female who several weeks ago  underwent cysto right stent placement secondary to obstructing stone  UTI.  UTI is cleared. She is here now for definitive treatment of her  stone. DESCRIPTION OF PROCEDURE:  The patient was identified in the holding  area, taken to the procedure room. Placed in dorsal lithotomy position. The patient's genital area was prepped and draped in usual sterile  fashion. A 1-Vietnamese cystoscope sheath was introduced per urethra under  visualization. Bladder had no signs of infection. Right ureteral stent  was identified, grasped and removed intact. Two sensor wires were  passed through the right ureter under fluoroscopy. Navigator ureteral  access sheath was passed over the working wires in the right distal  ureter. The LithoVue flexible ureteroscope was then passed via the  access sheath into the right ureter. The ureter in the entire length  was patent. No stone. I did do direct nephroscopy which demonstrated  no other stones. She had some gravel a millimeter fragments at the  lower pole, but no 2-mm fragments. So at this point, likely the stone  has passed with the stent being in place. We elected to leave the stent  out.

## 2023-06-20 NOTE — PROGRESS NOTES
1051- Patient arrived back to hospitals. Report given to this nurse from [facility]. Patient A&O, C/O pain 9/10. Beverage of choice offered to patient. Call light in reach and bed in lowest position. 1054- Pain medication given per order. IV removed. Discharge instructions given to . ... Patient sitting on side of bed getting dressed assisted by. .. Patient escorted to car via wheelchair transported home by. ..

## 2023-06-20 NOTE — ANESTHESIA PRE PROCEDURE
Department of Anesthesiology  Preprocedure Note       Name:  Pranav Carson   Age:  65 y.o.  :  1965                                          MRN:  [id number]         Date:  2023      Surgeon: Anuradha Contreras):  Cb Hurt MD    Procedure: Procedure(s):  RIGHT CYSTOSCOPY URETEROSCOPY RETROGRADE PYELOGRAM STONE MANIPULATION WITH HOLMIUM  LASER LITHOTRIPSY POSSIBLE STENT PLACEMENT    Medications prior to admission:   Prior to Admission medications    Medication Sig Start Date End Date Taking? Authorizing Provider   ibuprofen (ADVIL;MOTRIN) 800 MG tablet Take 1 tablet by mouth every 6 hours as needed for Pain    Historical Provider, MD   oxybutynin (DITROPAN) 5 MG tablet Take 1 tablet by mouth 3 times daily 23   Chata Redd MD   phenazopyridine (PYRIDIUM) 200 MG tablet Take 1 tablet by mouth 3 times daily as needed for Pain  Patient not taking: Reported on 2023   Chata Redd MD   mupirocin (BACTROBAN) 2 % ointment Apply topically 3 times daily. Patient not taking: Reported on 2023   Dameon Gutierrez MD   clotrimazole-betamethasone (LOTRISONE) 1-0.05 % cream Apply topically 2 times daily to inner thighs, posterior of right leg. Patient not taking: Reported on 2023   Dameon Gutirerez MD   hydrocortisone 1 % ointment Apply topically 2 times daily.   Patient not taking: Reported on 2023   Alana Payne MD   QVAR REDIHALER 40 MCG/ACT AERB inhaler Inhale 2 puffs into the lungs 2 times daily 3/31/23   Historical Provider, MD   albuterol sulfate HFA (PROVENTIL;VENTOLIN;PROAIR) 108 (90 Base) MCG/ACT inhaler Inhale 2 puffs into the lungs every 4-6 hours as needed for Wheezing    Historical Provider, MD   atorvastatin (LIPITOR) 20 MG tablet Take 1 tablet by mouth daily 23 Dose decreased 03/31/23 3/31/23   Historical Provider, MD   tiZANidine (ZANAFLEX) 4 MG tablet Take 1 tablet by mouth nightly 23   Historical Provider, MD   montelukast
Applicable):  No results found for: HIV, HEPCAB    COVID-19 Screening (If Applicable):   Lab Results   Component Value Date/Time    COVID19 NOT DETECTED 04/24/2023 06:52 AM    COVID19 NOT DETECTED 04/21/2021 08:47 AM           Anesthesia Evaluation     history of anesthetic complications: PONV. Airway: Mallampati: II  TM distance: >3 FB     Mouth opening: > = 3 FB   Dental:    (+) edentulous      Pulmonary:normal exam    (+) asthma: current smoker                           Cardiovascular:  Exercise tolerance: good (>4 METS),   (+) hypertension:, hyperlipidemia               ROS comment: Left ventricular systolic function is normal.   Ejection fraction is visually estimated at 55-60%. Mild left ventricular hypertrophy. Moderately dilated left atrium. No evidence of any pericardial effusion. No obvious evidence of endocarditis. Neuro/Psych:   (+) headaches: migraine headaches, depression/anxiety             GI/Hepatic/Renal:   (+) GERD:, renal disease (CKD3): kidney stones and CRI, morbid obesity          Endo/Other:    (+) DiabetesType II DM, poorly controlled, , blood dyscrasia: anemia:., .                 Abdominal:             Vascular: Other Findings:             Anesthesia Plan      general     ASA 3     (Chart review 6/19/23)  Induction: intravenous. Anesthetic plan and risks discussed with patient. Plan discussed with CRNA. Jeff Mane MD   6/20/2023         Pre Anesthesia Assessment complete.  Chart reviewed on 6/20/2023

## 2023-06-20 NOTE — ANESTHESIA POSTPROCEDURE EVALUATION
Department of Anesthesiology  Postprocedure Note    Patient: Destiny Yip  MRN: [id number]  YOB: 1965  Date of evaluation: 6/20/2023      Procedure Summary     Date: 06/20/23 Room / Location: [address]    Anesthesia Start: 0932 Anesthesia Stop: 0218    Procedure: RIGHT CYSTOSCOPY URETEROSCOPY   WITH LITHOTRIPSY, STENT REMOVAL (Right: Ureter) Diagnosis:       Calculus of kidney      (Calculus of kidney [N20.0])    Surgeons: Raghu Ornelas MD Responsible Provider: Humaira Cordova MD    Anesthesia Type: general ASA Status: 3          Anesthesia Type: No value filed.     Khris Phase I: Khris Score: 10    Khris Phase II: Khris Score: 10      Anesthesia Post Evaluation    Patient location during evaluation: PACU  Patient participation: complete - patient participated  Level of consciousness: awake and alert  Airway patency: patent  Nausea & Vomiting: no nausea and no vomiting  Complications: no  Cardiovascular status: hemodynamically stable  Respiratory status: acceptable  Hydration status: euvolemic

## 2023-06-20 NOTE — PROGRESS NOTES
Outpatient Pharmacy Progress Note for Meds-to-Beds    Total number of Prescriptions Filled: 1  The following medications were dispensed to the patient during the discharge process:  Norco      Additional Documentation:  Patient picked-up the medication(s) in the OP Pharmacy      Thank you for letting us serve your patients.   [address]    [address], [address] [facility]    Phone: [phone]    Fax: [phone]

## 2023-06-20 NOTE — DISCHARGE INSTRUCTIONS
[facility]  [phone]    Do not drive, work around [address] [place], [place] [address] or use equipment. Do not drink any alcoholic beverages. Do not smoke while alone. Avoid making important decisions. Plan to spend a quiet, relaxed evening @ home. Resume normal activities as you begin to feel better. Eat lightly for your first meal, then gradually increase your diet to what is normal for you. In case of nausea, avoid food and drink only clear liquids. Resume food as nausea ceases. Notify your surgeon if you experience fever, chills, large amount of bleeding, difficulty breathing, persistent nausea and vomiting or any other disturbing problem. Call for a follow-up appointment with your surgeon.

## 2023-06-20 NOTE — BRIEF OP NOTE
Brief Postoperative Note      Patient: Destiny Yip  YOB: 1965  MRN: [id number]    Date of Procedure: 6/20/2023    Pre-Op Diagnosis Codes:     * Calculus of kidney [N20.0]    Post-Op Diagnosis: Same       Procedure(s):  RIGHT CYSTOSCOPY URETEROSCOPY   WITH LITHOTRIPSY, STENT REMOVAL    Surgeon(s):  Raghu Ornelas MD    Assistant:  * No surgical staff found *    Anesthesia: General    Estimated Blood Loss (mL): Minimal    Complications: None    Specimens:   * No specimens in log *    Implants:  * No implants in log *      Drains: * No LDAs found *    Findings: patent right ureter      Electronically signed by Raghu Ornelas MD on 6/20/2023 at 10:08 AM

## 2023-06-27 ENCOUNTER — OFFICE VISIT (OUTPATIENT)
Dept: CARDIOLOGY CLINIC | Age: 58
End: 2023-06-27
Payer: MEDICAID

## 2023-06-27 VITALS
HEIGHT: 66 IN | BODY MASS INDEX: 40.56 KG/M2 | WEIGHT: 252.4 LBS | DIASTOLIC BLOOD PRESSURE: 62 MMHG | SYSTOLIC BLOOD PRESSURE: 102 MMHG

## 2023-06-27 DIAGNOSIS — Z82.49 FAMILY HISTORY OF CORONARY ARTERIOSCLEROSIS: ICD-10-CM

## 2023-06-27 DIAGNOSIS — I10 PRIMARY HYPERTENSION: ICD-10-CM

## 2023-06-27 DIAGNOSIS — F17.200 TOBACCO DEPENDENCE: Primary | ICD-10-CM

## 2023-06-27 DIAGNOSIS — E78.2 MIXED HYPERLIPIDEMIA: ICD-10-CM

## 2023-06-27 DIAGNOSIS — M79.89 LEG SWELLING: ICD-10-CM

## 2023-06-27 DIAGNOSIS — R06.02 SOB (SHORTNESS OF BREATH) ON EXERTION: ICD-10-CM

## 2023-06-27 PROCEDURE — G8427 DOCREV CUR MEDS BY ELIG CLIN: HCPCS | Performed by: INTERNAL MEDICINE

## 2023-06-27 PROCEDURE — 99214 OFFICE O/P EST MOD 30 MIN: CPT | Performed by: INTERNAL MEDICINE

## 2023-06-27 PROCEDURE — 3074F SYST BP LT 130 MM HG: CPT | Performed by: INTERNAL MEDICINE

## 2023-06-27 PROCEDURE — 3078F DIAST BP <80 MM HG: CPT | Performed by: INTERNAL MEDICINE

## 2023-06-27 PROCEDURE — G8417 CALC BMI ABV UP PARAM F/U: HCPCS | Performed by: INTERNAL MEDICINE

## 2023-06-27 PROCEDURE — 4004F PT TOBACCO SCREEN RCVD TLK: CPT | Performed by: INTERNAL MEDICINE

## 2023-06-27 PROCEDURE — 3017F COLORECTAL CA SCREEN DOC REV: CPT | Performed by: INTERNAL MEDICINE

## 2023-06-27 RX ORDER — LOSARTAN POTASSIUM 50 MG/1
50 TABLET ORAL DAILY
Qty: 90 TABLET | Refills: 1 | Status: SHIPPED | OUTPATIENT
Start: 2023-06-27

## 2023-06-27 RX ORDER — HYDROCHLOROTHIAZIDE 12.5 MG/1
12.5 TABLET ORAL DAILY
COMMUNITY
Start: 2023-06-21

## 2023-06-27 RX ORDER — BUMETANIDE 1 MG/1
1 TABLET ORAL DAILY
Qty: 30 TABLET | Refills: 3 | Status: SHIPPED | OUTPATIENT
Start: 2023-06-27

## 2023-06-28 ENCOUNTER — TELEPHONE (OUTPATIENT)
Dept: CARDIOLOGY CLINIC | Age: 58
End: 2023-06-28

## 2023-07-28 ENCOUNTER — INITIAL CONSULT (OUTPATIENT)
Dept: CARDIOLOGY CLINIC | Age: 58
End: 2023-07-28
Payer: MEDICAID

## 2023-07-28 VITALS
HEART RATE: 76 BPM | DIASTOLIC BLOOD PRESSURE: 78 MMHG | WEIGHT: 259 LBS | HEIGHT: 66 IN | BODY MASS INDEX: 41.62 KG/M2 | SYSTOLIC BLOOD PRESSURE: 138 MMHG

## 2023-07-28 DIAGNOSIS — I10 PRIMARY HYPERTENSION: ICD-10-CM

## 2023-07-28 DIAGNOSIS — F17.200 TOBACCO DEPENDENCE: ICD-10-CM

## 2023-07-28 DIAGNOSIS — E78.2 MIXED HYPERLIPIDEMIA: ICD-10-CM

## 2023-07-28 DIAGNOSIS — I83.893 VARICOSE VEINS OF BOTH LEGS WITH EDEMA: Primary | ICD-10-CM

## 2023-07-28 DIAGNOSIS — Z96.652 STATUS POST LEFT KNEE REPLACEMENT: ICD-10-CM

## 2023-07-28 PROCEDURE — 3078F DIAST BP <80 MM HG: CPT | Performed by: INTERNAL MEDICINE

## 2023-07-28 PROCEDURE — 3017F COLORECTAL CA SCREEN DOC REV: CPT | Performed by: INTERNAL MEDICINE

## 2023-07-28 PROCEDURE — 4004F PT TOBACCO SCREEN RCVD TLK: CPT | Performed by: INTERNAL MEDICINE

## 2023-07-28 PROCEDURE — G8427 DOCREV CUR MEDS BY ELIG CLIN: HCPCS | Performed by: INTERNAL MEDICINE

## 2023-07-28 PROCEDURE — 3075F SYST BP GE 130 - 139MM HG: CPT | Performed by: INTERNAL MEDICINE

## 2023-07-28 PROCEDURE — 99204 OFFICE O/P NEW MOD 45 MIN: CPT | Performed by: INTERNAL MEDICINE

## 2023-07-28 PROCEDURE — G8417 CALC BMI ABV UP PARAM F/U: HCPCS | Performed by: INTERNAL MEDICINE

## 2023-07-28 NOTE — PROGRESS NOTES
CARDIAC CONSULT NOTE       Keisha  65 y.o.  female    Chief Complaint   Patient presents with    Edema       Referring physician:  Wendie Hicks MD     Primary care physician:  Wendie Hicks MD    History of Present Illness:     DAWN is a 65 y.o. female referred for evaluation and management of CVI. Patient C/O Leg edema, prominent Leg veins, skin discoloration, restless Legs, night time cramping,aching, itching & tired Legs. Patient has had symptoms for about 2 years. She has been wearing stockings for about an year. Patient smokes & she is Morbidly obese. Had knee surgery in the past.     has a past medical history of Arthritis, Arthritis of left knee, Asthma, Diabetes mellitus ([address]), Disorder of rotator cuff syndrome of left shoulder and allied disorder, H/O cardiovascular stress test, History of motion sickness, Hx of echocardiogram, Hyperlipidemia, Hypertension, Kidney stones, Migraine, Obesity, PONV (postoperative nausea and vomiting), UTI (urinary tract infection), Vertigo, and Wears glasses. has a past surgical history that includes Foot surgery (Bilateral); shoulder surgery (Right); Tubal ligation (1992); Kidney stone surgery; CYSTOSCOPY INSERTION / REMOVAL STENT / STONE (N/A, 11/28/2020); CYSTOSCOPY INSERTION / REMOVAL STENT / STONE (Right, 1/21/2021); Hysterectomy (2001); hernia repair (2019?); Tonsillectomy (age 26); Cholecystectomy (age22's); Total knee arthroplasty (Left, 4/28/2021); Foot Tendon Surgery (Left, 10/21/2022); Plantar fascia surgery (Left, 10/21/2022); Bladder surgery (Right, 5/16/2023); and Lithotripsy (Right, 6/20/2023). reports that she has been smoking cigarettes. She has a 20.00 pack-year smoking history. She has never used smokeless tobacco. She reports that she does not drink alcohol and does not use drugs.     family history includes Asthma in her brother, daughter, daughter, mother, and sister; COPD in her sister; Cancer

## 2023-07-28 NOTE — PATIENT INSTRUCTIONS
CVI: Patient has symptomatic C4 venous disease. Has been wearing stockings. However last venous US study is from 10 months ago ( abnormal )  Will repeat US and recommend ablations based on information. I spent about 30 min. of time in review of the available data, chart Prep., interviewing patient, obtaining history, performing physical exam, going through decision making analysis for assessment & plans of management on this patient. Office Visit a month after the procedures.

## 2023-07-28 NOTE — PROGRESS NOTES
Vein \"LEG PROBLEM Questionnaire\"  Do you have prominent leg veins? Yes   Do you have any skin discoloration? Yes  Do you have any healed or active sores? No  Do you have swelling of the legs? Yes  Do you have a family history of varicose veins? No  Does your profession involve pro-longed        standing or heavy lifting? Yes  7. Have you been fighting overweight problems? Yes  8. Do you have restless legs? Yes  9. Do you have any night time cramps? Yes  10. Do you have any of the following in your legs:        Aching, Itching and Tiredness I  11. If Yes - Have they worn compression stockings Yes  12.  If they have worn compression stockings 1 Years

## 2023-07-31 ENCOUNTER — OFFICE VISIT (OUTPATIENT)
Dept: CARDIOLOGY CLINIC | Age: 58
End: 2023-07-31
Payer: MEDICAID

## 2023-07-31 VITALS
OXYGEN SATURATION: 92 % | HEART RATE: 84 BPM | SYSTOLIC BLOOD PRESSURE: 130 MMHG | HEIGHT: 66 IN | WEIGHT: 256.6 LBS | DIASTOLIC BLOOD PRESSURE: 66 MMHG | BODY MASS INDEX: 41.24 KG/M2

## 2023-07-31 DIAGNOSIS — E66.9 OBESITY, CLASS I, BMI 30-34.9: ICD-10-CM

## 2023-07-31 DIAGNOSIS — I83.893 VARICOSE VEINS OF BOTH LEGS WITH EDEMA: Primary | ICD-10-CM

## 2023-07-31 DIAGNOSIS — I10 PRIMARY HYPERTENSION: ICD-10-CM

## 2023-07-31 DIAGNOSIS — E78.2 MIXED HYPERLIPIDEMIA: ICD-10-CM

## 2023-07-31 DIAGNOSIS — F17.200 TOBACCO DEPENDENCE: ICD-10-CM

## 2023-07-31 PROCEDURE — 99214 OFFICE O/P EST MOD 30 MIN: CPT | Performed by: NURSE PRACTITIONER

## 2023-07-31 PROCEDURE — G8427 DOCREV CUR MEDS BY ELIG CLIN: HCPCS | Performed by: NURSE PRACTITIONER

## 2023-07-31 PROCEDURE — 3078F DIAST BP <80 MM HG: CPT | Performed by: NURSE PRACTITIONER

## 2023-07-31 PROCEDURE — G8417 CALC BMI ABV UP PARAM F/U: HCPCS | Performed by: NURSE PRACTITIONER

## 2023-07-31 PROCEDURE — 93000 ELECTROCARDIOGRAM COMPLETE: CPT | Performed by: NURSE PRACTITIONER

## 2023-07-31 PROCEDURE — 3017F COLORECTAL CA SCREEN DOC REV: CPT | Performed by: NURSE PRACTITIONER

## 2023-07-31 PROCEDURE — 3075F SYST BP GE 130 - 139MM HG: CPT | Performed by: NURSE PRACTITIONER

## 2023-07-31 PROCEDURE — 4004F PT TOBACCO SCREEN RCVD TLK: CPT | Performed by: NURSE PRACTITIONER

## 2023-07-31 RX ORDER — BUMETANIDE 1 MG/1
1 TABLET ORAL DAILY
Qty: 30 TABLET | Refills: 3 | Status: CANCELLED | OUTPATIENT
Start: 2023-07-31

## 2023-07-31 RX ORDER — BUMETANIDE 1 MG/1
1 TABLET ORAL DAILY
Qty: 90 TABLET | Refills: 1 | Status: SHIPPED | OUTPATIENT
Start: 2023-07-31

## 2023-07-31 RX ORDER — LOSARTAN POTASSIUM 50 MG/1
50 TABLET ORAL DAILY
Qty: 90 TABLET | Refills: 1 | Status: SHIPPED | OUTPATIENT
Start: 2023-07-31

## 2023-07-31 RX ORDER — AMLODIPINE BESYLATE 10 MG/1
TABLET ORAL DAILY
COMMUNITY
Start: 2023-07-29 | End: 2023-07-31

## 2023-07-31 ASSESSMENT — ENCOUNTER SYMPTOMS
SHORTNESS OF BREATH: 0
COLOR CHANGE: 1
BACK PAIN: 1
COUGH: 0

## 2023-07-31 NOTE — PATIENT INSTRUCTIONS
Please be informed that if you contact our office outside of normal business hours the physician on call cannot help with any scheduling or rescheduling issues, procedure instruction questions or any type of medication issue. We advise you for any urgent/emergency that you go to the nearest emergency room! PLEASE CALL OUR OFFICE DURING NORMAL BUSINESS HOURS    Monday - Friday   8 am to 5 pm    Judith: [address]: [phone]    [place]:  [phone]    **It is YOUR responsibilty to bring medication bottles and/or updated medication list to [address] [place] [address]. This will allow us to better serve you and all your healthcare needs**    [facility] Locations - No appointment necessary. Sites open Monday to Friday. Call your preferred location for test preparation, business   hours and other information you need. [employer] accepts [employer]. [address] [place] [address]. [address] Chris, [address] [facility]  Phone: [phone]     Thank you for allowing us to care for you today! We want to ensure we can follow your treatment plan and we strive to give you the best outcomes and experience possible. If you ever have a life threatening emergency and call 911 - for an ambulance (EMS)   Our providers can only care for you at:   [facility] or [facility]. Even if you have someone take you or you drive yourself we can only care for you in a [facility] facility. Our providers are not setup at the other healthcare locations! We are committed to providing you the best care possible. If you receive a survey after visiting one of our offices, please take time to share your experience concerning your physician office visit. These surveys are confidential and no health information about you is shared. We are eager to improve for you and we are counting on your feedback to help make that happen.

## 2023-07-31 NOTE — PROGRESS NOTES
CARDIOLOGY  NOTE    2023    Darnell Cobian (:  1965) is a 65 y.o. female,an established patient with Dr. Darrick Batista, here for evaluation of the following chief complaint(s):  Follow-up and Edema (Both legs, ankles, feet)        SUBJECTIVE/OBJECTIVE:    KRISHNA Virk has Past medical history as noted below. Chinyere Virk states that she is feeling better since starting bumex. She has lower leg swelling but is not worse than previous. She is planned for venous ablation with Dr. Melodie Baez. She is not able to wear compression stockings. she cannot lie flat due to worsening vertigo on lying down flat. We stopped amlodipine due to lower leg swelling. She is tolerating losartan well. Echo shows LVH and diastolic dysfunction , Mili Heritage shows no ischemia  She developed renal failure in May and all her diuretics were stopped    She reports that back pain limits her walking but it is improved from previous. Diabetes has been uncontrolled in the past .  She is here with her mother who also has history of congestive heart failure. She continues Smoking a pack a day   Her younger sister had CABG and brother has 3 stents      Review of Systems   Constitutional:  Negative for fatigue and fever. Respiratory:  Negative for cough and shortness of breath. Cardiovascular:  Positive for leg swelling. Negative for chest pain and palpitations. Musculoskeletal:  Positive for back pain. Negative for arthralgias and gait problem. Skin:  Positive for color change. Pallor: vascular discoloration bilaterally. Neurological:  Negative for dizziness, syncope, weakness, light-headedness and headaches.      Vitals:    23 1045   BP: 130/66   Site: Left Upper Arm   Position: Sitting   Cuff Size: Large Adult   Pulse: 84   SpO2: 92%   Weight: 256 lb 9.6 oz (116.4 kg)   Height: 5' 6\" (1.676 m)       Wt Readings from Last 3 Encounters:   23 256 lb 9.6 oz (116.4 kg)   23 259 lb (117.5 kg)   23 252 lb 6.4 oz (114.5 kg)

## 2023-08-15 ENCOUNTER — PROCEDURE VISIT (OUTPATIENT)
Dept: CARDIOLOGY CLINIC | Age: 58
End: 2023-08-15
Payer: MEDICAID

## 2023-08-15 DIAGNOSIS — I83.893 VARICOSE VEINS OF BOTH LEGS WITH EDEMA: ICD-10-CM

## 2023-08-15 DIAGNOSIS — F17.200 TOBACCO DEPENDENCE: ICD-10-CM

## 2023-08-15 DIAGNOSIS — I10 PRIMARY HYPERTENSION: ICD-10-CM

## 2023-08-15 DIAGNOSIS — Z96.652 STATUS POST LEFT KNEE REPLACEMENT: ICD-10-CM

## 2023-08-15 DIAGNOSIS — E78.2 MIXED HYPERLIPIDEMIA: ICD-10-CM

## 2023-08-15 PROCEDURE — 93970 EXTREMITY STUDY: CPT | Performed by: INTERNAL MEDICINE

## 2023-08-18 ENCOUNTER — TELEPHONE (OUTPATIENT)
Dept: CARDIOLOGY CLINIC | Age: 58
End: 2023-08-18

## 2023-08-18 NOTE — TELEPHONE ENCOUNTER
Called patient and provided the following:     No evidence of DVT or SVT in the bilateral common femoral vein, femoral   vein, popliteal vein, greater saphenous vein or small saphenous vein. Limited visualization of the bilateral Distal FV, no thrombus noted at this   time. Significant reflux noted of the Right CFV (1.8s), GSV Mid Calf (1.5s), GSV   Distal Calf (2.3s), and SSV Distal Calf (2.8s). Significant reflux noted in the Left GSV Knee (1.4s), and GSV Distal Calf   (1.1s). Recommendations     Advice thigh high pressure stockings, 20 to 30 mm of Hg. Keep feet elevated. Increase walking. Significant changes or pathology noted. Schedule for OV for follow up.       Next appoint that was scheduled for:  08/29/2023 at 10:00 AM    Rescheduled sooner 08/22/2023 at 8:40 AM

## 2023-08-21 ENCOUNTER — OFFICE VISIT (OUTPATIENT)
Dept: CARDIOLOGY CLINIC | Age: 58
End: 2023-08-21
Payer: MEDICAID

## 2023-08-21 VITALS
SYSTOLIC BLOOD PRESSURE: 114 MMHG | DIASTOLIC BLOOD PRESSURE: 64 MMHG | HEART RATE: 82 BPM | WEIGHT: 255 LBS | HEIGHT: 66 IN | BODY MASS INDEX: 40.98 KG/M2

## 2023-08-21 DIAGNOSIS — F17.200 TOBACCO DEPENDENCE: ICD-10-CM

## 2023-08-21 DIAGNOSIS — I83.893 VARICOSE VEINS OF BOTH LEGS WITH EDEMA: Primary | ICD-10-CM

## 2023-08-21 DIAGNOSIS — E78.2 MIXED HYPERLIPIDEMIA: ICD-10-CM

## 2023-08-21 DIAGNOSIS — I10 PRIMARY HYPERTENSION: ICD-10-CM

## 2023-08-21 PROCEDURE — 4004F PT TOBACCO SCREEN RCVD TLK: CPT | Performed by: INTERNAL MEDICINE

## 2023-08-21 PROCEDURE — G8427 DOCREV CUR MEDS BY ELIG CLIN: HCPCS | Performed by: INTERNAL MEDICINE

## 2023-08-21 PROCEDURE — 3074F SYST BP LT 130 MM HG: CPT | Performed by: INTERNAL MEDICINE

## 2023-08-21 PROCEDURE — 99214 OFFICE O/P EST MOD 30 MIN: CPT | Performed by: INTERNAL MEDICINE

## 2023-08-21 PROCEDURE — 3078F DIAST BP <80 MM HG: CPT | Performed by: INTERNAL MEDICINE

## 2023-08-21 PROCEDURE — 3017F COLORECTAL CA SCREEN DOC REV: CPT | Performed by: INTERNAL MEDICINE

## 2023-08-21 PROCEDURE — G8417 CALC BMI ABV UP PARAM F/U: HCPCS | Performed by: INTERNAL MEDICINE

## 2023-08-21 RX ORDER — LOSARTAN POTASSIUM 50 MG/1
50 TABLET ORAL DAILY
Qty: 90 TABLET | Refills: 1 | Status: SHIPPED | OUTPATIENT
Start: 2023-08-21

## 2023-08-21 RX ORDER — BUMETANIDE 1 MG/1
1 TABLET ORAL DAILY
Qty: 90 TABLET | Refills: 1 | Status: SHIPPED | OUTPATIENT
Start: 2023-08-21

## 2023-08-21 RX ORDER — ATORVASTATIN CALCIUM 20 MG/1
20 TABLET, FILM COATED ORAL DAILY
Qty: 30 TABLET | Refills: 5 | Status: SHIPPED | OUTPATIENT
Start: 2023-08-21

## 2023-08-21 NOTE — PROGRESS NOTES
OFFICE PROGRESS NOTES      Sandeep Galvez is a 65 y.o. female who has    CHIEF COMPLAINT AS FOLLOWS:  CHEST PAIN: Patient denies any C/O chest pains at this time. SOB: No C/O SOB at this time. LEG EDEMA: C/O Leg edema, prominent Leg veins, skin discoloration, restless Legs, night time cramping,aching, itching & tired Legs. PALPITATIONS: Denies any C/O Palpitations   DIZZINESS: No C/O Dizziness   SYNCOPE: None   OTHER/ ADDITIONAL COMPLAINTS:                                     HPI: Patient is here for F/U on her CVI,  HTN & Dyslipidemia problems. CVI: Has Reflux of both superficial & deep veins. HTN: Patient has known essential HTN. Has been treated with guideline recommended medical / physical/ diet therapy as stated below. Dyslipidemia: Patient has known mixed dyslipidemia. Has been treated with guideline recommended medical / physical/ diet therapy as stated below.                 Current Outpatient Medications   Medication Sig Dispense Refill    losartan (COZAAR) 50 MG tablet Take 1 tablet by mouth daily 90 tablet 1    bumetanide (BUMEX) 1 MG tablet Take 1 tablet by mouth daily 90 tablet 1    atorvastatin (LIPITOR) 20 MG tablet Take 1 tablet by mouth daily 04/24/23 Dose decreased 03/31/23 30 tablet 5    ibuprofen (ADVIL;MOTRIN) 800 MG tablet Take 1 tablet by mouth every 6 hours as needed for Pain      oxybutynin (DITROPAN) 5 MG tablet Take 1 tablet by mouth 3 times daily 30 tablet 0    QVAR REDIHALER 40 MCG/ACT AERB inhaler Inhale 2 puffs into the lungs 2 times daily      albuterol sulfate HFA (PROVENTIL;VENTOLIN;PROAIR) 108 (90 Base) MCG/ACT inhaler Inhale 2 puffs into the lungs every 4-6 hours as needed for Wheezing      tiZANidine (ZANAFLEX) 4 MG tablet Take 1 tablet by mouth nightly      montelukast (SINGULAIR) 10 MG tablet Take 1 tablet by mouth nightly      ondansetron (ZOFRAN-ODT) 4 MG disintegrating tablet Take 1 tablet by mouth 3 times daily as needed for Nausea or Vomiting 21 tablet 0

## 2023-08-21 NOTE — PATIENT INSTRUCTIONS
CHRONIC VENOUS INSUFFICIENCY:yes,    8/15/2023   No evidence of DVT or SVT in the bilateral common femoral vein, femoral   vein, popliteal vein, greater saphenous vein or small saphenous vein. Limited visualization of the bilateral Distal FV, no thrombus noted at this   time. Significant reflux noted of the Right CFV (1.8s), GSV Mid Calf (1.5s), GSV   Distal Calf (2.3s), and SSV Distal Calf (2.8s). Significant reflux noted in the Left GSV Knee (1.4s), and GSV Distal Calf   (1.1s). Patient has symptomatic C4 disease. HYPERTENSION:Yes  well controlled on current medical regimen.  - changes in  treatment:   no. Patient is on Aldactone  Counseled regarding low salt diet, exercise & weight control. DYSLIPIDEMIA: yes,   Patient's profile is at / near Goal.no  HDL is low   Tolerating current medical regimen wellyes. Takes Lipitor  See most recent Lab values:( Reviewed Labs from family Dr. GENNARO     )  LDL is 130  HDL is 37    TESTS ORDERED: Serial ablations of both GSVs & right SSV. PREVIOUSLY ORDERED TESTS REVIEWED & DISCUSSED WITH THE PATIENT:     I personally reviewed & interpreted, all previously ordered tests as copied above. Latest Labs are pulled in to the note with dates. Labs, specially in Reference to Lipid profile, Cardiac testing in the form of Echo ( dated: ), stress tests ( dated: ) & other relevant cardiac testing reviewed with patient & recommendations made based on assessment of the results. Discussed role of Cardiac risk factors & effects + treatment of co morbidities with patient & advised accordingly. MEDICATIONS: List of medications patient is currently taking is reviewed in detail with the patient & family member present. Discussed any side effects or problems taking the medication. Recommend Continue present management & medications as listed. AFFIRMATION: I  reviewed patient's history, previous & current medical problems & all Labs + testing.  This includes chart prep

## 2023-09-15 ENCOUNTER — OFFICE VISIT (OUTPATIENT)
Dept: CARDIOLOGY CLINIC | Age: 58
End: 2023-09-15
Payer: MEDICAID

## 2023-09-15 VITALS
WEIGHT: 252 LBS | DIASTOLIC BLOOD PRESSURE: 76 MMHG | HEART RATE: 72 BPM | SYSTOLIC BLOOD PRESSURE: 126 MMHG | BODY MASS INDEX: 40.5 KG/M2 | HEIGHT: 66 IN

## 2023-09-15 DIAGNOSIS — F17.200 TOBACCO DEPENDENCE: ICD-10-CM

## 2023-09-15 DIAGNOSIS — E78.2 MIXED HYPERLIPIDEMIA: ICD-10-CM

## 2023-09-15 DIAGNOSIS — Z82.49 FAMILY HISTORY OF CORONARY ARTERIOSCLEROSIS: ICD-10-CM

## 2023-09-15 DIAGNOSIS — R06.02 SOB (SHORTNESS OF BREATH) ON EXERTION: ICD-10-CM

## 2023-09-15 DIAGNOSIS — E66.01 CLASS 3 SEVERE OBESITY DUE TO EXCESS CALORIES WITH SERIOUS COMORBIDITY AND BODY MASS INDEX (BMI) OF 40.0 TO 44.9 IN ADULT (HCC): ICD-10-CM

## 2023-09-15 DIAGNOSIS — I83.893 VARICOSE VEINS OF BOTH LEGS WITH EDEMA: ICD-10-CM

## 2023-09-15 DIAGNOSIS — I10 PRIMARY HYPERTENSION: Primary | ICD-10-CM

## 2023-09-15 PROCEDURE — 3017F COLORECTAL CA SCREEN DOC REV: CPT | Performed by: NURSE PRACTITIONER

## 2023-09-15 PROCEDURE — G8417 CALC BMI ABV UP PARAM F/U: HCPCS | Performed by: NURSE PRACTITIONER

## 2023-09-15 PROCEDURE — 99214 OFFICE O/P EST MOD 30 MIN: CPT | Performed by: NURSE PRACTITIONER

## 2023-09-15 PROCEDURE — 4004F PT TOBACCO SCREEN RCVD TLK: CPT | Performed by: NURSE PRACTITIONER

## 2023-09-15 PROCEDURE — 3078F DIAST BP <80 MM HG: CPT | Performed by: NURSE PRACTITIONER

## 2023-09-15 PROCEDURE — 3074F SYST BP LT 130 MM HG: CPT | Performed by: NURSE PRACTITIONER

## 2023-09-15 PROCEDURE — G8427 DOCREV CUR MEDS BY ELIG CLIN: HCPCS | Performed by: NURSE PRACTITIONER

## 2023-09-15 RX ORDER — EMPAGLIFLOZIN 25 MG/1
25 TABLET, FILM COATED ORAL DAILY
COMMUNITY
Start: 2023-09-11

## 2023-09-15 RX ORDER — DULAGLUTIDE 0.75 MG/.5ML
1 INJECTION, SOLUTION SUBCUTANEOUS WEEKLY
COMMUNITY
Start: 2023-09-11

## 2023-09-15 ASSESSMENT — ENCOUNTER SYMPTOMS
COUGH: 0
SHORTNESS OF BREATH: 0
BACK PAIN: 1
COLOR CHANGE: 1

## 2023-09-15 NOTE — PROGRESS NOTES
CARDIOLOGY  NOTE    9/15/2023    Sonny Mcgrath (:  1965) is a 65 y.o. female,an established patient with Dr. Walter Lagos, here for evaluation of the following chief complaint(s):  Edema (Having ablation this month ) and Follow-up (Denies all other cardiac sx)        SUBJECTIVE/OBJECTIVE:    KRISHNA Gibbs has Past medical history as noted below. Aubrey Gibbs states that she is feeling better since starting bumex. She has lower leg swelling but is not worse than previous. She is planned for venous ablation with Dr. Akosua Viveros. She is not able to wear compression stockings. she cannot lie flat due to worsening vertigo on lying down flat. We stopped amlodipine due to lower leg swelling. She is tolerating losartan well. Echo shows LVH and diastolic dysfunction , Vicky Oconnor shows no ischemia  She developed renal failure in May and all her diuretics were stopped    She reports that back pain limits her walking but it is improved from previous. Diabetes has been uncontrolled in the past .  She is here with her mother who also has history of congestive heart failure. She continues Smoking a pack a day   Her younger sister had CABG and brother has 3 stents      Review of Systems   Constitutional:  Negative for fatigue and fever. Respiratory:  Negative for cough and shortness of breath. Cardiovascular:  Positive for leg swelling. Negative for chest pain and palpitations. Musculoskeletal:  Positive for back pain. Negative for arthralgias and gait problem. Skin:  Positive for color change. Pallor: vascular discoloration bilaterally. Neurological:  Negative for dizziness, syncope, weakness, light-headedness and headaches.        Vitals:    09/15/23 1417   BP: 126/76   Site: Left Upper Arm   Position: Sitting   Cuff Size: Large Adult   Pulse: 72   Weight: 252 lb (114.3 kg)   Height: 5' 6\" (1.676 m)       Wt Readings from Last 3 Encounters:   09/15/23 252 lb (114.3 kg)   23 255 lb (115.7 kg)   23 256 lb 9.6 oz

## 2023-09-27 ENCOUNTER — TELEPHONE (OUTPATIENT)
Dept: CARDIOLOGY CLINIC | Age: 58
End: 2023-09-27

## 2023-09-27 DIAGNOSIS — I83.893 VARICOSE VEINS OF BOTH LEGS WITH EDEMA: Primary | ICD-10-CM

## 2023-09-27 RX ORDER — DIAZEPAM 5 MG/1
5 TABLET ORAL EVERY 6 HOURS PRN
Qty: 2 TABLET | Refills: 0 | Status: SHIPPED | OUTPATIENT
Start: 2023-09-27 | End: 2023-09-29

## 2023-09-27 NOTE — TELEPHONE ENCOUNTER
Pre- Instructions Venous ablation     Date of Procedure: 09/09/2023 Time: 0800 Arrival Time: 0745      Please keep yourself hydrated for 24 hours before the procedure ( drink lots of water)  Please  the Valium that was sent to [facility] and bring it with you to the procedure. Please wear loose comfortable clothing. Patient does not need to wear compression stockings to the procedure. You will need someone with you to drive you home. Please eat a light breakfast in the morning  Please continue to take medications as needed. AR Confirmed with patient. Reviewed instructions and patient voiced understanding. Medication called in to pharmacy.

## 2023-09-27 NOTE — TELEPHONE ENCOUNTER
Called [facility] pharmacy on [address] to call in valium 5 mg two tablets

## 2023-09-29 ENCOUNTER — PROCEDURE VISIT (OUTPATIENT)
Dept: CARDIOLOGY CLINIC | Age: 58
End: 2023-09-29

## 2023-09-29 DIAGNOSIS — I83.893 VARICOSE VEINS OF BOTH LEGS WITH EDEMA: Primary | ICD-10-CM

## 2023-09-29 DIAGNOSIS — E78.2 MIXED HYPERLIPIDEMIA: ICD-10-CM

## 2023-09-29 DIAGNOSIS — E66.9 OBESITY, CLASS I, BMI 30-34.9: ICD-10-CM

## 2023-09-29 DIAGNOSIS — F17.200 TOBACCO DEPENDENCE: ICD-10-CM

## 2023-09-29 DIAGNOSIS — I10 PRIMARY HYPERTENSION: ICD-10-CM

## 2023-10-02 ENCOUNTER — PROCEDURE VISIT (OUTPATIENT)
Dept: CARDIOLOGY CLINIC | Age: 58
End: 2023-10-02
Payer: MEDICAID

## 2023-10-02 ENCOUNTER — TELEPHONE (OUTPATIENT)
Dept: CARDIOLOGY CLINIC | Age: 58
End: 2023-10-02

## 2023-10-02 DIAGNOSIS — I87.2 CHRONIC VENOUS INSUFFICIENCY: Primary | ICD-10-CM

## 2023-10-02 DIAGNOSIS — I87.302 CHRONIC VENOUS HYPERTENSION INVOLVING LEFT SIDE: Primary | ICD-10-CM

## 2023-10-02 PROCEDURE — 93971 EXTREMITY STUDY: CPT | Performed by: INTERNAL MEDICINE

## 2023-10-02 RX ORDER — DIAZEPAM 5 MG/1
5 TABLET ORAL PRN
Qty: 2 TABLET | Refills: 0 | OUTPATIENT
Start: 2023-10-02 | End: 2023-10-06

## 2023-10-02 NOTE — TELEPHONE ENCOUNTER
Valium called in to [facility]. [address] [facility] for Venous Ablation 10/6/23    Pharmacy is closed today and will reopen Tue 10/3/23. WILL on .

## 2023-10-02 NOTE — TELEPHONE ENCOUNTER
Pre- Instructions Venous ablation     Date of Procedure: 10/6/23 Time: 8 AM Arrival Time: 750 AM      Please keep yourself hydrated for 24 hours before the procedure ( drink lots of water)  Please  the Valium that was sent to [facility]. [address] [facility]  and bring it with you to the procedure. Please wear loose comfortable clothing. Patient does not need to wear compression stockings to the procedure. You will need someone with you to drive you home. Please eat a light breakfast in the morning  Please continue to take medications as needed. LM on  for patient to return call to confirm. Reviewed instructions. Medication called in to pharmacy.

## 2023-10-03 ENCOUNTER — TELEPHONE (OUTPATIENT)
Dept: CARDIOLOGY CLINIC | Age: 58
End: 2023-10-03

## 2023-10-03 NOTE — TELEPHONE ENCOUNTER
Pre- Instructions Venous ablation     Date of Procedure: 10/6/2023  Time: 0800 Arrival Time: 0745      Please keep yourself hydrated for 24 hours before the procedure ( drink lots of water)  Please  the Valium that was sent to [facility] and bring it with you to the procedure. Please wear loose comfortable clothing. Patient does not need to wear compression stockings to the procedure. You will need someone with you to drive you home. Please eat a light breakfast in the morning  Please continue to take medications as needed. AR Confirmed with patient. Reviewed instructions and patient voiced understanding. Medication called in to pharmacy.

## 2023-10-06 ENCOUNTER — TELEPHONE (OUTPATIENT)
Dept: CARDIOLOGY CLINIC | Age: 58
End: 2023-10-06

## 2023-10-06 ENCOUNTER — PROCEDURE VISIT (OUTPATIENT)
Dept: CARDIOLOGY CLINIC | Age: 58
End: 2023-10-06

## 2023-10-06 DIAGNOSIS — E78.2 MIXED HYPERLIPIDEMIA: ICD-10-CM

## 2023-10-06 DIAGNOSIS — F17.200 TOBACCO DEPENDENCE: ICD-10-CM

## 2023-10-06 DIAGNOSIS — I10 PRIMARY HYPERTENSION: ICD-10-CM

## 2023-10-06 DIAGNOSIS — I83.893 VARICOSE VEINS OF BOTH LEGS WITH EDEMA: Primary | ICD-10-CM

## 2023-10-06 NOTE — TELEPHONE ENCOUNTER
Patient in the office presented new insurance cards effective 9/1/2023. I emailed billing to correct billing and refile  DOS 9/15/23 , 9/29/2023,  10/2/2023  to Medicare.

## 2023-10-11 ENCOUNTER — PROCEDURE VISIT (OUTPATIENT)
Dept: CARDIOLOGY CLINIC | Age: 58
End: 2023-10-11
Payer: MEDICARE

## 2023-10-11 ENCOUNTER — TELEPHONE (OUTPATIENT)
Dept: CARDIOLOGY CLINIC | Age: 58
End: 2023-10-11

## 2023-10-11 DIAGNOSIS — I87.2 CHRONIC VENOUS INSUFFICIENCY: Primary | ICD-10-CM

## 2023-10-11 DIAGNOSIS — I87.301 IDIOPATHIC CHRONIC VENOUS HYPERTENSION OF RIGHT LOWER EXTREMITY WITHOUT COMPLICATIONS: Primary | ICD-10-CM

## 2023-10-11 PROCEDURE — 93971 EXTREMITY STUDY: CPT | Performed by: INTERNAL MEDICINE

## 2023-10-11 RX ORDER — DIAZEPAM 5 MG/1
5 TABLET ORAL PRN
Qty: 2 TABLET | Refills: 0 | OUTPATIENT
Start: 2023-10-11 | End: 2023-10-16

## 2023-10-11 NOTE — TELEPHONE ENCOUNTER
Pre- Instructions Venous ablation     Date of Procedure: 10/16/23 Time: 8 AM Arrival Time: 750 MA      Please keep yourself hydrated for 24 hours before the procedure ( drink lots of water)  Please  the Valium that was sent to [address]  and bring it with you to the procedure. Please wear loose comfortable clothing. Patient does not need to wear compression stockings to the procedure. You will need someone with you to drive you home. Please eat a light breakfast in the morning  Please continue to take medications as needed. Confirmed with patient. Reviewed instructions and patient voiced understanding. Medication called in to pharmacy.

## 2023-10-12 ENCOUNTER — TELEPHONE (OUTPATIENT)
Dept: CARDIOLOGY CLINIC | Age: 58
End: 2023-10-12

## 2023-10-12 NOTE — TELEPHONE ENCOUNTER
Called and provided:     Right CFV is patent with good compressibility and respirophasic signal with   good augmentation. The Right GSV is non-compressible with no evidence of flow from the knee to   the distal calf.     Next scott:  11/29/2023 at 8:15 AM

## 2023-10-16 ENCOUNTER — PROCEDURE VISIT (OUTPATIENT)
Dept: CARDIOLOGY CLINIC | Age: 58
End: 2023-10-16
Payer: MEDICARE

## 2023-10-16 DIAGNOSIS — E78.2 MIXED HYPERLIPIDEMIA: ICD-10-CM

## 2023-10-16 DIAGNOSIS — Z82.49 FAMILY HISTORY OF CORONARY ARTERIOSCLEROSIS: ICD-10-CM

## 2023-10-16 DIAGNOSIS — I10 PRIMARY HYPERTENSION: ICD-10-CM

## 2023-10-16 DIAGNOSIS — E66.01 MORBID OBESITY (HCC): ICD-10-CM

## 2023-10-16 DIAGNOSIS — F17.200 TOBACCO DEPENDENCE: ICD-10-CM

## 2023-10-16 DIAGNOSIS — I83.893 VARICOSE VEINS OF BOTH LEGS WITH EDEMA: Primary | ICD-10-CM

## 2023-10-16 PROCEDURE — 36482 ENDOVEN THER CHEM ADHES 1ST: CPT | Performed by: INTERNAL MEDICINE

## 2023-10-19 ENCOUNTER — PROCEDURE VISIT (OUTPATIENT)
Dept: CARDIOLOGY CLINIC | Age: 58
End: 2023-10-19
Payer: MEDICARE

## 2023-10-19 DIAGNOSIS — I87.301 IDIOPATHIC CHRONIC VENOUS HYPERTENSION OF RIGHT LOWER EXTREMITY WITHOUT COMPLICATIONS: Primary | ICD-10-CM

## 2023-10-19 PROCEDURE — 93971 EXTREMITY STUDY: CPT | Performed by: INTERNAL MEDICINE

## 2023-10-20 ENCOUNTER — TELEPHONE (OUTPATIENT)
Dept: CARDIOLOGY CLINIC | Age: 58
End: 2023-10-20

## 2023-10-20 NOTE — TELEPHONE ENCOUNTER
Called and provided:     Right Popliteal vein is patent with good compressibility and respirophasic   signal with good augmentation. The Right SSV is non-compressible with no evidence of flow just past the   saphenopopliteal junction to the distal calf.      Next appointment:  11/29/23 at 11:15 AM

## 2023-11-22 ENCOUNTER — TELEPHONE (OUTPATIENT)
Dept: CARDIOLOGY CLINIC | Age: 58
End: 2023-11-22

## 2023-11-22 NOTE — TELEPHONE ENCOUNTER
Called patient to provide her with test results. Line was busy so I attempted again and was unsuccessful so I sent a letter. Bilateral CFV and right Popliteal Vein are compressible with respirophasic flow and good augmentation. The right GSV is non-compressible with no evidence of flow just past the knee to the distal calf. The right SSV is non-compressible with no evidence of flow just past the saphenopopliteal junction to the distal calf. The left GSV is non-compressible with no evidence of flow from the mid thigh to the distal calf. Incidental finding of right groin mass consistent with a lymph node, measuring 3.41 x 0.94 x 3.77 cm.      Recommend to follow up on right groin swollen lymphnode

## 2023-12-01 ENCOUNTER — TELEPHONE (OUTPATIENT)
Dept: CARDIOLOGY CLINIC | Age: 58
End: 2023-12-01

## 2023-12-01 NOTE — TELEPHONE ENCOUNTER
Called patient and provided the following:      Bilateral CFV and right Popliteal Vein are compressible with respirophasic flow and good augmentation. The right GSV is non-compressible with no evidence of flow just past the knee to the distal calf. The right SSV is non-compressible with no evidence of flow just past the saphenopopliteal junction to the distal calf. The left GSV is non-compressible with no evidence of flow from the mid thigh to the distal calf. Incidental finding of right groin mass consistent with a lymph node, measuring 3.41 x 0.94 x 3.77 cm. Recommend to follow up on right groin swollen lymphnode      Advised that PCP appointment needs to be made for f/u on lymph node growth    Pt advised that leg becomes hot on and off but does not remain continuously hot. PT stated that she  often experiences the same sx when she is starting a cellulitis flare up. Advised her to call PCP Dr. Jaylin Bradford at [address] as soon as possible and to call our office back Monday if her symptoms persist. PT voiced understanding.

## 2023-12-05 RX ORDER — BUMETANIDE 1 MG/1
1 TABLET ORAL DAILY
Qty: 90 TABLET | Refills: 0 | Status: SHIPPED | OUTPATIENT
Start: 2023-12-05

## 2023-12-27 ENCOUNTER — OFFICE VISIT (OUTPATIENT)
Dept: SURGERY | Age: 58
End: 2023-12-27
Payer: COMMERCIAL

## 2023-12-27 VITALS — BODY MASS INDEX: 39.08 KG/M2 | HEIGHT: 66 IN | WEIGHT: 243.2 LBS | OXYGEN SATURATION: 95 % | HEART RATE: 85 BPM

## 2023-12-27 DIAGNOSIS — R59.9 LYMPH NODE ENLARGEMENT: Primary | ICD-10-CM

## 2023-12-27 PROCEDURE — 99203 OFFICE O/P NEW LOW 30 MIN: CPT | Performed by: SURGERY

## 2024-01-04 ENCOUNTER — HOSPITAL ENCOUNTER (OUTPATIENT)
Dept: ULTRASOUND IMAGING | Age: 59
Discharge: HOME | End: 2024-01-04
Payer: COMMERCIAL

## 2024-01-04 DIAGNOSIS — R59.9 LYMPH NODE ENLARGEMENT: ICD-10-CM

## 2024-01-04 PROCEDURE — 76999 ECHO EXAMINATION PROCEDURE: CPT

## 2024-01-17 ENCOUNTER — OFFICE VISIT (OUTPATIENT)
Dept: SURGERY | Age: 59
End: 2024-01-17
Payer: COMMERCIAL

## 2024-01-17 VITALS
WEIGHT: 237.9 LBS | OXYGEN SATURATION: 94 % | HEIGHT: 66 IN | BODY MASS INDEX: 38.23 KG/M2 | SYSTOLIC BLOOD PRESSURE: 134 MMHG | DIASTOLIC BLOOD PRESSURE: 88 MMHG | HEART RATE: 78 BPM

## 2024-01-17 DIAGNOSIS — E66.01 SEVERE OBESITY (BMI 35.0-39.9) WITH COMORBIDITY (HCC): ICD-10-CM

## 2024-01-17 DIAGNOSIS — I83.893 VARICOSE VEINS OF BOTH LEGS WITH EDEMA: Primary | ICD-10-CM

## 2024-01-17 PROBLEM — E11.9 TYPE 2 DIABETES MELLITUS (HCC): Status: ACTIVE | Noted: 2024-01-17

## 2024-01-17 PROCEDURE — 3075F SYST BP GE 130 - 139MM HG: CPT | Performed by: SURGERY

## 2024-01-17 PROCEDURE — 99213 OFFICE O/P EST LOW 20 MIN: CPT | Performed by: SURGERY

## 2024-01-17 PROCEDURE — 3079F DIAST BP 80-89 MM HG: CPT | Performed by: SURGERY

## 2024-01-17 ASSESSMENT — PATIENT HEALTH QUESTIONNAIRE - PHQ9
SUM OF ALL RESPONSES TO PHQ9 QUESTIONS 1 & 2: 0
SUM OF ALL RESPONSES TO PHQ QUESTIONS 1-9: 0
SUM OF ALL RESPONSES TO PHQ QUESTIONS 1-9: 0
2. FEELING DOWN, DEPRESSED OR HOPELESS: 0
SUM OF ALL RESPONSES TO PHQ QUESTIONS 1-9: 0
1. LITTLE INTEREST OR PLEASURE IN DOING THINGS: 0
SUM OF ALL RESPONSES TO PHQ QUESTIONS 1-9: 0

## 2024-01-17 NOTE — PROGRESS NOTES
No chief complaint on file.        SUBJECTIVE:    Patient here to follow-up on ultrasound.  She had a recent vascular duplex ruling out DVT with incidental finding of enlarged lymph node.  After last visit she was sent for repeat ultrasound of this lymph node.         Past Surgical History:   Procedure Laterality Date    BLADDER SURGERY Right 5/16/2023    CYSTOSCOPY RETROGRADE PYELOGRAM STENT INSERTION performed by Rosibel Man MD at [facility] OR    CHOLECYSTECTOMY  age22's    CYSTOSCOPY INSERTION / REMOVAL STENT / STONE N/A 11/28/2020    CYSTOSCOPY RETROGRADE PYELOGRAM STENT INSERTION performed by Koko Rodriguez MD at [facility] OR    CYSTOSCOPY INSERTION / REMOVAL STENT / STONE Right 1/21/2021    CYSTOSCOPY STENT EXCHANGE URETEROSCOPY EXTRACTION OF STONES performed by Luis Daniel Marsh MD at [facility] OR    FOOT SURGERY Bilateral     tarsal tunnel, hammer toe, neuroma removed -1990's    FOOT TENDON SURGERY Left 10/21/2022    LEFT EXTENSOR  TENDON REPAIR performed [facility] Damien Banegas DPM at [facility] [place]    HERNIA REPAIR  2019?    ventral hernia repair    HYSTERECTOMY (CERVIX STATUS UNKNOWN)  2001    KIDNEY STONE SURGERY      per old chart had surg for left nephrostolithotomy and cysto /lithotripsy in 2013at [facility]    LITHOTRIPSY Right 6/20/2023    RIGHT CYSTOSCOPY URETEROSCOPY   WITH LITHOTRIPSY, STENT REMOVAL performed by Rosibel Man MD at [facility] OR    PLANTAR FASCIA SURGERY Left 10/21/2022    PLANTAR FASCIOTOMY ENDOSCOPIC performed by Damien Banegas DPM at [facility] [place]    SHOULDER SURGERY Right     \"open surg right shoulder and later had 2 rotator cuff surgeries on my right shoulder\"    TONSILLECTOMY  age 19    TOTAL KNEE ARTHROPLASTY Left 4/28/2021    LEFT KNEE TOTAL ARTHROPLASTY ROBOTIC performed by Konstantin Bustos MD at [facility] OR    TUBAL LIGATION  1992     Past Medical History:   Diagnosis Date    Arthritis     Arthritis of left knee     Asthma     last flare up summer 2020    Diabetes mellitus (HCC)     \"use to take

## 2024-03-19 ENCOUNTER — OFFICE VISIT (OUTPATIENT)
Dept: CARDIOLOGY CLINIC | Age: 59
End: 2024-03-19
Payer: COMMERCIAL

## 2024-03-19 VITALS
HEART RATE: 80 BPM | WEIGHT: 236 LBS | SYSTOLIC BLOOD PRESSURE: 108 MMHG | DIASTOLIC BLOOD PRESSURE: 68 MMHG | HEIGHT: 66 IN | BODY MASS INDEX: 37.93 KG/M2

## 2024-03-19 DIAGNOSIS — E78.2 MIXED HYPERLIPIDEMIA: ICD-10-CM

## 2024-03-19 DIAGNOSIS — I87.2 CHRONIC VENOUS INSUFFICIENCY: ICD-10-CM

## 2024-03-19 DIAGNOSIS — F17.200 TOBACCO DEPENDENCE: ICD-10-CM

## 2024-03-19 DIAGNOSIS — E66.01 MORBID OBESITY (HCC): ICD-10-CM

## 2024-03-19 DIAGNOSIS — Z82.49 FAMILY HISTORY OF CORONARY ARTERIOSCLEROSIS: ICD-10-CM

## 2024-03-19 DIAGNOSIS — I10 PRIMARY HYPERTENSION: Primary | ICD-10-CM

## 2024-03-19 PROCEDURE — 99214 OFFICE O/P EST MOD 30 MIN: CPT | Performed by: NURSE PRACTITIONER

## 2024-03-19 PROCEDURE — 3074F SYST BP LT 130 MM HG: CPT | Performed by: NURSE PRACTITIONER

## 2024-03-19 PROCEDURE — 3078F DIAST BP <80 MM HG: CPT | Performed by: NURSE PRACTITIONER

## 2024-03-19 NOTE — PROGRESS NOTES
CARDIOLOGY  NOTE    3/19/2024    Keisha Turner (:  1965) is a 58 y.o. female,an established patient with Dr. Mc, here for evaluation of the following chief complaint(s):  Other (1ppd. 4-5 caffeine drinks a day. Helping with the family. Not as much exercise as she should.)        SUBJECTIVE/OBJECTIVE:    KRISHNA Valdes has Past medical history as noted below.      She states that she is having some restless leg issues. She states she has not been to pain clinic recently due to insurance issues.     Echo shows LVH and diastolic dysfunction, lexiscan shows no ischemia, renal failure; recovered, siatica issues, and  Diabetes.     her mother has history of congestive heart failure.    Her younger sister had CABG and brother has 3 stents    She continues Smoking a pack a day     Review of Systems   Constitutional:  Negative for fatigue and fever.   Respiratory:  Negative for cough and shortness of breath.    Cardiovascular:  Positive for leg swelling. Negative for chest pain and palpitations.   Musculoskeletal:  Positive for back pain. Negative for arthralgias and gait problem.   Skin:  Positive for color change. Pallor: vascular discoloration bilaterally.  Neurological:  Negative for dizziness, syncope, weakness, light-headedness and headaches.       Vitals:    24 1454   BP: 108/68   Site: Left Upper Arm   Position: Sitting   Cuff Size: Large Adult   Pulse: 80   Weight: 107 kg (236 lb)   Height: 1.676 m (5' 6\")       Wt Readings from Last 3 Encounters:   24 107 kg (236 lb)   24 107.9 kg (237 lb 14.4 oz)   23 110.3 kg (243 lb 3.2 oz)       BP Readings from Last 3 Encounters:   24 108/68   24 134/88   09/15/23 126/76       Prior to Admission medications    Medication Sig Start Date End Date Taking? Authorizing Provider   bumetanide (BUMEX) 1 MG tablet Take 1 tablet by mouth daily 23  Yes Crystal Garner, APRN - CNP   TRULICITY 0.75 MG/0.5ML SOPN Inject 1 Application into

## 2024-03-21 RX ORDER — LOSARTAN POTASSIUM 50 MG/1
50 TABLET ORAL DAILY
Qty: 90 TABLET | Refills: 1 | Status: CANCELLED | OUTPATIENT
Start: 2024-03-21

## 2024-04-18 ENCOUNTER — TELEPHONE (OUTPATIENT)
Dept: CARDIOLOGY CLINIC | Age: 59
End: 2024-04-18

## 2024-04-18 ENCOUNTER — OFFICE VISIT (OUTPATIENT)
Dept: CARDIOLOGY CLINIC | Age: 59
End: 2024-04-18
Payer: MEDICARE

## 2024-04-18 VITALS
HEIGHT: 66 IN | BODY MASS INDEX: 37.93 KG/M2 | DIASTOLIC BLOOD PRESSURE: 74 MMHG | SYSTOLIC BLOOD PRESSURE: 110 MMHG | HEART RATE: 76 BPM | WEIGHT: 236 LBS

## 2024-04-18 DIAGNOSIS — E78.2 MIXED HYPERLIPIDEMIA: ICD-10-CM

## 2024-04-18 DIAGNOSIS — F17.200 TOBACCO DEPENDENCE: ICD-10-CM

## 2024-04-18 DIAGNOSIS — E66.01 MORBID OBESITY (HCC): ICD-10-CM

## 2024-04-18 DIAGNOSIS — E11.9 TYPE 2 DIABETES MELLITUS WITHOUT COMPLICATION, WITHOUT LONG-TERM CURRENT USE OF INSULIN (HCC): ICD-10-CM

## 2024-04-18 DIAGNOSIS — Z82.49 FAMILY HISTORY OF CORONARY ARTERIOSCLEROSIS: ICD-10-CM

## 2024-04-18 DIAGNOSIS — I83.893 VARICOSE VEINS OF BOTH LEGS WITH EDEMA: Primary | ICD-10-CM

## 2024-04-18 DIAGNOSIS — I10 PRIMARY HYPERTENSION: ICD-10-CM

## 2024-04-18 PROCEDURE — 3017F COLORECTAL CA SCREEN DOC REV: CPT | Performed by: INTERNAL MEDICINE

## 2024-04-18 PROCEDURE — 3078F DIAST BP <80 MM HG: CPT | Performed by: INTERNAL MEDICINE

## 2024-04-18 PROCEDURE — G8417 CALC BMI ABV UP PARAM F/U: HCPCS | Performed by: INTERNAL MEDICINE

## 2024-04-18 PROCEDURE — 4004F PT TOBACCO SCREEN RCVD TLK: CPT | Performed by: INTERNAL MEDICINE

## 2024-04-18 PROCEDURE — 3046F HEMOGLOBIN A1C LEVEL >9.0%: CPT | Performed by: INTERNAL MEDICINE

## 2024-04-18 PROCEDURE — G8427 DOCREV CUR MEDS BY ELIG CLIN: HCPCS | Performed by: INTERNAL MEDICINE

## 2024-04-18 PROCEDURE — 2022F DILAT RTA XM EVC RTNOPTHY: CPT | Performed by: INTERNAL MEDICINE

## 2024-04-18 PROCEDURE — 3074F SYST BP LT 130 MM HG: CPT | Performed by: INTERNAL MEDICINE

## 2024-04-18 PROCEDURE — 99213 OFFICE O/P EST LOW 20 MIN: CPT | Performed by: INTERNAL MEDICINE

## 2024-04-18 NOTE — PATIENT INSTRUCTIONS
CHRONIC VENOUS INSUFFICIENCY:yes,    8/15/2023   No evidence of DVT or SVT in the bilateral common femoral vein, femoral   vein, popliteal vein, greater saphenous vein or small saphenous vein.   Limited visualization of the bilateral Distal FV, no thrombus noted at this   time.   Significant reflux noted of the Right CFV (1.8s), GSV Mid Calf (1.5s), GSV   Distal Calf (2.3s), and SSV Distal Calf (2.8s).   Significant reflux noted in the Left GSV Knee (1.4s), and GSV Distal Calf   (1.1s).              Patient has symptomatic C4 disease.  S/P Serial ablations of both GSVs & right SSV.    4/15/2024    Patent bilateral CFV and right Pop V.    Bilateral GSV are non-compressible without evidence of flow from distal thigh to distal calf.    Right SSV is non-compressible without evidence of flow from prox to distal calf.    Advised patient to continue to wear compression stockings.  Need to Diet Exercise & Loose weight.  Stop smoking.  Increase walking while wearing compression stockings.  Keep feet propped up while seated.     HYPERTENSION:Yes  well controlled on current medical regimen.  - changes in  treatment:   no. Patient is on Aldactone  Counseled regarding low salt diet, exercise & weight control.     DYSLIPIDEMIA: yes,   Patient's profile is at / near Goal.no  HDL is low   Tolerating current medical regimen wellyes. Takes Lipitor  See most recent Lab values:( Reviewed Labs from family Dr. GENNARO     )  LDL is 130  HDL is 37     TESTS ORDERED:NONE THIS VISIT     PREVIOUSLY ORDERED TESTS REVIEWED & DISCUSSED WITH THE PATIENT:     I personally reviewed & interpreted, all previously ordered tests as copied above. Latest Labs are pulled in to the note with dates.   Labs, specially in Reference to Lipid profile, Cardiac testing in the form of Echo ( dated: ), stress tests ( dated: ) & other relevant cardiac testing reviewed with patient & recommendations made based on assessment of the results.    Discussed role of Cardiac

## 2024-04-18 NOTE — TELEPHONE ENCOUNTER
Test Ordered: RLE Venous  /  Insurance: [facility] Medicare  /  Authorization Status: No Auth Required

## 2024-04-18 NOTE — PROGRESS NOTES
OFFICE PROGRESS NOTES      Keisha is a 58 y.o. female who has    CHIEF COMPLAINT AS FOLLOWS:  CHEST PAIN: Patient denies any C/O chest pains at this time.      SOB: No C/O SOB at this time.    LEG EDEMA: Symptoms better than before.   PALPITATIONS: Denies any C/O Palpitations   DIZZINESS: No C/O Dizziness   SYNCOPE: None   OTHER/ ADDITIONAL COMPLAINTS:                                     HPI: Patient is here for F/U on her CVI, HTN & Dyslipidemia problems.   CVI: Has Reflux of both superficial & deep veins.   HTN: Patient has known essential HTN. Has been treated with guideline recommended medical / physical/ diet therapy as stated below.  Dyslipidemia: Patient has known mixed dyslipidemia. Has been treated with guideline recommended medical / physical/ diet therapy as stated below.                Current Outpatient Medications   Medication Sig Dispense Refill    bumetanide (BUMEX) 1 MG tablet Take 1 tablet by mouth daily 90 tablet 0    TRULICITY 0.75 MG/0.5ML SOPN Inject 1 Application into the skin once a week      JARDIANCE 25 MG tablet Take 1 tablet by mouth daily      losartan (COZAAR) 50 MG tablet Take 1 tablet by mouth daily 90 tablet 1    atorvastatin (LIPITOR) 20 MG tablet Take 1 tablet by mouth daily 04/24/23 Dose decreased 03/31/23 30 tablet 5    ibuprofen (ADVIL;MOTRIN) 800 MG tablet Take 1 tablet by mouth every 6 hours as needed for Pain      oxybutynin (DITROPAN) 5 MG tablet Take 1 tablet by mouth 3 times daily 30 tablet 0    QVAR REDIHALER 40 MCG/ACT AERB inhaler Inhale 2 puffs into the lungs 2 times daily      albuterol sulfate HFA (PROVENTIL;VENTOLIN;PROAIR) 108 (90 Base) MCG/ACT inhaler Inhale 2 puffs into the lungs every 4-6 hours as needed for Wheezing      tiZANidine (ZANAFLEX) 4 MG tablet Take 1 tablet by mouth nightly      montelukast (SINGULAIR) 10 MG tablet Take 1 tablet by mouth nightly      ondansetron (ZOFRAN-ODT) 4 MG disintegrating tablet Take 1 tablet by mouth 3 times daily as needed

## 2024-04-19 ENCOUNTER — TELEPHONE (OUTPATIENT)
Dept: CARDIOLOGY CLINIC | Age: 59
End: 2024-04-19

## 2024-04-19 NOTE — TELEPHONE ENCOUNTER
Called pt to tell them the results of their US as summarized below. Pt verbalized understanding.            Vascular US Duplex Lower Extremity Venous Right        Patent bilateral CFV and right Pop V.    Bilateral GSV are non-compressible without evidence of flow from distal thigh to distal calf.    Right SSV is non-compressible without evidence of flow from prox to distal calf.

## 2024-04-29 NOTE — PROGRESS NOTES
SUBJECTIVE:  HPI:     Patient presents with history of right groin pain. She has had recent vascular duplex ruling out DVT but there was incidental finding of right groin mass consistent with a lymph node measuring 3.4 x 0.9 x 3.7 cm. She was referred here for possible biopsy. Has had ongoing pain in right groin which is nonradiating. She has not noticed any specific mass in the right groin area. No prior surgery to the area. Past Surgical History:   Procedure Laterality Date    BLADDER SURGERY Right 5/16/2023    CYSTOSCOPY RETROGRADE PYELOGRAM STENT INSERTION performed by Cristian Melendez MD at [address]  age22's    [address] / REMOVAL STENT / Mona Monzon N/A 11/28/2020    CYSTOSCOPY RETROGRADE PYELOGRAM STENT INSERTION performed by Saritha Garrett MD at [address] / [facility] / Mona Monzon Right 1/21/2021    CYSTOSCOPY STENT EXCHANGE URETEROSCOPY EXTRACTION OF STONES performed by Mike Sesay MD at [address] [facility] Bilateral     tarsal tunnel, hammer toe, neuroma removed -1990's    FOOT TENDON SURGERY Left 10/21/2022    LEFT EXTENSOR  TENDON REPAIR performed by Saritha Garrett DPM at [address] [facility]  2019?     ventral hernia repair    HYSTERECTOMY (CERVIX STATUS UNKNOWN)  2001    KIDNEY STONE SURGERY      per old chart had surg for left nephrostolithotomy and cysto /lithotripsy in 2013at [facility]    LITHOTRIPSY Right 6/20/2023    RIGHT CYSTOSCOPY URETEROSCOPY   WITH LITHOTRIPSY, STENT REMOVAL performed by Cristian Melendez MD at [address] Left 10/21/2022    PLANTAR FASCIOTOMY ENDOSCOPIC performed by Saritha Garrett DPM at [address] Right     \"open surg right shoulder and later had 2 rotator cuff surgeries on my right shoulder\"    TONSILLECTOMY  age 26    TOTAL KNEE ARTHROPLASTY Left 4/28/2021    LEFT KNEE TOTAL ARTHROPLASTY ROBOTIC performed by Nahomy Farris MD at [address] [facility] 34 Bed/Stretcher in lowest position, wheels locked, appropriate side rails in place/Call bell, personal items and telephone in reach/Instruct patient to call for assistance before getting out of bed/chair/stretcher/Non-slip footwear applied when patient is off stretcher/[place] to call system/Physically safe environment - no spills, clutter or unnecessary equipment/Purposeful proactive rounding/Room/bathroom lighting operational, light cord in reach

## 2024-07-08 RX ORDER — BUMETANIDE 1 MG/1
1 TABLET ORAL DAILY
Qty: 30 TABLET | Refills: 5 | Status: SHIPPED | OUTPATIENT
Start: 2024-07-08

## 2024-07-08 RX ORDER — LOSARTAN POTASSIUM 50 MG/1
50 TABLET ORAL DAILY
Qty: 30 TABLET | Refills: 5 | Status: SHIPPED | OUTPATIENT
Start: 2024-07-08

## 2024-07-23 ENCOUNTER — ANESTHESIA EVENT (OUTPATIENT)
Dept: OPERATING ROOM | Age: 59
End: 2024-07-23
Payer: MEDICARE

## 2024-07-23 ENCOUNTER — APPOINTMENT (OUTPATIENT)
Dept: CT IMAGING | Age: 59
End: 2024-07-23
Payer: MEDICARE

## 2024-07-23 ENCOUNTER — ANESTHESIA (OUTPATIENT)
Dept: OPERATING ROOM | Age: 59
End: 2024-07-23
Payer: MEDICARE

## 2024-07-23 ENCOUNTER — HOSPITAL ENCOUNTER (INPATIENT)
Age: 59
LOS: 4 days | Discharge: HOME | End: 2024-07-27
Attending: EMERGENCY MEDICINE | Admitting: STUDENT IN AN ORGANIZED HEALTH CARE EDUCATION/TRAINING PROGRAM
Payer: MEDICARE

## 2024-07-23 ENCOUNTER — APPOINTMENT (OUTPATIENT)
Dept: GENERAL RADIOLOGY | Age: 59
End: 2024-07-23
Payer: MEDICARE

## 2024-07-23 DIAGNOSIS — I50.9 CONGESTIVE HEART FAILURE, UNSPECIFIED HF CHRONICITY, UNSPECIFIED HEART FAILURE TYPE (HCC): ICD-10-CM

## 2024-07-23 DIAGNOSIS — R11.2 NAUSEA AND VOMITING, UNSPECIFIED VOMITING TYPE: ICD-10-CM

## 2024-07-23 DIAGNOSIS — N23 RENAL COLIC: Primary | ICD-10-CM

## 2024-07-23 DIAGNOSIS — N20.1 URETEROLITHIASIS: ICD-10-CM

## 2024-07-23 PROBLEM — N20.0 NEPHROLITHIASIS: Status: ACTIVE | Noted: 2024-07-23

## 2024-07-23 LAB
ALBUMIN SERPL-MCNC: 4 GM/DL (ref 3.4–5)
ALP BLD-CCNC: 154 IU/L (ref 40–129)
ALT SERPL-CCNC: 25 U/L (ref 10–40)
ANION GAP SERPL CALCULATED.3IONS-SCNC: 13 MMOL/L (ref 7–16)
AST SERPL-CCNC: 29 IU/L (ref 15–37)
BACTERIA: ABNORMAL /HPF
BASOPHILS ABSOLUTE: 0 K/CU MM
BASOPHILS RELATIVE PERCENT: 0.2 % (ref 0–1)
BILIRUB SERPL-MCNC: 0.5 MG/DL (ref 0–1)
BILIRUBIN, URINE: NEGATIVE MG/DL
BLOOD, URINE: ABNORMAL
BUN SERPL-MCNC: 13 MG/DL (ref 6–23)
CALCIUM SERPL-MCNC: 9.2 MG/DL (ref 8.3–10.6)
CHLORIDE BLD-SCNC: 104 MMOL/L (ref 99–110)
CLARITY, UA: CLEAR
CO2: 22 MMOL/L (ref 21–32)
COLOR, UA: YELLOW
CREAT SERPL-MCNC: 0.9 MG/DL (ref 0.6–1.1)
CRP SERPL HS-MCNC: 33.9 MG/L
DIFFERENTIAL TYPE: ABNORMAL
EOSINOPHILS ABSOLUTE: 0.2 K/CU MM
EOSINOPHILS RELATIVE PERCENT: 1.7 % (ref 0–3)
GFR, ESTIMATED: 75 ML/MIN/1.73M2
GLUCOSE BLD-MCNC: 146 MG/DL (ref 70–99)
GLUCOSE BLD-MCNC: 157 MG/DL (ref 70–99)
GLUCOSE BLD-MCNC: 192 MG/DL (ref 70–99)
GLUCOSE BLD-MCNC: 233 MG/DL (ref 70–99)
GLUCOSE BLD-MCNC: 244 MG/DL (ref 70–99)
GLUCOSE BLD-MCNC: 246 MG/DL (ref 70–99)
GLUCOSE SERPL-MCNC: 136 MG/DL (ref 70–99)
GLUCOSE URINE: >1000 MG/DL
HCT VFR BLD CALC: 39.7 % (ref 37–47)
HEMOGLOBIN: 12.7 GM/DL (ref 12.5–16)
IMMATURE NEUTROPHIL %: 0.3 % (ref 0–0.43)
KETONES, URINE: NEGATIVE MG/DL
LACTATE: 1.9 MMOL/L (ref 0.5–1.9)
LACTIC ACID, SEPSIS: 2.2 MMOL/L (ref 0.4–2)
LACTIC ACID, SEPSIS: 3.5 MMOL/L (ref 0.4–2)
LEUKOCYTE ESTERASE, URINE: ABNORMAL
LIPASE: 40 IU/L (ref 13–60)
LYMPHOCYTES ABSOLUTE: 1.7 K/CU MM
LYMPHOCYTES RELATIVE PERCENT: 16.6 % (ref 24–44)
MCH RBC QN AUTO: 27.1 PG (ref 27–31)
MCHC RBC AUTO-ENTMCNC: 32 % (ref 32–36)
MCV RBC AUTO: 84.6 FL (ref 78–100)
MONOCYTES ABSOLUTE: 0.6 K/CU MM
MONOCYTES RELATIVE PERCENT: 5.6 % (ref 0–4)
NEUTROPHILS ABSOLUTE: 7.9 K/CU MM
NEUTROPHILS RELATIVE PERCENT: 75.6 % (ref 36–66)
NITRITE URINE, QUANTITATIVE: NEGATIVE
NUCLEATED RBC %: 0 %
PDW BLD-RTO: 14.9 % (ref 11.7–14.9)
PH, URINE: 7 (ref 5–8)
PLATELET # BLD: 296 K/CU MM (ref 140–440)
PMV BLD AUTO: 9 FL (ref 7.5–11.1)
POTASSIUM SERPL-SCNC: 4 MMOL/L (ref 3.5–5.1)
PROTEIN UA: 30 MG/DL
RBC # BLD: 4.69 M/CU MM (ref 4.2–5.4)
RBC URINE: 470 /HPF (ref 0–6)
SODIUM BLD-SCNC: 139 MMOL/L (ref 135–145)
SPECIFIC GRAVITY UA: 1.01 (ref 1–1.03)
SQUAMOUS EPITHELIAL: 11 /HPF
TOTAL IMMATURE NEUTOROPHIL: 0.03 K/CU MM
TOTAL NUCLEATED RBC: 0 K/CU MM
TOTAL PROTEIN: 6.9 GM/DL (ref 6.4–8.2)
TRICHOMONAS: ABNORMAL /HPF
UROBILINOGEN, URINE: 1 MG/DL (ref 0.2–1)
WBC # BLD: 10.5 K/CU MM (ref 4–10.5)
WBC CLUMP: ABNORMAL /HPF
WBC UA: 211 /HPF (ref 0–5)

## 2024-07-23 PROCEDURE — 6360000004 HC RX CONTRAST MEDICATION: Performed by: UROLOGY

## 2024-07-23 PROCEDURE — 0T768DZ DILATION OF RIGHT URETER WITH INTRALUMINAL DEVICE, VIA NATURAL OR ARTIFICIAL OPENING ENDOSCOPIC: ICD-10-PCS | Performed by: UROLOGY

## 2024-07-23 PROCEDURE — 3600000003 HC SURGERY LEVEL 3 BASE: Performed by: UROLOGY

## 2024-07-23 PROCEDURE — 2500000003 HC RX 250 WO HCPCS: Performed by: NURSE ANESTHETIST, CERTIFIED REGISTERED

## 2024-07-23 PROCEDURE — 87040 BLOOD CULTURE FOR BACTERIA: CPT

## 2024-07-23 PROCEDURE — 6370000000 HC RX 637 (ALT 250 FOR IP): Performed by: EMERGENCY MEDICINE

## 2024-07-23 PROCEDURE — 83605 ASSAY OF LACTIC ACID: CPT

## 2024-07-23 PROCEDURE — 80053 COMPREHEN METABOLIC PANEL: CPT

## 2024-07-23 PROCEDURE — 6360000002 HC RX W HCPCS: Performed by: EMERGENCY MEDICINE

## 2024-07-23 PROCEDURE — 7100000000 HC PACU RECOVERY - FIRST 15 MIN: Performed by: UROLOGY

## 2024-07-23 PROCEDURE — 96365 THER/PROPH/DIAG IV INF INIT: CPT

## 2024-07-23 PROCEDURE — 74176 CT ABD & PELVIS W/O CONTRAST: CPT

## 2024-07-23 PROCEDURE — 87150 DNA/RNA AMPLIFIED PROBE: CPT

## 2024-07-23 PROCEDURE — 1200000000 HC SEMI PRIVATE

## 2024-07-23 PROCEDURE — 2580000003 HC RX 258: Performed by: STUDENT IN AN ORGANIZED HEALTH CARE EDUCATION/TRAINING PROGRAM

## 2024-07-23 PROCEDURE — 81003 URINALYSIS AUTO W/O SCOPE: CPT

## 2024-07-23 PROCEDURE — 6360000002 HC RX W HCPCS: Performed by: UROLOGY

## 2024-07-23 PROCEDURE — 6370000000 HC RX 637 (ALT 250 FOR IP): Performed by: UROLOGY

## 2024-07-23 PROCEDURE — 6360000002 HC RX W HCPCS: Performed by: STUDENT IN AN ORGANIZED HEALTH CARE EDUCATION/TRAINING PROGRAM

## 2024-07-23 PROCEDURE — 82962 GLUCOSE BLOOD TEST: CPT

## 2024-07-23 PROCEDURE — 83690 ASSAY OF LIPASE: CPT

## 2024-07-23 PROCEDURE — 2700000000 HC OXYGEN THERAPY PER DAY

## 2024-07-23 PROCEDURE — 86140 C-REACTIVE PROTEIN: CPT

## 2024-07-23 PROCEDURE — 87186 SC STD MICRODIL/AGAR DIL: CPT

## 2024-07-23 PROCEDURE — 87086 URINE CULTURE/COLONY COUNT: CPT

## 2024-07-23 PROCEDURE — C2617 STENT, NON-COR, TEM W/O DEL: HCPCS | Performed by: UROLOGY

## 2024-07-23 PROCEDURE — C1758 CATHETER, URETERAL: HCPCS | Performed by: UROLOGY

## 2024-07-23 PROCEDURE — 2580000003 HC RX 258: Performed by: UROLOGY

## 2024-07-23 PROCEDURE — 96375 TX/PRO/DX INJ NEW DRUG ADDON: CPT

## 2024-07-23 PROCEDURE — 3700000001 HC ADD 15 MINUTES (ANESTHESIA): Performed by: UROLOGY

## 2024-07-23 PROCEDURE — 81001 URINALYSIS AUTO W/SCOPE: CPT

## 2024-07-23 PROCEDURE — 2709999900 HC NON-CHARGEABLE SUPPLY: Performed by: UROLOGY

## 2024-07-23 PROCEDURE — 2580000003 HC RX 258: Performed by: EMERGENCY MEDICINE

## 2024-07-23 PROCEDURE — BT1D1ZZ FLUOROSCOPY OF RIGHT KIDNEY, URETER AND BLADDER USING LOW OSMOLAR CONTRAST: ICD-10-PCS | Performed by: UROLOGY

## 2024-07-23 PROCEDURE — 7100000001 HC PACU RECOVERY - ADDTL 15 MIN: Performed by: UROLOGY

## 2024-07-23 PROCEDURE — 6370000000 HC RX 637 (ALT 250 FOR IP): Performed by: PHYSICIAN ASSISTANT

## 2024-07-23 PROCEDURE — 3600000013 HC SURGERY LEVEL 3 ADDTL 15MIN: Performed by: UROLOGY

## 2024-07-23 PROCEDURE — C1769 GUIDE WIRE: HCPCS | Performed by: UROLOGY

## 2024-07-23 PROCEDURE — 2580000003 HC RX 258: Performed by: PHYSICIAN ASSISTANT

## 2024-07-23 PROCEDURE — 6360000002 HC RX W HCPCS: Performed by: NURSE ANESTHETIST, CERTIFIED REGISTERED

## 2024-07-23 PROCEDURE — 99285 EMERGENCY DEPT VISIT HI MDM: CPT

## 2024-07-23 PROCEDURE — 94761 N-INVAS EAR/PLS OXIMETRY MLT: CPT

## 2024-07-23 PROCEDURE — 3700000000 HC ANESTHESIA ATTENDED CARE: Performed by: UROLOGY

## 2024-07-23 PROCEDURE — 85025 COMPLETE CBC W/AUTO DIFF WBC: CPT

## 2024-07-23 PROCEDURE — 6370000000 HC RX 637 (ALT 250 FOR IP): Performed by: STUDENT IN AN ORGANIZED HEALTH CARE EDUCATION/TRAINING PROGRAM

## 2024-07-23 PROCEDURE — 87077 CULTURE AEROBIC IDENTIFY: CPT

## 2024-07-23 PROCEDURE — 36415 COLL VENOUS BLD VENIPUNCTURE: CPT

## 2024-07-23 PROCEDURE — 76000 FLUOROSCOPY <1 HR PHYS/QHP: CPT

## 2024-07-23 DEVICE — VARIABLE LENGTH URETERAL STENT
Type: IMPLANTABLE DEVICE | Site: URETER | Status: FUNCTIONAL
Brand: CONTOUR VL™

## 2024-07-23 RX ORDER — MAGNESIUM SULFATE IN WATER 40 MG/ML
2000 INJECTION, SOLUTION INTRAVENOUS PRN
Status: DISCONTINUED | OUTPATIENT
Start: 2024-07-23 | End: 2024-07-27 | Stop reason: HOSPADM

## 2024-07-23 RX ORDER — ACETAMINOPHEN 325 MG/1
650 TABLET ORAL EVERY 6 HOURS PRN
Status: DISCONTINUED | OUTPATIENT
Start: 2024-07-23 | End: 2024-07-27 | Stop reason: HOSPADM

## 2024-07-23 RX ORDER — OXYCODONE HYDROCHLORIDE AND ACETAMINOPHEN 5; 325 MG/1; MG/1
1 TABLET ORAL ONCE
Status: COMPLETED | OUTPATIENT
Start: 2024-07-23 | End: 2024-07-23

## 2024-07-23 RX ORDER — POTASSIUM CHLORIDE 7.45 MG/ML
10 INJECTION INTRAVENOUS PRN
Status: DISCONTINUED | OUTPATIENT
Start: 2024-07-23 | End: 2024-07-27 | Stop reason: HOSPADM

## 2024-07-23 RX ORDER — 0.9 % SODIUM CHLORIDE 0.9 %
1000 INTRAVENOUS SOLUTION INTRAVENOUS ONCE
Status: COMPLETED | OUTPATIENT
Start: 2024-07-23 | End: 2024-07-23

## 2024-07-23 RX ORDER — TIZANIDINE 4 MG/1
4 TABLET ORAL NIGHTLY
Status: DISCONTINUED | OUTPATIENT
Start: 2024-07-23 | End: 2024-07-27 | Stop reason: HOSPADM

## 2024-07-23 RX ORDER — CIPROFLOXACIN 2 MG/ML
400 INJECTION, SOLUTION INTRAVENOUS ONCE
Status: COMPLETED | OUTPATIENT
Start: 2024-07-23 | End: 2024-07-23

## 2024-07-23 RX ORDER — SODIUM CHLORIDE 0.9 % (FLUSH) 0.9 %
5-40 SYRINGE (ML) INJECTION PRN
Status: DISCONTINUED | OUTPATIENT
Start: 2024-07-23 | End: 2024-07-23 | Stop reason: HOSPADM

## 2024-07-23 RX ORDER — PROCHLORPERAZINE EDISYLATE 5 MG/ML
5 INJECTION INTRAMUSCULAR; INTRAVENOUS
Status: DISCONTINUED | OUTPATIENT
Start: 2024-07-23 | End: 2024-07-23 | Stop reason: HOSPADM

## 2024-07-23 RX ORDER — METOCLOPRAMIDE HYDROCHLORIDE 5 MG/ML
10 INJECTION INTRAMUSCULAR; INTRAVENOUS ONCE
Status: DISCONTINUED | OUTPATIENT
Start: 2024-07-23 | End: 2024-07-23 | Stop reason: HOSPADM

## 2024-07-23 RX ORDER — KETOROLAC TROMETHAMINE 30 MG/ML
INJECTION, SOLUTION INTRAMUSCULAR; INTRAVENOUS PRN
Status: DISCONTINUED | OUTPATIENT
Start: 2024-07-23 | End: 2024-07-23 | Stop reason: SDUPTHER

## 2024-07-23 RX ORDER — ONDANSETRON 2 MG/ML
INJECTION INTRAMUSCULAR; INTRAVENOUS PRN
Status: DISCONTINUED | OUTPATIENT
Start: 2024-07-23 | End: 2024-07-23 | Stop reason: SDUPTHER

## 2024-07-23 RX ORDER — INSULIN LISPRO 100 [IU]/ML
0-4 INJECTION, SOLUTION INTRAVENOUS; SUBCUTANEOUS NIGHTLY
Status: DISCONTINUED | OUTPATIENT
Start: 2024-07-23 | End: 2024-07-27 | Stop reason: HOSPADM

## 2024-07-23 RX ORDER — SODIUM CHLORIDE, SODIUM LACTATE, POTASSIUM CHLORIDE, AND CALCIUM CHLORIDE .6; .31; .03; .02 G/100ML; G/100ML; G/100ML; G/100ML
250 INJECTION, SOLUTION INTRAVENOUS ONCE
Status: DISCONTINUED | OUTPATIENT
Start: 2024-07-23 | End: 2024-07-23 | Stop reason: HOSPADM

## 2024-07-23 RX ORDER — KETOROLAC TROMETHAMINE 15 MG/ML
15 INJECTION, SOLUTION INTRAMUSCULAR; INTRAVENOUS ONCE
Status: COMPLETED | OUTPATIENT
Start: 2024-07-23 | End: 2024-07-23

## 2024-07-23 RX ORDER — SODIUM CHLORIDE, SODIUM LACTATE, POTASSIUM CHLORIDE, AND CALCIUM CHLORIDE .6; .31; .03; .02 G/100ML; G/100ML; G/100ML; G/100ML
1000 INJECTION, SOLUTION INTRAVENOUS ONCE
Status: COMPLETED | OUTPATIENT
Start: 2024-07-23 | End: 2024-07-23

## 2024-07-23 RX ORDER — LOSARTAN POTASSIUM 25 MG/1
50 TABLET ORAL DAILY
Status: DISCONTINUED | OUTPATIENT
Start: 2024-07-23 | End: 2024-07-27 | Stop reason: HOSPADM

## 2024-07-23 RX ORDER — DOCUSATE SODIUM 100 MG/1
100 CAPSULE, LIQUID FILLED ORAL DAILY PRN
Status: DISCONTINUED | OUTPATIENT
Start: 2024-07-23 | End: 2024-07-27 | Stop reason: HOSPADM

## 2024-07-23 RX ORDER — PROPOFOL 10 MG/ML
INJECTION, EMULSION INTRAVENOUS PRN
Status: DISCONTINUED | OUTPATIENT
Start: 2024-07-23 | End: 2024-07-23 | Stop reason: SDUPTHER

## 2024-07-23 RX ORDER — ALBUTEROL SULFATE 90 UG/1
2 AEROSOL, METERED RESPIRATORY (INHALATION) EVERY 6 HOURS PRN
Status: DISCONTINUED | OUTPATIENT
Start: 2024-07-23 | End: 2024-07-27 | Stop reason: HOSPADM

## 2024-07-23 RX ORDER — POLYETHYLENE GLYCOL 3350 17 G/17G
17 POWDER, FOR SOLUTION ORAL DAILY PRN
Status: DISCONTINUED | OUTPATIENT
Start: 2024-07-23 | End: 2024-07-27 | Stop reason: HOSPADM

## 2024-07-23 RX ORDER — SODIUM CHLORIDE 9 MG/ML
INJECTION, SOLUTION INTRAVENOUS PRN
Status: DISCONTINUED | OUTPATIENT
Start: 2024-07-23 | End: 2024-07-27 | Stop reason: HOSPADM

## 2024-07-23 RX ORDER — MORPHINE SULFATE 4 MG/ML
4 INJECTION, SOLUTION INTRAMUSCULAR; INTRAVENOUS ONCE
Status: COMPLETED | OUTPATIENT
Start: 2024-07-23 | End: 2024-07-23

## 2024-07-23 RX ORDER — DEXTROSE MONOHYDRATE 100 MG/ML
INJECTION, SOLUTION INTRAVENOUS CONTINUOUS PRN
Status: DISCONTINUED | OUTPATIENT
Start: 2024-07-23 | End: 2024-07-27 | Stop reason: HOSPADM

## 2024-07-23 RX ORDER — ENOXAPARIN SODIUM 100 MG/ML
30 INJECTION SUBCUTANEOUS 2 TIMES DAILY
Status: DISCONTINUED | OUTPATIENT
Start: 2024-07-23 | End: 2024-07-27 | Stop reason: HOSPADM

## 2024-07-23 RX ORDER — POTASSIUM CHLORIDE 20 MEQ/1
40 TABLET, EXTENDED RELEASE ORAL PRN
Status: DISCONTINUED | OUTPATIENT
Start: 2024-07-23 | End: 2024-07-27 | Stop reason: HOSPADM

## 2024-07-23 RX ORDER — METOCLOPRAMIDE HYDROCHLORIDE 5 MG/ML
10 INJECTION INTRAMUSCULAR; INTRAVENOUS
Status: DISCONTINUED | OUTPATIENT
Start: 2024-07-23 | End: 2024-07-23 | Stop reason: HOSPADM

## 2024-07-23 RX ORDER — SODIUM CHLORIDE, SODIUM LACTATE, POTASSIUM CHLORIDE, CALCIUM CHLORIDE 600; 310; 30; 20 MG/100ML; MG/100ML; MG/100ML; MG/100ML
INJECTION, SOLUTION INTRAVENOUS CONTINUOUS
Status: DISPENSED | OUTPATIENT
Start: 2024-07-23 | End: 2024-07-23

## 2024-07-23 RX ORDER — ENOXAPARIN SODIUM 100 MG/ML
40 INJECTION SUBCUTANEOUS DAILY
Status: DISCONTINUED | OUTPATIENT
Start: 2024-07-23 | End: 2024-07-23

## 2024-07-23 RX ORDER — OXYCODONE HYDROCHLORIDE 5 MG/1
5 TABLET ORAL PRN
Status: DISCONTINUED | OUTPATIENT
Start: 2024-07-23 | End: 2024-07-23 | Stop reason: HOSPADM

## 2024-07-23 RX ORDER — FENTANYL CITRATE 50 UG/ML
INJECTION, SOLUTION INTRAMUSCULAR; INTRAVENOUS PRN
Status: DISCONTINUED | OUTPATIENT
Start: 2024-07-23 | End: 2024-07-23 | Stop reason: SDUPTHER

## 2024-07-23 RX ORDER — CIPROFLOXACIN 2 MG/ML
400 INJECTION, SOLUTION INTRAVENOUS EVERY 12 HOURS
Status: DISCONTINUED | OUTPATIENT
Start: 2024-07-23 | End: 2024-07-25 | Stop reason: SDUPTHER

## 2024-07-23 RX ORDER — SODIUM CHLORIDE 0.9 % (FLUSH) 0.9 %
5-40 SYRINGE (ML) INJECTION EVERY 12 HOURS SCHEDULED
Status: DISCONTINUED | OUTPATIENT
Start: 2024-07-23 | End: 2024-07-23 | Stop reason: HOSPADM

## 2024-07-23 RX ORDER — ONDANSETRON 4 MG/1
4 TABLET, ORALLY DISINTEGRATING ORAL EVERY 8 HOURS PRN
Status: DISCONTINUED | OUTPATIENT
Start: 2024-07-23 | End: 2024-07-27 | Stop reason: HOSPADM

## 2024-07-23 RX ORDER — ATORVASTATIN CALCIUM 10 MG/1
20 TABLET, FILM COATED ORAL DAILY
Status: DISCONTINUED | OUTPATIENT
Start: 2024-07-23 | End: 2024-07-27 | Stop reason: HOSPADM

## 2024-07-23 RX ORDER — OXYBUTYNIN CHLORIDE 5 MG/1
5 TABLET ORAL 3 TIMES DAILY
Status: DISCONTINUED | OUTPATIENT
Start: 2024-07-23 | End: 2024-07-27 | Stop reason: HOSPADM

## 2024-07-23 RX ORDER — SODIUM CHLORIDE 9 MG/ML
INJECTION, SOLUTION INTRAVENOUS PRN
Status: DISCONTINUED | OUTPATIENT
Start: 2024-07-23 | End: 2024-07-23 | Stop reason: HOSPADM

## 2024-07-23 RX ORDER — TAMSULOSIN HYDROCHLORIDE 0.4 MG/1
0.4 CAPSULE ORAL ONCE
Status: COMPLETED | OUTPATIENT
Start: 2024-07-23 | End: 2024-07-23

## 2024-07-23 RX ORDER — MORPHINE SULFATE 4 MG/ML
4 INJECTION, SOLUTION INTRAMUSCULAR; INTRAVENOUS EVERY 4 HOURS PRN
Status: DISCONTINUED | OUTPATIENT
Start: 2024-07-23 | End: 2024-07-25

## 2024-07-23 RX ORDER — FENTANYL CITRATE 50 UG/ML
25 INJECTION, SOLUTION INTRAMUSCULAR; INTRAVENOUS EVERY 5 MIN PRN
Status: DISCONTINUED | OUTPATIENT
Start: 2024-07-23 | End: 2024-07-23 | Stop reason: HOSPADM

## 2024-07-23 RX ORDER — NALOXONE HYDROCHLORIDE 0.4 MG/ML
INJECTION, SOLUTION INTRAMUSCULAR; INTRAVENOUS; SUBCUTANEOUS PRN
Status: DISCONTINUED | OUTPATIENT
Start: 2024-07-23 | End: 2024-07-23 | Stop reason: HOSPADM

## 2024-07-23 RX ORDER — SODIUM CHLORIDE 0.9 % (FLUSH) 0.9 %
5-40 SYRINGE (ML) INJECTION PRN
Status: DISCONTINUED | OUTPATIENT
Start: 2024-07-23 | End: 2024-07-27 | Stop reason: HOSPADM

## 2024-07-23 RX ORDER — ROCURONIUM BROMIDE 10 MG/ML
INJECTION, SOLUTION INTRAVENOUS PRN
Status: DISCONTINUED | OUTPATIENT
Start: 2024-07-23 | End: 2024-07-23 | Stop reason: SDUPTHER

## 2024-07-23 RX ORDER — GLUCAGON 1 MG/ML
1 KIT INJECTION PRN
Status: DISCONTINUED | OUTPATIENT
Start: 2024-07-23 | End: 2024-07-27 | Stop reason: HOSPADM

## 2024-07-23 RX ORDER — DIPHENHYDRAMINE HYDROCHLORIDE 50 MG/ML
25 INJECTION INTRAMUSCULAR; INTRAVENOUS ONCE
Status: COMPLETED | OUTPATIENT
Start: 2024-07-23 | End: 2024-07-23

## 2024-07-23 RX ORDER — INSULIN LISPRO 100 [IU]/ML
0-4 INJECTION, SOLUTION INTRAVENOUS; SUBCUTANEOUS
Status: DISCONTINUED | OUTPATIENT
Start: 2024-07-23 | End: 2024-07-27 | Stop reason: HOSPADM

## 2024-07-23 RX ORDER — ONDANSETRON 2 MG/ML
8 INJECTION INTRAMUSCULAR; INTRAVENOUS ONCE
Status: COMPLETED | OUTPATIENT
Start: 2024-07-23 | End: 2024-07-23

## 2024-07-23 RX ORDER — LIDOCAINE HYDROCHLORIDE 20 MG/ML
INJECTION, SOLUTION INTRAVENOUS PRN
Status: DISCONTINUED | OUTPATIENT
Start: 2024-07-23 | End: 2024-07-23 | Stop reason: SDUPTHER

## 2024-07-23 RX ORDER — DEXAMETHASONE SODIUM PHOSPHATE 4 MG/ML
INJECTION, SOLUTION INTRA-ARTICULAR; INTRALESIONAL; INTRAMUSCULAR; INTRAVENOUS; SOFT TISSUE PRN
Status: DISCONTINUED | OUTPATIENT
Start: 2024-07-23 | End: 2024-07-23 | Stop reason: SDUPTHER

## 2024-07-23 RX ORDER — DICYCLOMINE HCL 20 MG
20 TABLET ORAL 3 TIMES DAILY PRN
Status: DISCONTINUED | OUTPATIENT
Start: 2024-07-23 | End: 2024-07-27 | Stop reason: HOSPADM

## 2024-07-23 RX ORDER — FOLIC ACID 1 MG/1
1 TABLET ORAL DAILY
Status: DISCONTINUED | OUTPATIENT
Start: 2024-07-23 | End: 2024-07-27 | Stop reason: HOSPADM

## 2024-07-23 RX ORDER — GABAPENTIN 300 MG/1
300 CAPSULE ORAL 3 TIMES DAILY
Status: DISCONTINUED | OUTPATIENT
Start: 2024-07-23 | End: 2024-07-27 | Stop reason: HOSPADM

## 2024-07-23 RX ORDER — PANTOPRAZOLE SODIUM 40 MG/1
40 TABLET, DELAYED RELEASE ORAL
Status: DISCONTINUED | OUTPATIENT
Start: 2024-07-23 | End: 2024-07-27 | Stop reason: HOSPADM

## 2024-07-23 RX ORDER — MONTELUKAST SODIUM 10 MG/1
10 TABLET ORAL NIGHTLY
Status: DISCONTINUED | OUTPATIENT
Start: 2024-07-23 | End: 2024-07-27 | Stop reason: HOSPADM

## 2024-07-23 RX ORDER — HYDROCODONE BITARTRATE AND ACETAMINOPHEN 5; 325 MG/1; MG/1
1 TABLET ORAL EVERY 6 HOURS PRN
Status: DISCONTINUED | OUTPATIENT
Start: 2024-07-23 | End: 2024-07-26

## 2024-07-23 RX ORDER — INSULIN LISPRO 100 [IU]/ML
0-4 INJECTION, SOLUTION INTRAVENOUS; SUBCUTANEOUS EVERY 4 HOURS
Status: DISCONTINUED | OUTPATIENT
Start: 2024-07-23 | End: 2024-07-24

## 2024-07-23 RX ORDER — ONDANSETRON 2 MG/ML
4 INJECTION INTRAMUSCULAR; INTRAVENOUS EVERY 6 HOURS PRN
Status: DISCONTINUED | OUTPATIENT
Start: 2024-07-23 | End: 2024-07-27 | Stop reason: HOSPADM

## 2024-07-23 RX ORDER — SODIUM CHLORIDE 0.9 % (FLUSH) 0.9 %
5-40 SYRINGE (ML) INJECTION EVERY 12 HOURS SCHEDULED
Status: DISCONTINUED | OUTPATIENT
Start: 2024-07-23 | End: 2024-07-27 | Stop reason: HOSPADM

## 2024-07-23 RX ORDER — ACETAMINOPHEN 650 MG/1
650 SUPPOSITORY RECTAL EVERY 6 HOURS PRN
Status: DISCONTINUED | OUTPATIENT
Start: 2024-07-23 | End: 2024-07-27 | Stop reason: HOSPADM

## 2024-07-23 RX ORDER — DROPERIDOL 2.5 MG/ML
1.25 INJECTION, SOLUTION INTRAMUSCULAR; INTRAVENOUS ONCE
Status: COMPLETED | OUTPATIENT
Start: 2024-07-23 | End: 2024-07-23

## 2024-07-23 RX ORDER — OXYCODONE HYDROCHLORIDE 5 MG/1
10 TABLET ORAL PRN
Status: DISCONTINUED | OUTPATIENT
Start: 2024-07-23 | End: 2024-07-23 | Stop reason: HOSPADM

## 2024-07-23 RX ORDER — NICOTINE 21 MG/24HR
1 PATCH, TRANSDERMAL 24 HOURS TRANSDERMAL DAILY
Status: DISCONTINUED | OUTPATIENT
Start: 2024-07-23 | End: 2024-07-27 | Stop reason: HOSPADM

## 2024-07-23 RX ADMIN — MONTELUKAST 10 MG: 10 TABLET, FILM COATED ORAL at 20:28

## 2024-07-23 RX ADMIN — MORPHINE SULFATE 4 MG: 4 INJECTION, SOLUTION INTRAMUSCULAR; INTRAVENOUS at 02:05

## 2024-07-23 RX ADMIN — ONDANSETRON 8 MG: 2 INJECTION INTRAMUSCULAR; INTRAVENOUS at 02:05

## 2024-07-23 RX ADMIN — GABAPENTIN 300 MG: 300 CAPSULE ORAL at 15:31

## 2024-07-23 RX ADMIN — SUGAMMADEX 400 MG: 100 INJECTION, SOLUTION INTRAVENOUS at 13:19

## 2024-07-23 RX ADMIN — DROPERIDOL 1.25 MG: 2.5 INJECTION, SOLUTION INTRAMUSCULAR; INTRAVENOUS at 03:01

## 2024-07-23 RX ADMIN — KETOROLAC TROMETHAMINE 15 MG: 30 INJECTION, SOLUTION INTRAMUSCULAR; INTRAVENOUS at 13:15

## 2024-07-23 RX ADMIN — PROPOFOL 150 MG: 10 INJECTION, EMULSION INTRAVENOUS at 12:59

## 2024-07-23 RX ADMIN — GABAPENTIN 300 MG: 300 CAPSULE ORAL at 09:34

## 2024-07-23 RX ADMIN — PROPOFOL 50 MG: 10 INJECTION, EMULSION INTRAVENOUS at 13:05

## 2024-07-23 RX ADMIN — MORPHINE SULFATE 4 MG: 4 INJECTION, SOLUTION INTRAMUSCULAR; INTRAVENOUS at 09:34

## 2024-07-23 RX ADMIN — TAMSULOSIN HYDROCHLORIDE 0.4 MG: 0.4 CAPSULE ORAL at 04:13

## 2024-07-23 RX ADMIN — SODIUM CHLORIDE, POTASSIUM CHLORIDE, SODIUM LACTATE AND CALCIUM CHLORIDE: 600; 310; 30; 20 INJECTION, SOLUTION INTRAVENOUS at 11:51

## 2024-07-23 RX ADMIN — SODIUM CHLORIDE, POTASSIUM CHLORIDE, SODIUM LACTATE AND CALCIUM CHLORIDE 1000 ML: 600; 310; 30; 20 INJECTION, SOLUTION INTRAVENOUS at 09:38

## 2024-07-23 RX ADMIN — SERTRALINE HYDROCHLORIDE 25 MG: 50 TABLET ORAL at 09:34

## 2024-07-23 RX ADMIN — FOLIC ACID 1 MG: 1 TABLET ORAL at 09:34

## 2024-07-23 RX ADMIN — GABAPENTIN 300 MG: 300 CAPSULE ORAL at 20:28

## 2024-07-23 RX ADMIN — SODIUM CHLORIDE 1000 ML: 9 INJECTION, SOLUTION INTRAVENOUS at 02:12

## 2024-07-23 RX ADMIN — DEXAMETHASONE SODIUM PHOSPHATE 8 MG: 4 INJECTION, SOLUTION INTRAMUSCULAR; INTRAVENOUS at 12:59

## 2024-07-23 RX ADMIN — SODIUM CHLORIDE, PRESERVATIVE FREE 10 ML: 5 INJECTION INTRAVENOUS at 09:34

## 2024-07-23 RX ADMIN — ENOXAPARIN SODIUM 30 MG: 100 INJECTION SUBCUTANEOUS at 20:29

## 2024-07-23 RX ADMIN — SODIUM CHLORIDE, PRESERVATIVE FREE 10 ML: 5 INJECTION INTRAVENOUS at 20:29

## 2024-07-23 RX ADMIN — CIPROFLOXACIN 400 MG: 400 INJECTION, SOLUTION INTRAVENOUS at 04:17

## 2024-07-23 RX ADMIN — OXYBUTYNIN CHLORIDE 5 MG: 5 TABLET ORAL at 09:34

## 2024-07-23 RX ADMIN — SODIUM CHLORIDE, POTASSIUM CHLORIDE, SODIUM LACTATE AND CALCIUM CHLORIDE: 600; 310; 30; 20 INJECTION, SOLUTION INTRAVENOUS at 06:56

## 2024-07-23 RX ADMIN — ATORVASTATIN CALCIUM 20 MG: 10 TABLET, FILM COATED ORAL at 20:28

## 2024-07-23 RX ADMIN — LIDOCAINE HYDROCHLORIDE 100 MG: 20 INJECTION, SOLUTION INTRAVENOUS at 12:59

## 2024-07-23 RX ADMIN — SODIUM CHLORIDE, PRESERVATIVE FREE 10 ML: 5 INJECTION INTRAVENOUS at 17:48

## 2024-07-23 RX ADMIN — PROPOFOL 150 MCG/KG/MIN: 10 INJECTION, EMULSION INTRAVENOUS at 13:00

## 2024-07-23 RX ADMIN — MORPHINE SULFATE 4 MG: 4 INJECTION, SOLUTION INTRAMUSCULAR; INTRAVENOUS at 22:31

## 2024-07-23 RX ADMIN — LOSARTAN POTASSIUM 50 MG: 25 TABLET, FILM COATED ORAL at 09:34

## 2024-07-23 RX ADMIN — INSULIN LISPRO 1 UNITS: 100 INJECTION, SOLUTION INTRAVENOUS; SUBCUTANEOUS at 22:37

## 2024-07-23 RX ADMIN — OXYCODONE HYDROCHLORIDE AND ACETAMINOPHEN 1 TABLET: 5; 325 TABLET ORAL at 04:13

## 2024-07-23 RX ADMIN — SODIUM CHLORIDE, POTASSIUM CHLORIDE, SODIUM LACTATE AND CALCIUM CHLORIDE 1000 ML: 600; 310; 30; 20 INJECTION, SOLUTION INTRAVENOUS at 17:30

## 2024-07-23 RX ADMIN — HYDROMORPHONE HYDROCHLORIDE 1 MG: 1 INJECTION, SOLUTION INTRAMUSCULAR; INTRAVENOUS; SUBCUTANEOUS at 13:15

## 2024-07-23 RX ADMIN — FENTANYL CITRATE 100 MCG: 50 INJECTION, SOLUTION INTRAMUSCULAR; INTRAVENOUS at 12:59

## 2024-07-23 RX ADMIN — INSULIN LISPRO 1 UNITS: 100 INJECTION, SOLUTION INTRAVENOUS; SUBCUTANEOUS at 17:47

## 2024-07-23 RX ADMIN — DIPHENHYDRAMINE HYDROCHLORIDE 25 MG: 50 INJECTION INTRAMUSCULAR; INTRAVENOUS at 03:01

## 2024-07-23 RX ADMIN — KETOROLAC TROMETHAMINE 15 MG: 15 INJECTION, SOLUTION INTRAMUSCULAR; INTRAVENOUS at 03:01

## 2024-07-23 RX ADMIN — MORPHINE SULFATE 4 MG: 4 INJECTION, SOLUTION INTRAMUSCULAR; INTRAVENOUS at 17:46

## 2024-07-23 RX ADMIN — CIPROFLOXACIN 400 MG: 400 INJECTION, SOLUTION INTRAVENOUS at 18:16

## 2024-07-23 RX ADMIN — OXYBUTYNIN CHLORIDE 5 MG: 5 TABLET ORAL at 20:28

## 2024-07-23 RX ADMIN — TIZANIDINE 4 MG: 4 TABLET ORAL at 20:28

## 2024-07-23 RX ADMIN — ATORVASTATIN CALCIUM 20 MG: 10 TABLET, FILM COATED ORAL at 09:39

## 2024-07-23 RX ADMIN — ROCURONIUM BROMIDE 50 MG: 10 INJECTION, SOLUTION INTRAVENOUS at 12:59

## 2024-07-23 RX ADMIN — OXYBUTYNIN CHLORIDE 5 MG: 5 TABLET ORAL at 15:31

## 2024-07-23 RX ADMIN — ONDANSETRON 4 MG: 2 INJECTION INTRAMUSCULAR; INTRAVENOUS at 12:59

## 2024-07-23 ASSESSMENT — PAIN DESCRIPTION - ORIENTATION
ORIENTATION: RIGHT

## 2024-07-23 ASSESSMENT — PAIN DESCRIPTION - FREQUENCY
FREQUENCY: CONTINUOUS

## 2024-07-23 ASSESSMENT — PAIN SCALES - GENERAL
PAINLEVEL_OUTOF10: 8
PAINLEVEL_OUTOF10: 10
PAINLEVEL_OUTOF10: 7
PAINLEVEL_OUTOF10: 10
PAINLEVEL_OUTOF10: 9
PAINLEVEL_OUTOF10: 9
PAINLEVEL_OUTOF10: 7
PAINLEVEL_OUTOF10: 0
PAINLEVEL_OUTOF10: 7
PAINLEVEL_OUTOF10: 7

## 2024-07-23 ASSESSMENT — PAIN DESCRIPTION - DESCRIPTORS
DESCRIPTORS: STABBING
DESCRIPTORS: CRUSHING
DESCRIPTORS: STABBING
DESCRIPTORS: STABBING
DESCRIPTORS: CRAMPING;ACHING;DISCOMFORT
DESCRIPTORS: CRUSHING

## 2024-07-23 ASSESSMENT — PAIN DESCRIPTION - ONSET
ONSET: ON-GOING

## 2024-07-23 ASSESSMENT — PAIN DESCRIPTION - PAIN TYPE
TYPE: ACUTE PAIN

## 2024-07-23 ASSESSMENT — PAIN - FUNCTIONAL ASSESSMENT
PAIN_FUNCTIONAL_ASSESSMENT: 0-10
PAIN_FUNCTIONAL_ASSESSMENT: PREVENTS OR INTERFERES SOME ACTIVE ACTIVITIES AND ADLS
PAIN_FUNCTIONAL_ASSESSMENT: ACTIVITIES ARE NOT PREVENTED
PAIN_FUNCTIONAL_ASSESSMENT: PREVENTS OR INTERFERES SOME ACTIVE ACTIVITIES AND ADLS

## 2024-07-23 ASSESSMENT — PAIN DESCRIPTION - LOCATION
LOCATION: FLANK
LOCATION: ABDOMEN;FLANK
LOCATION: FLANK
LOCATION: FLANK
LOCATION: ABDOMEN;FLANK
LOCATION: ABDOMEN;FLANK
LOCATION: FLANK

## 2024-07-23 ASSESSMENT — LIFESTYLE VARIABLES: SMOKING_STATUS: 1

## 2024-07-23 NOTE — ED NOTES
ED TO INPATIENT SBAR HANDOFF    Patient Name: Keisha Turner   :  1965  58 y.o.   Preferred Name  Keisha  Family/Caregiver Present no   Restraints no   C-SSRS: Risk of Suicide: No Risk  Sitter no   Sepsis Risk Score        Situation  Chief Complaint   Patient presents with    Flank Pain     Pt arrived to ED via walk-in from home c/o right-sided flank pain since this AM. Pmhx of kidney stones; reports \"it feels the same\"     Brief Description of Patient's Condition: Pt came in from home for right abdominal and flank pain with nausea/vomiting. See CT results below for ureteral stone. Pt pain and nausea is controlled at this time.   Mental Status: oriented  Arrived from: home    Imaging:   CT ABDOMEN PELVIS WO CONTRAST Additional Contrast? None   Final Result      1. Moderate right hydronephrosis due to obstructive proximal ureteral stone.   There is mild perinephric fat stranding.         Electronically signed by Kailash Forman        Abnormal labs:   Abnormal Labs Reviewed   CBC WITH AUTO DIFFERENTIAL - Abnormal; Notable for the following components:       Result Value    Neutrophils % 75.6 (*)     Lymphocytes % 16.6 (*)     Monocytes % 5.6 (*)     All other components within normal limits   COMPREHENSIVE METABOLIC PANEL - Abnormal; Notable for the following components:    Glucose 136 (*)     Alkaline Phosphatase 154 (*)     All other components within normal limits   URINALYSIS - Abnormal; Notable for the following components:    Glucose, Ur >1000 (*)     Blood, Urine MODERATE NUMBER OR AMOUNT OBSERVED (*)     Protein, UA 30 (*)     Leukocyte Esterase, Urine MODERATE NUMBER OR AMOUNT OBSERVED (*)     All other components within normal limits   MICROSCOPIC URINALYSIS - Abnormal; Notable for the following components:    RBC,  (*)     WBC,  (*)     Bacteria, UA FEW (*)     All other components within normal limits        Background  History:   Past Medical History:   Diagnosis Date    Arthritis      Arthritis of left knee     Asthma     last flare up summer 2020    Diabetes mellitus (HCC)     \"use to take Metformin been off this since  2019\"    Disorder of rotator cuff syndrome of left shoulder and allied disorder     H/O cardiovascular stress test 2/24/16    EF68% normal study    History of motion sickness     Hx of echocardiogram 2/24/16    EF 55%, normal LV function, moderate LVH    Hyperlipidemia     Hypertension     Kidney stones     \"had kidney stone 11/2020- removed with surg- , back in 2009- had to put tube in my back(nephostomy tube) for kidney stone-had lithotripsy    Migraine     Obesity     PONV (postoperative nausea and vomiting)     hx of motion sickness    UTI (urinary tract infection)     \"last one 11/2020\"    Vertigo     \"have trouble leaning to my left and laying flat with this\"    Wears glasses        Assessment    Vitals:        Vitals:    07/23/24 0058 07/23/24 0205 07/23/24 0301   BP: (!) 190/101 (!) 193/99 (!) 189/105   Pulse: 96 91 (!) 107   Resp: 20 20 20   Temp: 98.6 °F (37 °C)     TempSrc: Oral     SpO2: 97% 97% 97%     PO Status: Nothing by Mouth  O2 Flow Rate:      Cardiac Rhythm: NS  Last documented pain medication administered: 0413 Percocet  NIH Score: NIH     Active LDA's:   Peripheral IV 07/23/24 Left;Proximal;Anterior Forearm (Active)   Site Assessment Clean, dry & intact 07/23/24 0141   Line Status Brisk blood return;Flushed;Specimen collected;Normal saline locked 07/23/24 0141       Pertinent or High Risk Medications/Drips: no   If Yes, please provide details: na  Blood Product Administration: no  If Yes, please provide details: na    Recommendation    Incomplete orders no  Additional Comments: no   If any further questions, please call Sending RN at [phone]    Electronically signed by: Electronically signed by Kin Addison RN on 7/23/2024 at 5:26 AM

## 2024-07-23 NOTE — PROGRESS NOTES
LOVENOX PROPHYLAXIS EVALUATION  (Populations not addressed in this protocol: trauma, obstetrics, or COVID-19)    Wt Readings from Last 3 Encounters:   07/23/24 107.2 kg (236 lb 5.3 oz)   04/18/24 107 kg (236 lb)   03/19/24 107 kg (236 lb)       Estimated Creatinine Clearance: 85 mL/min (based on SCr of 0.9 mg/dL).  Recent Labs     07/23/24  0139   BUN 13   CREATININE 0.9      HGB 12.7   HCT 39.7       Weight Range: 101-150.9 kg    CRCL = 30 or greater    50.9 kg   and below     .9  kg   101-150.9 kg   151-174.9  kg   175 kg  or greater     30mg subq  daily     40mg subq daily    (or 30mg subq BID orthopedic)     30mg subq  BID   40mg subq  BID   60mg subq BID       Per P/T protocol for appropriate subq anticoagulation by weight and CRCL change to:    Enoxaparin 30mg subq BID      Galileo Ansari RPH  6:30 AM  07/23/24

## 2024-07-23 NOTE — H&P
T2DM, GERD, hypertension, hyperlipidemia, IBS, depression, asthma, obesity, peripheral edema, vertigo, who presented to the ED with flank pain. Patient presents with right sided flank pain associated with N/V and dysuria that began the day of admit. Reports symptoms are consistent with prior stones. Denied any F/C, HA, dizziness, presyncope, syncope, cough, SOB, CP,  bleeding (hemoptysis / hematemesis, hematuria, BRBPR), C/D,        History obtained from: Patient, ED provider, EMR    ROS:     Pertinent positives and negatives discussed in HPI above.    Objective:     No intake or output data in the 24 hours ending 07/23/24 0506     Vitals:   Vitals:    07/23/24 0058 07/23/24 0205 07/23/24 0301   BP: (!) 190/101 (!) 193/99 (!) 189/105   Pulse: 96 91 (!) 107   Resp: 20 20 20   Temp: 98.6 °F (37 °C)     TempSrc: Oral     SpO2: 97% 97% 97%     BMI: There is no height or weight on file to calculate BMI.  General: Awake. WDWN.    HEENT: PERRLA. Vision grossly intact. Hearing grossly intact. Oropharynx clear.   Neck: Supple. No JVD.   CV: RRR. NL S1/S2. No murmurs. CR <2 secs.   Pulm: NL effort on RA. CTAB.   GI: +BS x4. Soft. ND/NT.   : + Right CVA tenderness. No Mendoza catheter.  Skin: Intact, warm and dry.  MSK: No gross joint deformities. Full ROM. Muscle strength is 5/5 B/L. No BLE edema.   Neuro: AAOx3. CNs grossly intact. Normal speech. No focal deficit.   Psych: Good judgement and reason.     Past History:     PMHx:   Past Medical History:   Diagnosis Date    Arthritis     Arthritis of left knee     Asthma     last flare up summer 2020    Diabetes mellitus (HCC)     \"use to take Metformin been off this since  2019\"    Disorder of rotator cuff syndrome of left shoulder and allied disorder     H/O cardiovascular stress test 2/24/16    EF68% normal study    History of motion sickness     Hx of echocardiogram 2/24/16    EF 55%, normal LV function, moderate LVH    Hyperlipidemia     Hypertension     Kidney stones      9/11/23   Phillip Hope MD   atorvastatin (LIPITOR) 20 MG tablet Take 1 tablet by mouth daily 04/24/23 Dose decreased 03/31/23 8/21/23   Karthikeyan Ames MD   ibuprofen (ADVIL;MOTRIN) 800 MG tablet Take 1 tablet by mouth every 6 hours as needed for Pain    Phillip Hope MD   oxybutynin (DITROPAN) 5 MG tablet Take 1 tablet by mouth 3 times daily 5/18/23   Mckenna Sharp MD   QVAR REDIHALER 40 MCG/ACT AERB inhaler Inhale 2 puffs into the lungs 2 times daily 3/31/23   Phillip Hope MD   albuterol sulfate HFA (PROVENTIL;VENTOLIN;PROAIR) 108 (90 Base) MCG/ACT inhaler Inhale 2 puffs into the lungs every 4-6 hours as needed for Wheezing    Phillip Hope MD   tiZANidine (ZANAFLEX) 4 MG tablet Take 1 tablet by mouth nightly 4/13/23   Phillip Hope MD   montelukast (SINGULAIR) 10 MG tablet Take 1 tablet by mouth nightly    Phillip Hope MD   ondansetron (ZOFRAN-ODT) 4 MG disintegrating tablet Take 1 tablet by mouth 3 times daily as needed for Nausea or Vomiting 4/14/23   Job Isbell DO   sertraline (ZOLOFT) 25 MG tablet take 1 tablet by oral route every day 9/22/22   Phillpi Hope MD   gabapentin (NEURONTIN) 300 MG capsule Take 1 capsule by mouth 3 times daily. 8/19/22   Phillip Hope MD   metFORMIN (GLUCOPHAGE) 500 MG tablet Take 2 tablets by mouth 2 times daily (with meals) 8/19/22   Phillip Hope MD   omeprazole (PRILOSEC) 20 MG delayed release capsule take 1 capsule by oral route every day before a meal 8/8/22   Phillip Hope MD   docusate sodium (COLACE) 100 MG capsule Take 1 capsule by mouth daily as needed for Constipation 4/29/21   Konstantin Bustos MD   VITAMIN D PO Take 1,000 Units by mouth daily    Phillip Hope MD   folic acid (FOLVITE) 1 MG tablet Take 1 tablet by mouth daily    Phillip Hope MD   dicyclomine (BENTYL) 20 MG tablet Take 1 tablet by mouth 3 times daily as needed (abdominal pain) 8/13/20   Ray,

## 2024-07-23 NOTE — ED PROVIDER NOTES
CHIEF COMPLAINT    Chief Complaint   Patient presents with    Flank Pain     Pt arrived to ED via walk-in from home c/o right-sided flank pain since this AM. Pmhx of kidney stones; reports \"it feels the same\"     HPI  Keisha Turner is a 58 y.o. female history of hypertension, hyperlipidemia, diabetes who presents to the ED with complaints of right-sided flank pain.  Patient states she began to experience pain in her right flank yesterday morning which has now radiated to right lower quadrant.  The pain is described as a 9/10 stabbing pain with associated nausea.  Symptoms are constant.  She is attempted use ibuprofen without relief.  Movement exacerbates the pain.  She has a prior history of ureterolithiasis requiring ureteral stent placement and states symptoms feel similar.  She has been able to urinate despite the discomfort.  She denies fevers, chills, chest pain, shortness of breath, dizziness, lightheadedness      REVIEW OF SYSTEMS  Constitutional: No fever, chills   Eye: No visual changes  HENT: No earache or sore throat.  Resp: No SOB or productive cough.  Cardio: No chest pain or palpitations.  GI: Complains of abdominal pain and nausea.  No diarrhea or constipation  : Complains of flank pain  Endocrine: No heat intolerance, no cold intolerance, no polydipsia   Lymphatics: No adenopathy  Musculoskeletal: No new muscle aches or joint pain.  Neuro: No headaches.  Psych: No homicidal or suicidal thoughts  Skin: No rash, No itching.  ?  ?  PAST MEDICAL HISTORY  Past Medical History:   Diagnosis Date    Arthritis     Arthritis of left knee     Asthma     last flare up summer 2020    Diabetes mellitus (HCC)     \"use to take Metformin been off this since  2019\"    Disorder of rotator cuff syndrome of left shoulder and allied disorder     H/O cardiovascular stress test 2/24/16    EF68% normal study    History of motion sickness     Hx of echocardiogram 2/24/16    EF 55%, normal LV function, moderate LVH     CYSTOSCOPY INSERTION / REMOVAL STENT / STONE N/A 11/28/2020    CYSTOSCOPY RETROGRADE PYELOGRAM STENT INSERTION performed by Koko Rodriguez MD at [facility] OR    CYSTOSCOPY INSERTION / REMOVAL STENT / STONE Right 1/21/2021    CYSTOSCOPY STENT EXCHANGE URETEROSCOPY EXTRACTION OF STONES performed by Luis Daniel Marsh MD at [facility] OR    FOOT SURGERY Bilateral     tarsal tunnel, hammer toe, neuroma removed -1990's    FOOT TENDON SURGERY Left 10/21/2022    LEFT EXTENSOR  TENDON REPAIR performed by Damien Banegas DPM at [facility] OR    HERNIA REPAIR  2019?    ventral hernia repair    HYSTERECTOMY (CERVIX STATUS UNKNOWN)  2001    KIDNEY STONE SURGERY      per old chart had surg for left nephrostolithotomy and cysto /lithotripsy in 2013at [facility]    LITHOTRIPSY Right 6/20/2023    RIGHT CYSTOSCOPY URETEROSCOPY   WITH LITHOTRIPSY, STENT REMOVAL performed by Rosibel Man MD at [facility] OR    PLANTAR FASCIA SURGERY Left 10/21/2022    PLANTAR FASCIOTOMY ENDOSCOPIC performed by Damien Banegas DPM at [facility] OR    SHOULDER SURGERY Right     \"open surg right shoulder and later had 2 rotator cuff surgeries on my right shoulder\"    TONSILLECTOMY  age 19    TOTAL KNEE ARTHROPLASTY Left 4/28/2021    LEFT KNEE TOTAL ARTHROPLASTY ROBOTIC performed by Konstantin Bustos MD at [facility] OR    TUBAL LIGATION  1992     CURRENT MEDICATIONS  Previous Medications    ALBUTEROL SULFATE HFA (PROVENTIL;VENTOLIN;PROAIR) 108 (90 BASE) MCG/ACT INHALER    Inhale 2 puffs into the lungs every 4-6 hours as needed for Wheezing    ATORVASTATIN (LIPITOR) 20 MG TABLET    Take 1 tablet by mouth daily 04/24/23 Dose decreased 03/31/23    BUMETANIDE (BUMEX) 1 MG TABLET    Take 1 tablet by mouth daily    DICYCLOMINE (BENTYL) 20 MG TABLET    Take 1 tablet by mouth 3 times daily as needed (abdominal pain)    DOCUSATE SODIUM (COLACE) 100 MG CAPSULE    Take 1 capsule by mouth daily as needed for Constipation    FOLIC ACID (FOLVITE) 1 MG TABLET    Take 1 tablet by mouth daily

## 2024-07-23 NOTE — OP NOTE
[address] [place] [address]     Operative Note  [facility]    Patient: Keisha Turner    Date: 2024  : 1965     DOA: 2024   MRN: [id number]    [facility] [id number]  ROOM#: [id number]     Pre-operative Diagnosis: Right ureteral stone, UTI    Post-operative Diagnosis: same    Procedure: Cystoscopy, right retrograde pyelogram/stent insertion    Surgeons/Assistants: Jennifer    Anesthesia: General LMA anesthesia    EBL: Minimal    Complications: none    Specimens: were not obtained    Indication for Procedure:      Keisha Turner is a 58 y.o. diabetic female with history of ureterolithiasis who presented with sharp right flank pain radiating down to the groin.  Imaging suggested a right ureteral calculus which measured 6 mm.  Lactate 2.2, cr, 0.9, wbc 10.5, UA c/w UTI.  Being admitted to IM.  Dosed Cipro in ED.  + h/o stones treated with ureteroscopy.  The patient was unable to get pain relief and unable to pass calculus so Keisha Turner was brought to the OR today for cystourethroscopy, right retrograde pyelogram, and right ureteral stent insertion.  Risks and benefits were explained & Keisha Turner agreed to proceed as planned.  I called her mother pre op & d/w her.    Description of procedure:     The patient was identified in the holding area, taken to procedure room, and placed in supine position. Anesthesia was administered.  The patient was then placed in the dorsal lithotomy position, sterilely prepped and then draped in the usual fashion.  Time out was taken to ensure this was the proper operation, for the proper patient, on the proper side; everyone in the room agreed.     Rigid cystoscopy ensued.  A right retrograde pyelogram with a cone tipped catheter was performed opacifying the collecting system with findings of right proximal ureteral filling defect.       A sensor wire was then placed into the patient's right renal pelvis.       Using the sensor wire I passed up a 4.8 Albanian

## 2024-07-23 NOTE — PROGRESS NOTES
Patient asked that we call her mother and inform her of surgery and transfer to room 3012. Spoke with Lacey chery mother, all questions answered.

## 2024-07-23 NOTE — ANESTHESIA POSTPROCEDURE EVALUATION
Department of Anesthesiology  Postprocedure Note    Patient: Keisha Turner  MRN: [id number]  YOB: 1965  Date of evaluation: 7/23/2024    Procedure Summary       Date: 07/23/24 Room / Location: [address]    Anesthesia Start: 1255 Anesthesia Stop: 1329    Procedure: CYSTOSCOPY RETROGRADE PYELOGRAM STENT INSERTION (Right) Diagnosis: Kidney calculi    Surgeons: Koko Rodriguez MD Responsible Provider: Seth Elizabeth MD    Anesthesia Type: general ASA Status: 3            Anesthesia Type: No value filed.    Khris Phase I: Khris Score: 9    Khris Phase II:      Anesthesia Post Evaluation    Patient location during evaluation: PACU  Patient participation: complete - patient participated  Pain score: 0  Airway patency: patent  Nausea & Vomiting: no nausea and no vomiting  Cardiovascular status: hemodynamically stable  Respiratory status: acceptable and nasal cannula (On O2 prior to surgery)  Hydration status: euvolemic  Pain management: adequate and satisfactory to patient    No notable events documented.

## 2024-07-23 NOTE — PROGRESS NOTES
I have NOT personally seen and examined the patient independently. Reviewed and discussed note as documented by KARENA.  I agree with the assessment and plan.       Went to OR. On abx.

## 2024-07-23 NOTE — PROGRESS NOTES
Patient taken to surgery. Patient then to transfer to 3012. Report given to Ginger on 3E. All questions answered.

## 2024-07-23 NOTE — PROGRESS NOTES
4 Eyes Skin Assessment     NAME:  Keisha Turner  YOB: 1965  MEDICAL RECORD NUMBER:  [id number]    The patient is being assessed for  Admission    I agree that at least one RN has performed a thorough Head to Toe Skin Assessment on the patient. ALL assessment sites listed below have been assessed.      Areas assessed by both nurses:    Head, Face, Ears, Shoulders, Back, Chest, Arms, Elbows, Hands, Sacrum. Buttock, Coccyx, Ischium, Legs. Feet and Heels, and Under Medical Devices         Does the Patient have a Wound? No noted wound(s)       Jhonatan Prevention initiated by RN: No  Wound Care Orders initiated by RN: No    Pressure Injury (Stage 3,4, Unstageable, DTI, NWPT, and Complex wounds) if present, place Wound referral order by RN under : No    New Ostomies, if present place, Ostomy referral order under : No     Nurse 1 eSignature: Electronically signed by Ginger Almonte RN on 7/23/24 at 2:52 PM EDT    **SHARE this note so that the co-signing nurse can place an eSignature**    Nurse 2 eSignature: Electronically signed by Nora Glez RN on 7/23/24 at 2:56 PM EDT

## 2024-07-23 NOTE — CONSULTS
lispro (HUMALOG,ADMELOG) injection vial 0-4 Units, 0-4 Units, SubCUTAneous, TID WC  insulin lispro (HUMALOG,ADMELOG) injection vial 0-4 Units, 0-4 Units, SubCUTAneous, Nightly  insulin lispro (HUMALOG,ADMELOG) injection vial 0-4 Units, 0-4 Units, SubCUTAneous, Q4H  glucose chewable tablet 16 g, 4 tablet, Oral, PRN  dextrose bolus 10% 125 mL, 125 mL, IntraVENous, PRN **OR** dextrose bolus 10% 250 mL, 250 mL, IntraVENous, PRN  glucagon injection 1 mg, 1 mg, SubCUTAneous, PRN  dextrose 10 % infusion, , IntraVENous, Continuous PRN  sodium chloride flush 0.9 % injection 5-40 mL, 5-40 mL, IntraVENous, 2 times per day  sodium chloride flush 0.9 % injection 5-40 mL, 5-40 mL, IntraVENous, PRN  0.9 % sodium chloride infusion, , IntraVENous, PRN  potassium chloride (KLOR-CON M) extended release tablet 40 mEq, 40 mEq, Oral, PRN **OR** potassium bicarb-citric acid (EFFER-K) effervescent tablet 40 mEq, 40 mEq, Oral, PRN **OR** potassium chloride 10 mEq/100 mL IVPB (Peripheral Line), 10 mEq, IntraVENous, PRN  potassium chloride 10 mEq/100 mL IVPB (Peripheral Line), 10 mEq, IntraVENous, PRN  magnesium sulfate 2000 mg in 50 mL IVPB premix, 2,000 mg, IntraVENous, PRN  ondansetron (ZOFRAN-ODT) disintegrating tablet 4 mg, 4 mg, Oral, Q8H PRN **OR** ondansetron (ZOFRAN) injection 4 mg, 4 mg, IntraVENous, Q6H PRN  polyethylene glycol (GLYCOLAX) packet 17 g, 17 g, Oral, Daily PRN  acetaminophen (TYLENOL) tablet 650 mg, 650 mg, Oral, Q6H PRN **OR** acetaminophen (TYLENOL) suppository 650 mg, 650 mg, Rectal, Q6H PRN  lactated ringers IV soln infusion, , IntraVENous, Continuous  morphine sulfate (PF) injection 4 mg, 4 mg, IntraVENous, Q4H PRN  HYDROcodone-acetaminophen (NORCO) 5-325 MG per tablet 1 tablet, 1 tablet, Oral, Q6H PRN  ciprofloxacin (CIPRO) IVPB 400 mg, 400 mg, IntraVENous, Q12H  enoxaparin Sodium (LOVENOX) injection 30 mg, 30 mg, SubCUTAneous, BID  lactated ringers bolus 1,000 mL, 1,000 mL, IntraVENous, Once    Allergies:       TEMPERATURE:  Current - Temp: (!) 100.5 °F (38.1 °C); Max - Temp  Av.6 °F (37.6 °C)  Min: 98.6 °F (37 °C)  Max: 100.5 °F (38.1 °C)  24HR BLOOD PRESSURE RANGE:  Systolic (24hrs), Av , Min:119 , Max:193   ; Diastolic (24hrs), Av, Min:71, Max:105    8HR INTAKE OUTPUT:  No intake/output data recorded.  URINARY CATHETER OUTPUT (Mendoza):     DRAIN/TUBE OUTPUT:        Physical Exam:  Gen: Pleasant female, in NAD  Neuro: Non-focal  Resp: Unlabored breathing  Abd: Soft, non-distended, mild TTP in RLQ  Back:   Right CVAT  Ext: No edema of bilateral LEs    DATA:    WBC:    Lab Results   Component Value Date/Time    WBC 10.5 2024 01:39 AM     Hemoglobin/Hematocrit:    Lab Results   Component Value Date/Time    HGB 12.7 2024 01:39 AM    HCT 39.7 2024 01:39 AM     BMP:    Lab Results   Component Value Date/Time     2024 01:39 AM    K 4.0 2024 01:39 AM     2024 01:39 AM    CO2 22 2024 01:39 AM    BUN 13 2024 01:39 AM    CREATININE 0.9 2024 01:39 AM    CALCIUM 9.2 2024 01:39 AM    GFRAA >60 2022 09:59 AM    LABGLOM 75 2024 01:39 AM    LABGLOM 59 06/15/2023 12:51 PM     PT/INR:    Lab Results   Component Value Date/Time    PROTIME 13.6 2020 03:45 AM    PROTIME 10.4 2012 06:23 AM    INR 1.12 2020 03:45 AM     Blood Culture: ordered  Urine Culture: pending    Imaging:  I personally reviewed images  CT ABDOMEN PELVIS WO CONTRAST Additional Contrast? None    Result Date: 2024  EXAM: CT ABDOMEN PELVIS WO CONTRAST INDICATION: Right-sided flank pain and right lower quadrant abdominal pain COMPARISON: None TECHNIQUE: Helically acquired axial unenhanced images were obtained through the abdomen and pelvis. Helically acquired axial thin section CTA images were obtained through the abdomen and pelvis. 3-D post-processing, volume rendered and maximum intensity projection reformats were reconstructed on the Vitrea

## 2024-07-23 NOTE — PROGRESS NOTES
1240 DR. Rodriguez in pt room. Pt temp 103.1 oral given to him and pt shivering, No orders received.1247 Perfect served DR. Rodriguez Sepsis LACTATE 3.5  no order received and DR. Helm here on SDS and result also given to him. Order received to give IV bolus and bolus started.

## 2024-07-23 NOTE — PROGRESS NOTES
V2.0  [facility] Hospitalist Progress Note      Name:  Keisha Turner /Age/Sex: 1965  (58 y.o. female)   MRN & CSN:  [id number] & [id number] Encounter Date/Time: 2024 7:52 AM EDT    Location:  OBS  PCP: Alka Mcleod MD       Hospital Day: 1    Assessment and Plan:   Keisha Turner is a 58 y.o. female who presents with Nephrolithiasis    Sever sepsis 2/2 UTI   - criteria met  @ 0613 with Temp 100.5, tachycardia. LA 2.2 Otherwise HDS, no leukocytosis.   - UA grossly infectious. Complains of dysuria.   - history of pseudomonal UTI sensitive to cipro   - continue IV cipro   - ordered IV fluid bolus, blood cultures, repeat LA  - urine culture pending     R ureterolithiasis with hydronephrosis   - CT of abdomen shows Moderate right hydronephrosis due to 6mm obstructive proximal ureteral stone. There is mild perinephric fat stranding.   - urology consulted from ED, planning for OR today  -Continue IV fluids, antiemetics, as needed pain control    T2DM  - LCSI  - Hold PO meds   - Hypoglycemic protocol      HTN  -Continue losartan      HLD  -Continue statin      GERD  -Continue PPI     Mood Disorder   -Continue home meds     Asthma  -Continue home inhalers     This patient was discussed with Dr. Rhoades. He was agreeable with the assessment and plan as dictated above.     Current Living situation: home  Expected Disposition: same  Estimated D/C: 24 hours    Diet Diet NPO Exceptions are: Sips of Water with Meds   DVT Prophylaxis [x] Lovenox, []  Heparin, [] SCDs, [] Ambulation,  [] Eliquis, [] Xarelto []Coumadin   Code Status Full Code   Disposition Patient requires continued admission due to UTI, Kidney stone   Surrogate Decision Maker/ POMAURICIO Reveles     Personally reviewed Lab Studies and Imaging       Subjective:     Chief Complaint: Flank Pain (Pt arrived to ED via walk-in from home c/o right-sided flank pain since this AM. Pmhx of kidney stones; reports \"it feels the same\")     Patient  workstation.   Individualized dose optimization technique was used in order to meet ALARA standards for radiation dose reduction.  In addition to vendor specific dose reduction algorithms, the dose reduction techniques vary based on the specific scanner utilized but frequently include automated exposure control, adjustment of the mA and/or kV according to patient size, and use of iterative reconstruction technique. FINDINGS: LUNGS BASES: Clear. VESSELS: Atherosclerotic calcifications with no evidence of aneurysm. LIVER: Normal. GALLBLADDER AND BILE DUCTS: Gallbladder is present. No biliary ductal dilatation. PANCREAS: Normal. SPLEEN: Normal. ADRENAL GLANDS: Normal. KIDNEYS: Moderate right hydronephrosis due to proximal obstructing renal stone measuring 6 mm. There is mild perinephric fat stranding. Nonobstructing renal calculi in the right kidney measuring up to 9 mm. Left kidney is atrophic. BOWEL: No dilatation. PERITONEUM/RETROPERITONEUM: No ascites. No free air. LYMPH NODES: No adenopathy. ABDOMINAL WALL: Normal. URINARY BLADDER: Bladder wall thickening.. REPRODUCTIVE ORGANS: No associated masses. BONES: No acute abnormalities.     1. Moderate right hydronephrosis due to obstructive proximal ureteral stone. There is mild perinephric fat stranding. Electronically signed by Kailash Forman      Electronically signed by Adina Monreal PA-C on 7/23/2024 at 10:36 AM

## 2024-07-23 NOTE — PROGRESS NOTES
1330 Pt arrived from OR to PACU. Monitors applied and alarms in place. Report rec'd from Moraima MONTANO and Solis CARIAS. Pt alert and oriented. Resp even and unlabored  1335 Pt tolerating ice chips appropriately  1340 Pt denies pain or nausea  1350 Report given to Ginger 3E  2452 Transport at bedside

## 2024-07-23 NOTE — ANESTHESIA PRE PROCEDURE
HERNIA REPAIR  2019?    ventral hernia repair    HYSTERECTOMY (CERVIX STATUS UNKNOWN)  2001    KIDNEY STONE SURGERY      per old chart had surg for left nephrostolithotomy and cysto /lithotripsy in 2013at [facility]    LITHOTRIPSY Right 6/20/2023    RIGHT CYSTOSCOPY URETEROSCOPY   WITH LITHOTRIPSY, STENT REMOVAL performed by Rosibel Man MD at [facility]    PLANTAR FASCIA SURGERY Left 10/21/2022    PLANTAR FASCIOTOMY ENDOSCOPIC performed [place] Damien Banegas DPM [place] [facility] [place]    SHOULDER SURGERY Right     \"open surg right shoulder and later had 2 rotator cuff surgeries on my right shoulder\"    TONSILLECTOMY  age 19    TOTAL KNEE ARTHROPLASTY Left 4/28/2021    LEFT KNEE TOTAL ARTHROPLASTY ROBOTIC performed by Konstantin Bustos MD at [facility] OR    TUBAL LIGATION  1992       Social History:    Social History     Tobacco Use    Smoking status: Every Day     Current packs/day: 1.00     Average packs/day: 1 pack/day for 20.0 years (20.0 ttl pk-yrs)     Types: Cigarettes    Smokeless tobacco: Never   Substance Use Topics    Alcohol use: No     Comment: caffeine: 2 cups coffee a day, pepsi sometimes                                Ready to quit: Not Answered  Counseling given: Not Answered      Vital Signs (Current):   Vitals:    07/23/24 0613 07/23/24 0915 07/23/24 1225 07/23/24 1233   BP: 119/71 105/62  (!) 153/92   Pulse: (!) 109 95 (!) 118    Resp: 21 19 19    Temp: (!) 100.5 °F (38.1 °C) 98.9 °F (37.2 °C) (!) 103.1 °F (39.5 °C)    TempSrc: Oral Oral Oral    SpO2: 92% 91% 93%    Weight: 107.2 kg (236 lb 5.3 oz)      Height: 1.676 m (5' 6\")                                                 BP Readings from Last 3 Encounters:   07/23/24 (!) 153/92   04/18/24 110/74   03/19/24 108/68       NPO Status: Time of last liquid consumption: 0900 (sip with meds)                        Time of last solid consumption: 1800                        Date of last liquid consumption: 07/23/24                        Date of last solid food

## 2024-07-23 NOTE — CARE COORDINATION
07/23/24 0839   Service Assessment   Patient Orientation Alert and Oriented;Person;Place;Situation   Cognition Alert   History Provided By Patient   Primary Caregiver Self   Support Systems Spouse/Significant Other;Children;Family Members   Patient's Healthcare Decision Maker is: Legal Next of Kin   PCP Verified by CM Yes   Last Visit to PCP Within last 6 months   Prior Functional Level Independent in ADLs/IADLs   Current Functional Level Assistance with the following:;Mobility   Can patient return to prior living arrangement Yes   Ability to make needs known: Good   Family able to assist with home care needs: Yes   Would you like for me to discuss the discharge plan with any other family members/significant others, and if so, who? No   Condition of Participation: Discharge Planning   The Patient and/Or Patient Representative agree with the Discharge Plan? Yes     CM into see pt to initiate a safe discharge plan. Cm introduced self and explained role of CM. Pt is kind, alert and oriented. Pt lives with her dtr in a two story home but pt is able to stay on the first level. Pt shared that she is well supported by her  who comes in and out and her family. Pt is independent for her ADL's. Pt is able to drive and make appointments, obtain her medications and groceries. Pt uses no DME. Pt goes to the [facility] for her PCP. Pt has insurance and able to afford her prescriptions. Pt declined home care or any needs when discharged.   Discharge plan is for pt to return home with family. PCP/ insurance. Declined needs.   CM provided card and encouraged to call for any needs or concern.   CM is available if any needs arise.

## 2024-07-23 NOTE — PROGRESS NOTES
4 Eyes Skin Assessment     NAME:  Keisha Turner  YOB: 1965  MEDICAL RECORD NUMBER:  [id number]    The patient is being assessed for  Admission    I agree that at least one RN has performed a thorough Head to Toe Skin Assessment on the patient. ALL assessment sites listed below have been assessed.      Areas assessed by both nurses:    Head, Face, Ears, Shoulders, Back, Chest, Arms, Elbows, Hands, Sacrum. Buttock, Coccyx, Ischium, and Legs. Feet and Heels        Does the Patient have a Wound? No noted wound(s)       Jhonatan Prevention initiated by RN: No  Wound Care Orders initiated by RN: No    Pressure Injury (Stage 3,4, Unstageable, DTI, NWPT, and Complex wounds) if present, place Wound referral order by RN under : No    New Ostomies, if present place, Ostomy referral order under : No     Nurse 1 eSignature: Electronically signed by Eddie Helm RN on 7/23/24 at 6:45 AM EDT    **SHARE this note so that the co-signing nurse can place an eSignature**    Nurse 2 eSignature: {Esignature:[id number]}

## 2024-07-24 ENCOUNTER — APPOINTMENT (OUTPATIENT)
Dept: GENERAL RADIOLOGY | Age: 59
End: 2024-07-24
Payer: MEDICARE

## 2024-07-24 ENCOUNTER — APPOINTMENT (OUTPATIENT)
Dept: NON INVASIVE DIAGNOSTICS | Age: 59
End: 2024-07-24
Payer: MEDICARE

## 2024-07-24 LAB
ALBUMIN SERPL-MCNC: 3.6 GM/DL (ref 3.4–5)
ALP BLD-CCNC: 150 IU/L (ref 40–128)
ALT SERPL-CCNC: 32 U/L (ref 10–40)
ANION GAP SERPL CALCULATED.3IONS-SCNC: 12 MMOL/L (ref 7–16)
AST SERPL-CCNC: 29 IU/L (ref 15–37)
BASOPHILS ABSOLUTE: 0 K/CU MM
BASOPHILS RELATIVE PERCENT: 0.2 % (ref 0–1)
BILIRUB SERPL-MCNC: 0.8 MG/DL (ref 0–1)
BUN SERPL-MCNC: 25 MG/DL (ref 6–23)
CALCIUM SERPL-MCNC: 9.3 MG/DL (ref 8.3–10.6)
CHLORIDE BLD-SCNC: 107 MMOL/L (ref 99–110)
CO2: 23 MMOL/L (ref 21–32)
CREAT SERPL-MCNC: 1.6 MG/DL (ref 0.6–1.1)
DIFFERENTIAL TYPE: ABNORMAL
ECHO AO ROOT DIAM: 3.1 CM
ECHO AO ROOT INDEX: 1.41 CM/M2
ECHO AV AREA PEAK VELOCITY: 3.4 CM2
ECHO AV AREA VTI: 3.9 CM2
ECHO AV AREA/BSA PEAK VELOCITY: 1.5 CM2/M2
ECHO AV AREA/BSA VTI: 1.8 CM2/M2
ECHO AV MEAN GRADIENT: 6 MMHG
ECHO AV MEAN VELOCITY: 1.1 M/S
ECHO AV PEAK GRADIENT: 11 MMHG
ECHO AV PEAK VELOCITY: 1.7 M/S
ECHO AV VELOCITY RATIO: 0.88
ECHO AV VTI: 29.9 CM
ECHO BSA: 2.29 M2
ECHO EST RA PRESSURE: 3 MMHG
ECHO IVC PROX: 2.5 CM
ECHO LA AREA 4C: 17 CM2
ECHO LA DIAMETER INDEX: 1.73 CM/M2
ECHO LA DIAMETER: 3.8 CM
ECHO LA MAJOR AXIS: 6.5 CM
ECHO LA TO AORTIC ROOT RATIO: 1.23
ECHO LA VOL MOD A4C: 38 ML (ref 22–52)
ECHO LA VOLUME INDEX MOD A4C: 17 ML/M2 (ref 16–34)
ECHO LV E' LATERAL VELOCITY: 10 CM/S
ECHO LV E' SEPTAL VELOCITY: 8 CM/S
ECHO LV EDV A4C: 146 ML
ECHO LV EDV INDEX A4C: 66 ML/M2
ECHO LV EJECTION FRACTION A4C: 55 %
ECHO LV ESV A4C: 66 ML
ECHO LV ESV INDEX A4C: 30 ML/M2
ECHO LV FRACTIONAL SHORTENING: 29 % (ref 28–44)
ECHO LV INTERNAL DIMENSION DIASTOLE INDEX: 2.32 CM/M2
ECHO LV INTERNAL DIMENSION DIASTOLIC: 5.1 CM (ref 3.9–5.3)
ECHO LV INTERNAL DIMENSION SYSTOLIC INDEX: 1.64 CM/M2
ECHO LV INTERNAL DIMENSION SYSTOLIC: 3.6 CM
ECHO LV IVSD: 1 CM (ref 0.6–0.9)
ECHO LV MASS 2D: 200.8 G (ref 67–162)
ECHO LV MASS INDEX 2D: 91.3 G/M2 (ref 43–95)
ECHO LV POSTERIOR WALL DIASTOLIC: 1.1 CM (ref 0.6–0.9)
ECHO LV RELATIVE WALL THICKNESS RATIO: 0.43
ECHO LVOT AREA: 3.8 CM2
ECHO LVOT AV VTI INDEX: 1.01
ECHO LVOT DIAM: 2.2 CM
ECHO LVOT MEAN GRADIENT: 5 MMHG
ECHO LVOT PEAK GRADIENT: 9 MMHG
ECHO LVOT PEAK VELOCITY: 1.5 M/S
ECHO LVOT STROKE VOLUME INDEX: 52.3 ML/M2
ECHO LVOT SV: 115.1 ML
ECHO LVOT VTI: 30.3 CM
ECHO MV A VELOCITY: 0.72 M/S
ECHO MV E VELOCITY: 0.95 M/S
ECHO MV E/A RATIO: 1.32
ECHO MV E/E' LATERAL: 9.5
ECHO MV E/E' RATIO (AVERAGED): 10.69
ECHO MV E/E' SEPTAL: 11.88
ECHO RIGHT VENTRICULAR SYSTOLIC PRESSURE (RVSP): 23 MMHG
ECHO RV MID DIMENSION: 3.5 CM
ECHO TV REGURGITANT MAX VELOCITY: 2.26 M/S
ECHO TV REGURGITANT PEAK GRADIENT: 20 MMHG
EOSINOPHILS ABSOLUTE: 0 K/CU MM
EOSINOPHILS RELATIVE PERCENT: 0 % (ref 0–3)
GFR, ESTIMATED: 37 ML/MIN/1.73M2
GLUCOSE BLD-MCNC: 142 MG/DL (ref 70–99)
GLUCOSE BLD-MCNC: 143 MG/DL (ref 70–99)
GLUCOSE BLD-MCNC: 145 MG/DL (ref 70–99)
GLUCOSE BLD-MCNC: 150 MG/DL (ref 70–99)
GLUCOSE BLD-MCNC: 197 MG/DL (ref 70–99)
GLUCOSE BLD-MCNC: 204 MG/DL (ref 70–99)
GLUCOSE SERPL-MCNC: 178 MG/DL (ref 70–99)
HCT VFR BLD CALC: 33.7 % (ref 37–47)
HEMOGLOBIN: 10.7 GM/DL (ref 12.5–16)
IMMATURE NEUTROPHIL %: 0.3 % (ref 0–0.43)
INR BLD: 1.2 INDEX
LYMPHOCYTES ABSOLUTE: 0.8 K/CU MM
LYMPHOCYTES RELATIVE PERCENT: 6.3 % (ref 24–44)
MCH RBC QN AUTO: 26.9 PG (ref 27–31)
MCHC RBC AUTO-ENTMCNC: 31.8 % (ref 32–36)
MCV RBC AUTO: 84.7 FL (ref 78–100)
MONOCYTES ABSOLUTE: 0.6 K/CU MM
MONOCYTES RELATIVE PERCENT: 4.8 % (ref 0–4)
NEUTROPHILS ABSOLUTE: 11.7 K/CU MM
NEUTROPHILS RELATIVE PERCENT: 88.4 % (ref 36–66)
NUCLEATED RBC %: 0 %
PDW BLD-RTO: 14.6 % (ref 11.7–14.9)
PLATELET # BLD: 211 K/CU MM (ref 140–440)
PMV BLD AUTO: 9.2 FL (ref 7.5–11.1)
POTASSIUM SERPL-SCNC: 3.7 MMOL/L (ref 3.5–5.1)
PRO-BNP: 1707 PG/ML
PROTHROMBIN TIME: 15.1 SECONDS (ref 11.7–14.5)
RBC # BLD: 3.98 M/CU MM (ref 4.2–5.4)
SODIUM BLD-SCNC: 142 MMOL/L (ref 135–145)
TOTAL IMMATURE NEUTOROPHIL: 0.04 K/CU MM
TOTAL NUCLEATED RBC: 0 K/CU MM
TOTAL PROTEIN: 6.5 GM/DL (ref 6.4–8.2)
WBC # BLD: 13.2 K/CU MM (ref 4–10.5)

## 2024-07-24 PROCEDURE — 6370000000 HC RX 637 (ALT 250 FOR IP): Performed by: PHYSICIAN ASSISTANT

## 2024-07-24 PROCEDURE — 71045 X-RAY EXAM CHEST 1 VIEW: CPT

## 2024-07-24 PROCEDURE — 2580000003 HC RX 258: Performed by: UROLOGY

## 2024-07-24 PROCEDURE — 6360000002 HC RX W HCPCS: Performed by: PHYSICIAN ASSISTANT

## 2024-07-24 PROCEDURE — 93306 TTE W/DOPPLER COMPLETE: CPT | Performed by: INTERNAL MEDICINE

## 2024-07-24 PROCEDURE — 83880 ASSAY OF NATRIURETIC PEPTIDE: CPT

## 2024-07-24 PROCEDURE — 6370000000 HC RX 637 (ALT 250 FOR IP): Performed by: UROLOGY

## 2024-07-24 PROCEDURE — 6360000002 HC RX W HCPCS: Performed by: UROLOGY

## 2024-07-24 PROCEDURE — 1200000000 HC SEMI PRIVATE

## 2024-07-24 PROCEDURE — 85610 PROTHROMBIN TIME: CPT

## 2024-07-24 PROCEDURE — 94761 N-INVAS EAR/PLS OXIMETRY MLT: CPT

## 2024-07-24 PROCEDURE — 85025 COMPLETE CBC W/AUTO DIFF WBC: CPT

## 2024-07-24 PROCEDURE — 82962 GLUCOSE BLOOD TEST: CPT

## 2024-07-24 PROCEDURE — 36415 COLL VENOUS BLD VENIPUNCTURE: CPT

## 2024-07-24 PROCEDURE — 93306 TTE W/DOPPLER COMPLETE: CPT

## 2024-07-24 PROCEDURE — 80053 COMPREHEN METABOLIC PANEL: CPT

## 2024-07-24 RX ORDER — FUROSEMIDE 10 MG/ML
40 INJECTION INTRAMUSCULAR; INTRAVENOUS ONCE
Status: COMPLETED | OUTPATIENT
Start: 2024-07-24 | End: 2024-07-24

## 2024-07-24 RX ADMIN — MORPHINE SULFATE 4 MG: 4 INJECTION, SOLUTION INTRAMUSCULAR; INTRAVENOUS at 06:00

## 2024-07-24 RX ADMIN — LOSARTAN POTASSIUM 50 MG: 25 TABLET, FILM COATED ORAL at 09:14

## 2024-07-24 RX ADMIN — ENOXAPARIN SODIUM 30 MG: 100 INJECTION SUBCUTANEOUS at 20:30

## 2024-07-24 RX ADMIN — HYDROCODONE BITARTRATE AND ACETAMINOPHEN 1 TABLET: 5; 325 TABLET ORAL at 20:37

## 2024-07-24 RX ADMIN — MORPHINE SULFATE 4 MG: 4 INJECTION, SOLUTION INTRAMUSCULAR; INTRAVENOUS at 19:33

## 2024-07-24 RX ADMIN — ENOXAPARIN SODIUM 30 MG: 100 INJECTION SUBCUTANEOUS at 09:15

## 2024-07-24 RX ADMIN — SODIUM CHLORIDE, PRESERVATIVE FREE 5 ML: 5 INJECTION INTRAVENOUS at 20:41

## 2024-07-24 RX ADMIN — GABAPENTIN 300 MG: 300 CAPSULE ORAL at 13:04

## 2024-07-24 RX ADMIN — MONTELUKAST 10 MG: 10 TABLET, FILM COATED ORAL at 20:30

## 2024-07-24 RX ADMIN — CIPROFLOXACIN 400 MG: 400 INJECTION, SOLUTION INTRAVENOUS at 17:59

## 2024-07-24 RX ADMIN — SODIUM CHLORIDE, PRESERVATIVE FREE 10 ML: 5 INJECTION INTRAVENOUS at 17:56

## 2024-07-24 RX ADMIN — SERTRALINE HYDROCHLORIDE 25 MG: 50 TABLET ORAL at 09:14

## 2024-07-24 RX ADMIN — ATORVASTATIN CALCIUM 20 MG: 10 TABLET, FILM COATED ORAL at 20:30

## 2024-07-24 RX ADMIN — SODIUM CHLORIDE, PRESERVATIVE FREE 10 ML: 5 INJECTION INTRAVENOUS at 13:04

## 2024-07-24 RX ADMIN — GABAPENTIN 300 MG: 300 CAPSULE ORAL at 20:30

## 2024-07-24 RX ADMIN — MORPHINE SULFATE 4 MG: 4 INJECTION, SOLUTION INTRAMUSCULAR; INTRAVENOUS at 10:42

## 2024-07-24 RX ADMIN — OXYBUTYNIN CHLORIDE 5 MG: 5 TABLET ORAL at 09:14

## 2024-07-24 RX ADMIN — INSULIN LISPRO 1 UNITS: 100 INJECTION, SOLUTION INTRAVENOUS; SUBCUTANEOUS at 17:52

## 2024-07-24 RX ADMIN — CIPROFLOXACIN 400 MG: 400 INJECTION, SOLUTION INTRAVENOUS at 04:32

## 2024-07-24 RX ADMIN — PANTOPRAZOLE SODIUM 40 MG: 40 TABLET, DELAYED RELEASE ORAL at 05:53

## 2024-07-24 RX ADMIN — FOLIC ACID 1 MG: 1 TABLET ORAL at 09:14

## 2024-07-24 RX ADMIN — SODIUM CHLORIDE, PRESERVATIVE FREE 10 ML: 5 INJECTION INTRAVENOUS at 09:16

## 2024-07-24 RX ADMIN — OXYBUTYNIN CHLORIDE 5 MG: 5 TABLET ORAL at 20:30

## 2024-07-24 RX ADMIN — FUROSEMIDE 40 MG: 10 INJECTION, SOLUTION INTRAMUSCULAR; INTRAVENOUS at 13:04

## 2024-07-24 RX ADMIN — TIZANIDINE 4 MG: 4 TABLET ORAL at 20:30

## 2024-07-24 RX ADMIN — HYDROCODONE BITARTRATE AND ACETAMINOPHEN 1 TABLET: 5; 325 TABLET ORAL at 13:15

## 2024-07-24 RX ADMIN — ONDANSETRON 4 MG: 2 INJECTION INTRAMUSCULAR; INTRAVENOUS at 09:15

## 2024-07-24 RX ADMIN — MORPHINE SULFATE 4 MG: 4 INJECTION, SOLUTION INTRAMUSCULAR; INTRAVENOUS at 14:53

## 2024-07-24 RX ADMIN — HYDROCODONE BITARTRATE AND ACETAMINOPHEN 1 TABLET: 5; 325 TABLET ORAL at 02:25

## 2024-07-24 RX ADMIN — GABAPENTIN 300 MG: 300 CAPSULE ORAL at 09:14

## 2024-07-24 RX ADMIN — OXYBUTYNIN CHLORIDE 5 MG: 5 TABLET ORAL at 13:04

## 2024-07-24 ASSESSMENT — PAIN DESCRIPTION - ORIENTATION
ORIENTATION: RIGHT;MID
ORIENTATION: RIGHT

## 2024-07-24 ASSESSMENT — PAIN - FUNCTIONAL ASSESSMENT

## 2024-07-24 ASSESSMENT — PAIN DESCRIPTION - DESCRIPTORS
DESCRIPTORS: STABBING;SHARP
DESCRIPTORS: ACHING
DESCRIPTORS: ACHING;SHARP;STABBING
DESCRIPTORS: ACHING
DESCRIPTORS: THROBBING
DESCRIPTORS: ACHING
DESCRIPTORS: THROBBING
DESCRIPTORS: GNAWING

## 2024-07-24 ASSESSMENT — PAIN SCALES - GENERAL
PAINLEVEL_OUTOF10: 8
PAINLEVEL_OUTOF10: 3
PAINLEVEL_OUTOF10: 8
PAINLEVEL_OUTOF10: 7
PAINLEVEL_OUTOF10: 8
PAINLEVEL_OUTOF10: 5
PAINLEVEL_OUTOF10: 9
PAINLEVEL_OUTOF10: 8
PAINLEVEL_OUTOF10: 7
PAINLEVEL_OUTOF10: 7
PAINLEVEL_OUTOF10: 9
PAINLEVEL_OUTOF10: 8
PAINLEVEL_OUTOF10: 7
PAINLEVEL_OUTOF10: 8
PAINLEVEL_OUTOF10: 8
PAINLEVEL_OUTOF10: 7
PAINLEVEL_OUTOF10: 8
PAINLEVEL_OUTOF10: 7

## 2024-07-24 ASSESSMENT — PAIN SCALES - WONG BAKER
WONGBAKER_NUMERICALRESPONSE: HURTS LITTLE MORE

## 2024-07-24 ASSESSMENT — PAIN DESCRIPTION - PAIN TYPE
TYPE: ACUTE PAIN

## 2024-07-24 ASSESSMENT — PAIN DESCRIPTION - LOCATION
LOCATION: HEAD;FLANK
LOCATION: ABDOMEN;FLANK
LOCATION: ABDOMEN;FLANK
LOCATION: FLANK
LOCATION: HEAD
LOCATION: FLANK
LOCATION: FLANK
LOCATION: ABDOMEN;FLANK

## 2024-07-24 ASSESSMENT — PAIN DESCRIPTION - ONSET
ONSET: ON-GOING

## 2024-07-24 ASSESSMENT — PAIN DESCRIPTION - FREQUENCY
FREQUENCY: CONTINUOUS

## 2024-07-24 NOTE — PROGRESS NOTES
V2.0  [facility] Hospitalist Progress Note      Name:  Keisha Turner /Age/Sex: 1965  (58 y.o. female)   MRN & CSN:  [id number] & [id number] Encounter Date/Time: 2024 7:52 AM EDT    Location:  [facility] PCP: Alka Mcleod MD       Hospital Day: 2    Assessment and Plan:   Keisha Turner is a 58 y.o. female who presents with Nephrolithiasis    Severe sepsis 2/2 UTI, improved  - criteria met  @ 0613 with T max 103.1, tachycardia, lasctic acidosis. Otherwise HDS.  - UA grossly infectious. Complains of dysuria.   - history of pseudomonal UTI sensitive to cipro  - s/p IV fluids   - lactic acidosis resolved, afebrile x 24 hours   - blood cultures NGTD  - urine culture with >100K Proteus mirabilis, sensitivities pending  - continue IV cipro     R ureterolithiasis with hydronephrosis   - CT of abdomen shows Moderate right hydronephrosis due to 6mm obstructive proximal ureteral stone. There is mild perinephric fat stranding.   - s/p ureteral stent insertion by Dr. Rodriguez   -plan for outpatient follow-up and stone manipulation once urine is sterile    SHEYLA  - Cr 0.9-->1.6  - appears volume overloaded with elevated BNP, LE edema, crackles and hypoxia   - CXR with new bibasilar infiltrates   - on Bumex at home  - hold losartan  -Trial IV Lasix x 1  -Repeat BMP in a.m.    Acute hypoxic respiratory failure   - currently requiring 2L NC   - last echo 2023 with normal EF  - BNP 1.7k  -likely due to volume overload   -Trial IV Lasix  -Repeat echocardiogram    T2DM  - LCSI  - Hold PO meds   - Hypoglycemic protocol      HTN  -Continue losartan      HLD  -Continue statin      GERD  -Continue PPI     Mood Disorder   -Continue home meds     Asthma  -Continue home inhalers     This patient was discussed with Dr. Rhoades. He was agreeable with the assessment and plan as dictated above.     Current Living situation: home  Expected Disposition: same  Estimated D/C: 24 hours    Diet ADULT DIET; Regular; 3 carb choices  Electronically signed by Kailash Forman      Electronically signed by Adina Monreal PA-C on 7/24/2024 at 7:18 AM

## 2024-07-24 NOTE — PROGRESS NOTES
[facility] [address] [place] [address]   Urology Progress Note  [facility] 0 1 2      Date: 2024   Patient: Keisha Turner   : 1965   DOA: 2024   MRN: [id number]   ROOM#: [id number]     Admit Date: 2024       Chief Complaint: UTI, right ureteral stone    24 Hour Events/Subjective:     NAEO  Pain controlled  Voiding spontaneously  No fevers    Objective:    Vitals:    BP (!) 140/77   Pulse 72   Temp 98.3 °F (36.8 °C) (Oral)   Resp 14   Ht 1.676 m (5' 6\")   Wt 113.3 kg (249 lb 12.5 oz)   SpO2 96%   BMI 40.32 kg/m²    Temp  Av.6 °F (37.6 °C)  Min: 98.2 °F (36.8 °C)  Max: 103.1 °F (39.5 °C)     Intake/Output Summary (Last 24 hours) at 2024 1024  Last data filed at 2024 0914  Gross per 24 hour   Intake 10 ml   Output 3905 ml   Net -3895 ml       Physical Exam:  Gen: Pleasant female, in NAD  Neuro: Non-focal  Resp: Unlabored breathing  CV: Regular rate and rhythm  Abd: soft, non-distended, non-tender to palpation, no rebound  Back:   No CVAT  : Voiding spontaneously  Ext: No edema of bilateral LEs    Labs:   WBC:    Lab Results   Component Value Date/Time    WBC 13.2 2024 03:00 AM      Hemoglobin/Hematocrit:    Lab Results   Component Value Date/Time    HGB 10.7 2024 03:00 AM    HCT 33.7 2024 03:00 AM      BMP:   Lab Results   Component Value Date/Time     2024 03:00 AM    K 3.7 2024 03:00 AM     2024 03:00 AM    CO2 23 2024 03:00 AM    BUN 25 2024 03:00 AM    CREATININE 1.6 2024 03:00 AM    CALCIUM 9.3 2024 03:00 AM    GFRAA >60 2022 09:59 AM    LABGLOM 37 2024 03:00 AM    LABGLOM 59 06/15/2023 12:51 PM         Imaging:  No new Urologic    Assessment & Plan:      Keisha Turner is a 58 y.o. year old female with PMH CHF, HTN, DM, HLD, OAB, anxiety, neuropathy, GERD with UTI and 6mm right proximal ureteral stone s/p right ureteral stent placement 24    - Cr elevated - continue IVFs and

## 2024-07-24 NOTE — PROGRESS NOTES
I have NOT personally seen and examined the patient independently. Reviewed and discussed note as documented by KARENA.  I agree with the assessment and plan.     On oxygen. Give lasix.

## 2024-07-25 LAB
ALBUMIN SERPL-MCNC: 3.6 GM/DL (ref 3.4–5)
ALP BLD-CCNC: 130 IU/L (ref 40–128)
ALT SERPL-CCNC: 25 U/L (ref 10–40)
ANION GAP SERPL CALCULATED.3IONS-SCNC: 14 MMOL/L (ref 7–16)
AST SERPL-CCNC: 16 IU/L (ref 15–37)
BASOPHILS ABSOLUTE: 0 K/CU MM
BASOPHILS RELATIVE PERCENT: 0.1 % (ref 0–1)
BILIRUB SERPL-MCNC: 0.4 MG/DL (ref 0–1)
BUN SERPL-MCNC: 28 MG/DL (ref 6–23)
CALCIUM SERPL-MCNC: 9.4 MG/DL (ref 8.3–10.6)
CHLORIDE BLD-SCNC: 96 MMOL/L (ref 99–110)
CO2: 25 MMOL/L (ref 21–32)
CREAT SERPL-MCNC: 1.5 MG/DL (ref 0.6–1.1)
CRP SERPL HS-MCNC: 161.4 MG/L
CULTURE: ABNORMAL
CULTURE: ABNORMAL
DIFFERENTIAL TYPE: ABNORMAL
EOSINOPHILS ABSOLUTE: 0.1 K/CU MM
EOSINOPHILS RELATIVE PERCENT: 1.1 % (ref 0–3)
GFR, ESTIMATED: 40 ML/MIN/1.73M2
GLUCOSE BLD-MCNC: 128 MG/DL (ref 70–99)
GLUCOSE BLD-MCNC: 167 MG/DL (ref 70–99)
GLUCOSE BLD-MCNC: 207 MG/DL (ref 70–99)
GLUCOSE BLD-MCNC: 234 MG/DL (ref 70–99)
GLUCOSE SERPL-MCNC: 138 MG/DL (ref 70–99)
HCT VFR BLD CALC: 32.9 % (ref 37–47)
HEMOGLOBIN: 10.5 GM/DL (ref 12.5–16)
IMMATURE NEUTROPHIL %: 0.3 % (ref 0–0.43)
LYMPHOCYTES ABSOLUTE: 1.5 K/CU MM
LYMPHOCYTES RELATIVE PERCENT: 15.2 % (ref 24–44)
Lab: ABNORMAL
MAGNESIUM: 1.7 MG/DL (ref 1.8–2.4)
MCH RBC QN AUTO: 26.4 PG (ref 27–31)
MCHC RBC AUTO-ENTMCNC: 31.9 % (ref 32–36)
MCV RBC AUTO: 82.7 FL (ref 78–100)
MONOCYTES ABSOLUTE: 0.7 K/CU MM
MONOCYTES RELATIVE PERCENT: 7 % (ref 0–4)
NEUTROPHILS ABSOLUTE: 7.6 K/CU MM
NEUTROPHILS RELATIVE PERCENT: 76.3 % (ref 36–66)
NUCLEATED RBC %: 0 %
PDW BLD-RTO: 14.9 % (ref 11.7–14.9)
PLATELET # BLD: 208 K/CU MM (ref 140–440)
PMV BLD AUTO: 9.1 FL (ref 7.5–11.1)
POTASSIUM SERPL-SCNC: 3.4 MMOL/L (ref 3.5–5.1)
RBC # BLD: 3.98 M/CU MM (ref 4.2–5.4)
SODIUM BLD-SCNC: 135 MMOL/L (ref 135–145)
SPECIMEN: ABNORMAL
TOTAL IMMATURE NEUTOROPHIL: 0.03 K/CU MM
TOTAL NUCLEATED RBC: 0 K/CU MM
TOTAL PROTEIN: 6.7 GM/DL (ref 6.4–8.2)
WBC # BLD: 9.9 K/CU MM (ref 4–10.5)

## 2024-07-25 PROCEDURE — 80053 COMPREHEN METABOLIC PANEL: CPT

## 2024-07-25 PROCEDURE — 2580000003 HC RX 258: Performed by: UROLOGY

## 2024-07-25 PROCEDURE — 36415 COLL VENOUS BLD VENIPUNCTURE: CPT

## 2024-07-25 PROCEDURE — 94761 N-INVAS EAR/PLS OXIMETRY MLT: CPT

## 2024-07-25 PROCEDURE — 6370000000 HC RX 637 (ALT 250 FOR IP): Performed by: UROLOGY

## 2024-07-25 PROCEDURE — 85025 COMPLETE CBC W/AUTO DIFF WBC: CPT

## 2024-07-25 PROCEDURE — 86140 C-REACTIVE PROTEIN: CPT

## 2024-07-25 PROCEDURE — 83735 ASSAY OF MAGNESIUM: CPT

## 2024-07-25 PROCEDURE — 6360000002 HC RX W HCPCS: Performed by: PHYSICIAN ASSISTANT

## 2024-07-25 PROCEDURE — 6370000000 HC RX 637 (ALT 250 FOR IP): Performed by: PHYSICIAN ASSISTANT

## 2024-07-25 PROCEDURE — 6360000002 HC RX W HCPCS: Performed by: UROLOGY

## 2024-07-25 PROCEDURE — 1200000000 HC SEMI PRIVATE

## 2024-07-25 PROCEDURE — 82962 GLUCOSE BLOOD TEST: CPT

## 2024-07-25 RX ORDER — CIPROFLOXACIN 2 MG/ML
200 INJECTION, SOLUTION INTRAVENOUS EVERY 12 HOURS
Status: DISCONTINUED | OUTPATIENT
Start: 2024-07-25 | End: 2024-07-25

## 2024-07-25 RX ORDER — CIPROFLOXACIN 2 MG/ML
200 INJECTION, SOLUTION INTRAVENOUS EVERY 12 HOURS
Status: DISCONTINUED | OUTPATIENT
Start: 2024-07-28 | End: 2024-07-25

## 2024-07-25 RX ORDER — AMOXICILLIN AND CLAVULANATE POTASSIUM 875; 125 MG/1; MG/1
1 TABLET, FILM COATED ORAL EVERY 12 HOURS SCHEDULED
Status: DISCONTINUED | OUTPATIENT
Start: 2024-07-25 | End: 2024-07-26

## 2024-07-25 RX ORDER — MAGNESIUM SULFATE 1 G/100ML
1000 INJECTION INTRAVENOUS ONCE
Status: COMPLETED | OUTPATIENT
Start: 2024-07-25 | End: 2024-07-25

## 2024-07-25 RX ORDER — BUMETANIDE 1 MG/1
1 TABLET ORAL DAILY
Status: DISCONTINUED | OUTPATIENT
Start: 2024-07-25 | End: 2024-07-27 | Stop reason: HOSPADM

## 2024-07-25 RX ORDER — TAMSULOSIN HYDROCHLORIDE 0.4 MG/1
0.4 CAPSULE ORAL DAILY
Status: DISCONTINUED | OUTPATIENT
Start: 2024-07-25 | End: 2024-07-25

## 2024-07-25 RX ORDER — TAMSULOSIN HYDROCHLORIDE 0.4 MG/1
0.4 CAPSULE ORAL DAILY
Status: DISCONTINUED | OUTPATIENT
Start: 2024-07-26 | End: 2024-07-27 | Stop reason: HOSPADM

## 2024-07-25 RX ADMIN — AMOXICILLIN AND CLAVULANATE POTASSIUM 1 TABLET: 875; 125 TABLET, FILM COATED ORAL at 11:35

## 2024-07-25 RX ADMIN — OXYBUTYNIN CHLORIDE 5 MG: 5 TABLET ORAL at 20:37

## 2024-07-25 RX ADMIN — SERTRALINE HYDROCHLORIDE 25 MG: 50 TABLET ORAL at 09:16

## 2024-07-25 RX ADMIN — POTASSIUM CHLORIDE 40 MEQ: 1500 TABLET, EXTENDED RELEASE ORAL at 05:26

## 2024-07-25 RX ADMIN — ACETAMINOPHEN 650 MG: 325 TABLET ORAL at 20:36

## 2024-07-25 RX ADMIN — ATORVASTATIN CALCIUM 20 MG: 10 TABLET, FILM COATED ORAL at 20:37

## 2024-07-25 RX ADMIN — INSULIN LISPRO 1 UNITS: 100 INJECTION, SOLUTION INTRAVENOUS; SUBCUTANEOUS at 12:10

## 2024-07-25 RX ADMIN — MAGNESIUM SULFATE HEPTAHYDRATE 1000 MG: 1 INJECTION, SOLUTION INTRAVENOUS at 07:50

## 2024-07-25 RX ADMIN — ENOXAPARIN SODIUM 30 MG: 100 INJECTION SUBCUTANEOUS at 20:37

## 2024-07-25 RX ADMIN — INSULIN LISPRO 1 UNITS: 100 INJECTION, SOLUTION INTRAVENOUS; SUBCUTANEOUS at 09:21

## 2024-07-25 RX ADMIN — EMPAGLIFLOZIN 25 MG: 10 TABLET, FILM COATED ORAL at 09:16

## 2024-07-25 RX ADMIN — FOLIC ACID 1 MG: 1 TABLET ORAL at 09:16

## 2024-07-25 RX ADMIN — OXYBUTYNIN CHLORIDE 5 MG: 5 TABLET ORAL at 14:02

## 2024-07-25 RX ADMIN — GABAPENTIN 300 MG: 300 CAPSULE ORAL at 20:37

## 2024-07-25 RX ADMIN — HYDROCODONE BITARTRATE AND ACETAMINOPHEN 1 TABLET: 5; 325 TABLET ORAL at 12:22

## 2024-07-25 RX ADMIN — AMOXICILLIN AND CLAVULANATE POTASSIUM 1 TABLET: 875; 125 TABLET, FILM COATED ORAL at 20:37

## 2024-07-25 RX ADMIN — MORPHINE SULFATE 4 MG: 4 INJECTION, SOLUTION INTRAMUSCULAR; INTRAVENOUS at 07:43

## 2024-07-25 RX ADMIN — SODIUM CHLORIDE, PRESERVATIVE FREE 10 ML: 5 INJECTION INTRAVENOUS at 20:38

## 2024-07-25 RX ADMIN — TIZANIDINE 4 MG: 4 TABLET ORAL at 20:37

## 2024-07-25 RX ADMIN — PANTOPRAZOLE SODIUM 40 MG: 40 TABLET, DELAYED RELEASE ORAL at 05:26

## 2024-07-25 RX ADMIN — MONTELUKAST 10 MG: 10 TABLET, FILM COATED ORAL at 20:37

## 2024-07-25 RX ADMIN — HYDROCODONE BITARTRATE AND ACETAMINOPHEN 1 TABLET: 5; 325 TABLET ORAL at 20:37

## 2024-07-25 RX ADMIN — ENOXAPARIN SODIUM 30 MG: 100 INJECTION SUBCUTANEOUS at 09:16

## 2024-07-25 RX ADMIN — GABAPENTIN 300 MG: 300 CAPSULE ORAL at 09:16

## 2024-07-25 RX ADMIN — GABAPENTIN 300 MG: 300 CAPSULE ORAL at 14:02

## 2024-07-25 RX ADMIN — BUMETANIDE 1 MG: 1 TABLET ORAL at 09:16

## 2024-07-25 RX ADMIN — MORPHINE SULFATE 4 MG: 4 INJECTION, SOLUTION INTRAMUSCULAR; INTRAVENOUS at 14:05

## 2024-07-25 RX ADMIN — OXYBUTYNIN CHLORIDE 5 MG: 5 TABLET ORAL at 09:16

## 2024-07-25 ASSESSMENT — PAIN DESCRIPTION - ONSET
ONSET: ON-GOING
ONSET: ON-GOING

## 2024-07-25 ASSESSMENT — PAIN DESCRIPTION - ORIENTATION
ORIENTATION: RIGHT
ORIENTATION: RIGHT;LOWER
ORIENTATION: RIGHT
ORIENTATION: RIGHT;LOWER

## 2024-07-25 ASSESSMENT — PAIN DESCRIPTION - LOCATION
LOCATION: ABDOMEN
LOCATION: GROIN;FLANK
LOCATION: ABDOMEN
LOCATION: GROIN;FLANK
LOCATION: BACK;ABDOMEN
LOCATION: GROIN;FLANK
LOCATION: ABDOMEN

## 2024-07-25 ASSESSMENT — PAIN SCALES - GENERAL
PAINLEVEL_OUTOF10: 4
PAINLEVEL_OUTOF10: 8
PAINLEVEL_OUTOF10: 4
PAINLEVEL_OUTOF10: 6
PAINLEVEL_OUTOF10: 9
PAINLEVEL_OUTOF10: 8
PAINLEVEL_OUTOF10: 7
PAINLEVEL_OUTOF10: 8
PAINLEVEL_OUTOF10: 3

## 2024-07-25 ASSESSMENT — PAIN DESCRIPTION - DESCRIPTORS
DESCRIPTORS: ACHING;CRAMPING
DESCRIPTORS: ACHING
DESCRIPTORS: ACHING
DESCRIPTORS: ACHING;CRAMPING
DESCRIPTORS: ACHING;CRAMPING
DESCRIPTORS: STABBING;SHARP
DESCRIPTORS: ACHING

## 2024-07-25 ASSESSMENT — PAIN DESCRIPTION - PAIN TYPE
TYPE: ACUTE PAIN
TYPE: ACUTE PAIN

## 2024-07-25 ASSESSMENT — PAIN - FUNCTIONAL ASSESSMENT
PAIN_FUNCTIONAL_ASSESSMENT: ACTIVITIES ARE NOT PREVENTED
PAIN_FUNCTIONAL_ASSESSMENT: PREVENTS OR INTERFERES SOME ACTIVE ACTIVITIES AND ADLS
PAIN_FUNCTIONAL_ASSESSMENT: ACTIVITIES ARE NOT PREVENTED

## 2024-07-25 ASSESSMENT — PAIN SCALES - WONG BAKER
WONGBAKER_NUMERICALRESPONSE: NO HURT
WONGBAKER_NUMERICALRESPONSE: HURTS LITTLE MORE
WONGBAKER_NUMERICALRESPONSE: HURTS LITTLE MORE
WONGBAKER_NUMERICALRESPONSE: NO HURT
WONGBAKER_NUMERICALRESPONSE: NO HURT
WONGBAKER_NUMERICALRESPONSE: HURTS LITTLE MORE

## 2024-07-25 ASSESSMENT — PAIN DESCRIPTION - FREQUENCY
FREQUENCY: INTERMITTENT
FREQUENCY: INTERMITTENT

## 2024-07-25 NOTE — PROGRESS NOTES
I have NOT personally seen and examined the patient independently. Reviewed and discussed note as documented by KARENA.  I agree with the assessment and plan.       Feeling better today, breathing better. Doing voiding trial today.

## 2024-07-25 NOTE — PROGRESS NOTES
[facility] [place] [address]   Urology Progress Note  [facility] 0 1 2  Date: 2024   Patient: Keisha Turner   : 1965   DOA: 2024   MRN: [id number]   ROOM#: [id number]/3012-A     Admit Date: 2024       Chief Complaint: UTI, right ureteral stone    24 Hour Events/Subjective:     NAEO  Pain controlled  Voiding spontaneously  No fevers    Objective:    Vitals:    BP (!) 103/45   Pulse (!) 103   Temp 97.9 °F (36.6 °C) (Oral)   Resp 18   Ht 1.676 m (5' 6\")   Wt 110.4 kg (243 lb 6.2 oz)   SpO2 97%   BMI 39.28 kg/m²    Temp  Av.1 °F (36.7 °C)  Min: 97.6 °F (36.4 °C)  Max: 98.7 °F (37.1 °C)     Intake/Output Summary (Last 24 hours) at 2024 1156  Last data filed at 2024 1147  Gross per 24 hour   Intake 1697.99 ml   Output 5550 ml   Net -3852.01 ml       Physical Exam:  Gen: Pleasant female, in NAD  Neuro: Non-focal  Resp: Unlabored breathing  Abd: soft, non-distended, non-tender to palpation, no rebound  Back:   Mild right CVAT  : Voiding spontaneously  Ext: No edema of bilateral LEs    Labs:   WBC:    Lab Results   Component Value Date/Time    WBC 9.9 2024 01:53 AM      Hemoglobin/Hematocrit:    Lab Results   Component Value Date/Time    HGB 10.5 2024 01:53 AM    HCT 32.9 2024 01:53 AM      BMP:   Lab Results   Component Value Date/Time     2024 01:53 AM    K 3.4 2024 01:53 AM    CL 96 2024 01:53 AM    CO2 25 2024 01:53 AM    BUN 28 2024 01:53 AM    CREATININE 1.5 2024 01:53 AM    CALCIUM 9.4 2024 01:53 AM    GFRAA >60 2022 09:59 AM    LABGLOM 40 2024 01:53 AM    LABGLOM 59 06/15/2023 12:51 PM     Imaging:  No new Urologic    Assessment & Plan:      Keisha Turner is a 58 y.o. year old female with PMH CHF, HTN, DM, HLD, OAB, anxiety, neuropathy, GERD with UTI and 6mm right proximal ureteral stone s/p right ureteral stent placement 24    - Cr elevated, slightly improved today at 1.5 -

## 2024-07-25 NOTE — CARE COORDINATION
CM reviewed pt medical chart and discussed in IDR. CM in to see pt and discuss discharge plan.   Plan is to return home with family

## 2024-07-25 NOTE — PROGRESS NOTES
V2.0  [facility] Hospitalist Progress Note      Name:  Keisha Turner /Age/Sex: 1965  (58 y.o. female)   MRN & CSN:  [id number] & [id number] Encounter Date/Time: 2024 7:52 AM EDT    Location:  [address] [place] [address]-A PCP: Alka Mcleod MD       Hospital Day: 3    Assessment and Plan:   Keisha Turner is a 58 y.o. female who presents with Nephrolithiasis    Severe sepsis 2/2 UTI, improved  Proteus mirabilis bacteremia   - criteria met  @ 0613 with T max 103.1, tachycardia, lasctic acidosis. Otherwise HDS.  - UA grossly infectious. Complains of dysuria.   - s/p IV fluids   - lactic acidosis resolved, afebrile x 48 hours   - blood cultures 1/4 with Proteus   - urine culture with >100K Proteus mirabilis, sensitive to ampicillin   - stop IV cipro, transition to Augmentin to complete 14-day course  - given gram negative bacteremia with known source, no need to repeat blood cultures for clearance.     R ureterolithiasis with hydronephrosis   - CT of abdomen shows Moderate right hydronephrosis due to 6mm obstructive proximal ureteral stone. There is mild perinephric fat stranding.   - s/p ureteral stent insertion by Dr. Rodriguez   -plan for outpatient follow-up and stone manipulation once urine is sterile    SHEYLA  - Cr 0.9-->1.6, now downtrending  - appeared volume overloaded with elevated BNP, LE edema, crackles and hypoxia   - CXR with new bibasilar infiltrates   - on Bumex at home  - hold losartan  - received IV lasix x1, 5.7L out yesterday  - resume home Bumex  - borjas removed, attempting voiding trial     Hypokalemia   Hypomagnesemia  - received replacement    Acute hypoxic respiratory failure - resolved  - required up to 4L NC  - echo  with EF 55%  - BNP 1.7k  -likely due to volume overload, iatrogenic vs HFpEF  - weaned to RA today     T2DM  - LCSI  - Hold PO meds   - Hypoglycemic protocol      HTN  -holding losartan due to SHEYLA     HLD  -Continue statin      GERD  -Continue PPI     Mood Disorder

## 2024-07-26 LAB
ALBUMIN SERPL-MCNC: 3.6 GM/DL (ref 3.4–5)
ALP BLD-CCNC: 144 IU/L (ref 40–128)
ALT SERPL-CCNC: 23 U/L (ref 10–40)
ANION GAP SERPL CALCULATED.3IONS-SCNC: 14 MMOL/L (ref 7–16)
AST SERPL-CCNC: 19 IU/L (ref 15–37)
BASOPHILS ABSOLUTE: 0 K/CU MM
BASOPHILS RELATIVE PERCENT: 0.2 % (ref 0–1)
BILIRUB SERPL-MCNC: 0.8 MG/DL (ref 0–1)
BUN SERPL-MCNC: 26 MG/DL (ref 6–23)
CALCIUM SERPL-MCNC: 9.3 MG/DL (ref 8.3–10.6)
CHLORIDE BLD-SCNC: 96 MMOL/L (ref 99–110)
CO2: 26 MMOL/L (ref 21–32)
CREAT SERPL-MCNC: 1.2 MG/DL (ref 0.6–1.1)
CRP SERPL HS-MCNC: 248.3 MG/L
CULTURE: ABNORMAL
CULTURE: ABNORMAL
DIFFERENTIAL TYPE: ABNORMAL
EOSINOPHILS ABSOLUTE: 0.1 K/CU MM
EOSINOPHILS RELATIVE PERCENT: 0.9 % (ref 0–3)
GFR, ESTIMATED: 53 ML/MIN/1.73M2
GLUCOSE BLD-MCNC: 122 MG/DL (ref 70–99)
GLUCOSE BLD-MCNC: 142 MG/DL (ref 70–99)
GLUCOSE BLD-MCNC: 166 MG/DL (ref 70–99)
GLUCOSE BLD-MCNC: 212 MG/DL (ref 70–99)
GLUCOSE SERPL-MCNC: 115 MG/DL (ref 70–99)
HCT VFR BLD CALC: 36.3 % (ref 37–47)
HEMOGLOBIN: 11.6 GM/DL (ref 12.5–16)
IMMATURE NEUTROPHIL %: 0.5 % (ref 0–0.43)
LYMPHOCYTES ABSOLUTE: 1.4 K/CU MM
LYMPHOCYTES RELATIVE PERCENT: 13.8 % (ref 24–44)
Lab: ABNORMAL
MCH RBC QN AUTO: 26.9 PG (ref 27–31)
MCHC RBC AUTO-ENTMCNC: 32 % (ref 32–36)
MCV RBC AUTO: 84 FL (ref 78–100)
MONOCYTES ABSOLUTE: 0.7 K/CU MM
MONOCYTES RELATIVE PERCENT: 6.9 % (ref 0–4)
NEUTROPHILS ABSOLUTE: 7.7 K/CU MM
NEUTROPHILS RELATIVE PERCENT: 77.7 % (ref 36–66)
NUCLEATED RBC %: 0 %
PDW BLD-RTO: 14.8 % (ref 11.7–14.9)
PLATELET # BLD: 215 K/CU MM (ref 140–440)
PMV BLD AUTO: 9 FL (ref 7.5–11.1)
POTASSIUM SERPL-SCNC: 3.6 MMOL/L (ref 3.5–5.1)
RBC # BLD: 4.32 M/CU MM (ref 4.2–5.4)
SODIUM BLD-SCNC: 136 MMOL/L (ref 135–145)
SPECIMEN: ABNORMAL
TOTAL IMMATURE NEUTOROPHIL: 0.05 K/CU MM
TOTAL NUCLEATED RBC: 0 K/CU MM
TOTAL PROTEIN: 7.1 GM/DL (ref 6.4–8.2)
WBC # BLD: 9.9 K/CU MM (ref 4–10.5)

## 2024-07-26 PROCEDURE — 85025 COMPLETE CBC W/AUTO DIFF WBC: CPT

## 2024-07-26 PROCEDURE — 80053 COMPREHEN METABOLIC PANEL: CPT

## 2024-07-26 PROCEDURE — 36415 COLL VENOUS BLD VENIPUNCTURE: CPT

## 2024-07-26 PROCEDURE — 2580000003 HC RX 258: Performed by: UROLOGY

## 2024-07-26 PROCEDURE — 86140 C-REACTIVE PROTEIN: CPT

## 2024-07-26 PROCEDURE — 6360000002 HC RX W HCPCS: Performed by: UROLOGY

## 2024-07-26 PROCEDURE — 1200000000 HC SEMI PRIVATE

## 2024-07-26 PROCEDURE — 6370000000 HC RX 637 (ALT 250 FOR IP): Performed by: UROLOGY

## 2024-07-26 PROCEDURE — 6360000002 HC RX W HCPCS: Performed by: PHYSICIAN ASSISTANT

## 2024-07-26 PROCEDURE — 94761 N-INVAS EAR/PLS OXIMETRY MLT: CPT

## 2024-07-26 PROCEDURE — 2580000003 HC RX 258: Performed by: PHYSICIAN ASSISTANT

## 2024-07-26 PROCEDURE — 82962 GLUCOSE BLOOD TEST: CPT

## 2024-07-26 PROCEDURE — 6370000000 HC RX 637 (ALT 250 FOR IP): Performed by: PHYSICIAN ASSISTANT

## 2024-07-26 RX ORDER — HYDROCODONE BITARTRATE AND ACETAMINOPHEN 5; 325 MG/1; MG/1
1 TABLET ORAL EVERY 6 HOURS PRN
Status: DISCONTINUED | OUTPATIENT
Start: 2024-07-26 | End: 2024-07-27 | Stop reason: HOSPADM

## 2024-07-26 RX ORDER — POLYETHYLENE GLYCOL 3350 17 G/17G
17 POWDER, FOR SOLUTION ORAL DAILY
Status: DISCONTINUED | OUTPATIENT
Start: 2024-07-26 | End: 2024-07-27 | Stop reason: HOSPADM

## 2024-07-26 RX ORDER — SENNA AND DOCUSATE SODIUM 50; 8.6 MG/1; MG/1
2 TABLET, FILM COATED ORAL DAILY
Status: DISCONTINUED | OUTPATIENT
Start: 2024-07-26 | End: 2024-07-27 | Stop reason: HOSPADM

## 2024-07-26 RX ADMIN — TAMSULOSIN HYDROCHLORIDE 0.4 MG: 0.4 CAPSULE ORAL at 10:15

## 2024-07-26 RX ADMIN — GABAPENTIN 300 MG: 300 CAPSULE ORAL at 10:15

## 2024-07-26 RX ADMIN — SODIUM CHLORIDE, PRESERVATIVE FREE 5 ML: 5 INJECTION INTRAVENOUS at 10:17

## 2024-07-26 RX ADMIN — EMPAGLIFLOZIN 25 MG: 10 TABLET, FILM COATED ORAL at 10:15

## 2024-07-26 RX ADMIN — DICYCLOMINE HYDROCHLORIDE 20 MG: 20 TABLET ORAL at 02:00

## 2024-07-26 RX ADMIN — ONDANSETRON 4 MG: 4 TABLET, ORALLY DISINTEGRATING ORAL at 13:01

## 2024-07-26 RX ADMIN — SODIUM CHLORIDE, PRESERVATIVE FREE 10 ML: 5 INJECTION INTRAVENOUS at 21:41

## 2024-07-26 RX ADMIN — OXYBUTYNIN CHLORIDE 5 MG: 5 TABLET ORAL at 21:39

## 2024-07-26 RX ADMIN — GABAPENTIN 300 MG: 300 CAPSULE ORAL at 13:01

## 2024-07-26 RX ADMIN — HYDROCODONE BITARTRATE AND ACETAMINOPHEN 1 TABLET: 5; 325 TABLET ORAL at 19:30

## 2024-07-26 RX ADMIN — FOLIC ACID 1 MG: 1 TABLET ORAL at 10:16

## 2024-07-26 RX ADMIN — POLYETHYLENE GLYCOL (3350) 17 G: 17 POWDER, FOR SOLUTION ORAL at 17:22

## 2024-07-26 RX ADMIN — ENOXAPARIN SODIUM 30 MG: 100 INJECTION SUBCUTANEOUS at 21:39

## 2024-07-26 RX ADMIN — MONTELUKAST 10 MG: 10 TABLET, FILM COATED ORAL at 21:38

## 2024-07-26 RX ADMIN — SODIUM CHLORIDE 3000 MG: 900 INJECTION INTRAVENOUS at 10:29

## 2024-07-26 RX ADMIN — DOCUSATE SODIUM 50 MG AND SENNOSIDES 8.6 MG 2 TABLET: 8.6; 5 TABLET, FILM COATED ORAL at 17:22

## 2024-07-26 RX ADMIN — ACETAMINOPHEN 650 MG: 325 TABLET ORAL at 10:16

## 2024-07-26 RX ADMIN — ATORVASTATIN CALCIUM 20 MG: 10 TABLET, FILM COATED ORAL at 21:38

## 2024-07-26 RX ADMIN — OXYBUTYNIN CHLORIDE 5 MG: 5 TABLET ORAL at 13:01

## 2024-07-26 RX ADMIN — ENOXAPARIN SODIUM 30 MG: 100 INJECTION SUBCUTANEOUS at 10:17

## 2024-07-26 RX ADMIN — PANTOPRAZOLE SODIUM 40 MG: 40 TABLET, DELAYED RELEASE ORAL at 06:10

## 2024-07-26 RX ADMIN — SODIUM CHLORIDE 3000 MG: 900 INJECTION INTRAVENOUS at 21:51

## 2024-07-26 RX ADMIN — SODIUM CHLORIDE 3000 MG: 900 INJECTION INTRAVENOUS at 17:44

## 2024-07-26 RX ADMIN — TIZANIDINE 4 MG: 4 TABLET ORAL at 21:40

## 2024-07-26 RX ADMIN — BUMETANIDE 1 MG: 1 TABLET ORAL at 10:15

## 2024-07-26 RX ADMIN — SERTRALINE HYDROCHLORIDE 25 MG: 50 TABLET ORAL at 10:14

## 2024-07-26 RX ADMIN — SODIUM CHLORIDE: 9 INJECTION, SOLUTION INTRAVENOUS at 21:50

## 2024-07-26 RX ADMIN — HYDROCODONE BITARTRATE AND ACETAMINOPHEN 1 TABLET: 5; 325 TABLET ORAL at 04:25

## 2024-07-26 RX ADMIN — HYDROCODONE BITARTRATE AND ACETAMINOPHEN 1 TABLET: 5; 325 TABLET ORAL at 13:04

## 2024-07-26 RX ADMIN — OXYBUTYNIN CHLORIDE 5 MG: 5 TABLET ORAL at 10:16

## 2024-07-26 RX ADMIN — GABAPENTIN 300 MG: 300 CAPSULE ORAL at 21:38

## 2024-07-26 ASSESSMENT — PAIN DESCRIPTION - PAIN TYPE: TYPE: ACUTE PAIN

## 2024-07-26 ASSESSMENT — PAIN DESCRIPTION - ORIENTATION
ORIENTATION: RIGHT;LEFT
ORIENTATION: RIGHT
ORIENTATION: RIGHT
ORIENTATION: RIGHT;LEFT
ORIENTATION: RIGHT
ORIENTATION: RIGHT;LOWER

## 2024-07-26 ASSESSMENT — PAIN DESCRIPTION - LOCATION
LOCATION: FLANK
LOCATION: FLANK
LOCATION: BACK;FLANK
LOCATION: BACK;ABDOMEN
LOCATION: ABDOMEN;BACK
LOCATION: FLANK

## 2024-07-26 ASSESSMENT — PAIN SCALES - WONG BAKER: WONGBAKER_NUMERICALRESPONSE: NO HURT

## 2024-07-26 ASSESSMENT — PAIN - FUNCTIONAL ASSESSMENT
PAIN_FUNCTIONAL_ASSESSMENT: ACTIVITIES ARE NOT PREVENTED

## 2024-07-26 ASSESSMENT — PAIN DESCRIPTION - DESCRIPTORS
DESCRIPTORS: STABBING;SHARP
DESCRIPTORS: ACHING;STABBING
DESCRIPTORS: STABBING
DESCRIPTORS: STABBING
DESCRIPTORS: ACHING;STABBING

## 2024-07-26 ASSESSMENT — PAIN SCALES - GENERAL
PAINLEVEL_OUTOF10: 8

## 2024-07-26 NOTE — PROGRESS NOTES
V2.0  [facility] Hospitalist Progress Note      Name:  Keisha Turner /Age/Sex: 1965  (58 y.o. female)   MRN & CSN:  [id number] & [id number] Encounter Date/Time: 2024 7:52 AM EDT    Location:  [facility]-A PCP: Alka Mcelod MD       Hospital Day: 4    Assessment and Plan:   Keisha Turnre is a 58 y.o. female who presents with Nephrolithiasis    Severe sepsis 2/2 UTI, pyelonephritis, improved  Proteus mirabilis bacteremia   - criteria met  @ 0613 with T max 103.1, tachycardia, lasctic acidosis. Otherwise HDS.  - UA grossly infectious. Complains of dysuria.   - s/p IV fluids   - lactic acidosis resolved  - blood cultures  with Proteus   - urine culture with >100K Proteus mirabilis, sensitive to ampicillin   - stopped IV cipro, transitioned to Augmentin, but patient had recurrent fever. Started Unasyn. Plan to discharge once fever free for 24 hours.   - given gram negative bacteremia with known source, no need to repeat blood cultures for clearance unless patient having persistent fevers    R ureterolithiasis with hydronephrosis   - CT of abdomen shows Moderate right hydronephrosis due to 6mm obstructive proximal ureteral stone. There is mild perinephric fat stranding.   - s/p ureteral stent insertion by Dr. Rodriguez   -plan for outpatient follow-up and stone manipulation once urine is sterile    SHEYLA, improved  - Cr 0.9-->1.6, now downtrending  - appeared volume overloaded with elevated BNP, LE edema, crackles and hypoxia   - CXR with new bibasilar infiltrates   - hold losartan  - received IV lasix x1 with good response  - resume home Bumex  - borjas removed, now voiding independently     Hypokalemia   Hypomagnesemia  - received replacement    Acute hypoxic respiratory failure - resolved  - required up to 4L NC  - echo  with EF 55%  - BNP 1.7k  -likely due to volume overload, iatrogenic vs HFpEF  - weaned to RA today     T2DM  - LCSI  - Hold PO meds   - Hypoglycemic protocol

## 2024-07-26 NOTE — PROGRESS NOTES
I have NOT personally seen and examined the patient independently. Reviewed and discussed note as documented by KARENA.  I agree with the assessment and plan.       Still having fevers, but just started on appropriate abx.

## 2024-07-26 NOTE — PLAN OF CARE
Problem: Discharge Planning  Goal: Discharge to home or other facility with appropriate resources  7/26/2024 0109 by Kalpana Spears RN  Outcome: Progressing     Problem: Pain  Goal: Verbalizes/displays adequate comfort level or baseline comfort level  7/26/2024 0109 by Kalpana Spears RN  Outcome: Progressing     Problem: Chronic Conditions and Co-morbidities  Goal: Patient's chronic conditions and co-morbidity symptoms are monitored and maintained or improved  7/26/2024 0109 by Kalpana Spears RN  Outcome: Progressing     Problem: Safety - Adult  Goal: Free from fall injury  7/26/2024 0109 by Kalpana Spears RN  Outcome: Progressing     Problem: Genitourinary - Adult  Goal: Absence of urinary retention  7/26/2024 0109 by Kalpana Spears RN  Outcome: Progressing     Problem: Genitourinary - Adult  Goal: Urinary catheter remains patent  7/26/2024 0109 by Kalpana Spears RN  Outcome: Progressing     Problem: Infection - Adult  Goal: Absence of infection at discharge  7/26/2024 0109 by Kalpana Spears RN  Outcome: Progressing     Problem: Infection - Adult  Goal: Absence of infection during hospitalization  7/26/2024 0109 by Kalpana Spears RN  Outcome: Progressing     Problem: Metabolic/Fluid and Electrolytes - Adult  Goal: Electrolytes maintained within normal limits  7/26/2024 0109 by Kalpana Spears RN  Outcome: Progressing     Problem: Metabolic/Fluid and Electrolytes - Adult  Goal: Hemodynamic stability and optimal renal function maintained  7/26/2024 0109 by Kalpana Spears RN  Outcome: Progressing     Problem: Metabolic/Fluid and Electrolytes - Adult  Goal: Glucose maintained within prescribed range  7/26/2024 0109 by Kalpana Spears RN  Outcome: Progressing

## 2024-07-27 VITALS
TEMPERATURE: 97.6 F | HEIGHT: 66 IN | DIASTOLIC BLOOD PRESSURE: 63 MMHG | RESPIRATION RATE: 16 BRPM | OXYGEN SATURATION: 94 % | SYSTOLIC BLOOD PRESSURE: 131 MMHG | HEART RATE: 68 BPM | WEIGHT: 243.39 LBS | BODY MASS INDEX: 39.12 KG/M2

## 2024-07-27 LAB
ANION GAP SERPL CALCULATED.3IONS-SCNC: 15 MMOL/L (ref 7–16)
BASOPHILS ABSOLUTE: 0 K/CU MM
BASOPHILS RELATIVE PERCENT: 0.3 % (ref 0–1)
BUN SERPL-MCNC: 24 MG/DL (ref 6–23)
CALCIUM SERPL-MCNC: 9.2 MG/DL (ref 8.3–10.6)
CHLORIDE BLD-SCNC: 97 MMOL/L (ref 99–110)
CO2: 25 MMOL/L (ref 21–32)
CREAT SERPL-MCNC: 1.2 MG/DL (ref 0.6–1.1)
CRP SERPL HS-MCNC: 218.3 MG/L
CULTURE: ABNORMAL
CULTURE: ABNORMAL
DIFFERENTIAL TYPE: ABNORMAL
EOSINOPHILS ABSOLUTE: 0.2 K/CU MM
EOSINOPHILS RELATIVE PERCENT: 2.7 % (ref 0–3)
GFR, ESTIMATED: 53 ML/MIN/1.73M2
GLUCOSE BLD-MCNC: 147 MG/DL (ref 70–99)
GLUCOSE BLD-MCNC: 171 MG/DL (ref 70–99)
GLUCOSE SERPL-MCNC: 127 MG/DL (ref 70–99)
HCT VFR BLD CALC: 34.5 % (ref 37–47)
HEMOGLOBIN: 10.8 GM/DL (ref 12.5–16)
IMMATURE NEUTROPHIL %: 0.5 % (ref 0–0.43)
LYMPHOCYTES ABSOLUTE: 1.9 K/CU MM
LYMPHOCYTES RELATIVE PERCENT: 29.5 % (ref 24–44)
Lab: ABNORMAL
MCH RBC QN AUTO: 26.4 PG (ref 27–31)
MCHC RBC AUTO-ENTMCNC: 31.3 % (ref 32–36)
MCV RBC AUTO: 84.4 FL (ref 78–100)
MONOCYTES ABSOLUTE: 0.5 K/CU MM
MONOCYTES RELATIVE PERCENT: 7.6 % (ref 0–4)
NEUTROPHILS ABSOLUTE: 3.8 K/CU MM
NEUTROPHILS RELATIVE PERCENT: 59.4 % (ref 36–66)
NUCLEATED RBC %: 0 %
PDW BLD-RTO: 14.7 % (ref 11.7–14.9)
PLATELET # BLD: 207 K/CU MM (ref 140–440)
PMV BLD AUTO: 9.2 FL (ref 7.5–11.1)
POTASSIUM SERPL-SCNC: 3.3 MMOL/L (ref 3.5–5.1)
RBC # BLD: 4.09 M/CU MM (ref 4.2–5.4)
SODIUM BLD-SCNC: 137 MMOL/L (ref 135–145)
SPECIMEN: ABNORMAL
TOTAL IMMATURE NEUTOROPHIL: 0.03 K/CU MM
TOTAL NUCLEATED RBC: 0 K/CU MM
WBC # BLD: 6.3 K/CU MM (ref 4–10.5)

## 2024-07-27 PROCEDURE — 6370000000 HC RX 637 (ALT 250 FOR IP): Performed by: PHYSICIAN ASSISTANT

## 2024-07-27 PROCEDURE — 94761 N-INVAS EAR/PLS OXIMETRY MLT: CPT

## 2024-07-27 PROCEDURE — 6370000000 HC RX 637 (ALT 250 FOR IP): Performed by: UROLOGY

## 2024-07-27 PROCEDURE — 80048 BASIC METABOLIC PNL TOTAL CA: CPT

## 2024-07-27 PROCEDURE — 86140 C-REACTIVE PROTEIN: CPT

## 2024-07-27 PROCEDURE — 2700000000 HC OXYGEN THERAPY PER DAY

## 2024-07-27 PROCEDURE — 6360000002 HC RX W HCPCS: Performed by: UROLOGY

## 2024-07-27 PROCEDURE — 82962 GLUCOSE BLOOD TEST: CPT

## 2024-07-27 PROCEDURE — 2580000003 HC RX 258: Performed by: PHYSICIAN ASSISTANT

## 2024-07-27 PROCEDURE — 85025 COMPLETE CBC W/AUTO DIFF WBC: CPT

## 2024-07-27 PROCEDURE — 36415 COLL VENOUS BLD VENIPUNCTURE: CPT

## 2024-07-27 PROCEDURE — 2580000003 HC RX 258: Performed by: UROLOGY

## 2024-07-27 PROCEDURE — 6360000002 HC RX W HCPCS: Performed by: PHYSICIAN ASSISTANT

## 2024-07-27 RX ORDER — POTASSIUM CHLORIDE 20 MEQ/1
20 TABLET, EXTENDED RELEASE ORAL ONCE
Status: COMPLETED | OUTPATIENT
Start: 2024-07-27 | End: 2024-07-27

## 2024-07-27 RX ORDER — AMOXICILLIN AND CLAVULANATE POTASSIUM 875; 125 MG/1; MG/1
1 TABLET, FILM COATED ORAL 2 TIMES DAILY
Qty: 24 TABLET | Refills: 0 | Status: SHIPPED | OUTPATIENT
Start: 2024-07-27 | End: 2024-08-08

## 2024-07-27 RX ORDER — SENNA AND DOCUSATE SODIUM 50; 8.6 MG/1; MG/1
2 TABLET, FILM COATED ORAL DAILY
COMMUNITY
Start: 2024-07-28

## 2024-07-27 RX ORDER — HYDROCODONE BITARTRATE AND ACETAMINOPHEN 5; 325 MG/1; MG/1
1 TABLET ORAL EVERY 6 HOURS PRN
Qty: 12 TABLET | Refills: 0 | Status: SHIPPED | OUTPATIENT
Start: 2024-07-27 | End: 2024-07-30

## 2024-07-27 RX ORDER — TAMSULOSIN HYDROCHLORIDE 0.4 MG/1
0.4 CAPSULE ORAL DAILY
Qty: 30 CAPSULE | Refills: 0 | Status: SHIPPED | OUTPATIENT
Start: 2024-07-28

## 2024-07-27 RX ORDER — POLYETHYLENE GLYCOL 3350 17 G/17G
17 POWDER, FOR SOLUTION ORAL DAILY
Qty: 527 G | Refills: 1 | Status: SHIPPED | OUTPATIENT
Start: 2024-07-27 | End: 2024-09-27

## 2024-07-27 RX ADMIN — POLYETHYLENE GLYCOL (3350) 17 G: 17 POWDER, FOR SOLUTION ORAL at 07:44

## 2024-07-27 RX ADMIN — HYDROCODONE BITARTRATE AND ACETAMINOPHEN 1 TABLET: 5; 325 TABLET ORAL at 07:46

## 2024-07-27 RX ADMIN — SERTRALINE HYDROCHLORIDE 25 MG: 50 TABLET ORAL at 07:44

## 2024-07-27 RX ADMIN — ENOXAPARIN SODIUM 30 MG: 100 INJECTION SUBCUTANEOUS at 07:46

## 2024-07-27 RX ADMIN — POTASSIUM CHLORIDE 20 MEQ: 1500 TABLET, EXTENDED RELEASE ORAL at 07:45

## 2024-07-27 RX ADMIN — SODIUM CHLORIDE, PRESERVATIVE FREE 10 ML: 5 INJECTION INTRAVENOUS at 07:46

## 2024-07-27 RX ADMIN — GABAPENTIN 300 MG: 300 CAPSULE ORAL at 07:45

## 2024-07-27 RX ADMIN — HYDROCODONE BITARTRATE AND ACETAMINOPHEN 1 TABLET: 5; 325 TABLET ORAL at 01:48

## 2024-07-27 RX ADMIN — EMPAGLIFLOZIN 25 MG: 10 TABLET, FILM COATED ORAL at 07:45

## 2024-07-27 RX ADMIN — OXYBUTYNIN CHLORIDE 5 MG: 5 TABLET ORAL at 07:45

## 2024-07-27 RX ADMIN — BUMETANIDE 1 MG: 1 TABLET ORAL at 07:45

## 2024-07-27 RX ADMIN — DOCUSATE SODIUM 50 MG AND SENNOSIDES 8.6 MG 2 TABLET: 8.6; 5 TABLET, FILM COATED ORAL at 07:43

## 2024-07-27 RX ADMIN — FOLIC ACID 1 MG: 1 TABLET ORAL at 07:45

## 2024-07-27 RX ADMIN — TAMSULOSIN HYDROCHLORIDE 0.4 MG: 0.4 CAPSULE ORAL at 07:45

## 2024-07-27 RX ADMIN — PANTOPRAZOLE SODIUM 40 MG: 40 TABLET, DELAYED RELEASE ORAL at 05:22

## 2024-07-27 RX ADMIN — SODIUM CHLORIDE 3000 MG: 900 INJECTION INTRAVENOUS at 05:23

## 2024-07-27 ASSESSMENT — PAIN DESCRIPTION - DESCRIPTORS
DESCRIPTORS: ACHING
DESCRIPTORS: CRAMPING;ACHING

## 2024-07-27 ASSESSMENT — PAIN DESCRIPTION - ORIENTATION
ORIENTATION: RIGHT
ORIENTATION: RIGHT

## 2024-07-27 ASSESSMENT — PAIN DESCRIPTION - PAIN TYPE: TYPE: ACUTE PAIN

## 2024-07-27 ASSESSMENT — PAIN DESCRIPTION - LOCATION
LOCATION: FLANK
LOCATION: FLANK

## 2024-07-27 ASSESSMENT — PAIN SCALES - GENERAL
PAINLEVEL_OUTOF10: 7
PAINLEVEL_OUTOF10: 8

## 2024-07-27 ASSESSMENT — PAIN - FUNCTIONAL ASSESSMENT: PAIN_FUNCTIONAL_ASSESSMENT: ACTIVITIES ARE NOT PREVENTED

## 2024-07-27 NOTE — DISCHARGE SUMMARY
V2.0  Discharge Summary    Name:  Keisha Turner /Age/Sex: 1965 (58 y.o. female)   Admit Date: 2024  Discharge Date: 24    MRN & CSN:  [id number] & [id number] Encounter Date and Time 24 1:49 PM EDT    Attending:  Zenia Rhoades MD Discharging Provider: Adina Monreal PA-C       Hospital Course:     Brief HPI: Keisha Turner is a 58 y.o. female who presented with nephrolithiasis.     Problems addressed during this hospitalization:     Severe sepsis 2/2 UTI, pyelonephritis, improved  Proteus mirabilis bacteremia   - criteria met  @ 0613 with T max 103.1, tachycardia, lasctic acidosis. Otherwise HDS.  - UA grossly infectious. Complains of dysuria.   - s/p IV fluids   - lactic acidosis resolved  - blood cultures 4/4 with Proteus   - urine culture with >100K Proteus mirabilis, sensitive to ampicillin   - stopped IV cipro, transitioned to Augmentin, but patient had recurrent fever. Started Unasyn, now afebrile x24 hours with downtrending CRP, no leukocytosis, HDS. Transition to Augmentin to complete 14-day course.   - given gram negative bacteremia with known source, no need to repeat blood cultures  - strict return precautions discussed     R ureterolithiasis with hydronephrosis   - CT of abdomen shows Moderate right hydronephrosis due to 6mm obstructive proximal ureteral stone. There is mild perinephric fat stranding.   - s/p ureteral stent insertion by Dr. Rodriguez   - continue Flomax, PRN [place] for short course  -plan for outpatient follow-up and stone manipulation once urine is sterile     SHEYLA, improved  - Cr 0.9-->1.6, now downtrending  - appeared volume overloaded with elevated BNP, LE edema, crackles and hypoxia   - CXR with new bibasilar infiltrates   - received IV lasix x1 with good response  - resume home Bumex  - borjas removed, now voiding independently   - instructed to hold losartan until follow-up with PCP and repeat BMP obtained in 5-7 days     Hypokalemia   Hypomagnesemia  -  axial unenhanced images were obtained through the abdomen and pelvis. Helically acquired axial thin section CTA images were obtained through the abdomen and pelvis. 3-D post-processing, volume rendered and maximum intensity projection reformats were reconstructed on the [employer] workstation.   Individualized dose optimization technique was used in order to meet ALARA standards for radiation dose reduction.  In addition to vendor specific dose reduction algorithms, the dose reduction techniques vary based on the specific scanner utilized but frequently include automated exposure control, adjustment of the mA and/or kV according to patient size, and use of iterative reconstruction technique. FINDINGS: LUNGS BASES: Clear. VESSELS: Atherosclerotic calcifications with no evidence of aneurysm. LIVER: Normal. GALLBLADDER AND BILE DUCTS: Gallbladder is present. No biliary ductal dilatation. PANCREAS: Normal. SPLEEN: Normal. ADRENAL GLANDS: Normal. KIDNEYS: Moderate right hydronephrosis due to proximal obstructing renal stone measuring 6 mm. There is mild perinephric fat stranding. Nonobstructing renal calculi in the right kidney measuring up to 9 mm. Left kidney is atrophic. BOWEL: No dilatation. PERITONEUM/RETROPERITONEUM: No ascites. No free air. LYMPH NODES: No adenopathy. ABDOMINAL WALL: Normal. URINARY BLADDER: Bladder wall thickening.. REPRODUCTIVE ORGANS: No associated masses. BONES: No acute abnormalities.     1. Moderate right hydronephrosis due to obstructive proximal ureteral stone. There is mild perinephric fat stranding. Electronically signed by Kailash Forman      CBC:   Recent Labs     07/25/24  0153 07/26/24  0413 07/27/24  0144   WBC 9.9 9.9 6.3   HGB 10.5* 11.6* 10.8*    215 207     BMP:    Recent Labs     07/25/24  0153 07/26/24  0413 07/27/24  0144    136 137   K 3.4* 3.6 3.3*   CL 96* 96* 97*   CO2 25 26 25   BUN 28* 26* 24*   CREATININE 1.5* 1.2* 1.2*   GLUCOSE 138* 115* 127*     Hepatic:

## 2024-07-27 NOTE — PROGRESS NOTES
I have NOT personally seen and examined the patient independently. Reviewed and discussed note as documented by KARENA.  I agree with the assessment and plan.       Patient is wanting to go home. Doing better.

## 2024-07-27 NOTE — DISCHARGE INSTRUCTIONS
Take all antibiotics as prescribed and until they are finished even if you are feeling better.     Due to acute kidney injury, avoid all NSAID medications.  This includes Aleve, naproxen, Advil, ibuprofen.    Return to the emergency department if you develop worsening pain, severe nausea/vomiting, recurrent fevers/chills.

## 2024-07-27 NOTE — PROGRESS NOTES
Physician Progress Note      PATIENT:               DEBBY CASTILLO  [facility] #:                  [id number]  :                       1965  ADMIT DATE:       2024 12:55 AM  DISCH DATE:  RESPONDING  PROVIDER #:        GENEVA CARTER          QUERY TEXT:    Internal Medicine,    Patient admitted with gram negative sepsis and now has acute respiratory   failure with hypoxia due to volume overload documented. In order to support   the diagnosis of acute respiratory failure, please include additional clinical   indicators in your documentation.  Or please document if the diagnosis of   acute respiratory failure has been ruled out after further study.    The medical record reflects the following:  Risk Factors: sepsis, CHF  Clinical Indicators: Progress note documentation: \"acute hypoxic respiratory   failure - currently requiring 2L NC -likely due to volume overload.\" Physical   exam documents: Respiratory: Clear to auscultation, respirations even and   unlabored on RA  Treatment: labs, imaging, lasix, O2 @ 2-4L    Thank you,  Tangela Mi RN CDS  [phone]    Acute Respiratory Failure Clinical Indicators per [employer] Training Guide and   Quick Reference Guide:  pO2 < 60 mmHg or SpO2 (pulse oximetry) < 91% breathing room air  pCO2 > 50 and pH < 7.35  P/F ratio (pO2 / FIO2) < 300  pO2 decrease or pCO2 increase by 10 mmHg from baseline (if known)  Supplemental oxygen of 40% or more  Presence of respiratory distress, tachypnea, dyspnea, shortness of breath,   wheezing  Unable to speak in complete sentences  Use of accessory muscles to breathe  Extreme anxiety and feeling of impending doom  Tripod position  Confusion/altered mental status/obtunded  Options provided:  -- Acute Respiratory Failure as evidenced by, Please document evidence.  -- Acute Respiratory Failure ruled out after study  -- Acute Respiratory Failure ruled out after study and Chronic Respiratory   Failure confirmed  -- Other - I will add my own

## 2024-07-29 ENCOUNTER — CARE COORDINATION (OUTPATIENT)
Dept: CASE MANAGEMENT | Age: 59
End: 2024-07-29

## 2024-07-29 NOTE — CARE COORDINATION
Care Transitions Note    Initial Call - Call within 2 business days of discharge: Yes    1st attempt to reach for Care Transition discharge call unsuccessful. HIPAA compliant message left requesting call back. No MyChart.     Patient: Keisha Turner    Patient : 1965   MRN: [id number]    Reason for Admission: Severe Sepsis 2/2 UTI, Pyelonephritis, R Ureterolithiasis w/ Hydronephrosis, SHEYLA   Discharge Date: 24  RURS: Readmission Risk Score: 10  Facility: [facility] 24-24     Last Discharge Facility       Date Complaint Diagnosis Description Type Department Provider    24 Flank Pain Renal colic ... ED to Hosp-Admission (Discharged) (ADMITTED) [facility]E Zenia Rhoades MD; Jet Abdi...     Future Appointments         Provider Specialty Dept Phone    2024 1:40 PM Crystal Garner APRN - CNP Cardiology [phone]    2024 1:15 PM Paul Townsend MD Cardiology [phone]            Plan for follow-up on next business day. 2nd attempt.     Elizabeth Hanna RN

## 2024-07-30 ENCOUNTER — LAB (OUTPATIENT)
Dept: CARDIOLOGY CLINIC | Age: 59
End: 2024-07-30

## 2024-07-30 ENCOUNTER — CARE COORDINATION (OUTPATIENT)
Dept: CASE MANAGEMENT | Age: 59
End: 2024-07-30

## 2024-07-30 ENCOUNTER — OFFICE VISIT (OUTPATIENT)
Dept: CARDIOLOGY CLINIC | Age: 59
End: 2024-07-30
Payer: MEDICARE

## 2024-07-30 VITALS
DIASTOLIC BLOOD PRESSURE: 52 MMHG | HEIGHT: 66 IN | SYSTOLIC BLOOD PRESSURE: 90 MMHG | HEART RATE: 75 BPM | WEIGHT: 222 LBS | BODY MASS INDEX: 35.68 KG/M2

## 2024-07-30 DIAGNOSIS — E78.2 MIXED HYPERLIPIDEMIA: ICD-10-CM

## 2024-07-30 DIAGNOSIS — Z82.49 FAMILY HISTORY OF CORONARY ARTERIOSCLEROSIS: ICD-10-CM

## 2024-07-30 DIAGNOSIS — I83.893 VARICOSE VEINS OF BOTH LEGS WITH EDEMA: ICD-10-CM

## 2024-07-30 DIAGNOSIS — E11.9 TYPE 2 DIABETES MELLITUS WITHOUT COMPLICATION, WITHOUT LONG-TERM CURRENT USE OF INSULIN (HCC): ICD-10-CM

## 2024-07-30 DIAGNOSIS — I10 HTN (HYPERTENSION): ICD-10-CM

## 2024-07-30 DIAGNOSIS — F17.200 TOBACCO DEPENDENCE: ICD-10-CM

## 2024-07-30 DIAGNOSIS — I10 PRIMARY HYPERTENSION: Primary | ICD-10-CM

## 2024-07-30 PROCEDURE — 3017F COLORECTAL CA SCREEN DOC REV: CPT | Performed by: NURSE PRACTITIONER

## 2024-07-30 PROCEDURE — G8417 CALC BMI ABV UP PARAM F/U: HCPCS | Performed by: NURSE PRACTITIONER

## 2024-07-30 PROCEDURE — 99214 OFFICE O/P EST MOD 30 MIN: CPT | Performed by: NURSE PRACTITIONER

## 2024-07-30 PROCEDURE — 3078F DIAST BP <80 MM HG: CPT | Performed by: NURSE PRACTITIONER

## 2024-07-30 PROCEDURE — 4004F PT TOBACCO SCREEN RCVD TLK: CPT | Performed by: NURSE PRACTITIONER

## 2024-07-30 PROCEDURE — 2022F DILAT RTA XM EVC RTNOPTHY: CPT | Performed by: NURSE PRACTITIONER

## 2024-07-30 PROCEDURE — G8427 DOCREV CUR MEDS BY ELIG CLIN: HCPCS | Performed by: NURSE PRACTITIONER

## 2024-07-30 PROCEDURE — 93000 ELECTROCARDIOGRAM COMPLETE: CPT | Performed by: NURSE PRACTITIONER

## 2024-07-30 PROCEDURE — 3074F SYST BP LT 130 MM HG: CPT | Performed by: NURSE PRACTITIONER

## 2024-07-30 PROCEDURE — 3046F HEMOGLOBIN A1C LEVEL >9.0%: CPT | Performed by: NURSE PRACTITIONER

## 2024-07-30 PROCEDURE — 1111F DSCHRG MED/CURRENT MED MERGE: CPT | Performed by: NURSE PRACTITIONER

## 2024-07-30 ASSESSMENT — ENCOUNTER SYMPTOMS
COLOR CHANGE: 1
BACK PAIN: 1
COUGH: 0
SHORTNESS OF BREATH: 0

## 2024-07-30 NOTE — CARE COORDINATION
Care Transitions Note    Initial Call - Call within 2 business days of discharge: Yes    2nd unsuccessful attempt to reach for Care Transition discharge call. HIPAA compliant message left requesting call back. No MyChart. CT program closed per protocol, no further outreach scheduled.       Outreach Attempts:   HIPAA compliant voicemail left for patient.     Patient: Keisha Turner    Patient : 1965   MRN: [id number]    Reason for Admission: Severe Sepsis 2/2 UTI, Pyelonephritis, R Ureterolithiasis w/ Hydronephrosis, SHEYLA   Discharge Date: 24  RURS: Readmission Risk Score: 10  Facility: [facility] 24-24   Last Discharge Facility       Date Complaint Diagnosis Description Type Department Provider    24 Flank Pain Renal colic ... ED to Hosp-Admission (Discharged) (ADMITTED) [facility] 3E Zenia Rhoades MD; Jet Abdi...     Future Appointments         Provider Specialty Dept Phone    2024 1:40 PM Crystal Garner, ALEJANDRO - CNP Cardiology [phone]    2024 1:15 PM Paul Townsend MD Cardiology [phone]          Elizabeth Hanna RN

## 2024-07-30 NOTE — PROGRESS NOTES
CARDIOLOGY  NOTE    2024    Keisha Turner (:  1965) is a 58 y.o. female,an established patient with Dr. Mc, here for evaluation of the following chief complaint(s):  Follow-Up from Hospital (Patient was septic, resulted in going to [facility], also had stent near the kidneys. Also complains of leg and feet pain.) and Edema (Little bit of edema, hx of venous ablation)        SUBJECTIVE/OBJECTIVE:    KRISHNA Valdes has Past medical history as noted below.      She states that she was recently in the hospital due to sepsis. Her potassium was low. She still has swelling and feels poorly, although better than when she went to the hospital    Echo shows LVH and diastolic dysfunction, lexiscan shows no ischemia, renal failure; recovered, siatica issues, and  Diabetes.     her mother has history of congestive heart failure.    Her younger sister had CABG and brother has 3 stents    She continues Smoking a pack a day     Review of Systems   Constitutional:  Negative for fatigue and fever.   Respiratory:  Negative for cough and shortness of breath.    Cardiovascular:  Positive for leg swelling. Negative for chest pain and palpitations.   Musculoskeletal:  Positive for back pain. Negative for arthralgias and gait problem.   Skin:  Positive for color change. Pallor: vascular discoloration bilaterally.  Neurological:  Negative for dizziness, syncope, weakness, light-headedness and headaches.       Vitals:    24 1345 24 1359   BP: (!) 90/50 (!) 90/52   Site: Left Upper Arm Left Upper Arm   Position: Sitting Sitting   Cuff Size: Large Adult Large Adult   Pulse: 75    Weight: 100.7 kg (222 lb)    Height: 1.676 m (5' 6\")        Wt Readings from Last 3 Encounters:   24 100.7 kg (222 lb)   24 110.4 kg (243 lb 6.2 oz)   24 107 kg (236 lb)       BP Readings from Last 3 Encounters:   24 (!) 90/52   24 131/63   24 110/74       Prior to Admission medications    Medication Sig Start

## 2024-07-31 LAB
ANION GAP SERPL CALCULATED.3IONS-SCNC: 18 MMOL/L (ref 3–16)
BUN SERPL-MCNC: 17 MG/DL (ref 7–20)
CALCIUM SERPL-MCNC: 9.3 MG/DL (ref 8.3–10.6)
CHLORIDE SERPL-SCNC: 99 MMOL/L (ref 99–110)
CO2 SERPL-SCNC: 21 MMOL/L (ref 21–32)
CREAT SERPL-MCNC: 1.1 MG/DL (ref 0.6–1.1)
GFR SERPLBLD CREATININE-BSD FMLA CKD-EPI: 58 ML/MIN/{1.73_M2}
GLUCOSE SERPL-MCNC: 142 MG/DL (ref 70–99)
POTASSIUM SERPL-SCNC: 3.8 MMOL/L (ref 3.5–5.1)
SODIUM SERPL-SCNC: 138 MMOL/L (ref 136–145)

## 2024-08-02 ENCOUNTER — TELEPHONE (OUTPATIENT)
Dept: CARDIOLOGY CLINIC | Age: 59
End: 2024-08-02

## 2024-08-02 NOTE — TELEPHONE ENCOUNTER
Airway       Patient location during procedure: OR       Procedure Start/Stop Times: 9/7/2021 9:46 AM  Staff -        Anesthesiologist:  Diego Davis MD       CRNA: Denisha Vargas APRN CRNA       Performed By: CRNA  Consent for Airway        Urgency: elective  Indications and Patient Condition       Indications for airway management: edwardo-procedural       Induction type:intravenous       Mask difficulty assessment: 1 - vent by mask    Final Airway Details       Final airway type: endotracheal airway       Successful airway: ETT - single  Endotracheal Airway Details        ETT size (mm): 8.0       Cuffed: yes       Successful intubation technique: direct laryngoscopy       DL Blade Type: Ramos 2       Grade View of Cords: 1       Adjucts: stylet       Position: Right       Measured from: gums/teeth       Secured at (cm): 22       Bite block used: None    Post intubation assessment        Placement verified by: capnometry, equal breath sounds and chest rise        Number of attempts at approach: 1       Number of other approaches attempted: 0       Secured with: pink tape       Ease of procedure: easy       Dentition: Intact and Unchanged           Called pt in regards to lab results. Pt voiced understanding in current plan and next ov at this time      Component  Ref Range & Units 3 d ago 6 d ago 7 d ago 8 d ago 9 d ago 10 d ago 1 yr ago   Sodium  136 - 145 mmol/L 138 137 R 136 R 135 R 142 R 139 R 135 R   Potassium  3.5 - 5.1 mmol/L 3.8 3.3 Low  R 3.6 R 3.4 Low  R 3.7 R 4.0 R 4.1 R   Chloride  99 - 110 mmol/L 99 97 Low  R 96 Low  R 96 Low  R 107 R 104 R 96 Low  R   CO2  21 - 32 mmol/L 21 25 R 26 R 25 R 23 R 22 R 24 R   Anion Gap  3 - 16 18 High  15 R 14 R 14 R 12 R 13 R 15 R   Glucose  70 - 99 mg/dL 142 High  127 High  R 115 High  R 138 High  R 178 High  R 136 High  R 226 High  R   BUN  7 - 20 mg/dL 17 24 High  R 26 High  R 28 High  R 25 High  R 13 R 10 R   Creatinine  0.6 - 1.1 mg/dL 1.1 1.2 High  R 1.2 High  R 1.5 High  R 1.6 High  R 0.9 R 1.1 R   Est, Glom Filt Rate  >60 58 Abnormal  53 Low  R, CM 53 Low  R, CM 40 Low  R, CM 37 Low  R, CM 75 R, CM 59 Low  R,

## 2024-08-02 NOTE — TELEPHONE ENCOUNTER
----- Message from AELJANDRO Sotelo CNP sent at 8/1/2024  7:53 AM EDT -----  Potassium is stable. Continue current plan  ----- Message -----  From: [facility] Incoming Lab Results From Soft (Epic Adt)  Sent: 7/31/2024   3:33 AM EDT  To: ALEJANDRO Sotelo CNP

## 2024-08-13 ENCOUNTER — TELEPHONE (OUTPATIENT)
Dept: CARDIOLOGY CLINIC | Age: 59
End: 2024-08-13

## 2024-08-13 NOTE — TELEPHONE ENCOUNTER
Proceed with surgery no further testing needed   Low   risk for surgery  Hold aspirin for procedure

## 2024-08-13 NOTE — TELEPHONE ENCOUNTER
Cardiologist: Dr. Mc  Surgeon: Dr. Brooks  Surgery: Kidney Stone surgery  Anesthesia: General  Date: 8/21/2024  FAX# [phone]  Ph# [phone]    Last OV 7/31/2024 w/Crystal    Family history of coronary arteriosclerosis  She had lexiscan showing no ischemia on Sept 2022 but she is very high risk for ASCVD   Continue aspirin 81 mg and statins      SOB (shortness of breath) on exertion   improved some  Continue low sodium diet and increase exercise.      Venous reflux  She has completed venous ablations  Continue bumex 1 mg Daily.   Swelling is improved  Check labs        HTN (hypertension)  Controlled  Continue bumex 1 mg daily.  advised to check blood pressure at home cut down on salt intake  Check BMP        Tobacco dependence  Patient is using tobacco at this time  Encouraged to stop  Offered emotional, medicinal, and social support  Patient is not ready to quit at this time         Dyslipidemia   All available lab work was reviewed. She did not tolerate lipitor 40 mg daily. She is tolerating 20 mg Daily. Will continue to monitor  Patient was advised to repeat lab work before next visit. Necessary orders were placed , instructions given by myself   The 10-year ASCVD risk score (Magali DK, et al., 2019) is: 7.1%      Last EKG- 7/30/2024      NM- 9/2/2022  ECG portion of stress test is negative for ischemia by diagnostic criteria.   Normal EF 65 % with normal ventricular contractility. No infarct or ischemia   noted.   Normal stress myocardial perfusion.   This is a normal study.       Echo- 7/24/2024   Image quality is adequate.    Left Ventricle: Normal left ventricular systolic function with a visually estimated EF of 55 - 60%. Left ventricle size is normal. Normal wall motion.    No significant valvular disease noted.    Pericardium: No pericardial effusion.      The patient is a 51y Female complaining of headache.

## 2024-08-16 ENCOUNTER — TELEPHONE (OUTPATIENT)
Dept: CARDIOLOGY CLINIC | Age: 59
End: 2024-08-16

## 2024-08-16 NOTE — TELEPHONE ENCOUNTER
Pt needs losartine refill but it is not on the list to fill   [facility] pharmacy she uses

## 2024-08-16 NOTE — TELEPHONE ENCOUNTER
Patient was in hospital, losartan 50mg stopped. Patient asking about restarting... currently 108/57

## 2024-08-23 ENCOUNTER — HOSPITAL ENCOUNTER (OUTPATIENT)
Age: 59
Setting detail: SPECIMEN
Discharge: HOME | End: 2024-08-23

## 2024-08-23 PROCEDURE — 82360 CALCULUS ASSAY QUANT: CPT

## 2024-08-28 LAB
APPEARANCE STONE: NORMAL
COMPN STONE: NORMAL
SPECIMEN WT: 16 MG

## 2025-01-06 RX ORDER — BUMETANIDE 1 MG/1
1 TABLET ORAL DAILY
Qty: 30 TABLET | Refills: 0 | OUTPATIENT
Start: 2025-01-06

## 2025-01-16 RX ORDER — BUMETANIDE 1 MG/1
1 TABLET ORAL DAILY
Qty: 30 TABLET | Refills: 0 | OUTPATIENT
Start: 2025-01-16

## 2025-02-05 ENCOUNTER — TELEPHONE (OUTPATIENT)
Dept: CARDIOLOGY CLINIC | Age: 60
End: 2025-02-05

## 2025-02-05 RX ORDER — BUMETANIDE 1 MG/1
1 TABLET ORAL DAILY
Qty: 30 TABLET | Refills: 0 | Status: SHIPPED | OUTPATIENT
Start: 2025-02-05

## 2025-02-18 ENCOUNTER — OFFICE VISIT (OUTPATIENT)
Dept: CARDIOLOGY CLINIC | Age: 60
End: 2025-02-18

## 2025-02-18 VITALS
SYSTOLIC BLOOD PRESSURE: 116 MMHG | DIASTOLIC BLOOD PRESSURE: 60 MMHG | HEIGHT: 66 IN | HEART RATE: 74 BPM | WEIGHT: 232.2 LBS | BODY MASS INDEX: 37.32 KG/M2

## 2025-02-18 DIAGNOSIS — I10 PRIMARY HYPERTENSION: Primary | ICD-10-CM

## 2025-02-18 RX ORDER — LOSARTAN POTASSIUM 25 MG/1
25 TABLET ORAL DAILY
Qty: 30 TABLET | Refills: 4 | Status: ON HOLD | OUTPATIENT
Start: 2025-02-18

## 2025-02-18 NOTE — PROGRESS NOTES
CARDIOLOGY  NOTE    2025    Keisha Turner (:  1965) is a 59 y.o. female,an established patient with Dr. Mc, here for evaluation of the following chief complaint(s):  Follow-up        SUBJECTIVE/OBJECTIVE:    History of Present Illness  The patient presents for evaluation of elevated blood pressure.    She has been experiencing elevated blood pressure readings at home, which have been associated with severe headaches. Despite attempts to alleviate the headaches with Excedrin and ibuprofen, they persisted. However, upon taking her prescribed losartan, the headaches subsided. She had previously discontinued losartan due to a significant drop in her blood pressure but decided to resume the medication for a few days to assess its impact on her headaches. She is uncertain about the dosage of losartan she was taking but believes it was a low dose. She last took the medication this morning. She has not had her lab work rechecked recently.    She continues to take Bumex, a diuretic, which has resulted in frequent urination. She also reports urinary incontinence and is currently on oxybutynin, although she notes that its effectiveness has diminished over time.    SOCIAL HISTORY  The patient admits to smoking and has not cut back.    MEDICATIONS  Current: Losartan, Bumex, oxybutynin       Echo shows LVH and diastolic dysfunction, lexiscan shows no ischemia, renal failure; recovered, siatica issues, and  Diabetes.     her mother has history of congestive heart failure.    Her younger sister had CABG and brother has 3 stents    She continues Smoking a pack a day     Review of Systems   Constitutional:  Negative for fatigue and fever.   Respiratory:  Negative for cough and shortness of breath.    Cardiovascular:  Positive for leg swelling. Negative for chest pain and palpitations.   Musculoskeletal:  Positive for back pain. Negative for arthralgias and gait problem.   Skin:  Positive for color change. Pallor:

## 2025-02-18 NOTE — PROGRESS NOTES
CLINICAL STAFF DOCUMENTATION    Crystal Garner, MANDA     Keisha Robleson  1965  [id number]    Have you had any Chest Pain recently? - No  Have you had any Shortness of Breath - No  Have you had any dizziness - No  Have you had any palpitations recently? - No    Do you have any edema - swelling in No        Do you have a surgery or procedure scheduled in the near future - No      Check medication list thoroughly!!! AND RECONCILE OUTSIDE MEDICATIONS  If dose has changed change the entire order not just the MG  BE SURE TO ASK PATIENT IF THEY NEED MEDICATION REFILLS  Verify Pharmacy and update if incorrect    At check out add to every patient's \"wrap up\" the following dot phrase AFTERVISITCARDIOHEARTHOUSE and ensure we explain this to our patients

## 2025-02-20 ENCOUNTER — LAB (OUTPATIENT)
Dept: CARDIOLOGY CLINIC | Age: 60
End: 2025-02-20
Payer: MEDICARE

## 2025-02-20 DIAGNOSIS — I10 PRIMARY HYPERTENSION: ICD-10-CM

## 2025-02-20 PROCEDURE — 36415 COLL VENOUS BLD VENIPUNCTURE: CPT | Performed by: INTERNAL MEDICINE

## 2025-02-21 LAB
ANION GAP SERPL CALCULATED.3IONS-SCNC: 11 MMOL/L (ref 3–16)
BUN SERPL-MCNC: 10 MG/DL (ref 7–20)
CALCIUM SERPL-MCNC: 9.2 MG/DL (ref 8.3–10.6)
CHLORIDE SERPL-SCNC: 104 MMOL/L (ref 99–110)
CO2 SERPL-SCNC: 27 MMOL/L (ref 21–32)
CREAT SERPL-MCNC: 1 MG/DL (ref 0.6–1.1)
GFR SERPLBLD CREATININE-BSD FMLA CKD-EPI: 65 ML/MIN/{1.73_M2}
GLUCOSE SERPL-MCNC: 120 MG/DL (ref 70–99)
POTASSIUM SERPL-SCNC: 3.9 MMOL/L (ref 3.5–5.1)
SODIUM SERPL-SCNC: 142 MMOL/L (ref 136–145)

## 2025-03-01 ENCOUNTER — HOSPITAL ENCOUNTER (INPATIENT)
Age: 60
LOS: 1 days | Discharge: HOME | DRG: 694 | End: 2025-03-03
Attending: EMERGENCY MEDICINE | Admitting: STUDENT IN AN ORGANIZED HEALTH CARE EDUCATION/TRAINING PROGRAM
Payer: MEDICARE

## 2025-03-01 DIAGNOSIS — N20.0 NEPHROLITHIASIS: ICD-10-CM

## 2025-03-01 DIAGNOSIS — N20.1 URETEROLITHIASIS: Primary | ICD-10-CM

## 2025-03-01 LAB
BASOPHILS # BLD: 0.04 K/UL
BASOPHILS NFR BLD: 0 % (ref 0–1)
EOSINOPHIL # BLD: 0.22 K/UL
EOSINOPHILS RELATIVE PERCENT: 2 % (ref 0–3)
ERYTHROCYTE [DISTWIDTH] IN BLOOD BY AUTOMATED COUNT: 14.4 % (ref 11.7–14.9)
HCT VFR BLD AUTO: 39.3 % (ref 37–47)
HGB BLD-MCNC: 12.4 G/DL (ref 12.5–16)
IMM GRANULOCYTES # BLD AUTO: 0.03 K/UL
IMM GRANULOCYTES NFR BLD: 0 %
LYMPHOCYTES NFR BLD: 2.25 K/UL
LYMPHOCYTES RELATIVE PERCENT: 23 % (ref 24–44)
MCH RBC QN AUTO: 27.7 PG (ref 27–31)
MCHC RBC AUTO-ENTMCNC: 31.6 G/DL (ref 32–36)
MCV RBC AUTO: 87.7 FL (ref 78–100)
MONOCYTES NFR BLD: 0.6 K/UL
MONOCYTES NFR BLD: 6 % (ref 0–4)
NEUTROPHILS NFR BLD: 69 % (ref 36–66)
NEUTS SEG NFR BLD: 6.85 K/UL
PLATELET # BLD AUTO: 247 K/UL (ref 140–440)
PMV BLD AUTO: 8.9 FL (ref 7.5–11.1)
RBC # BLD AUTO: 4.48 M/UL (ref 4.2–5.4)
WBC OTHER # BLD: 10 K/UL (ref 4–10.5)

## 2025-03-01 PROCEDURE — 36415 COLL VENOUS BLD VENIPUNCTURE: CPT

## 2025-03-01 PROCEDURE — 99285 EMERGENCY DEPT VISIT HI MDM: CPT

## 2025-03-01 PROCEDURE — 80048 BASIC METABOLIC PNL TOTAL CA: CPT

## 2025-03-01 PROCEDURE — 85025 COMPLETE CBC W/AUTO DIFF WBC: CPT

## 2025-03-01 RX ORDER — KETOROLAC TROMETHAMINE 15 MG/ML
30 INJECTION, SOLUTION INTRAMUSCULAR; INTRAVENOUS ONCE
Status: COMPLETED | OUTPATIENT
Start: 2025-03-01 | End: 2025-03-02

## 2025-03-01 RX ORDER — MORPHINE SULFATE 4 MG/ML
4 INJECTION, SOLUTION INTRAMUSCULAR; INTRAVENOUS ONCE
Status: COMPLETED | OUTPATIENT
Start: 2025-03-01 | End: 2025-03-02

## 2025-03-01 RX ORDER — ONDANSETRON 2 MG/ML
8 INJECTION INTRAMUSCULAR; INTRAVENOUS ONCE
Status: COMPLETED | OUTPATIENT
Start: 2025-03-01 | End: 2025-03-02

## 2025-03-01 ASSESSMENT — PAIN DESCRIPTION - ORIENTATION: ORIENTATION: RIGHT

## 2025-03-01 ASSESSMENT — PAIN DESCRIPTION - LOCATION: LOCATION: ABDOMEN

## 2025-03-01 ASSESSMENT — PAIN - FUNCTIONAL ASSESSMENT: PAIN_FUNCTIONAL_ASSESSMENT: 0-10

## 2025-03-01 ASSESSMENT — PAIN SCALES - GENERAL: PAINLEVEL_OUTOF10: 10

## 2025-03-01 ASSESSMENT — LIFESTYLE VARIABLES
HOW OFTEN DO YOU HAVE A DRINK CONTAINING ALCOHOL: NEVER
HOW MANY STANDARD DRINKS CONTAINING ALCOHOL DO YOU HAVE ON A TYPICAL DAY: PATIENT DOES NOT DRINK

## 2025-03-02 ENCOUNTER — APPOINTMENT (OUTPATIENT)
Dept: GENERAL RADIOLOGY | Age: 60
DRG: 694 | End: 2025-03-02
Payer: MEDICARE

## 2025-03-02 ENCOUNTER — APPOINTMENT (OUTPATIENT)
Dept: CT IMAGING | Age: 60
DRG: 694 | End: 2025-03-02
Payer: MEDICARE

## 2025-03-02 ENCOUNTER — ANESTHESIA (OUTPATIENT)
Dept: OPERATING ROOM | Age: 60
DRG: 694 | End: 2025-03-02
Payer: MEDICARE

## 2025-03-02 ENCOUNTER — ANESTHESIA EVENT (OUTPATIENT)
Dept: OPERATING ROOM | Age: 60
DRG: 694 | End: 2025-03-02
Payer: MEDICARE

## 2025-03-02 PROBLEM — N20.1 URETEROLITHIASIS: Status: ACTIVE | Noted: 2025-03-02

## 2025-03-02 LAB
ALBUMIN SERPL-MCNC: 3.5 G/DL (ref 3.4–5)
ALBUMIN/GLOB SERPL: 1.3 {RATIO} (ref 1.1–2.2)
ALP SERPL-CCNC: 108 U/L (ref 40–129)
ALT SERPL-CCNC: 17 U/L (ref 10–40)
ANION GAP SERPL CALCULATED.3IONS-SCNC: 11 MMOL/L (ref 9–17)
ANION GAP SERPL CALCULATED.3IONS-SCNC: 14 MMOL/L (ref 9–17)
AST SERPL-CCNC: 33 U/L (ref 15–37)
BACTERIA URNS QL MICRO: ABNORMAL
BASOPHILS # BLD: 0.03 K/UL
BASOPHILS ABSOLUTE: ABNORMAL
BASOPHILS NFR BLD: 0 % (ref 0–1)
BASOPHILS RELATIVE PERCENT: ABNORMAL
BILIRUB SERPL-MCNC: 0.3 MG/DL (ref 0–1)
BILIRUB UR QL STRIP: NEGATIVE
BUN BLDV-MCNC: 12 MG/DL
BUN SERPL-MCNC: 11 MG/DL (ref 7–20)
BUN SERPL-MCNC: 12 MG/DL (ref 7–20)
CALCIUM SERPL-MCNC: 9.4 MG/DL (ref 8.3–10.6)
CALCIUM SERPL-MCNC: 9.4 MG/DL (ref 8.3–10.6)
CALCIUM SERPL-MCNC: NORMAL MG/DL
CHLORIDE BLD-SCNC: 106 MMOL/L
CHLORIDE SERPL-SCNC: 104 MMOL/L (ref 99–110)
CHLORIDE SERPL-SCNC: 106 MMOL/L (ref 99–110)
CHOLESTEROL, TOTAL: 188 MG/DL
CHOLESTEROL/HDL RATIO: NORMAL
CLARITY UR: CLEAR
CO2 SERPL-SCNC: 23 MMOL/L (ref 21–32)
CO2 SERPL-SCNC: 24 MMOL/L (ref 21–32)
CO2: 24 MMOL/L
COLOR UR: YELLOW
CREAT SERPL-MCNC: 1.1 MG/DL
CREAT SERPL-MCNC: 1.1 MG/DL (ref 0.6–1.1)
CREAT SERPL-MCNC: 1.1 MG/DL (ref 0.6–1.1)
EGFR: NORMAL
EOSINOPHIL # BLD: 0.07 K/UL
EOSINOPHILS ABSOLUTE: ABNORMAL
EOSINOPHILS RELATIVE PERCENT: 1 % (ref 0–3)
EOSINOPHILS RELATIVE PERCENT: ABNORMAL
EPI CELLS #/AREA URNS HPF: 3 /HPF
ERYTHROCYTE [DISTWIDTH] IN BLOOD BY AUTOMATED COUNT: 14.4 % (ref 11.7–14.9)
ESTIMATED AVERAGE GLUCOSE: NORMAL
GFR, ESTIMATED: 50 ML/MIN/1.73M2
GFR, ESTIMATED: 54 ML/MIN/1.73M2
GLUCOSE BLD-MCNC: 128 MG/DL
GLUCOSE BLD-MCNC: 137 MG/DL (ref 74–99)
GLUCOSE BLD-MCNC: 137 MG/DL (ref 74–99)
GLUCOSE BLD-MCNC: 146 MG/DL (ref 74–99)
GLUCOSE BLD-MCNC: 164 MG/DL (ref 74–99)
GLUCOSE BLD-MCNC: 167 MG/DL (ref 74–99)
GLUCOSE BLD-MCNC: 294 MG/DL (ref 74–99)
GLUCOSE SERPL-MCNC: 128 MG/DL (ref 74–99)
GLUCOSE SERPL-MCNC: 146 MG/DL (ref 74–99)
GLUCOSE UR STRIP-MCNC: >=1000 MG/DL
HBA1C MFR BLD: 8.8 %
HCT VFR BLD AUTO: 35.1 % (ref 37–47)
HCT VFR BLD CALC: ABNORMAL %
HDLC SERPL-MCNC: 37 MG/DL (ref 35–70)
HEMOGLOBIN: 11.2 G/DL (ref 12–16)
HGB BLD-MCNC: 11.2 G/DL (ref 12.5–16)
HGB UR QL STRIP.AUTO: ABNORMAL
IMM GRANULOCYTES # BLD AUTO: 0.04 K/UL
IMM GRANULOCYTES NFR BLD: 0 %
INR PPP: 1
KETONES UR STRIP-MCNC: NEGATIVE MG/DL
LDL CHOLESTEROL: NORMAL
LEUKOCYTE ESTERASE UR QL STRIP: ABNORMAL
LYMPHOCYTES ABSOLUTE: ABNORMAL
LYMPHOCYTES NFR BLD: 1.29 K/UL
LYMPHOCYTES RELATIVE PERCENT: 11 % (ref 24–44)
LYMPHOCYTES RELATIVE PERCENT: ABNORMAL
MCH RBC QN AUTO: 27.7 PG (ref 27–31)
MCH RBC QN AUTO: ABNORMAL PG
MCHC RBC AUTO-ENTMCNC: 31.9 G/DL (ref 32–36)
MCHC RBC AUTO-ENTMCNC: ABNORMAL G/DL
MCV RBC AUTO: 86.9 FL (ref 78–100)
MCV RBC AUTO: ABNORMAL FL
MONOCYTES ABSOLUTE: ABNORMAL
MONOCYTES NFR BLD: 0.64 K/UL
MONOCYTES NFR BLD: 6 % (ref 0–4)
MONOCYTES RELATIVE PERCENT: ABNORMAL
NEUTROPHILS ABSOLUTE: ABNORMAL
NEUTROPHILS NFR BLD: 82 % (ref 36–66)
NEUTROPHILS RELATIVE PERCENT: ABNORMAL
NEUTS SEG NFR BLD: 9.54 K/UL
NITRITE UR QL STRIP: NEGATIVE
NONHDLC SERPL-MCNC: NORMAL MG/DL
PH UR STRIP: 7.5 [PH] (ref 5–8)
PLATELET # BLD AUTO: 252 K/UL (ref 140–440)
PLATELET # BLD: 247 K/ΜL
PMV BLD AUTO: 9.1 FL (ref 7.5–11.1)
PMV BLD AUTO: ABNORMAL FL
POTASSIUM SERPL-SCNC: 3.4 MMOL/L (ref 3.5–5.1)
POTASSIUM SERPL-SCNC: 4.2 MMOL/L
POTASSIUM SERPL-SCNC: 4.2 MMOL/L (ref 3.5–5.1)
PROT SERPL-MCNC: 6.2 G/DL (ref 6.4–8.2)
PROT UR STRIP-MCNC: ABNORMAL MG/DL
PROTHROMBIN TIME: 13.6 SEC (ref 11.7–14.5)
RBC # BLD AUTO: 4.04 M/UL (ref 4.2–5.4)
RBC # BLD: ABNORMAL 10*6/UL
RBC #/AREA URNS HPF: 12 /HPF (ref 0–2)
SODIUM BLD-SCNC: 141 MMOL/L
SODIUM SERPL-SCNC: 141 MMOL/L (ref 136–145)
SODIUM SERPL-SCNC: 141 MMOL/L (ref 136–145)
SP GR UR STRIP: 1.01 (ref 1–1.03)
TRIGL SERPL-MCNC: 103 MG/DL
TSH SERPL DL<=0.05 MIU/L-ACNC: NORMAL M[IU]/L
UROBILINOGEN UR STRIP-ACNC: 0.2 EU/DL (ref 0–1)
VLDLC SERPL CALC-MCNC: NORMAL MG/DL
WBC # BLD: 11.6 10^3/ML
WBC #/AREA URNS HPF: 115 /HPF (ref 0–5)
WBC OTHER # BLD: 11.6 K/UL (ref 4–10.5)

## 2025-03-02 PROCEDURE — 36410 VNPNXR 3YR/> PHY/QHP DX/THER: CPT

## 2025-03-02 PROCEDURE — 0TF68ZZ FRAGMENTATION IN RIGHT URETER, VIA NATURAL OR ARTIFICIAL OPENING ENDOSCOPIC: ICD-10-PCS | Performed by: SPECIALIST

## 2025-03-02 PROCEDURE — 87086 URINE CULTURE/COLONY COUNT: CPT

## 2025-03-02 PROCEDURE — 85025 COMPLETE CBC W/AUTO DIFF WBC: CPT

## 2025-03-02 PROCEDURE — 1200000000 HC SEMI PRIVATE

## 2025-03-02 PROCEDURE — 2500000003 HC RX 250 WO HCPCS: Performed by: SPECIALIST

## 2025-03-02 PROCEDURE — 3700000001 HC ADD 15 MINUTES (ANESTHESIA): Performed by: SPECIALIST

## 2025-03-02 PROCEDURE — 3600000004 HC SURGERY LEVEL 4 BASE: Performed by: SPECIALIST

## 2025-03-02 PROCEDURE — 87077 CULTURE AEROBIC IDENTIFY: CPT

## 2025-03-02 PROCEDURE — 6370000000 HC RX 637 (ALT 250 FOR IP): Performed by: SPECIALIST

## 2025-03-02 PROCEDURE — 36415 COLL VENOUS BLD VENIPUNCTURE: CPT

## 2025-03-02 PROCEDURE — 7100000000 HC PACU RECOVERY - FIRST 15 MIN: Performed by: SPECIALIST

## 2025-03-02 PROCEDURE — 6370000000 HC RX 637 (ALT 250 FOR IP): Performed by: ANESTHESIOLOGY

## 2025-03-02 PROCEDURE — 2500000003 HC RX 250 WO HCPCS

## 2025-03-02 PROCEDURE — 6360000002 HC RX W HCPCS

## 2025-03-02 PROCEDURE — 87186 SC STD MICRODIL/AGAR DIL: CPT

## 2025-03-02 PROCEDURE — C1758 CATHETER, URETERAL: HCPCS | Performed by: SPECIALIST

## 2025-03-02 PROCEDURE — 6360000002 HC RX W HCPCS: Performed by: EMERGENCY MEDICINE

## 2025-03-02 PROCEDURE — 6370000000 HC RX 637 (ALT 250 FOR IP): Performed by: STUDENT IN AN ORGANIZED HEALTH CARE EDUCATION/TRAINING PROGRAM

## 2025-03-02 PROCEDURE — 82962 GLUCOSE BLOOD TEST: CPT

## 2025-03-02 PROCEDURE — 6360000002 HC RX W HCPCS: Performed by: SPECIALIST

## 2025-03-02 PROCEDURE — 76000 FLUOROSCOPY <1 HR PHYS/QHP: CPT

## 2025-03-02 PROCEDURE — 74176 CT ABD & PELVIS W/O CONTRAST: CPT

## 2025-03-02 PROCEDURE — 2709999900 HC NON-CHARGEABLE SUPPLY: Performed by: SPECIALIST

## 2025-03-02 PROCEDURE — 6360000002 HC RX W HCPCS: Performed by: STUDENT IN AN ORGANIZED HEALTH CARE EDUCATION/TRAINING PROGRAM

## 2025-03-02 PROCEDURE — 2580000003 HC RX 258

## 2025-03-02 PROCEDURE — C1747 HC ENDOSCOPE, SINGLE, URINARY TRACT: HCPCS | Performed by: SPECIALIST

## 2025-03-02 PROCEDURE — BT1D1ZZ FLUOROSCOPY OF RIGHT KIDNEY, URETER AND BLADDER USING LOW OSMOLAR CONTRAST: ICD-10-PCS | Performed by: SPECIALIST

## 2025-03-02 PROCEDURE — 6360000002 HC RX W HCPCS: Performed by: ANESTHESIOLOGY

## 2025-03-02 PROCEDURE — 96374 THER/PROPH/DIAG INJ IV PUSH: CPT

## 2025-03-02 PROCEDURE — 81001 URINALYSIS AUTO W/SCOPE: CPT

## 2025-03-02 PROCEDURE — 94640 AIRWAY INHALATION TREATMENT: CPT

## 2025-03-02 PROCEDURE — 3700000000 HC ANESTHESIA ATTENDED CARE: Performed by: SPECIALIST

## 2025-03-02 PROCEDURE — 3600000014 HC SURGERY LEVEL 4 ADDTL 15MIN: Performed by: SPECIALIST

## 2025-03-02 PROCEDURE — 2500000003 HC RX 250 WO HCPCS: Performed by: STUDENT IN AN ORGANIZED HEALTH CARE EDUCATION/TRAINING PROGRAM

## 2025-03-02 PROCEDURE — 2720000010 HC SURG SUPPLY STERILE: Performed by: SPECIALIST

## 2025-03-02 PROCEDURE — 76937 US GUIDE VASCULAR ACCESS: CPT

## 2025-03-02 PROCEDURE — 3600000003 HC SURGERY LEVEL 3 BASE: Performed by: SPECIALIST

## 2025-03-02 PROCEDURE — C1769 GUIDE WIRE: HCPCS | Performed by: SPECIALIST

## 2025-03-02 PROCEDURE — 80053 COMPREHEN METABOLIC PANEL: CPT

## 2025-03-02 PROCEDURE — 85610 PROTHROMBIN TIME: CPT

## 2025-03-02 PROCEDURE — 96375 TX/PRO/DX INJ NEW DRUG ADDON: CPT

## 2025-03-02 PROCEDURE — 7100000001 HC PACU RECOVERY - ADDTL 15 MIN: Performed by: SPECIALIST

## 2025-03-02 PROCEDURE — 6360000004 HC RX CONTRAST MEDICATION: Performed by: SPECIALIST

## 2025-03-02 PROCEDURE — 3600000013 HC SURGERY LEVEL 3 ADDTL 15MIN: Performed by: SPECIALIST

## 2025-03-02 PROCEDURE — 2709999900 HC NON-CHARGEABLE SUPPLY

## 2025-03-02 PROCEDURE — 2580000003 HC RX 258: Performed by: STUDENT IN AN ORGANIZED HEALTH CARE EDUCATION/TRAINING PROGRAM

## 2025-03-02 RX ORDER — SODIUM CHLORIDE 0.9 % (FLUSH) 0.9 %
5-40 SYRINGE (ML) INJECTION PRN
Status: DISCONTINUED | OUTPATIENT
Start: 2025-03-02 | End: 2025-03-03 | Stop reason: HOSPADM

## 2025-03-02 RX ORDER — ACETAMINOPHEN 325 MG/1
650 TABLET ORAL ONCE
Status: DISCONTINUED | OUTPATIENT
Start: 2025-03-02 | End: 2025-03-02

## 2025-03-02 RX ORDER — DEXAMETHASONE SODIUM PHOSPHATE 10 MG/ML
INJECTION, SOLUTION INTRAMUSCULAR; INTRAVENOUS
Status: DISCONTINUED | OUTPATIENT
Start: 2025-03-02 | End: 2025-03-02 | Stop reason: SDUPTHER

## 2025-03-02 RX ORDER — HYDROCODONE BITARTRATE AND ACETAMINOPHEN 5; 325 MG/1; MG/1
1 TABLET ORAL EVERY 6 HOURS PRN
Status: DISCONTINUED | OUTPATIENT
Start: 2025-03-02 | End: 2025-03-03 | Stop reason: HOSPADM

## 2025-03-02 RX ORDER — GLYCOPYRROLATE 0.2 MG/ML
INJECTION INTRAMUSCULAR; INTRAVENOUS
Status: COMPLETED
Start: 2025-03-02 | End: 2025-03-02

## 2025-03-02 RX ORDER — IPRATROPIUM BROMIDE AND ALBUTEROL SULFATE 2.5; .5 MG/3ML; MG/3ML
1 SOLUTION RESPIRATORY (INHALATION) ONCE
Status: COMPLETED | OUTPATIENT
Start: 2025-03-02 | End: 2025-03-02

## 2025-03-02 RX ORDER — PROPOFOL 10 MG/ML
INJECTION, EMULSION INTRAVENOUS
Status: DISCONTINUED | OUTPATIENT
Start: 2025-03-02 | End: 2025-03-02 | Stop reason: SDUPTHER

## 2025-03-02 RX ORDER — FENTANYL CITRATE 50 UG/ML
25 INJECTION, SOLUTION INTRAMUSCULAR; INTRAVENOUS EVERY 5 MIN PRN
Status: DISCONTINUED | OUTPATIENT
Start: 2025-03-02 | End: 2025-03-02 | Stop reason: HOSPADM

## 2025-03-02 RX ORDER — SODIUM CHLORIDE, SODIUM LACTATE, POTASSIUM CHLORIDE, CALCIUM CHLORIDE 600; 310; 30; 20 MG/100ML; MG/100ML; MG/100ML; MG/100ML
INJECTION, SOLUTION INTRAVENOUS CONTINUOUS
Status: DISPENSED | OUTPATIENT
Start: 2025-03-02 | End: 2025-03-02

## 2025-03-02 RX ORDER — MONTELUKAST SODIUM 10 MG/1
10 TABLET ORAL NIGHTLY
Status: DISCONTINUED | OUTPATIENT
Start: 2025-03-02 | End: 2025-03-03 | Stop reason: HOSPADM

## 2025-03-02 RX ORDER — MAGNESIUM HYDROXIDE 1200 MG/15ML
LIQUID ORAL CONTINUOUS PRN
Status: COMPLETED | OUTPATIENT
Start: 2025-03-02 | End: 2025-03-02

## 2025-03-02 RX ORDER — CIPROFLOXACIN 2 MG/ML
400 INJECTION, SOLUTION INTRAVENOUS EVERY 12 HOURS
Status: DISCONTINUED | OUTPATIENT
Start: 2025-03-02 | End: 2025-03-03 | Stop reason: HOSPADM

## 2025-03-02 RX ORDER — ACETAMINOPHEN 500 MG
1000 TABLET ORAL ONCE
Status: COMPLETED | OUTPATIENT
Start: 2025-03-02 | End: 2025-03-02

## 2025-03-02 RX ORDER — OXYCODONE HYDROCHLORIDE 5 MG/1
10 TABLET ORAL PRN
Status: ACTIVE | OUTPATIENT
Start: 2025-03-02 | End: 2025-03-02

## 2025-03-02 RX ORDER — PROCHLORPERAZINE EDISYLATE 5 MG/ML
5 INJECTION INTRAMUSCULAR; INTRAVENOUS
Status: DISCONTINUED | OUTPATIENT
Start: 2025-03-02 | End: 2025-03-02 | Stop reason: HOSPADM

## 2025-03-02 RX ORDER — POLYETHYLENE GLYCOL 3350 17 G/17G
17 POWDER, FOR SOLUTION ORAL DAILY PRN
Status: DISCONTINUED | OUTPATIENT
Start: 2025-03-02 | End: 2025-03-03 | Stop reason: HOSPADM

## 2025-03-02 RX ORDER — SODIUM CHLORIDE 9 MG/ML
INJECTION, SOLUTION INTRAVENOUS PRN
Status: DISCONTINUED | OUTPATIENT
Start: 2025-03-02 | End: 2025-03-02 | Stop reason: HOSPADM

## 2025-03-02 RX ORDER — IPRATROPIUM BROMIDE AND ALBUTEROL SULFATE 2.5; .5 MG/3ML; MG/3ML
1 SOLUTION RESPIRATORY (INHALATION) ONCE
Status: CANCELLED | OUTPATIENT
Start: 2025-03-02

## 2025-03-02 RX ORDER — GLYCOPYRROLATE 0.2 MG/ML
INJECTION INTRAMUSCULAR; INTRAVENOUS
Status: DISCONTINUED | OUTPATIENT
Start: 2025-03-02 | End: 2025-03-02 | Stop reason: SDUPTHER

## 2025-03-02 RX ORDER — SODIUM CHLORIDE 9 MG/ML
INJECTION, SOLUTION INTRAVENOUS PRN
Status: DISCONTINUED | OUTPATIENT
Start: 2025-03-02 | End: 2025-03-03 | Stop reason: HOSPADM

## 2025-03-02 RX ORDER — SODIUM CHLORIDE 0.9 % (FLUSH) 0.9 %
5-40 SYRINGE (ML) INJECTION PRN
Status: CANCELLED | OUTPATIENT
Start: 2025-03-02

## 2025-03-02 RX ORDER — MORPHINE SULFATE 4 MG/ML
4 INJECTION, SOLUTION INTRAMUSCULAR; INTRAVENOUS EVERY 4 HOURS PRN
Status: DISCONTINUED | OUTPATIENT
Start: 2025-03-02 | End: 2025-03-03 | Stop reason: HOSPADM

## 2025-03-02 RX ORDER — GABAPENTIN 300 MG/1
300 CAPSULE ORAL 3 TIMES DAILY
Status: DISCONTINUED | OUTPATIENT
Start: 2025-03-02 | End: 2025-03-02

## 2025-03-02 RX ORDER — NALOXONE HYDROCHLORIDE 0.4 MG/ML
INJECTION, SOLUTION INTRAMUSCULAR; INTRAVENOUS; SUBCUTANEOUS PRN
Status: DISCONTINUED | OUTPATIENT
Start: 2025-03-02 | End: 2025-03-02 | Stop reason: HOSPADM

## 2025-03-02 RX ORDER — SODIUM CHLORIDE, SODIUM LACTATE, POTASSIUM CHLORIDE, CALCIUM CHLORIDE 600; 310; 30; 20 MG/100ML; MG/100ML; MG/100ML; MG/100ML
INJECTION, SOLUTION INTRAVENOUS
Status: DISCONTINUED | OUTPATIENT
Start: 2025-03-02 | End: 2025-03-02 | Stop reason: SDUPTHER

## 2025-03-02 RX ORDER — INSULIN LISPRO 100 [IU]/ML
0-4 INJECTION, SOLUTION INTRAVENOUS; SUBCUTANEOUS
Status: DISCONTINUED | OUTPATIENT
Start: 2025-03-02 | End: 2025-03-03 | Stop reason: HOSPADM

## 2025-03-02 RX ORDER — TAMSULOSIN HYDROCHLORIDE 0.4 MG/1
0.4 CAPSULE ORAL DAILY
Status: DISCONTINUED | OUTPATIENT
Start: 2025-03-02 | End: 2025-03-03 | Stop reason: HOSPADM

## 2025-03-02 RX ORDER — EPHEDRINE SULFATE 50 MG/ML
INJECTION INTRAVENOUS
Status: DISCONTINUED | OUTPATIENT
Start: 2025-03-02 | End: 2025-03-02 | Stop reason: SDUPTHER

## 2025-03-02 RX ORDER — ACETAMINOPHEN 325 MG/1
650 TABLET ORAL EVERY 6 HOURS PRN
Status: DISCONTINUED | OUTPATIENT
Start: 2025-03-02 | End: 2025-03-03 | Stop reason: HOSPADM

## 2025-03-02 RX ORDER — SODIUM CHLORIDE, SODIUM LACTATE, POTASSIUM CHLORIDE, CALCIUM CHLORIDE 600; 310; 30; 20 MG/100ML; MG/100ML; MG/100ML; MG/100ML
INJECTION, SOLUTION INTRAVENOUS CONTINUOUS
Status: DISCONTINUED | OUTPATIENT
Start: 2025-03-02 | End: 2025-03-02 | Stop reason: HOSPADM

## 2025-03-02 RX ORDER — ONDANSETRON 4 MG/1
4 TABLET, ORALLY DISINTEGRATING ORAL EVERY 8 HOURS PRN
Status: DISCONTINUED | OUTPATIENT
Start: 2025-03-02 | End: 2025-03-03 | Stop reason: HOSPADM

## 2025-03-02 RX ORDER — GABAPENTIN 400 MG/1
400 CAPSULE ORAL 2 TIMES DAILY
Status: DISCONTINUED | OUTPATIENT
Start: 2025-03-02 | End: 2025-03-03 | Stop reason: HOSPADM

## 2025-03-02 RX ORDER — ACETAMINOPHEN 325 MG/1
650 TABLET ORAL ONCE
Status: DISCONTINUED | OUTPATIENT
Start: 2025-03-02 | End: 2025-03-02 | Stop reason: HOSPADM

## 2025-03-02 RX ORDER — SODIUM CHLORIDE 0.9 % (FLUSH) 0.9 %
5-40 SYRINGE (ML) INJECTION PRN
Status: DISCONTINUED | OUTPATIENT
Start: 2025-03-02 | End: 2025-03-02 | Stop reason: HOSPADM

## 2025-03-02 RX ORDER — POTASSIUM CHLORIDE 1500 MG/1
40 TABLET, EXTENDED RELEASE ORAL PRN
Status: DISCONTINUED | OUTPATIENT
Start: 2025-03-02 | End: 2025-03-03 | Stop reason: HOSPADM

## 2025-03-02 RX ORDER — SODIUM CHLORIDE 9 MG/ML
INJECTION, SOLUTION INTRAVENOUS PRN
Status: CANCELLED | OUTPATIENT
Start: 2025-03-02

## 2025-03-02 RX ORDER — INSULIN LISPRO 100 [IU]/ML
0-4 INJECTION, SOLUTION INTRAVENOUS; SUBCUTANEOUS EVERY 4 HOURS
Status: DISCONTINUED | OUTPATIENT
Start: 2025-03-02 | End: 2025-03-02

## 2025-03-02 RX ORDER — SODIUM CHLORIDE 0.9 % (FLUSH) 0.9 %
5-40 SYRINGE (ML) INJECTION EVERY 12 HOURS SCHEDULED
Status: CANCELLED | OUTPATIENT
Start: 2025-03-02

## 2025-03-02 RX ORDER — ONDANSETRON 2 MG/ML
4 INJECTION INTRAMUSCULAR; INTRAVENOUS
Status: DISCONTINUED | OUTPATIENT
Start: 2025-03-02 | End: 2025-03-02 | Stop reason: HOSPADM

## 2025-03-02 RX ORDER — SCOPOLAMINE 1 MG/3D
PATCH, EXTENDED RELEASE TRANSDERMAL
Status: DISPENSED
Start: 2025-03-02 | End: 2025-03-03

## 2025-03-02 RX ORDER — ACETAMINOPHEN 500 MG
1000 TABLET ORAL ONCE
Status: CANCELLED | OUTPATIENT
Start: 2025-03-02 | End: 2025-03-02

## 2025-03-02 RX ORDER — KETOROLAC TROMETHAMINE 30 MG/ML
15 INJECTION, SOLUTION INTRAMUSCULAR; INTRAVENOUS
Status: DISCONTINUED | OUTPATIENT
Start: 2025-03-02 | End: 2025-03-02 | Stop reason: HOSPADM

## 2025-03-02 RX ORDER — DEXAMETHASONE SODIUM PHOSPHATE 10 MG/ML
INJECTION, SOLUTION INTRAMUSCULAR; INTRAVENOUS
Status: COMPLETED
Start: 2025-03-02 | End: 2025-03-02

## 2025-03-02 RX ORDER — ALBUTEROL SULFATE 90 UG/1
2 INHALANT RESPIRATORY (INHALATION) EVERY 6 HOURS PRN
Status: DISCONTINUED | OUTPATIENT
Start: 2025-03-02 | End: 2025-03-03 | Stop reason: HOSPADM

## 2025-03-02 RX ORDER — POTASSIUM CHLORIDE 7.45 MG/ML
10 INJECTION INTRAVENOUS PRN
Status: DISCONTINUED | OUTPATIENT
Start: 2025-03-02 | End: 2025-03-03 | Stop reason: HOSPADM

## 2025-03-02 RX ORDER — IPRATROPIUM BROMIDE AND ALBUTEROL SULFATE 2.5; .5 MG/3ML; MG/3ML
1 SOLUTION RESPIRATORY (INHALATION)
Status: DISCONTINUED | OUTPATIENT
Start: 2025-03-02 | End: 2025-03-02 | Stop reason: HOSPADM

## 2025-03-02 RX ORDER — GLUCAGON 1 MG/ML
1 KIT INJECTION PRN
Status: DISCONTINUED | OUTPATIENT
Start: 2025-03-02 | End: 2025-03-03 | Stop reason: HOSPADM

## 2025-03-02 RX ORDER — NICOTINE 21 MG/24HR
1 PATCH, TRANSDERMAL 24 HOURS TRANSDERMAL DAILY
Status: DISCONTINUED | OUTPATIENT
Start: 2025-03-02 | End: 2025-03-03 | Stop reason: HOSPADM

## 2025-03-02 RX ORDER — DEXTROSE MONOHYDRATE 100 MG/ML
INJECTION, SOLUTION INTRAVENOUS CONTINUOUS PRN
Status: DISCONTINUED | OUTPATIENT
Start: 2025-03-02 | End: 2025-03-03 | Stop reason: HOSPADM

## 2025-03-02 RX ORDER — ACETAMINOPHEN 650 MG/1
650 SUPPOSITORY RECTAL EVERY 6 HOURS PRN
Status: DISCONTINUED | OUTPATIENT
Start: 2025-03-02 | End: 2025-03-03 | Stop reason: HOSPADM

## 2025-03-02 RX ORDER — ATORVASTATIN CALCIUM 10 MG/1
20 TABLET, FILM COATED ORAL DAILY
Status: DISCONTINUED | OUTPATIENT
Start: 2025-03-02 | End: 2025-03-03 | Stop reason: HOSPADM

## 2025-03-02 RX ORDER — SUCCINYLCHOLINE CHLORIDE 20 MG/ML
INJECTION INTRAMUSCULAR; INTRAVENOUS
Status: DISCONTINUED | OUTPATIENT
Start: 2025-03-02 | End: 2025-03-02 | Stop reason: SDUPTHER

## 2025-03-02 RX ORDER — MAGNESIUM SULFATE IN WATER 40 MG/ML
2000 INJECTION, SOLUTION INTRAVENOUS PRN
Status: DISCONTINUED | OUTPATIENT
Start: 2025-03-02 | End: 2025-03-03 | Stop reason: HOSPADM

## 2025-03-02 RX ORDER — ACETAMINOPHEN 500 MG
TABLET ORAL
Status: DISCONTINUED
Start: 2025-03-02 | End: 2025-03-02

## 2025-03-02 RX ORDER — PANTOPRAZOLE SODIUM 40 MG/1
40 TABLET, DELAYED RELEASE ORAL
Status: DISCONTINUED | OUTPATIENT
Start: 2025-03-02 | End: 2025-03-03 | Stop reason: HOSPADM

## 2025-03-02 RX ORDER — BUMETANIDE 1 MG/1
1 TABLET ORAL DAILY
Status: DISCONTINUED | OUTPATIENT
Start: 2025-03-02 | End: 2025-03-03 | Stop reason: HOSPADM

## 2025-03-02 RX ORDER — SODIUM CHLORIDE 0.9 % (FLUSH) 0.9 %
5-40 SYRINGE (ML) INJECTION EVERY 12 HOURS SCHEDULED
Status: DISCONTINUED | OUTPATIENT
Start: 2025-03-02 | End: 2025-03-03 | Stop reason: HOSPADM

## 2025-03-02 RX ORDER — SODIUM CHLORIDE 0.9 % (FLUSH) 0.9 %
5-40 SYRINGE (ML) INJECTION EVERY 12 HOURS SCHEDULED
Status: DISCONTINUED | OUTPATIENT
Start: 2025-03-02 | End: 2025-03-02 | Stop reason: HOSPADM

## 2025-03-02 RX ORDER — LOSARTAN POTASSIUM 25 MG/1
25 TABLET ORAL DAILY
Status: DISCONTINUED | OUTPATIENT
Start: 2025-03-02 | End: 2025-03-03 | Stop reason: HOSPADM

## 2025-03-02 RX ORDER — LIDOCAINE HYDROCHLORIDE 20 MG/ML
INJECTION, SOLUTION INTRAVENOUS
Status: DISCONTINUED | OUTPATIENT
Start: 2025-03-02 | End: 2025-03-02 | Stop reason: SDUPTHER

## 2025-03-02 RX ORDER — FENTANYL CITRATE 50 UG/ML
INJECTION, SOLUTION INTRAMUSCULAR; INTRAVENOUS
Status: DISCONTINUED | OUTPATIENT
Start: 2025-03-02 | End: 2025-03-02 | Stop reason: SDUPTHER

## 2025-03-02 RX ORDER — ENOXAPARIN SODIUM 100 MG/ML
30 INJECTION SUBCUTANEOUS 2 TIMES DAILY
Status: DISCONTINUED | OUTPATIENT
Start: 2025-03-02 | End: 2025-03-03 | Stop reason: HOSPADM

## 2025-03-02 RX ORDER — ONDANSETRON 2 MG/ML
4 INJECTION INTRAMUSCULAR; INTRAVENOUS EVERY 6 HOURS PRN
Status: DISCONTINUED | OUTPATIENT
Start: 2025-03-02 | End: 2025-03-03 | Stop reason: HOSPADM

## 2025-03-02 RX ORDER — SCOPOLAMINE 1 MG/3D
1 PATCH, EXTENDED RELEASE TRANSDERMAL ONCE
Status: DISCONTINUED | OUTPATIENT
Start: 2025-03-02 | End: 2025-03-02

## 2025-03-02 RX ORDER — OXYCODONE HYDROCHLORIDE 5 MG/1
5 TABLET ORAL PRN
Status: ACTIVE | OUTPATIENT
Start: 2025-03-02 | End: 2025-03-02

## 2025-03-02 RX ADMIN — FENTANYL CITRATE 25 MCG: 50 INJECTION, SOLUTION INTRAMUSCULAR; INTRAVENOUS at 17:02

## 2025-03-02 RX ADMIN — PROPOFOL 25 MG: 10 INJECTION, EMULSION INTRAVENOUS at 17:33

## 2025-03-02 RX ADMIN — GABAPENTIN 400 MG: 400 CAPSULE ORAL at 20:32

## 2025-03-02 RX ADMIN — HYDROCODONE BITARTRATE AND ACETAMINOPHEN 1 TABLET: 5; 325 TABLET ORAL at 02:17

## 2025-03-02 RX ADMIN — PANTOPRAZOLE SODIUM 40 MG: 40 TABLET, DELAYED RELEASE ORAL at 06:29

## 2025-03-02 RX ADMIN — FENTANYL CITRATE 25 MCG: 50 INJECTION, SOLUTION INTRAMUSCULAR; INTRAVENOUS at 17:33

## 2025-03-02 RX ADMIN — FENTANYL CITRATE 25 MCG: 50 INJECTION, SOLUTION INTRAMUSCULAR; INTRAVENOUS at 17:27

## 2025-03-02 RX ADMIN — EPHEDRINE SULFATE 10 MG: 50 INJECTION INTRAVENOUS at 17:08

## 2025-03-02 RX ADMIN — ATORVASTATIN CALCIUM 20 MG: 10 TABLET, FILM COATED ORAL at 02:56

## 2025-03-02 RX ADMIN — MORPHINE SULFATE 4 MG: 4 INJECTION, SOLUTION INTRAMUSCULAR; INTRAVENOUS at 03:28

## 2025-03-02 RX ADMIN — FENTANYL CITRATE 100 MCG: 50 INJECTION, SOLUTION INTRAMUSCULAR; INTRAVENOUS at 16:50

## 2025-03-02 RX ADMIN — SODIUM CHLORIDE, SODIUM LACTATE, POTASSIUM CHLORIDE, AND CALCIUM CHLORIDE: .6; .31; .03; .02 INJECTION, SOLUTION INTRAVENOUS at 02:55

## 2025-03-02 RX ADMIN — PROPOFOL 25 MG: 10 INJECTION, EMULSION INTRAVENOUS at 17:31

## 2025-03-02 RX ADMIN — PROPOFOL 25 MG: 10 INJECTION, EMULSION INTRAVENOUS at 17:27

## 2025-03-02 RX ADMIN — MONTELUKAST 10 MG: 10 TABLET, FILM COATED ORAL at 20:32

## 2025-03-02 RX ADMIN — GLYCOPYRROLATE 0.1 MG: 0.2 INJECTION INTRAMUSCULAR; INTRAVENOUS at 15:35

## 2025-03-02 RX ADMIN — SODIUM CHLORIDE, PRESERVATIVE FREE 10 ML: 5 INJECTION INTRAVENOUS at 09:34

## 2025-03-02 RX ADMIN — PROPOFOL 25 MG: 10 INJECTION, EMULSION INTRAVENOUS at 17:32

## 2025-03-02 RX ADMIN — PROPOFOL 200 MG: 10 INJECTION, EMULSION INTRAVENOUS at 16:50

## 2025-03-02 RX ADMIN — ONDANSETRON 8 MG: 2 INJECTION INTRAMUSCULAR; INTRAVENOUS at 00:10

## 2025-03-02 RX ADMIN — SERTRALINE HYDROCHLORIDE 25 MG: 50 TABLET ORAL at 09:33

## 2025-03-02 RX ADMIN — HYDROCODONE BITARTRATE AND ACETAMINOPHEN 1 TABLET: 5; 325 TABLET ORAL at 20:34

## 2025-03-02 RX ADMIN — LOSARTAN POTASSIUM 25 MG: 25 TABLET, FILM COATED ORAL at 09:33

## 2025-03-02 RX ADMIN — ATORVASTATIN CALCIUM 20 MG: 10 TABLET, FILM COATED ORAL at 20:32

## 2025-03-02 RX ADMIN — GLYCOPYRROLATE 0.2 MG: 0.2 INJECTION INTRAMUSCULAR; INTRAVENOUS at 17:19

## 2025-03-02 RX ADMIN — SODIUM CHLORIDE 25 ML: 0.9 INJECTION, SOLUTION INTRAVENOUS at 14:10

## 2025-03-02 RX ADMIN — DEXAMETHASONE SODIUM PHOSPHATE 10 MG: 10 INJECTION, SOLUTION INTRAMUSCULAR; INTRAVENOUS at 15:35

## 2025-03-02 RX ADMIN — PROPOFOL 25 MG: 10 INJECTION, EMULSION INTRAVENOUS at 17:26

## 2025-03-02 RX ADMIN — PROPOFOL 60 MG: 10 INJECTION, EMULSION INTRAVENOUS at 17:23

## 2025-03-02 RX ADMIN — LIDOCAINE HYDROCHLORIDE 100 MG: 20 INJECTION, SOLUTION INTRAVENOUS at 16:50

## 2025-03-02 RX ADMIN — PROPOFOL 25 MG: 10 INJECTION, EMULSION INTRAVENOUS at 17:29

## 2025-03-02 RX ADMIN — IPRATROPIUM BROMIDE AND ALBUTEROL SULFATE 1 DOSE: .5; 2.5 SOLUTION RESPIRATORY (INHALATION) at 17:56

## 2025-03-02 RX ADMIN — KETOROLAC TROMETHAMINE 30 MG: 15 INJECTION, SOLUTION INTRAMUSCULAR; INTRAVENOUS at 00:11

## 2025-03-02 RX ADMIN — ACETAMINOPHEN 1000 MG: 500 TABLET ORAL at 16:36

## 2025-03-02 RX ADMIN — PROPOFOL 25 MG: 10 INJECTION, EMULSION INTRAVENOUS at 17:25

## 2025-03-02 RX ADMIN — SODIUM CHLORIDE, POTASSIUM CHLORIDE, SODIUM LACTATE AND CALCIUM CHLORIDE: 600; 310; 30; 20 INJECTION, SOLUTION INTRAVENOUS at 16:43

## 2025-03-02 RX ADMIN — SUCCINYLCHOLINE CHLORIDE 140 MG: 20 INJECTION, SOLUTION INTRAMUSCULAR; INTRAVENOUS at 16:50

## 2025-03-02 RX ADMIN — TAMSULOSIN HYDROCHLORIDE 0.4 MG: 0.4 CAPSULE ORAL at 09:33

## 2025-03-02 RX ADMIN — HYDROCODONE BITARTRATE AND ACETAMINOPHEN 1 TABLET: 5; 325 TABLET ORAL at 11:20

## 2025-03-02 RX ADMIN — MORPHINE SULFATE 4 MG: 4 INJECTION, SOLUTION INTRAMUSCULAR; INTRAVENOUS at 13:15

## 2025-03-02 RX ADMIN — SODIUM CHLORIDE, SODIUM LACTATE, POTASSIUM CHLORIDE, AND CALCIUM CHLORIDE: .6; .31; .03; .02 INJECTION, SOLUTION INTRAVENOUS at 13:02

## 2025-03-02 RX ADMIN — PROPOFOL 25 MG: 10 INJECTION, EMULSION INTRAVENOUS at 17:30

## 2025-03-02 RX ADMIN — MORPHINE SULFATE 4 MG: 4 INJECTION INTRAVENOUS at 00:11

## 2025-03-02 RX ADMIN — ENOXAPARIN SODIUM 30 MG: 100 INJECTION SUBCUTANEOUS at 20:35

## 2025-03-02 RX ADMIN — CIPROFLOXACIN 400 MG: 2 INJECTION, SOLUTION INTRAVENOUS at 02:57

## 2025-03-02 RX ADMIN — SODIUM CHLORIDE, PRESERVATIVE FREE 10 ML: 5 INJECTION INTRAVENOUS at 20:33

## 2025-03-02 RX ADMIN — INSULIN LISPRO 2 UNITS: 100 INJECTION, SOLUTION INTRAVENOUS; SUBCUTANEOUS at 22:05

## 2025-03-02 RX ADMIN — PROPOFOL 25 MG: 10 INJECTION, EMULSION INTRAVENOUS at 17:24

## 2025-03-02 RX ADMIN — GABAPENTIN 400 MG: 400 CAPSULE ORAL at 09:33

## 2025-03-02 RX ADMIN — CIPROFLOXACIN 400 MG: 2 INJECTION, SOLUTION INTRAVENOUS at 14:12

## 2025-03-02 ASSESSMENT — PAIN DESCRIPTION - FREQUENCY: FREQUENCY: INTERMITTENT

## 2025-03-02 ASSESSMENT — PAIN SCALES - GENERAL
PAINLEVEL_OUTOF10: 0
PAINLEVEL_OUTOF10: 7
PAINLEVEL_OUTOF10: 4
PAINLEVEL_OUTOF10: 6
PAINLEVEL_OUTOF10: 5
PAINLEVEL_OUTOF10: 0
PAINLEVEL_OUTOF10: 7
PAINLEVEL_OUTOF10: 10
PAINLEVEL_OUTOF10: 10
PAINLEVEL_OUTOF10: 6

## 2025-03-02 ASSESSMENT — PAIN DESCRIPTION - ORIENTATION
ORIENTATION: RIGHT;POSTERIOR
ORIENTATION: RIGHT;POSTERIOR
ORIENTATION: RIGHT

## 2025-03-02 ASSESSMENT — PAIN DESCRIPTION - ONSET: ONSET: ON-GOING

## 2025-03-02 ASSESSMENT — PAIN - FUNCTIONAL ASSESSMENT
PAIN_FUNCTIONAL_ASSESSMENT: ACTIVITIES ARE NOT PREVENTED
PAIN_FUNCTIONAL_ASSESSMENT: 0-10
PAIN_FUNCTIONAL_ASSESSMENT: ACTIVITIES ARE NOT PREVENTED
PAIN_FUNCTIONAL_ASSESSMENT: ACTIVITIES ARE NOT PREVENTED

## 2025-03-02 ASSESSMENT — PAIN DESCRIPTION - DESCRIPTORS
DESCRIPTORS: NAGGING
DESCRIPTORS: STABBING
DESCRIPTORS: DULL;ACHING
DESCRIPTORS: STABBING;SHOOTING
DESCRIPTORS: STABBING

## 2025-03-02 ASSESSMENT — PAIN DESCRIPTION - LOCATION
LOCATION: BACK
LOCATION: BACK
LOCATION: FLANK
LOCATION: BACK
LOCATION: FLANK
LOCATION: FLANK

## 2025-03-02 ASSESSMENT — PAIN DESCRIPTION - PAIN TYPE: TYPE: ACUTE PAIN;SURGICAL PAIN

## 2025-03-02 NOTE — ANESTHESIA POSTPROCEDURE EVALUATION
Department of Anesthesiology  Postprocedure Note    Patient: Keisha Turner  MRN: [id number]  YOB: 1965  Date of evaluation: 3/2/2025    Procedure Summary       Date: 03/02/25 Room / Location: [address]    Anesthesia Start: 1643 Anesthesia Stop: 1801    Procedure: CYSTOSCOPY, RETROGRADE PYELOGRAM, URETEROSCOPY, LASER LITHOTRIPSY (Right: Ureter) Diagnosis:       Kidney stones      (Kidney stones [N20.0])    Surgeons: Jef Anderson MD Responsible Provider: Adina Hagen MD    Anesthesia Type: General ASA Status: Not recorded            Anesthesia Type: General    Khris Phase I: Khris Score: 8    Khris Phase II:      Anesthesia Post Evaluation    Patient location during evaluation: PACU  Patient participation: waiting for patient participation  Level of consciousness: awake  Pain score: 0  Airway patency: patent  Nausea & Vomiting: no nausea and no vomiting  Cardiovascular status: hemodynamically stable and tachycardic  Respiratory status: acceptable, spontaneous ventilation and face mask  Hydration status: euvolemic  Multimodal analgesia pain management approach  Pain management: adequate    There were no known notable events for this encounter.

## 2025-03-02 NOTE — ED NOTES
vomiting)     hx of motion sickness    UTI (urinary tract infection)     \"last one 11/2020\"    Vertigo     \"have trouble leaning to my left and laying flat with this\"    Wears glasses        Assessment    Vitals:        Vitals:    03/01/25 2303   BP: (!) 163/118   Pulse: 98   Resp: 22   Temp: 99.1 °F (37.3 °C)   SpO2: 94%   Weight: 104.3 kg (230 lb)   Height: 1.676 m (5' 6\")       PO Status: Nothing by Mouth    O2 Flow Rate: O2 Device: None (Room air)      Cardiac Rhythm:     Last documented pain medication administered: See MAR    NIH Score: NIH       Active LDA's:   Peripheral IV 03/01/25 Left Antecubital (Active)   Site Assessment Clean, dry & intact 03/01/25 2331   Line Status Blood return noted;Flushed 03/01/25 2331   Phlebitis Assessment No symptoms 03/01/25 2331   Infiltration Assessment 0 03/01/25 2331   Dressing Status New dressing applied;Clean, dry & intact 03/01/25 2331   Dressing Type Transparent 03/01/25 2331   Dressing Intervention New 03/01/25 2331       Pertinent or High Risk Medications/Drips: no   If Yes, please provide details:       Blood Product Administration: no  If Yes, please provide details:     Recommendation    Incomplete orders Inpt  Additional Comments:    If any further questions, please call Sending RN at [phone]    Electronically signed by: Electronically signed by Farzana Love RN on 3/2/2025 at 1:08 AM

## 2025-03-02 NOTE — ED PROVIDER NOTES
Triage Chief Complaint:   Abdominal Pain and Vomiting (Right flank, right lower abdominal pain )    [place]:  Keisha Turner is a 59 y.o. female that presents with 2 to 3 hours of acute onset severe right flank pain radiating to the right lower quadrant associated with nausea and vomiting similar to prior kidney stones.  Denies any dysuria, hematuria, fevers, other recent illness.  No alleviating or exacerbating factors.    ROS:  At least 6 systems reviewed and otherwise acutely negative except as in the [place].    Past Medical History:   Diagnosis Date    Arthritis     Arthritis of left knee     Asthma     last flare up summer 2020    Diabetes mellitus (HCC)     \"use to take Metformin been off this since  2019\"    Disorder of rotator cuff syndrome of left shoulder and allied disorder     H/O cardiovascular stress test 2/24/16    EF68% normal study    History of motion sickness     Hx of echocardiogram 2/24/16    EF 55%, normal LV function, moderate LVH    Hyperlipidemia     Hypertension     Kidney stones     \"had kidney stone 11/2020- removed with surg- , back in 2009- had to put tube in my back(nephostomy tube) for kidney stone-had lithotripsy    Migraine     Obesity     PONV (postoperative nausea and vomiting)     hx of motion sickness    UTI (urinary tract infection)     \"last one 11/2020\"    Vertigo     \"have trouble leaning to my left and laying flat with this\"    Wears glasses      Past Surgical History:   Procedure Laterality Date    BLADDER SURGERY Right 5/16/2023    CYSTOSCOPY RETROGRADE PYELOGRAM STENT INSERTION performed by Rosibel Man MD at [facility] OR    CHOLECYSTECTOMY  age22's    CYSTOSCOPY INSERTION / REMOVAL STENT / STONE N/A 11/28/2020    CYSTOSCOPY RETROGRADE PYELOGRAM STENT INSERTION performed by Koko Rodriguez MD at [facility] OR    CYSTOSCOPY INSERTION / REMOVAL STENT / STONE Right 1/21/2021    CYSTOSCOPY STENT EXCHANGE URETEROSCOPY EXTRACTION OF STONES performed by Luis Daniel Marsh MD at [facility] OR

## 2025-03-02 NOTE — OP NOTE
Operative Note      Patient: Keisha Turner  YOB: 1965  MRN: [id number]    Date of Procedure: 3/2/2025    Pre-Op Diagnosis Codes:      * Kidney stones [N20.0], Right ureteral calculi    Post-Op Diagnosis: Right renal calculus       Procedure(s):  CYSTOSCOPY, RETROGRADE PYELOGRAM, URETEROSCOPY, LASER LITHOTRIPSY RIGHT    Surgeon(s):  Jef Anderson MD    Assistant:   * No surgical staff found *    Anesthesia: General    Estimated Blood Loss (mL): Minimal    Complications: None    Specimens:   * No specimens in log *    Implants:  * No implants in log *      Drains: * No LDAs found *    Findings:  Infection Present At Time Of Surgery (PATOS) (choose all levels that have infection present):  No infection present  Other Findings: 1. No distal ureteral calculus noted  2. 5 mm right renal calculus noted in mid calyx and dusted.    Detailed Description of Procedure:   Indication for procedure: 58 yo F with hx of urolithiasis presents w two right ureteral obstructing calculi  Pt is not febrile. Recommend cystoscopy, right retrograde pyelogram, right ureteroscopy, José Luis laser lithotripsy, and possible ureteral stent. Risks and Complications were reviewed in detail including bleeding, infection, ureteral injury, bladder injury, and need for additional surgical procedures. Decision for surgery.      Details of procedure:  After placing patient on surgical table, she was inducted with general anesthesia. With relaxation, we were able to place her legs in stirrups for a dorsal lithotomy position. She was prepped and draped according to sterile technique. Time out was taken.      A 21 F cystoscope was inserted into the urethra and to the bladder. The bladder was examined and otherwise unremarkable. Right ureteral orifice was identified. Retrograde pyelogram was performed. No distal ureteral filling defect but a subtle filling defect was noted in the renal pelvis.  Zip wire was inserted to the renal

## 2025-03-02 NOTE — CONSULTS
Consult completed. [employer] Safety® Deep Access 20g 2 ½\" IV catheter initiated into right forearm x 1 attempt using ultrasound guided technique. Brisk blood return, flushes without resistance. Labs collected and sent at this time. Patient tolerated well. Primary nurse DUSTY Barrera notified.      Consult the Vascular Access Team for questions, concerns, or change in patient's condition.      
is also nonobstructive right nephrolithiasis 3.  There is left renal atrophy with cortical based calcification 4.  Aneurysmal dilatation of the infrarenal abdominal aorta measuring 32 mm For management of fusiform AAA: *  2.6-2.9 cm aorta, recommend follow-up every 5 years or aortas meeting the criteria for AAA (>1.5 x proximal normal segment; no f/u if < 1.5 x proximal normal segment; no f/u for aortas < 2.6 cm). *  3.0-3.4 cm AAA, recommend follow-up every 3 years. *  3.5-3.9 cm AAA, recommend follow-up every 2 years. *  4.0-4.4 cm AAA, recommend follow-up every 12 months and recommend vascular consultation. *  4.5-5.4 cm AAA, recommend follow-up every 6 months and recommend vascular consultation. *  >5.5 cm AAA, recommend referral to vascular specialist. ** For management of saccular abdominal aortic aneurysms of any size, recommend vascular consultation. **Note: For AAA enlargement of >0.5 cm in 6 months or >1 cm in 1 year, recommend  vascular consultation. References: J Am Steve Radiol 2013; 10(10):789-794; J Vasc Surg. 2018; 67:2-77 This dictation was created with voice recognition software.  While attempts have  been made to review the dictation as it is transcribed, on occasion the spoken word can be misinterpreted by the technology leading to omissions or inappropriate words, phrases or sentences.  Dictated and Electronically Signed By: Primo Khanna MD 3/2/2025 0:43             Assessment & Plan:      Keisha Turner is a 59 y.o. year old female admitted 3/1/2025 for right flank pain due to multiple proximal and distal ureteral calculi.    1) Ureteral calculi: Recommend cystoscopy, right retrograde pyelogram, right ureteroscopy, stone manipulation, and possible stent. Risks and complications reviewed including bleeding, infection, ureteral injury, bladder injury, and need for additional surgical procedures. Decision for surgery    2) Atrophic left kidney with calculi: appears to be chronic      Patient seen and

## 2025-03-02 NOTE — H&P
Pain    Vomiting     Right flank, right lower abdominal pain        Patient is a 59 y.o. female with a PMHx as above who presented to the ED with right flank pain. Patient presents with acute onset right flank pain radiating to her groin associated with nausea and vomiting.  She states that she believes she may have passed a stone approximately a week ago.Denied any F/C, HA, dizziness, presyncope, syncope, cough, SOB, CP,  bleeding (hemoptysis / hematemesis, hematuria, BRBPR), C/D.       History obtained from: Patient, ED provider, EMR and family     ROS:     Pertinent positives and negatives discussed in HPI above.    Objective:     No intake or output data in the 24 hours ending 03/02/25 0059     Vitals:   Vitals:    03/01/25 2303   BP: (!) 163/118   Pulse: 98   Resp: 22   Temp: 99.1 °F (37.3 °C)   SpO2: 94%   Weight: 104.3 kg (230 lb)   Height: 1.676 m (5' 6\")     BMI: Body mass index is 37.12 kg/m².  General: Awake. WDWN.    HEENT: PERRLA. Vision grossly intact. Hearing grossly intact. Oropharynx clear.   Neck: Supple. No JVD.   CV: RRR. NL S1/S2. No murmurs. CR <2 secs.   Pulm: NL effort on RA. CTAB.   GI: +BS x4. Soft. ND/NT.   : + right CVA tenderness. No Mendoza catheter.  Skin: Intact, warm and dry.  MSK: No gross joint deformities. Full ROM. Muscle strength is 5/5 B/L. No BLE edema.   Neuro: AAOx3. CNs grossly intact. Normal speech. No focal deficit.   Psych: Good judgement and reason.     Past History:     PMHx:   Past Medical History:   Diagnosis Date    Arthritis     Arthritis of left knee     Asthma     last flare up summer 2020    Diabetes mellitus (HCC)     \"use to take Metformin been off this since  2019\"    Disorder of rotator cuff syndrome of left shoulder and allied disorder     H/O cardiovascular stress test 2/24/16    EF68% normal study    History of motion sickness     Hx of echocardiogram 2/24/16    EF 55%, normal LV function, moderate LVH    Hyperlipidemia     Hypertension     Kidney stones

## 2025-03-02 NOTE — PLAN OF CARE
Problem: Chronic Conditions and Co-morbidities  Goal: Patient's chronic conditions and co-morbidity symptoms are monitored and maintained or improved  3/2/2025 1444 by Kristy Ballard RN  Outcome: Progressing  3/2/2025 0228 by Holly Davey RN  Outcome: Progressing     Problem: Discharge Planning  Goal: Discharge to home or other facility with appropriate resources  3/2/2025 1444 by Kristy Ballard RN  Outcome: Progressing  3/2/2025 0228 by Holly Davey RN  Outcome: Progressing     Problem: Pain  Goal: Verbalizes/displays adequate comfort level or baseline comfort level  3/2/2025 1444 by Kristy Ballard RN  Outcome: Progressing  3/2/2025 0228 by Holly Davey RN  Outcome: Progressing     Problem: Safety - Adult  Goal: Free from fall injury  3/2/2025 1444 by Kristy Ballard RN  Outcome: Progressing  3/2/2025 0228 by Holly Davey RN  Outcome: Progressing     Problem: ABCDS Injury Assessment  Goal: Absence of physical injury  3/2/2025 1444 by Kristy Ballard RN  Outcome: Progressing  3/2/2025 0228 by Holly Davey RN  Outcome: Progressing

## 2025-03-02 NOTE — ED TRIAGE NOTES
Arrives per wheelchair, reports sudden onset of right flank right lower abdominal pain, approx 2 hrs ago.   (+) nausea with vomiting.   Hx of similar pain with previous kidney stones.     Actively retching at time of triage, moaning. C/o severe pain to right lower abdomen, right flank.     AAOx4, skin w/d oral mucosa moist pale, pink lips nailbeds pink with brisk crf, resp shallow, unlabored with symmetrical rise and fall of chest wall.

## 2025-03-03 VITALS
OXYGEN SATURATION: 97 % | RESPIRATION RATE: 18 BRPM | WEIGHT: 233.25 LBS | SYSTOLIC BLOOD PRESSURE: 133 MMHG | DIASTOLIC BLOOD PRESSURE: 66 MMHG | HEART RATE: 81 BPM | HEIGHT: 66 IN | TEMPERATURE: 98.3 F | BODY MASS INDEX: 37.49 KG/M2

## 2025-03-03 LAB
ALBUMIN SERPL-MCNC: 3.8 G/DL (ref 3.4–5)
ALBUMIN/GLOB SERPL: 1.3 {RATIO} (ref 1.1–2.2)
ALP SERPL-CCNC: 125 U/L (ref 40–129)
ALT SERPL-CCNC: 24 U/L (ref 10–40)
ANION GAP SERPL CALCULATED.3IONS-SCNC: 11 MMOL/L (ref 9–17)
AST SERPL-CCNC: 26 U/L (ref 15–37)
BASOPHILS # BLD: 0.01 K/UL
BASOPHILS NFR BLD: 0 % (ref 0–1)
BILIRUB SERPL-MCNC: 0.3 MG/DL (ref 0–1)
BUN SERPL-MCNC: 16 MG/DL (ref 7–20)
CALCIUM SERPL-MCNC: 10 MG/DL (ref 8.3–10.6)
CHLORIDE SERPL-SCNC: 105 MMOL/L (ref 99–110)
CO2 SERPL-SCNC: 24 MMOL/L (ref 21–32)
CREAT SERPL-MCNC: 1.1 MG/DL (ref 0.6–1.1)
EOSINOPHIL # BLD: 0 K/UL
EOSINOPHILS RELATIVE PERCENT: 0 % (ref 0–3)
ERYTHROCYTE [DISTWIDTH] IN BLOOD BY AUTOMATED COUNT: 14.6 % (ref 11.7–14.9)
GFR, ESTIMATED: 50 ML/MIN/1.73M2
GLUCOSE BLD-MCNC: 239 MG/DL (ref 74–99)
GLUCOSE BLD-MCNC: 245 MG/DL (ref 74–99)
GLUCOSE SERPL-MCNC: 194 MG/DL (ref 74–99)
HCT VFR BLD AUTO: 35.6 % (ref 37–47)
HGB BLD-MCNC: 11.2 G/DL (ref 12.5–16)
IMM GRANULOCYTES # BLD AUTO: 0.07 K/UL
IMM GRANULOCYTES NFR BLD: 1 %
LYMPHOCYTES NFR BLD: 1.19 K/UL
LYMPHOCYTES RELATIVE PERCENT: 9 % (ref 24–44)
MCH RBC QN AUTO: 27.7 PG (ref 27–31)
MCHC RBC AUTO-ENTMCNC: 31.5 G/DL (ref 32–36)
MCV RBC AUTO: 88.1 FL (ref 78–100)
MONOCYTES NFR BLD: 0.67 K/UL
MONOCYTES NFR BLD: 5 % (ref 0–4)
NEUTROPHILS NFR BLD: 85 % (ref 36–66)
NEUTS SEG NFR BLD: 11.33 K/UL
PLATELET # BLD AUTO: 238 K/UL (ref 140–440)
PMV BLD AUTO: 9.1 FL (ref 7.5–11.1)
POTASSIUM SERPL-SCNC: 3.8 MMOL/L (ref 3.5–5.1)
PROT SERPL-MCNC: 6.8 G/DL (ref 6.4–8.2)
RBC # BLD AUTO: 4.04 M/UL (ref 4.2–5.4)
SODIUM SERPL-SCNC: 140 MMOL/L (ref 136–145)
WBC OTHER # BLD: 13.3 K/UL (ref 4–10.5)

## 2025-03-03 PROCEDURE — 36415 COLL VENOUS BLD VENIPUNCTURE: CPT

## 2025-03-03 PROCEDURE — 2500000003 HC RX 250 WO HCPCS: Performed by: SPECIALIST

## 2025-03-03 PROCEDURE — 6370000000 HC RX 637 (ALT 250 FOR IP): Performed by: SPECIALIST

## 2025-03-03 PROCEDURE — 82962 GLUCOSE BLOOD TEST: CPT

## 2025-03-03 PROCEDURE — 6360000002 HC RX W HCPCS: Performed by: SPECIALIST

## 2025-03-03 PROCEDURE — 80053 COMPREHEN METABOLIC PANEL: CPT

## 2025-03-03 PROCEDURE — 85025 COMPLETE CBC W/AUTO DIFF WBC: CPT

## 2025-03-03 RX ORDER — ONDANSETRON 4 MG/1
4 TABLET, FILM COATED ORAL DAILY PRN
Qty: 7 TABLET | Refills: 0 | Status: SHIPPED | OUTPATIENT
Start: 2025-03-03 | End: 2025-03-10

## 2025-03-03 RX ORDER — CIPROFLOXACIN 500 MG/1
500 TABLET, FILM COATED ORAL 2 TIMES DAILY
Qty: 3 TABLET | Refills: 0 | Status: SHIPPED | OUTPATIENT
Start: 2025-03-03 | End: 2025-03-05

## 2025-03-03 RX ORDER — HYDROCODONE BITARTRATE AND ACETAMINOPHEN 5; 325 MG/1; MG/1
1 TABLET ORAL EVERY 6 HOURS PRN
Qty: 12 TABLET | Refills: 0 | Status: SHIPPED | OUTPATIENT
Start: 2025-03-03 | End: 2025-03-06

## 2025-03-03 RX ORDER — TAMSULOSIN HYDROCHLORIDE 0.4 MG/1
0.4 CAPSULE ORAL DAILY
Qty: 14 CAPSULE | Refills: 0 | Status: SHIPPED | OUTPATIENT
Start: 2025-03-03 | End: 2025-03-17

## 2025-03-03 RX ADMIN — ENOXAPARIN SODIUM 30 MG: 100 INJECTION SUBCUTANEOUS at 08:04

## 2025-03-03 RX ADMIN — SERTRALINE HYDROCHLORIDE 25 MG: 50 TABLET ORAL at 08:03

## 2025-03-03 RX ADMIN — LOSARTAN POTASSIUM 25 MG: 25 TABLET, FILM COATED ORAL at 08:03

## 2025-03-03 RX ADMIN — HYDROCODONE BITARTRATE AND ACETAMINOPHEN 1 TABLET: 5; 325 TABLET ORAL at 08:02

## 2025-03-03 RX ADMIN — GABAPENTIN 400 MG: 400 CAPSULE ORAL at 08:03

## 2025-03-03 RX ADMIN — INSULIN LISPRO 1 UNITS: 100 INJECTION, SOLUTION INTRAVENOUS; SUBCUTANEOUS at 08:04

## 2025-03-03 RX ADMIN — SODIUM CHLORIDE, PRESERVATIVE FREE 10 ML: 5 INJECTION INTRAVENOUS at 08:10

## 2025-03-03 RX ADMIN — INSULIN LISPRO 1 UNITS: 100 INJECTION, SOLUTION INTRAVENOUS; SUBCUTANEOUS at 09:43

## 2025-03-03 RX ADMIN — PANTOPRAZOLE SODIUM 40 MG: 40 TABLET, DELAYED RELEASE ORAL at 05:48

## 2025-03-03 RX ADMIN — TAMSULOSIN HYDROCHLORIDE 0.4 MG: 0.4 CAPSULE ORAL at 08:02

## 2025-03-03 RX ADMIN — CIPROFLOXACIN 400 MG: 2 INJECTION, SOLUTION INTRAVENOUS at 02:02

## 2025-03-03 ASSESSMENT — PAIN DESCRIPTION - DESCRIPTORS: DESCRIPTORS: ACHING

## 2025-03-03 ASSESSMENT — PAIN DESCRIPTION - ORIENTATION: ORIENTATION: RIGHT

## 2025-03-03 ASSESSMENT — PAIN SCALES - GENERAL: PAINLEVEL_OUTOF10: 6

## 2025-03-03 ASSESSMENT — PAIN DESCRIPTION - LOCATION: LOCATION: FLANK

## 2025-03-03 NOTE — PLAN OF CARE
Problem: Chronic Conditions and Co-morbidities  Goal: Patient's chronic conditions and co-morbidity symptoms are monitored and maintained or improved  3/2/2025 2245 by Allison Aguilar RN  Outcome: Progressing  3/2/2025 1444 by Kristy Ballard RN  Outcome: Progressing     Problem: Discharge Planning  Goal: Discharge to home or other facility with appropriate resources  3/2/2025 2245 by Allison Aguilar RN  Outcome: Progressing  3/2/2025 1444 by Kristy Ballard RN  Outcome: Progressing     Problem: Pain  Goal: Verbalizes/displays adequate comfort level or baseline comfort level  3/2/2025 2245 by Allison Aguilar RN  Outcome: Progressing  3/2/2025 1444 by Kristy Ballard RN  Outcome: Progressing     Problem: Safety - Adult  Goal: Free from fall injury  3/2/2025 2245 by Allison Aguilar RN  Outcome: Progressing  3/2/2025 1444 by Kristy Ballard, RN  Outcome: Progressing     Problem: ABCDS Injury Assessment  Goal: Absence of physical injury  3/2/2025 2245 by Allison Aguilar RN  Outcome: Progressing  3/2/2025 1444 by Kristy Ballard RN  Outcome: Progressing      Subjective:       Patient ID: Shy Plata is a 72 y.o. female.    Chief Complaint: Follow-up (positive fit kit)    Pt presents for annual and f/u on a positive fit kit. Last annual with PCP Dr. Hanley was 11-18-16. Had HRA visit 12-6-17 with JULIO Alcazar. She would like to have a colonoscopy at a [facility].    Also due for mammogram and annual labs, will set up, not fasting today.    No complaints today.      Review of Systems   Constitutional: Negative for activity change, appetite change and unexpected weight change.   HENT: Negative for dental problem and hearing loss.    Eyes: Negative for visual disturbance.   Respiratory: Negative for apnea, cough, chest tightness and shortness of breath.    Cardiovascular: Negative for chest pain, palpitations and leg swelling.   Gastrointestinal: Positive for blood in stool. Negative for abdominal distention, abdominal pain, anal bleeding, constipation, diarrhea, nausea, rectal pain and vomiting.        Positive Fit kit as per HPI   Endocrine: Negative for cold intolerance, heat intolerance, polydipsia, polyphagia and polyuria.   Genitourinary: Negative for difficulty urinating, hematuria and vaginal pain.   Musculoskeletal: Negative for arthralgias.   Skin: Negative for color change.   Allergic/Immunologic: Negative for environmental allergies, food allergies and immunocompromised state.   Neurological: Negative for dizziness, speech difficulty and weakness.   Hematological: Negative for adenopathy. Does not bruise/bleed easily.   Psychiatric/Behavioral: Negative for agitation, behavioral problems, sleep disturbance and suicidal ideas.       Review of patient's allergies indicates:  No Known Allergies      Current Outpatient Prescriptions:     b complex vitamins capsule, Take 1 capsule by mouth once daily., Disp: , Rfl:     Patient Active Problem List   Diagnosis    Hyperhidrosis of axilla    H/O exposure to tuberculosis     Past Medical History:  "  Diagnosis Date    H/O exposure to tuberculosis     Her sister had tb, her PPDs come back positive    Pneumonia      History reviewed. No pertinent surgical history.  Social History     Social History    Marital status:      Spouse name: N/A    Number of children: 0    Years of education: N/A     Occupational History    retired      Social History Main Topics    Smoking status: Former Smoker     Packs/day: 1.00     Years: 12.00     Quit date: 9/28/1976    Smokeless tobacco: Never Used    Alcohol use No    Drug use: No    Sexual activity: No     Other Topics Concern    None     Social History Narrative    None     Family History   Problem Relation Age of Onset    Cancer Mother         lymphoma    Hypertension Mother     Hearing loss Father     Heart attack Father     Hypertension Father     Heart disease Father 50        MI    Hypertension Sister     Hearing loss Sister     Heart disease Sister 51        MI    Hearing loss Brother     Heart attack Brother     Coronary artery disease Brother     Hypertension Brother     Heart disease Brother 70        MI    Cancer Sister 67        breast    Hypertension Sister     Cancer Sister         lymphoma    Hypertension Sister        Objective:       Vitals:    07/11/18 1524   BP: 124/70   Pulse: 74   SpO2: 99%   Weight: 76.1 kg (167 lb 12.3 oz)   Height: 5' 4" (1.626 m)   PainSc: 0-No pain     Body mass index is 28.8 kg/m².    Physical Exam   Constitutional: She is oriented to person, place, and time. Vital signs are normal. She appears well-developed and well-nourished.   overweight   HENT:   Head: Normocephalic.   Right Ear: Hearing, tympanic membrane, external ear and ear canal normal.   Left Ear: Hearing, tympanic membrane, external ear and ear canal normal.   Eyes: Conjunctivae, EOM and lids are normal. Pupils are equal, round, and reactive to light. Lids are everted and swept, no foreign bodies found.   Neck: Trachea normal, normal " range of motion and full passive range of motion without pain. Neck supple. No JVD present. Carotid bruit is not present.   Cardiovascular: Normal rate, regular rhythm, S1 normal, S2 normal, normal heart sounds, intact distal pulses and normal pulses.    Pulmonary/Chest: Effort normal and breath sounds normal.   Abdominal: Soft. Normal appearance and bowel sounds are normal. There is no hepatosplenomegaly.   Musculoskeletal: Normal range of motion.   Neurological: She is alert and oriented to person, place, and time. She has normal strength and normal reflexes.   Skin: Skin is warm, dry and intact. Capillary refill takes less than 2 seconds.   Psychiatric: She has a normal mood and affect. Her speech is normal and behavior is normal. Judgment and thought content normal. Cognition and memory are normal.   Nursing note and vitals reviewed.      Assessment:       1. Encounter for health maintenance examination in adult    2. BMI 28.0-28.9,adult    3. Screening for cholesterol level    4. Breast cancer screening by mammogram    5. Colon cancer screening    6. Family history of heart disease    7. Overweight     8. Thyroid disorder screen    9. Routine lab draw        Plan:     Shy was seen today for follow-up.    Diagnoses and all orders for this visit:    Encounter for health maintenance examination in adult  Exam done    Health maintenance updated    BMI 28.0-28.9,adult  BMI reviewed    Screening for cholesterol level  -     Lipid panel; Future    Breast cancer screening by mammogram  -     Mammo Digital Screening Bilat with CAD; Future    Colon cancer screening  -     Case request GI: COLONOSCOPY    Family history of heart disease  -     Lipid panel; Future    Overweight   -     Lipid panel; Future    Thyroid disorder screen  Check TSH    Routine lab draw  -     CBC auto differential; Future  -     Comprehensive metabolic panel; Future  -     Urinalysis; Future    Mammogram and Colonoscopy ordered    Labs  ordered, fasting, will call with results, if results ok RTC in 1 yr for annual

## 2025-03-03 NOTE — DISCHARGE SUMMARY
Discharge Summary    Name:  Keisha Turner /Age/Sex: 1965 (59 y.o. female)   Admit Date: 3/1/2025  Discharge Date: 3/3/25    MRN & CSN:  [id number] & [id number] Encounter Date and Time 3/3/25 10:54 AM EST    Attending:  Pardeep Briones MD Discharging Provider: Nahomy Vazquez PA-C       Hospital Course:     Brief HPI:   Patient is a 59 y.o. female with a PMHx as above who presented to the ED with right flank pain. Patient presents with acute onset right flank pain radiating to her groin associated with nausea and vomiting.  She states that she believes she may have passed a stone approximately a week ago.Denied any F/C, HA, dizziness, presyncope, syncope, cough, SOB, CP,  bleeding (hemoptysis / hematemesis, hematuria, BRBPR), C/D.     In the ER, she was found to have obstructive uropathy on the right side with 2 calculi seen in the right ureter, one at the UPJ, and 1 in the distal ureter. Her urine was noted to have some infection.  She was started on Cipro given her history of MDRO.  She was admitted for urology consultation and pain control.  Patient had a cystoscopy, right retrograde pyelogram, right ureteroscopy, and laser lithotripsy on 3/2/2025 with Dr. Anderson.  Patient had her potassium replaced while in the hospital.  Her nausea and vomiting improved after the procedure.  Patient felt improved and ready for discharge today.  Urology cleared her for discharge to follow-up in 1 month outpatient for a repeat renal ultrasound.  She was discharged on Cipro as her urine culture had not resulted for a total of 3 days and will follow-up with her primary care provider for the incidental finding of intrarenal aortic aneurysm which I discussed with her at bedside.  Dr. Briones was in agreement with the above plan.       Brief Problem Based Course:     Right sided ureterolithiasis with hydronephrosis, status post lithotripsy on 3/2/2025, outpatient follow-up with urology in 1 month  Nausea, vomiting,

## 2025-03-03 NOTE — PROGRESS NOTES
Keisha Turner is 59 year-old female admitted for ureterolithiasis with hydronephrosis. She reports recurrent kidney stones. Will continue IV Cipro, antiemetics, and Flomax. Awaiting for urology evaluation.     ALEJANDRO Ventura, CNP  
1747- pt received from OR. Monitors placed and alarms on. Report received from Bartolome CARIAS. Pt drowsy but arouses to name being called.   1803- pt resting comfortably. Denies any pain or nausea. Tolerating ice chips well.  1810- pt resting comfortably. Denies any needs.  1814- blood glucose 167  1818- report called to JANETH Wagner RN prior to transport to inpatient room.  1820- food tray ordered.  1838- pt repositioned in bed. Linens clean and dry. Denies any needs.  1852- pt transported to inpatient room.  
4 Eyes Skin Assessment     NAME:  Keisha Turner  YOB: 1965  MEDICAL RECORD NUMBER:  [id number]    The patient is being assessed for  Admission    I agree that at least one RN has performed a thorough Head to Toe Skin Assessment on the patient. ALL assessment sites listed below have been assessed.      Areas assessed by both nurses:    Head, Face, Ears, Shoulders, Back, Chest, Arms, Elbows, Hands, Sacrum. Buttock, Coccyx, Ischium, Legs. Feet and Heels, and Under Medical Devices         Does the Patient have a Wound? No noted wound(s)       Jhonatan Prevention initiated by RN: No  Wound Care Orders initiated by RN: No    Pressure Injury (Stage 3,4, Unstageable, DTI, NWPT, and Complex wounds) if present, place Wound referral order by RN under : No    New Ostomies, if present place, Ostomy referral order under : No     Nurse 1 eSignature: Electronically signed by Holly Davey RN on 3/2/25 at 2:37 AM EST    **SHARE this note so that the co-signing nurse can place an eSignature**    Nurse 2 eSignature: Electronically signed by Kristy Ballard RN on 3/2/25 at 10:09 AM EST   
Outpatient Pharmacy Progress Note for Meds-to-Beds    Total number of Prescriptions Filled: 4    Additional Documentation:  Medications given to nurse luis a to provide to patient   Was taking meds in when RN luis a said he could take them in since she was a contact room.      Thank you for letting us serve your patients.  [facility] - [address] [address]    Phone: [phone]    Fax: [phone]        
recommend follow-up every 12 months and recommend vascular consultation. *  4.5-5.4 cm AAA, recommend follow-up every 6 months and recommend vascular consultation. *  >5.5 cm AAA, recommend referral to vascular specialist. ** For management of saccular abdominal aortic aneurysms of any size, recommend vascular consultation. **Note: For AAA enlargement of >0.5 cm in 6 months or >1 cm in 1 year, recommend  vascular consultation. References: J Am Steve Radiol 2013; 10(10):789-794; J Vasc Surg. 2018; 67:2-77 This dictation was created with voice recognition software.  While attempts have  been made to review the dictation as it is transcribed, on occasion the spoken word can be misinterpreted by the technology leading to omissions or inappropriate words, phrases or sentences.  Dictated and Electronically Signed By: Primo Khanna MD 3/2/2025 0:43        Assessment & Plan:      Keisha Turner is a 59 y.o. female with PMHx of urolithiasis requiring intervention, T2DM, CKD stage III, HTN, HLD, and GERD admitted 3/1/2025 for right ureteral stone    1) Right ureteral stone: CT A/P revealed right-sided obstructive uropathy secondary to a distal right ureteral stone and a right UPJ stone.  Also noted an additional nonobstructing right renal calculus.   POD #1 cystoscopy, right retrograde pyelogram, right ureteroscopy, and laser lithotripsy (3/2/2025)   Outpatient follow-up in 1 month with renal/bladder US done a few days prior    Patient seen and examined, chart reviewed.   Electronically signed by MANDEEP Crisostomo on 3/3/2025 at 9:33 AM

## 2025-03-03 NOTE — DISCHARGE INSTRUCTIONS
Incidentally, there was aneurysmal dilatation of the infrarenal abdominal aorta measuring 32 mm.  We discussed this during hospitalization.  Please follow-up with your primary care provider for outpatient screening and follow-up

## 2025-03-04 LAB
MICROORGANISM SPEC CULT: ABNORMAL
SERVICE CMNT-IMP: ABNORMAL
SPECIMEN DESCRIPTION: ABNORMAL

## 2025-03-04 ASSESSMENT — ENCOUNTER SYMPTOMS: SHORTNESS OF BREATH: 1

## 2025-03-04 NOTE — ANESTHESIA PRE PROCEDURE
STENT INSERTION performed by Rosibel Man MD at [facility] OR   • CHOLECYSTECTOMY  age22's   • CYSTOSCOPY INSERTION / REMOVAL STENT / STONE N/A 11/28/2020    CYSTOSCOPY RETROGRADE PYELOGRAM STENT INSERTION performed by Koko Rodriguez MD at [facility] OR   • CYSTOSCOPY INSERTION / REMOVAL STENT / STONE Right 1/21/2021    CYSTOSCOPY STENT EXCHANGE URETEROSCOPY EXTRACTION OF STONES performed by Luis Daniel Marsh MD at [facility] OR   • DEBRIDEMENT Right 7/23/2024    CYSTOSCOPY RETROGRADE PYELOGRAM STENT INSERTION performed by Koko Rodriguez MD at [facility] OR   • DEBRIDEMENT Right 3/2/2025    CYSTOSCOPY, RETROGRADE PYELOGRAM, URETEROSCOPY, LASER LITHOTRIPSY performed by Jef Anderson MD at [facility] OR   • FOOT SURGERY Bilateral     tarsal tunnel, hammer toe, neuroma removed -1990's   • FOOT TENDON SURGERY Left 10/21/2022    LEFT EXTENSOR  TENDON REPAIR performed by Damien Banegas DPM at [facility] OR   • HERNIA REPAIR  2019?    ventral hernia repair   • HYSTERECTOMY (CERVIX STATUS UNKNOWN)  2001   • KIDNEY STONE SURGERY      per old chart had surg for left nephrostolithotomy and cysto /lithotripsy in 2013at [facility]   • LITHOTRIPSY Right 6/20/2023    RIGHT CYSTOSCOPY URETEROSCOPY   WITH LITHOTRIPSY, STENT REMOVAL performed by Rosibel Man MD at [facility] OR   • PLANTAR FASCIA SURGERY Left 10/21/2022    PLANTAR FASCIOTOMY ENDOSCOPIC performed by Damien Banegas DPM at [facility] OR   • SHOULDER SURGERY Right     \"open surg right shoulder and later had 2 rotator cuff surgeries on my right shoulder\"   • TONSILLECTOMY  age 19   • TOTAL KNEE ARTHROPLASTY Left 4/28/2021    LEFT KNEE TOTAL ARTHROPLASTY ROBOTIC performed by Konstantin Bustos MD at [facility] OR   • TUBAL LIGATION  1992       Social History:    Social History     Tobacco Use   • Smoking status: Every Day     Current packs/day: 1.00     Average packs/day: 1 pack/day for 20.0 years (20.0 ttl pk-yrs)     Types: Cigarettes   • Smokeless tobacco: Never   Substance Use Topics   • Alcohol use:

## 2025-03-10 RX ORDER — BUMETANIDE 1 MG/1
1 TABLET ORAL DAILY
Qty: 30 TABLET | Refills: 5 | Status: SHIPPED | OUTPATIENT
Start: 2025-03-10

## 2025-04-15 ENCOUNTER — TRANSCRIBE ORDERS (OUTPATIENT)
Dept: ADMINISTRATIVE | Age: 60
End: 2025-04-15

## 2025-04-15 DIAGNOSIS — N28.89 RENAL MASS: Primary | ICD-10-CM

## 2025-05-28 ENCOUNTER — TRANSCRIBE ORDERS (OUTPATIENT)
Dept: ADMINISTRATIVE | Age: 60
End: 2025-05-28

## 2025-05-28 DIAGNOSIS — Z12.31 BREAST CANCER SCREENING BY MAMMOGRAM: Primary | ICD-10-CM

## 2025-06-10 ENCOUNTER — HOSPITAL ENCOUNTER (OUTPATIENT)
Dept: NUCLEAR MEDICINE | Age: 60
Discharge: HOME | End: 2025-06-05
Attending: SPECIALIST

## 2025-06-10 DIAGNOSIS — N28.89 RENAL MASS: ICD-10-CM

## 2025-06-18 ENCOUNTER — APPOINTMENT (OUTPATIENT)
Dept: CT IMAGING | Age: 60
DRG: 853 | End: 2025-06-18
Payer: MEDICARE

## 2025-06-18 ENCOUNTER — HOSPITAL ENCOUNTER (INPATIENT)
Age: 60
LOS: 7 days | Discharge: HOME | DRG: 853 | End: 2025-06-25
Attending: EMERGENCY MEDICINE | Admitting: STUDENT IN AN ORGANIZED HEALTH CARE EDUCATION/TRAINING PROGRAM
Payer: MEDICARE

## 2025-06-18 ENCOUNTER — ANESTHESIA EVENT (OUTPATIENT)
Dept: OPERATING ROOM | Age: 60
End: 2025-06-18
Payer: MEDICARE

## 2025-06-18 DIAGNOSIS — N12 PYELONEPHRITIS: ICD-10-CM

## 2025-06-18 DIAGNOSIS — R11.0 NAUSEA: ICD-10-CM

## 2025-06-18 DIAGNOSIS — N10 ACUTE PYELONEPHRITIS: ICD-10-CM

## 2025-06-18 DIAGNOSIS — R10.9 FLANK PAIN: Primary | ICD-10-CM

## 2025-06-18 DIAGNOSIS — N13.30 HYDRONEPHROSIS, UNSPECIFIED HYDRONEPHROSIS TYPE: ICD-10-CM

## 2025-06-18 DIAGNOSIS — N20.0 KIDNEY STONE: ICD-10-CM

## 2025-06-18 DIAGNOSIS — R10.9 ABDOMINAL PAIN, UNSPECIFIED ABDOMINAL LOCATION: ICD-10-CM

## 2025-06-18 LAB
ALBUMIN SERPL-MCNC: 4 G/DL (ref 3.4–5)
ALBUMIN/GLOB SERPL: 1.2 {RATIO} (ref 1.1–2.2)
ALP SERPL-CCNC: 140 U/L (ref 40–129)
ALT SERPL-CCNC: 21 U/L (ref 10–40)
ANION GAP SERPL CALCULATED.3IONS-SCNC: 14 MMOL/L (ref 9–17)
ARTERIAL PATENCY WRIST A: ABNORMAL
AST SERPL-CCNC: 42 U/L (ref 15–37)
BASOPHILS # BLD: 0.05 K/UL
BASOPHILS NFR BLD: 0 % (ref 0–1)
BILIRUB SERPL-MCNC: 0.4 MG/DL (ref 0–1)
BILIRUB UR QL STRIP: NEGATIVE
BODY TEMPERATURE: 37
BUN SERPL-MCNC: 10 MG/DL (ref 7–20)
CALCIUM SERPL-MCNC: 9.6 MG/DL (ref 8.3–10.6)
CHLORIDE SERPL-SCNC: 104 MMOL/L (ref 99–110)
CLARITY UR: ABNORMAL
CO2 SERPL-SCNC: 24 MMOL/L (ref 21–32)
COHGB MFR BLD: 3.8 % (ref 0.5–1.5)
COLOR UR: YELLOW
CREAT SERPL-MCNC: 1.4 MG/DL (ref 0.6–1.1)
CRP SERPL HS-MCNC: 17 MG/L (ref 0–5)
EOSINOPHIL # BLD: 0.26 K/UL
EOSINOPHILS RELATIVE PERCENT: 2 % (ref 0–3)
EPI CELLS #/AREA URNS HPF: 15 /HPF
ERYTHROCYTE [DISTWIDTH] IN BLOOD BY AUTOMATED COUNT: 15.8 % (ref 11.7–14.9)
EST. AVERAGE GLUCOSE BLD GHB EST-MCNC: 145 MG/DL
GAS FLOW.O2 O2 DELIVERY SYS: ABNORMAL L/MIN
GFR, ESTIMATED: 40 ML/MIN/1.73M2
GLUCOSE BLD-MCNC: 165 MG/DL (ref 74–99)
GLUCOSE SERPL-MCNC: 129 MG/DL (ref 74–99)
GLUCOSE UR STRIP-MCNC: >=1000 MG/DL
HBA1C MFR BLD: 6.7 % (ref 4.2–6.3)
HCO3 ARTERIAL: 20.2 MMOL/L (ref 21–28)
HCT VFR BLD AUTO: 41.5 % (ref 37–47)
HGB BLD-MCNC: 12.8 G/DL (ref 12.5–16)
HGB UR QL STRIP.AUTO: ABNORMAL
IMM GRANULOCYTES # BLD AUTO: 0.05 K/UL
IMM GRANULOCYTES NFR BLD: 0 %
KETONES UR STRIP-MCNC: NEGATIVE MG/DL
LACTATE BLDV-SCNC: 1.9 MMOL/L (ref 0.4–2)
LEUKOCYTE ESTERASE UR QL STRIP: ABNORMAL
LIPASE SERPL-CCNC: 28 U/L (ref 13–60)
LYMPHOCYTES NFR BLD: 1.89 K/UL
LYMPHOCYTES RELATIVE PERCENT: 16 % (ref 24–44)
MCH RBC QN AUTO: 26.3 PG (ref 27–31)
MCHC RBC AUTO-ENTMCNC: 30.8 G/DL (ref 32–36)
MCV RBC AUTO: 85.4 FL (ref 78–100)
METHEMOGLOBIN: 0.2 % (ref 0.5–1.5)
MONOCYTES NFR BLD: 0.31 K/UL
MONOCYTES NFR BLD: 3 % (ref 0–5)
MUCOUS THREADS URNS QL MICRO: ABNORMAL
NEGATIVE BASE EXCESS, ART: 5.5 MMOL/L (ref 0–3)
NEUTROPHILS NFR BLD: 79 % (ref 36–66)
NEUTS SEG NFR BLD: 9.54 K/UL
NITRITE UR QL STRIP: NEGATIVE
OXYHGB MFR BLD: 86.2 %
PCO2 ARTERIAL: 40.4 MMHG (ref 32–45)
PH ARTERIAL: 7.32 (ref 7.35–7.45)
PH UR STRIP: 7 [PH] (ref 5–8)
PLATELET # BLD AUTO: 307 K/UL (ref 140–440)
PMV BLD AUTO: 9 FL (ref 7.5–11.1)
PO2 ARTERIAL: 62.2 MMHG (ref 83–108)
POTASSIUM SERPL-SCNC: 4.4 MMOL/L (ref 3.5–5.1)
PROCALCITONIN SERPL-MCNC: 0.05 NG/ML
PROT SERPL-MCNC: 7.4 G/DL (ref 6.4–8.2)
PROT UR STRIP-MCNC: 100 MG/DL
RBC # BLD AUTO: 4.86 M/UL (ref 4.2–5.4)
RBC #/AREA URNS HPF: 125 /HPF (ref 0–2)
SODIUM SERPL-SCNC: 143 MMOL/L (ref 136–145)
SP GR UR STRIP: 1.01 (ref 1–1.03)
UROBILINOGEN UR STRIP-ACNC: 0.2 EU/DL (ref 0–1)
WBC #/AREA URNS HPF: 287 /HPF (ref 0–5)
WBC OTHER # BLD: 12.1 K/UL (ref 4–10.5)

## 2025-06-18 PROCEDURE — 87077 CULTURE AEROBIC IDENTIFY: CPT

## 2025-06-18 PROCEDURE — 5A0945A ASSISTANCE WITH RESPIRATORY VENTILATION, 24-96 CONSECUTIVE HOURS, HIGH NASAL FLOW/VELOCITY: ICD-10-PCS | Performed by: UROLOGY

## 2025-06-18 PROCEDURE — 74176 CT ABD & PELVIS W/O CONTRAST: CPT

## 2025-06-18 PROCEDURE — 80053 COMPREHEN METABOLIC PANEL: CPT

## 2025-06-18 PROCEDURE — 81001 URINALYSIS AUTO W/SCOPE: CPT

## 2025-06-18 PROCEDURE — 96375 TX/PRO/DX INJ NEW DRUG ADDON: CPT

## 2025-06-18 PROCEDURE — 6370000000 HC RX 637 (ALT 250 FOR IP): Performed by: STUDENT IN AN ORGANIZED HEALTH CARE EDUCATION/TRAINING PROGRAM

## 2025-06-18 PROCEDURE — 96374 THER/PROPH/DIAG INJ IV PUSH: CPT

## 2025-06-18 PROCEDURE — 83690 ASSAY OF LIPASE: CPT

## 2025-06-18 PROCEDURE — 2580000003 HC RX 258: Performed by: STUDENT IN AN ORGANIZED HEALTH CARE EDUCATION/TRAINING PROGRAM

## 2025-06-18 PROCEDURE — 83605 ASSAY OF LACTIC ACID: CPT

## 2025-06-18 PROCEDURE — 94660 CPAP INITIATION&MGMT: CPT

## 2025-06-18 PROCEDURE — 99285 EMERGENCY DEPT VISIT HI MDM: CPT

## 2025-06-18 PROCEDURE — 87186 SC STD MICRODIL/AGAR DIL: CPT

## 2025-06-18 PROCEDURE — 6360000002 HC RX W HCPCS

## 2025-06-18 PROCEDURE — 2580000003 HC RX 258: Performed by: EMERGENCY MEDICINE

## 2025-06-18 PROCEDURE — 83036 HEMOGLOBIN GLYCOSYLATED A1C: CPT

## 2025-06-18 PROCEDURE — 82962 GLUCOSE BLOOD TEST: CPT

## 2025-06-18 PROCEDURE — 36600 WITHDRAWAL OF ARTERIAL BLOOD: CPT

## 2025-06-18 PROCEDURE — 6360000002 HC RX W HCPCS: Performed by: STUDENT IN AN ORGANIZED HEALTH CARE EDUCATION/TRAINING PROGRAM

## 2025-06-18 PROCEDURE — 2140000000 HC CCU INTERMEDIATE R&B

## 2025-06-18 PROCEDURE — 86140 C-REACTIVE PROTEIN: CPT

## 2025-06-18 PROCEDURE — 36415 COLL VENOUS BLD VENIPUNCTURE: CPT

## 2025-06-18 PROCEDURE — 84145 PROCALCITONIN (PCT): CPT

## 2025-06-18 PROCEDURE — 2500000003 HC RX 250 WO HCPCS: Performed by: STUDENT IN AN ORGANIZED HEALTH CARE EDUCATION/TRAINING PROGRAM

## 2025-06-18 PROCEDURE — 87086 URINE CULTURE/COLONY COUNT: CPT

## 2025-06-18 PROCEDURE — 85025 COMPLETE CBC W/AUTO DIFF WBC: CPT

## 2025-06-18 PROCEDURE — 2700000000 HC OXYGEN THERAPY PER DAY

## 2025-06-18 PROCEDURE — 82805 BLOOD GASES W/O2 SATURATION: CPT

## 2025-06-18 PROCEDURE — 93005 ELECTROCARDIOGRAM TRACING: CPT | Performed by: STUDENT IN AN ORGANIZED HEALTH CARE EDUCATION/TRAINING PROGRAM

## 2025-06-18 PROCEDURE — 87040 BLOOD CULTURE FOR BACTERIA: CPT

## 2025-06-18 PROCEDURE — 6360000002 HC RX W HCPCS: Performed by: EMERGENCY MEDICINE

## 2025-06-18 RX ORDER — OXYBUTYNIN CHLORIDE 5 MG/1
5 TABLET, EXTENDED RELEASE ORAL DAILY
Status: DISCONTINUED | OUTPATIENT
Start: 2025-06-18 | End: 2025-06-19

## 2025-06-18 RX ORDER — OXYCODONE AND ACETAMINOPHEN 5; 325 MG/1; MG/1
1 TABLET ORAL EVERY 4 HOURS PRN
Refills: 0 | Status: DISCONTINUED | OUTPATIENT
Start: 2025-06-18 | End: 2025-06-25 | Stop reason: HOSPADM

## 2025-06-18 RX ORDER — ATORVASTATIN CALCIUM 10 MG/1
20 TABLET, FILM COATED ORAL DAILY
Status: DISCONTINUED | OUTPATIENT
Start: 2025-06-18 | End: 2025-06-25 | Stop reason: HOSPADM

## 2025-06-18 RX ORDER — ONDANSETRON 4 MG/1
4 TABLET, ORALLY DISINTEGRATING ORAL EVERY 8 HOURS PRN
Status: DISCONTINUED | OUTPATIENT
Start: 2025-06-18 | End: 2025-06-25 | Stop reason: HOSPADM

## 2025-06-18 RX ORDER — TAMSULOSIN HYDROCHLORIDE 0.4 MG/1
0.4 CAPSULE ORAL DAILY
Status: DISCONTINUED | OUTPATIENT
Start: 2025-06-18 | End: 2025-06-19

## 2025-06-18 RX ORDER — ALBUTEROL SULFATE 90 UG/1
2 INHALANT RESPIRATORY (INHALATION)
Status: DISCONTINUED | OUTPATIENT
Start: 2025-06-18 | End: 2025-06-25 | Stop reason: HOSPADM

## 2025-06-18 RX ORDER — FOLIC ACID 1 MG/1
1 TABLET ORAL DAILY
Status: DISCONTINUED | OUTPATIENT
Start: 2025-06-18 | End: 2025-06-25 | Stop reason: HOSPADM

## 2025-06-18 RX ORDER — ACETAMINOPHEN 325 MG/1
650 TABLET ORAL EVERY 6 HOURS PRN
Status: DISCONTINUED | OUTPATIENT
Start: 2025-06-18 | End: 2025-06-25 | Stop reason: HOSPADM

## 2025-06-18 RX ORDER — ONDANSETRON 2 MG/ML
4 INJECTION INTRAMUSCULAR; INTRAVENOUS EVERY 30 MIN PRN
Status: DISCONTINUED | OUTPATIENT
Start: 2025-06-18 | End: 2025-06-18

## 2025-06-18 RX ORDER — LOSARTAN POTASSIUM 25 MG/1
25 TABLET ORAL DAILY
Status: DISCONTINUED | OUTPATIENT
Start: 2025-06-18 | End: 2025-06-23

## 2025-06-18 RX ORDER — GABAPENTIN 100 MG/1
400 CAPSULE ORAL 3 TIMES DAILY
Status: DISCONTINUED | OUTPATIENT
Start: 2025-06-18 | End: 2025-06-19

## 2025-06-18 RX ORDER — POTASSIUM CHLORIDE 1500 MG/1
40 TABLET, EXTENDED RELEASE ORAL PRN
Status: ACTIVE | OUTPATIENT
Start: 2025-06-18 | End: 2025-06-20

## 2025-06-18 RX ORDER — SODIUM CHLORIDE 0.9 % (FLUSH) 0.9 %
5-40 SYRINGE (ML) INJECTION EVERY 12 HOURS SCHEDULED
Status: DISCONTINUED | OUTPATIENT
Start: 2025-06-18 | End: 2025-06-25 | Stop reason: HOSPADM

## 2025-06-18 RX ORDER — ENOXAPARIN SODIUM 100 MG/ML
40 INJECTION SUBCUTANEOUS DAILY
Status: DISCONTINUED | OUTPATIENT
Start: 2025-06-18 | End: 2025-06-19

## 2025-06-18 RX ORDER — POLYETHYLENE GLYCOL 3350 17 G/17G
17 POWDER, FOR SOLUTION ORAL DAILY PRN
Status: DISCONTINUED | OUTPATIENT
Start: 2025-06-18 | End: 2025-06-25 | Stop reason: HOSPADM

## 2025-06-18 RX ORDER — HYDROMORPHONE HYDROCHLORIDE 1 MG/ML
0.25 INJECTION, SOLUTION INTRAMUSCULAR; INTRAVENOUS; SUBCUTANEOUS EVERY 4 HOURS PRN
Status: DISPENSED | OUTPATIENT
Start: 2025-06-18 | End: 2025-06-20

## 2025-06-18 RX ORDER — SODIUM CHLORIDE, SODIUM LACTATE, POTASSIUM CHLORIDE, CALCIUM CHLORIDE 600; 310; 30; 20 MG/100ML; MG/100ML; MG/100ML; MG/100ML
INJECTION, SOLUTION INTRAVENOUS CONTINUOUS
Status: DISCONTINUED | OUTPATIENT
Start: 2025-06-18 | End: 2025-06-20

## 2025-06-18 RX ORDER — PANTOPRAZOLE SODIUM 40 MG/1
40 TABLET, DELAYED RELEASE ORAL
Status: DISCONTINUED | OUTPATIENT
Start: 2025-06-19 | End: 2025-06-25 | Stop reason: HOSPADM

## 2025-06-18 RX ORDER — CIPROFLOXACIN 2 MG/ML
400 INJECTION, SOLUTION INTRAVENOUS EVERY 12 HOURS
Status: DISCONTINUED | OUTPATIENT
Start: 2025-06-19 | End: 2025-06-19

## 2025-06-18 RX ORDER — DEXTROSE MONOHYDRATE 100 MG/ML
INJECTION, SOLUTION INTRAVENOUS CONTINUOUS PRN
Status: DISCONTINUED | OUTPATIENT
Start: 2025-06-18 | End: 2025-06-19 | Stop reason: SDUPTHER

## 2025-06-18 RX ORDER — CIPROFLOXACIN 2 MG/ML
400 INJECTION, SOLUTION INTRAVENOUS EVERY 12 HOURS
Status: DISCONTINUED | OUTPATIENT
Start: 2025-06-18 | End: 2025-06-18

## 2025-06-18 RX ORDER — GLUCAGON 1 MG/ML
1 KIT INJECTION PRN
Status: DISCONTINUED | OUTPATIENT
Start: 2025-06-18 | End: 2025-06-19 | Stop reason: SDUPTHER

## 2025-06-18 RX ORDER — MAGNESIUM SULFATE IN WATER 40 MG/ML
2000 INJECTION, SOLUTION INTRAVENOUS PRN
Status: DISCONTINUED | OUTPATIENT
Start: 2025-06-18 | End: 2025-06-25 | Stop reason: HOSPADM

## 2025-06-18 RX ORDER — ACETAMINOPHEN 650 MG/1
650 SUPPOSITORY RECTAL EVERY 6 HOURS PRN
Status: DISCONTINUED | OUTPATIENT
Start: 2025-06-18 | End: 2025-06-25 | Stop reason: HOSPADM

## 2025-06-18 RX ORDER — FENTANYL CITRATE 50 UG/ML
50 INJECTION, SOLUTION INTRAMUSCULAR; INTRAVENOUS
Status: DISCONTINUED | OUTPATIENT
Start: 2025-06-18 | End: 2025-06-19

## 2025-06-18 RX ORDER — FLUTICASONE PROPIONATE 110 UG/1
4 AEROSOL, METERED RESPIRATORY (INHALATION)
Status: DISCONTINUED | OUTPATIENT
Start: 2025-06-18 | End: 2025-06-25 | Stop reason: HOSPADM

## 2025-06-18 RX ORDER — 0.9 % SODIUM CHLORIDE 0.9 %
1000 INTRAVENOUS SOLUTION INTRAVENOUS ONCE
Status: COMPLETED | OUTPATIENT
Start: 2025-06-18 | End: 2025-06-18

## 2025-06-18 RX ORDER — OXYBUTYNIN CHLORIDE 5 MG/1
5 TABLET, EXTENDED RELEASE ORAL DAILY
COMMUNITY
Start: 2025-05-21

## 2025-06-18 RX ORDER — INSULIN LISPRO 100 [IU]/ML
0-4 INJECTION, SOLUTION INTRAVENOUS; SUBCUTANEOUS
Status: DISCONTINUED | OUTPATIENT
Start: 2025-06-18 | End: 2025-06-19

## 2025-06-18 RX ORDER — POTASSIUM CHLORIDE 7.45 MG/ML
10 INJECTION INTRAVENOUS PRN
Status: ACTIVE | OUTPATIENT
Start: 2025-06-18 | End: 2025-06-20

## 2025-06-18 RX ORDER — HYDRALAZINE HYDROCHLORIDE 20 MG/ML
5 INJECTION INTRAMUSCULAR; INTRAVENOUS EVERY 6 HOURS PRN
Status: DISCONTINUED | OUTPATIENT
Start: 2025-06-18 | End: 2025-06-25 | Stop reason: HOSPADM

## 2025-06-18 RX ORDER — BUMETANIDE 1 MG/1
1 TABLET ORAL DAILY
Status: DISCONTINUED | OUTPATIENT
Start: 2025-06-18 | End: 2025-06-21

## 2025-06-18 RX ORDER — MONTELUKAST SODIUM 10 MG/1
10 TABLET ORAL NIGHTLY
Status: DISCONTINUED | OUTPATIENT
Start: 2025-06-18 | End: 2025-06-25 | Stop reason: HOSPADM

## 2025-06-18 RX ORDER — KETOROLAC TROMETHAMINE 15 MG/ML
30 INJECTION, SOLUTION INTRAMUSCULAR; INTRAVENOUS ONCE
Status: COMPLETED | OUTPATIENT
Start: 2025-06-18 | End: 2025-06-18

## 2025-06-18 RX ORDER — SODIUM CHLORIDE 9 MG/ML
INJECTION, SOLUTION INTRAVENOUS PRN
Status: DISCONTINUED | OUTPATIENT
Start: 2025-06-18 | End: 2025-06-25 | Stop reason: HOSPADM

## 2025-06-18 RX ORDER — ONDANSETRON 2 MG/ML
4 INJECTION INTRAMUSCULAR; INTRAVENOUS EVERY 6 HOURS PRN
Status: DISCONTINUED | OUTPATIENT
Start: 2025-06-18 | End: 2025-06-25 | Stop reason: HOSPADM

## 2025-06-18 RX ORDER — SODIUM CHLORIDE 0.9 % (FLUSH) 0.9 %
5-40 SYRINGE (ML) INJECTION PRN
Status: DISCONTINUED | OUTPATIENT
Start: 2025-06-18 | End: 2025-06-25 | Stop reason: HOSPADM

## 2025-06-18 RX ORDER — DOCUSATE SODIUM 100 MG/1
100 CAPSULE, LIQUID FILLED ORAL DAILY PRN
Status: DISCONTINUED | OUTPATIENT
Start: 2025-06-18 | End: 2025-06-25 | Stop reason: HOSPADM

## 2025-06-18 RX ADMIN — SERTRALINE HYDROCHLORIDE 25 MG: 50 TABLET ORAL at 17:45

## 2025-06-18 RX ADMIN — TAMSULOSIN HYDROCHLORIDE 0.4 MG: 0.4 CAPSULE ORAL at 17:45

## 2025-06-18 RX ADMIN — HYDRALAZINE HYDROCHLORIDE 5 MG: 20 INJECTION INTRAMUSCULAR; INTRAVENOUS at 23:21

## 2025-06-18 RX ADMIN — GABAPENTIN 400 MG: 100 CAPSULE ORAL at 20:22

## 2025-06-18 RX ADMIN — SODIUM CHLORIDE 1000 ML: 0.9 INJECTION, SOLUTION INTRAVENOUS at 14:18

## 2025-06-18 RX ADMIN — ENOXAPARIN SODIUM 40 MG: 100 INJECTION SUBCUTANEOUS at 17:45

## 2025-06-18 RX ADMIN — FOLIC ACID 1 MG: 1 TABLET ORAL at 17:44

## 2025-06-18 RX ADMIN — OXYBUTYNIN CHLORIDE 5 MG: 5 TABLET, EXTENDED RELEASE ORAL at 17:44

## 2025-06-18 RX ADMIN — SODIUM CHLORIDE, SODIUM LACTATE, POTASSIUM CHLORIDE, AND CALCIUM CHLORIDE: .6; .31; .03; .02 INJECTION, SOLUTION INTRAVENOUS at 18:38

## 2025-06-18 RX ADMIN — KETOROLAC TROMETHAMINE 30 MG: 15 INJECTION, SOLUTION INTRAMUSCULAR; INTRAVENOUS at 14:12

## 2025-06-18 RX ADMIN — SODIUM CHLORIDE, PRESERVATIVE FREE 10 ML: 5 INJECTION INTRAVENOUS at 20:24

## 2025-06-18 RX ADMIN — OXYCODONE AND ACETAMINOPHEN 1 TABLET: 5; 325 TABLET ORAL at 20:27

## 2025-06-18 RX ADMIN — ATORVASTATIN CALCIUM 20 MG: 10 TABLET, FILM COATED ORAL at 20:22

## 2025-06-18 RX ADMIN — ONDANSETRON 4 MG: 2 INJECTION INTRAMUSCULAR; INTRAVENOUS at 14:11

## 2025-06-18 RX ADMIN — MONTELUKAST 10 MG: 10 TABLET, FILM COATED ORAL at 20:22

## 2025-06-18 RX ADMIN — FENTANYL CITRATE 50 MCG: 50 INJECTION, SOLUTION INTRAMUSCULAR; INTRAVENOUS at 14:11

## 2025-06-18 ASSESSMENT — PAIN DESCRIPTION - ORIENTATION
ORIENTATION_2: RIGHT
ORIENTATION: RIGHT
ORIENTATION: RIGHT

## 2025-06-18 ASSESSMENT — PAIN DESCRIPTION - DESCRIPTORS
DESCRIPTORS_2: THROBBING
DESCRIPTORS: ACHING
DESCRIPTORS: THROBBING

## 2025-06-18 ASSESSMENT — PAIN SCALES - GENERAL
PAINLEVEL_OUTOF10: 7
PAINLEVEL_OUTOF10: 10
PAINLEVEL_OUTOF10: 8
PAINLEVEL_OUTOF10: 7
PAINLEVEL_OUTOF10: 10
PAINLEVEL_OUTOF10: 10

## 2025-06-18 ASSESSMENT — PAIN DESCRIPTION - PAIN TYPE
TYPE: ACUTE PAIN
TYPE: ACUTE PAIN

## 2025-06-18 ASSESSMENT — PAIN DESCRIPTION - LOCATION
LOCATION: BACK
LOCATION: ABDOMEN
LOCATION: ABDOMEN
LOCATION: BACK;FLANK
LOCATION: ABDOMEN
LOCATION_2: ABDOMEN
LOCATION: FLANK

## 2025-06-18 ASSESSMENT — PAIN - FUNCTIONAL ASSESSMENT
PAIN_FUNCTIONAL_ASSESSMENT: PREVENTS OR INTERFERES SOME ACTIVE ACTIVITIES AND ADLS
PAIN_FUNCTIONAL_ASSESSMENT_SITE2: PREVENTS OR INTERFERES SOME ACTIVE ACTIVITIES AND ADLS
PAIN_FUNCTIONAL_ASSESSMENT: 0-10
PAIN_FUNCTIONAL_ASSESSMENT: ACTIVITIES ARE NOT PREVENTED

## 2025-06-18 ASSESSMENT — PAIN DESCRIPTION - FREQUENCY
FREQUENCY: CONTINUOUS
FREQUENCY: CONTINUOUS

## 2025-06-18 ASSESSMENT — PAIN DESCRIPTION - ONSET
ONSET: ON-GOING
ONSET: ON-GOING

## 2025-06-18 ASSESSMENT — PAIN DESCRIPTION - INTENSITY: RATING_2: 7

## 2025-06-18 NOTE — ED NOTES
Medication History  [facility]    Patient Name: Keisha Turner 1965     Medication history has been completed by: Nikole Zapata CPhT    Source(s) of information: insurance claims     Primary Care Physician: Alka Mcleod MD     Pharmacy: [facility]    Allergies as of 06/18/2025 - Fully Reviewed 06/18/2025   Allergen Reaction Noted    Rocephin [ceftriaxone] Hives and Rash 04/29/2023    Pollen extract Itching 10/29/2010    Tramadol  09/09/2011    Cephalosporins Rash 04/28/2023        Prior to Admission medications    Medication Sig Start Date End Date Taking? Authorizing Provider   oxyBUTYnin (DITROPAN-XL) 5 MG extended release tablet Take 1 tablet by mouth daily 5/21/25  Yes Phillip Hope MD   bumetanide (BUMEX) 1 MG tablet Take 1 tablet by mouth daily 3/10/25   Crystal Garner APRN - CNP   tamsulosin (FLOMAX) 0.4 MG capsule Take 1 capsule by mouth daily for 14 days 3/3/25 3/17/25  Nahomy Vazquez PA-C   ondansetron (ZOFRAN) 4 MG tablet Take 1 tablet by mouth daily as needed for Nausea or Vomiting 3/3/25 3/10/25  Nahomy Vazquez PA-C   losartan (COZAAR) 25 MG tablet Take 1 tablet by mouth daily 2/18/25   Crystal Garner APRN - CNP   Dulaglutide 3 MG/0.5ML SOAJ Inject 3 mg into the skin once a week 9/11/23   Phillip Hope MD   JARDIANCE 25 MG tablet Take 1 tablet by mouth daily 9/11/23   Phillip Hope MD   atorvastatin (LIPITOR) 20 MG tablet Take 1 tablet by mouth daily 04/24/23 Dose decreased 03/31/23  Patient taking differently: Take 1 tablet by mouth daily 8/21/23   Karthikeyan Ames MD   QVAR REDIHALER 40 MCG/ACT AERB inhaler Inhale 2 puffs into the lungs 2 times daily 3/31/23   Phillip Hope MD   albuterol sulfate HFA (PROVENTIL;VENTOLIN;PROAIR) 108 (90 Base) MCG/ACT inhaler Inhale 2 puffs into the lungs every 4-6 hours as needed for Wheezing    Phillip Hope MD   tiZANidine (ZANAFLEX) 4 MG tablet Take 1 tablet by mouth nightly 4/13/23

## 2025-06-18 NOTE — H&P
V2.0  History and Physical      Name:  Keisha Turner /Age/Sex: 1965  (59 y.o. female)   MRN & CSN:  [id number] & [id number] Encounter Date/Time: 2025 4:35 PM EDT   Location:  ED20/ED-20 PCP: Alka Mcleod MD       Hospital Day: 1    Assessment and Plan:   Keisha Turner is a 59 y.o. female with a pmh of type 2 diabetes mellitus, hypertension, asthma, hyperlipidemia, nephrolithiasis who presents with Acute pyelonephritis    Past Medical History:   Diagnosis Date    Arthritis     Arthritis of left knee     Asthma     last flare up summer 2020    Diabetes mellitus (HCC)     \"use to take Metformin been off this since  \"    Disorder of rotator cuff syndrome of left shoulder and allied disorder     H/O cardiovascular stress test 16    EF68% normal study    History of motion sickness     Hx of echocardiogram 16    EF 55%, normal LV function, moderate LVH    Hyperlipidemia     Hypertension     Kidney stones     \"had kidney stone 2020- removed with surg- , back in - had to put tube in my back(nephostomy tube) for kidney stone-had lithotripsy    Migraine     Obesity     PONV (postoperative nausea and vomiting)     hx of motion sickness    UTI (urinary tract infection)     \"last one 2020\"    Vertigo     \"have trouble leaning to my left and laying flat with this\"    Wears glasses          Hospital Problems           Last Modified POA    * (Principal) Acute pyelonephritis 2025 Yes       Plan:  Acute pyelonephritis:  Nephrolithiasis with right-sided hydronephrosis:  Obstructive SHEYLA:  -Admit to [facility]  - Telemetry  - Patient status post 1 L IV fluids in the ED  - IV fluids  - N.p.o. on admission- plan for intervention in am per Dr Man per report  - Urology consulted from ED and aware/per Nursing staff  - Pain meds as needed  - Antiemetics as needed  - IV ciprofloxacin  - Urine cultures  -Blood cultures  - Pro-Vijay/CRP  -De-escalate antibiotics as able  - Daily BMP  - Strict

## 2025-06-18 NOTE — ED PROVIDER NOTES
WBC,  (H) 0 - 5 /HPF    RBC,  (H) 0 - 2 /HPF    Epithelial Cells, UA 15 /HPF    Mucus, UA RARE (A) None        Radiographs (if obtained):  [] The following radiograph was interpreted by myself in the absence of a radiologist:  [x] Radiologist's Report Reviewed:    CT Abd/pelv    EKG (if obtained): (All EKG's are interpreted by myself in the absence of a cardiologist)    Total critical care time today provided was at least 30 minutes. This excludes seperately billable procedure. Critical care time provided for reviewing labs, images, giving ciprofloxacin for UTI, consulting medicine, consulting urology for kidney stone that required close evaluation and/or intervention with concern for patient decompensation.      MDM:    Patient complaint of right-sided flank pain abdominal pain nausea vomiting.  History of kidney stones this was similar.  Symptoms started last night.  Constant pain right flank rating to her abdomen.  No cough chest pain shortness of breath no urine complaints without complaints otherwise.  On arrival she appears well she appears like she does not feel well but appears well vital signs are stable does have some hypertension does have right-sided flank pain abdominal pain.  History of hysterectomy.  Will give her pain nausea medicine IV fluids will check labs, imaging given history of kidney stones right side abdominal pain flank pain. Patient rechecked, doing well, has 9mm R sided kidney stone with hydronephrosis, UTI, given cipro, urine culture, fluids, pain meds, consulted urology and medicine, will admit. Cr 1.4. Otherwise stable.  Did talk to urology patient may go for procedure today.  Last ate yesterday    CLINICAL IMPRESSION:  Final diagnoses:   Flank pain   Abdominal pain, unspecified abdominal location   Nausea   Kidney stone   Hydronephrosis, unspecified hydronephrosis type   Pyelonephritis       (Please note that portions of this note may have been completed with a voice

## 2025-06-19 ENCOUNTER — ANESTHESIA (OUTPATIENT)
Dept: OPERATING ROOM | Age: 60
End: 2025-06-19
Payer: MEDICARE

## 2025-06-19 ENCOUNTER — APPOINTMENT (OUTPATIENT)
Dept: GENERAL RADIOLOGY | Age: 60
DRG: 853 | End: 2025-06-19
Payer: MEDICARE

## 2025-06-19 LAB
ABSOLUTE BANDS: 0.25 K/UL
ALBUMIN SERPL-MCNC: 3.2 G/DL (ref 3.4–5)
ALBUMIN SERPL-MCNC: 3.2 G/DL (ref 3.4–5)
ALBUMIN/GLOB SERPL: 1.1 {RATIO} (ref 1.1–2.2)
ALBUMIN/GLOB SERPL: 1.3 {RATIO} (ref 1.1–2.2)
ALP SERPL-CCNC: 119 U/L (ref 40–129)
ALP SERPL-CCNC: 74 U/L (ref 40–129)
ALT SERPL-CCNC: 18 U/L (ref 10–40)
ALT SERPL-CCNC: 20 U/L (ref 10–40)
ANION GAP SERPL CALCULATED.3IONS-SCNC: 13 MMOL/L (ref 9–17)
ANION GAP SERPL CALCULATED.3IONS-SCNC: 17 MMOL/L (ref 9–17)
ANION GAP SERPL CALCULATED.3IONS-SCNC: 17 MMOL/L (ref 9–17)
AST SERPL-CCNC: 36 U/L (ref 15–37)
AST SERPL-CCNC: 41 U/L (ref 15–37)
BANDS: 4 %
BASOPHILS # BLD: 0 K/UL
BASOPHILS NFR BLD: 0 % (ref 0–1)
BILIRUB SERPL-MCNC: 1 MG/DL (ref 0–1)
BILIRUB SERPL-MCNC: 1.3 MG/DL (ref 0–1)
BUN SERPL-MCNC: 17 MG/DL (ref 7–20)
BUN SERPL-MCNC: 23 MG/DL (ref 7–20)
BUN SERPL-MCNC: 26 MG/DL (ref 7–20)
CALCIUM SERPL-MCNC: 7.9 MG/DL (ref 8.3–10.6)
CALCIUM SERPL-MCNC: 8.1 MG/DL (ref 8.3–10.6)
CALCIUM SERPL-MCNC: 8.4 MG/DL (ref 8.3–10.6)
CHLORIDE SERPL-SCNC: 102 MMOL/L (ref 99–110)
CHLORIDE SERPL-SCNC: 103 MMOL/L (ref 99–110)
CHLORIDE SERPL-SCNC: 106 MMOL/L (ref 99–110)
CO2 SERPL-SCNC: 17 MMOL/L (ref 21–32)
CO2 SERPL-SCNC: 18 MMOL/L (ref 21–32)
CO2 SERPL-SCNC: 21 MMOL/L (ref 21–32)
CREAT SERPL-MCNC: 2.8 MG/DL (ref 0.6–1.1)
CREAT SERPL-MCNC: 2.9 MG/DL (ref 0.6–1.1)
CREAT SERPL-MCNC: 2.9 MG/DL (ref 0.6–1.1)
EKG ATRIAL RATE: 103 BPM
EKG DIAGNOSIS: NORMAL
EKG P AXIS: 1 DEGREES
EKG P-R INTERVAL: 158 MS
EKG Q-T INTERVAL: 380 MS
EKG QRS DURATION: 84 MS
EKG QTC CALCULATION (BAZETT): 497 MS
EKG R AXIS: -6 DEGREES
EKG T AXIS: 7 DEGREES
EKG VENTRICULAR RATE: 103 BPM
EOSINOPHIL # BLD: 0 K/UL
EOSINOPHILS RELATIVE PERCENT: 0 % (ref 0–3)
ERYTHROCYTE [DISTWIDTH] IN BLOOD BY AUTOMATED COUNT: 15.9 % (ref 11.7–14.9)
ERYTHROCYTE [DISTWIDTH] IN BLOOD BY AUTOMATED COUNT: 16.2 % (ref 11.7–14.9)
GFR, ESTIMATED: 17 ML/MIN/1.73M2
GLUCOSE BLD-MCNC: 160 MG/DL (ref 74–99)
GLUCOSE BLD-MCNC: 168 MG/DL (ref 74–99)
GLUCOSE BLD-MCNC: 176 MG/DL (ref 74–99)
GLUCOSE BLD-MCNC: 183 MG/DL (ref 74–99)
GLUCOSE BLD-MCNC: 183 MG/DL (ref 74–99)
GLUCOSE SERPL-MCNC: 157 MG/DL (ref 74–99)
GLUCOSE SERPL-MCNC: 166 MG/DL (ref 74–99)
GLUCOSE SERPL-MCNC: 209 MG/DL (ref 74–99)
HCT VFR BLD AUTO: 31.4 % (ref 37–47)
HCT VFR BLD AUTO: 39 % (ref 37–47)
HGB BLD-MCNC: 11.8 G/DL (ref 12.5–16)
HGB BLD-MCNC: 9.8 G/DL (ref 12.5–16)
LACTATE BLDV-SCNC: 1.9 MMOL/L (ref 0.4–2)
LACTATE BLDV-SCNC: 2.5 MMOL/L (ref 0.4–2)
LYMPHOCYTES NFR BLD: 0.44 K/UL
LYMPHOCYTES RELATIVE PERCENT: 7 % (ref 24–44)
MAGNESIUM SERPL-MCNC: 1.7 MG/DL (ref 1.8–2.4)
MAGNESIUM SERPL-MCNC: 2.1 MG/DL (ref 1.8–2.4)
MCH RBC QN AUTO: 26.4 PG (ref 27–31)
MCH RBC QN AUTO: 26.5 PG (ref 27–31)
MCHC RBC AUTO-ENTMCNC: 30.3 G/DL (ref 32–36)
MCHC RBC AUTO-ENTMCNC: 31.2 G/DL (ref 32–36)
MCV RBC AUTO: 84.6 FL (ref 78–100)
MCV RBC AUTO: 87.4 FL (ref 78–100)
MONOCYTES NFR BLD: 0.38 K/UL
MONOCYTES NFR BLD: 6 % (ref 0–5)
NEUTROPHILS NFR BLD: 83 % (ref 36–66)
NEUTS SEG NFR BLD: 5.23 K/UL
PHOSPHATE SERPL-MCNC: 2.3 MG/DL (ref 2.5–4.9)
PHOSPHATE SERPL-MCNC: 5 MG/DL (ref 2.5–4.9)
PLATELET # BLD AUTO: 143 K/UL (ref 140–440)
PLATELET # BLD AUTO: 164 K/UL (ref 140–440)
PLATELET ESTIMATE: NORMAL
PMV BLD AUTO: 9 FL (ref 7.5–11.1)
PMV BLD AUTO: 9.1 FL (ref 7.5–11.1)
POTASSIUM SERPL-SCNC: 3.5 MMOL/L (ref 3.5–5.1)
POTASSIUM SERPL-SCNC: 4.1 MMOL/L (ref 3.5–5.1)
POTASSIUM SERPL-SCNC: 4.1 MMOL/L (ref 3.5–5.1)
PROT SERPL-MCNC: 5.6 G/DL (ref 6.4–8.2)
PROT SERPL-MCNC: 6.2 G/DL (ref 6.4–8.2)
RBC # BLD AUTO: 3.71 M/UL (ref 4.2–5.4)
RBC # BLD AUTO: 4.46 M/UL (ref 4.2–5.4)
RBC # BLD: NORMAL 10*6/UL
SODIUM SERPL-SCNC: 136 MMOL/L (ref 136–145)
SODIUM SERPL-SCNC: 137 MMOL/L (ref 136–145)
SODIUM SERPL-SCNC: 140 MMOL/L (ref 136–145)
WBC # BLD: NORMAL 10*3/UL
WBC OTHER # BLD: 16.1 K/UL (ref 4–10.5)
WBC OTHER # BLD: 6.3 K/UL (ref 4–10.5)

## 2025-06-19 PROCEDURE — 94640 AIRWAY INHALATION TREATMENT: CPT

## 2025-06-19 PROCEDURE — 2000000000 HC ICU R&B

## 2025-06-19 PROCEDURE — 3700000000 HC ANESTHESIA ATTENDED CARE: Performed by: UROLOGY

## 2025-06-19 PROCEDURE — 6370000000 HC RX 637 (ALT 250 FOR IP): Performed by: STUDENT IN AN ORGANIZED HEALTH CARE EDUCATION/TRAINING PROGRAM

## 2025-06-19 PROCEDURE — 51798 US URINE CAPACITY MEASURE: CPT

## 2025-06-19 PROCEDURE — 3700000001 HC ADD 15 MINUTES (ANESTHESIA): Performed by: UROLOGY

## 2025-06-19 PROCEDURE — 2500000003 HC RX 250 WO HCPCS: Performed by: ANESTHESIOLOGY

## 2025-06-19 PROCEDURE — 82962 GLUCOSE BLOOD TEST: CPT

## 2025-06-19 PROCEDURE — 85025 COMPLETE CBC W/AUTO DIFF WBC: CPT

## 2025-06-19 PROCEDURE — 2580000003 HC RX 258: Performed by: UROLOGY

## 2025-06-19 PROCEDURE — 2500000003 HC RX 250 WO HCPCS: Performed by: UROLOGY

## 2025-06-19 PROCEDURE — 6360000002 HC RX W HCPCS: Performed by: STUDENT IN AN ORGANIZED HEALTH CARE EDUCATION/TRAINING PROGRAM

## 2025-06-19 PROCEDURE — 84100 ASSAY OF PHOSPHORUS: CPT

## 2025-06-19 PROCEDURE — 51701 INSERT BLADDER CATHETER: CPT

## 2025-06-19 PROCEDURE — P9047 ALBUMIN (HUMAN), 25%, 50ML: HCPCS | Performed by: STUDENT IN AN ORGANIZED HEALTH CARE EDUCATION/TRAINING PROGRAM

## 2025-06-19 PROCEDURE — 36415 COLL VENOUS BLD VENIPUNCTURE: CPT

## 2025-06-19 PROCEDURE — 6370000000 HC RX 637 (ALT 250 FOR IP): Performed by: UROLOGY

## 2025-06-19 PROCEDURE — P9045 ALBUMIN (HUMAN), 5%, 250 ML: HCPCS | Performed by: NURSE ANESTHETIST, CERTIFIED REGISTERED

## 2025-06-19 PROCEDURE — 6360000002 HC RX W HCPCS: Performed by: NURSE ANESTHETIST, CERTIFIED REGISTERED

## 2025-06-19 PROCEDURE — 2580000003 HC RX 258: Performed by: STUDENT IN AN ORGANIZED HEALTH CARE EDUCATION/TRAINING PROGRAM

## 2025-06-19 PROCEDURE — 7100000001 HC PACU RECOVERY - ADDTL 15 MIN: Performed by: UROLOGY

## 2025-06-19 PROCEDURE — C1758 CATHETER, URETERAL: HCPCS | Performed by: UROLOGY

## 2025-06-19 PROCEDURE — 83735 ASSAY OF MAGNESIUM: CPT

## 2025-06-19 PROCEDURE — 93010 ELECTROCARDIOGRAM REPORT: CPT | Performed by: INTERNAL MEDICINE

## 2025-06-19 PROCEDURE — C1769 GUIDE WIRE: HCPCS | Performed by: UROLOGY

## 2025-06-19 PROCEDURE — 3600000013 HC SURGERY LEVEL 3 ADDTL 15MIN: Performed by: UROLOGY

## 2025-06-19 PROCEDURE — C2617 STENT, NON-COR, TEM W/O DEL: HCPCS | Performed by: UROLOGY

## 2025-06-19 PROCEDURE — 83605 ASSAY OF LACTIC ACID: CPT

## 2025-06-19 PROCEDURE — 80048 BASIC METABOLIC PNL TOTAL CA: CPT

## 2025-06-19 PROCEDURE — 80053 COMPREHEN METABOLIC PANEL: CPT

## 2025-06-19 PROCEDURE — 2709999900 HC NON-CHARGEABLE SUPPLY: Performed by: UROLOGY

## 2025-06-19 PROCEDURE — 6360000002 HC RX W HCPCS: Performed by: UROLOGY

## 2025-06-19 PROCEDURE — 7100000000 HC PACU RECOVERY - FIRST 15 MIN: Performed by: UROLOGY

## 2025-06-19 PROCEDURE — 71045 X-RAY EXAM CHEST 1 VIEW: CPT

## 2025-06-19 PROCEDURE — 76000 FLUOROSCOPY <1 HR PHYS/QHP: CPT

## 2025-06-19 PROCEDURE — 3600000003 HC SURGERY LEVEL 3 BASE: Performed by: UROLOGY

## 2025-06-19 PROCEDURE — 0T768DZ DILATION OF RIGHT URETER WITH INTRALUMINAL DEVICE, VIA NATURAL OR ARTIFICIAL OPENING ENDOSCOPIC: ICD-10-PCS | Performed by: UROLOGY

## 2025-06-19 PROCEDURE — 85027 COMPLETE CBC AUTOMATED: CPT

## 2025-06-19 DEVICE — VARIABLE LENGTH URETERAL STENT
Type: IMPLANTABLE DEVICE | Status: FUNCTIONAL
Brand: CONTOUR VL™

## 2025-06-19 RX ORDER — GLUCAGON 1 MG/ML
1 KIT INJECTION PRN
Status: DISCONTINUED | OUTPATIENT
Start: 2025-06-19 | End: 2025-06-25 | Stop reason: HOSPADM

## 2025-06-19 RX ORDER — IPRATROPIUM BROMIDE AND ALBUTEROL SULFATE 2.5; .5 MG/3ML; MG/3ML
1 SOLUTION RESPIRATORY (INHALATION)
Status: DISCONTINUED | OUTPATIENT
Start: 2025-06-19 | End: 2025-06-19

## 2025-06-19 RX ORDER — PROCHLORPERAZINE EDISYLATE 5 MG/ML
5 INJECTION INTRAMUSCULAR; INTRAVENOUS
Status: DISCONTINUED | OUTPATIENT
Start: 2025-06-19 | End: 2025-06-19

## 2025-06-19 RX ORDER — FUROSEMIDE 10 MG/ML
20 INJECTION INTRAMUSCULAR; INTRAVENOUS ONCE
Status: CANCELLED | OUTPATIENT
Start: 2025-06-19 | End: 2025-06-19

## 2025-06-19 RX ORDER — PROPOFOL 10 MG/ML
INJECTION, EMULSION INTRAVENOUS
Status: DISCONTINUED | OUTPATIENT
Start: 2025-06-19 | End: 2025-06-19 | Stop reason: SDUPTHER

## 2025-06-19 RX ORDER — LIDOCAINE HYDROCHLORIDE 20 MG/ML
INJECTION, SOLUTION INTRAVENOUS
Status: DISCONTINUED | OUTPATIENT
Start: 2025-06-19 | End: 2025-06-19 | Stop reason: SDUPTHER

## 2025-06-19 RX ORDER — GABAPENTIN 100 MG/1
200 CAPSULE ORAL 3 TIMES DAILY
Status: DISCONTINUED | OUTPATIENT
Start: 2025-06-19 | End: 2025-06-25 | Stop reason: HOSPADM

## 2025-06-19 RX ORDER — SODIUM CHLORIDE 0.9 % (FLUSH) 0.9 %
5-40 SYRINGE (ML) INJECTION EVERY 12 HOURS SCHEDULED
Status: DISCONTINUED | OUTPATIENT
Start: 2025-06-19 | End: 2025-06-19

## 2025-06-19 RX ORDER — ACETAMINOPHEN 325 MG/1
650 TABLET ORAL ONCE
Status: DISCONTINUED | OUTPATIENT
Start: 2025-06-19 | End: 2025-06-19

## 2025-06-19 RX ORDER — 0.9 % SODIUM CHLORIDE 0.9 %
1000 INTRAVENOUS SOLUTION INTRAVENOUS ONCE
Status: COMPLETED | OUTPATIENT
Start: 2025-06-19 | End: 2025-06-19

## 2025-06-19 RX ORDER — DEXTROSE MONOHYDRATE 100 MG/ML
INJECTION, SOLUTION INTRAVENOUS CONTINUOUS PRN
Status: DISCONTINUED | OUTPATIENT
Start: 2025-06-19 | End: 2025-06-25 | Stop reason: HOSPADM

## 2025-06-19 RX ORDER — SODIUM CHLORIDE 0.9 % (FLUSH) 0.9 %
5-40 SYRINGE (ML) INJECTION PRN
Status: DISCONTINUED | OUTPATIENT
Start: 2025-06-19 | End: 2025-06-19 | Stop reason: HOSPADM

## 2025-06-19 RX ORDER — INSULIN LISPRO 100 [IU]/ML
0-8 INJECTION, SOLUTION INTRAVENOUS; SUBCUTANEOUS
Status: DISCONTINUED | OUTPATIENT
Start: 2025-06-19 | End: 2025-06-25 | Stop reason: HOSPADM

## 2025-06-19 RX ORDER — SODIUM CHLORIDE 9 MG/ML
INJECTION, SOLUTION INTRAVENOUS PRN
Status: DISCONTINUED | OUTPATIENT
Start: 2025-06-19 | End: 2025-06-19

## 2025-06-19 RX ORDER — OXYCODONE HYDROCHLORIDE 5 MG/1
5 TABLET ORAL PRN
Status: COMPLETED | OUTPATIENT
Start: 2025-06-19 | End: 2025-06-20

## 2025-06-19 RX ORDER — FENTANYL CITRATE 50 UG/ML
25 INJECTION, SOLUTION INTRAMUSCULAR; INTRAVENOUS EVERY 5 MIN PRN
Status: DISCONTINUED | OUTPATIENT
Start: 2025-06-19 | End: 2025-06-19

## 2025-06-19 RX ORDER — ALBUMIN (HUMAN) 12.5 G/50ML
25 SOLUTION INTRAVENOUS ONCE
Status: COMPLETED | OUTPATIENT
Start: 2025-06-19 | End: 2025-06-19

## 2025-06-19 RX ORDER — NALOXONE HYDROCHLORIDE 0.4 MG/ML
INJECTION, SOLUTION INTRAMUSCULAR; INTRAVENOUS; SUBCUTANEOUS PRN
Status: DISCONTINUED | OUTPATIENT
Start: 2025-06-19 | End: 2025-06-19

## 2025-06-19 RX ORDER — HEPARIN SODIUM 5000 [USP'U]/ML
5000 INJECTION, SOLUTION INTRAVENOUS; SUBCUTANEOUS EVERY 8 HOURS SCHEDULED
Status: DISCONTINUED | OUTPATIENT
Start: 2025-06-19 | End: 2025-06-25 | Stop reason: HOSPADM

## 2025-06-19 RX ORDER — SODIUM CHLORIDE 0.9 % (FLUSH) 0.9 %
5-40 SYRINGE (ML) INJECTION PRN
Status: DISCONTINUED | OUTPATIENT
Start: 2025-06-19 | End: 2025-06-19

## 2025-06-19 RX ORDER — PHENYLEPHRINE HCL IN 0.9% NACL 1 MG/10 ML
SYRINGE (ML) INTRAVENOUS
Status: DISCONTINUED | OUTPATIENT
Start: 2025-06-19 | End: 2025-06-19 | Stop reason: SDUPTHER

## 2025-06-19 RX ORDER — ONDANSETRON 2 MG/ML
4 INJECTION INTRAMUSCULAR; INTRAVENOUS
Status: DISCONTINUED | OUTPATIENT
Start: 2025-06-19 | End: 2025-06-19

## 2025-06-19 RX ORDER — SODIUM CHLORIDE 9 MG/ML
INJECTION, SOLUTION INTRAVENOUS PRN
Status: DISCONTINUED | OUTPATIENT
Start: 2025-06-19 | End: 2025-06-19 | Stop reason: HOSPADM

## 2025-06-19 RX ORDER — SODIUM CHLORIDE 0.9 % (FLUSH) 0.9 %
5-40 SYRINGE (ML) INJECTION EVERY 12 HOURS SCHEDULED
Status: DISCONTINUED | OUTPATIENT
Start: 2025-06-19 | End: 2025-06-19 | Stop reason: HOSPADM

## 2025-06-19 RX ORDER — OXYCODONE HYDROCHLORIDE 10 MG/1
10 TABLET ORAL PRN
Status: COMPLETED | OUTPATIENT
Start: 2025-06-19 | End: 2025-06-20

## 2025-06-19 RX ORDER — MAGNESIUM SULFATE IN WATER 40 MG/ML
2000 INJECTION, SOLUTION INTRAVENOUS ONCE
Status: COMPLETED | OUTPATIENT
Start: 2025-06-19 | End: 2025-06-19

## 2025-06-19 RX ORDER — ALBUMIN HUMAN 50 G/1000ML
SOLUTION INTRAVENOUS
Status: DISCONTINUED | OUTPATIENT
Start: 2025-06-19 | End: 2025-06-19 | Stop reason: SDUPTHER

## 2025-06-19 RX ADMIN — GABAPENTIN 200 MG: 100 CAPSULE ORAL at 21:36

## 2025-06-19 RX ADMIN — ACETAMINOPHEN 650 MG: 650 SUPPOSITORY RECTAL at 05:04

## 2025-06-19 RX ADMIN — LIDOCAINE HYDROCHLORIDE 100 MG: 20 INJECTION, SOLUTION INTRAVENOUS at 09:05

## 2025-06-19 RX ADMIN — CIPROFLOXACIN 400 MG: 2 INJECTION, SOLUTION INTRAVENOUS at 03:08

## 2025-06-19 RX ADMIN — SODIUM PHOSPHATE, MONOBASIC, MONOHYDRATE AND SODIUM PHOSPHATE, DIBASIC, ANHYDROUS 20 MMOL: 142; 276 INJECTION, SOLUTION INTRAVENOUS at 11:23

## 2025-06-19 RX ADMIN — Medication 0.1 MG: at 09:10

## 2025-06-19 RX ADMIN — PIPERACILLIN AND TAZOBACTAM 4500 MG: 4; .5 INJECTION, POWDER, LYOPHILIZED, FOR SOLUTION INTRAVENOUS; PARENTERAL at 09:03

## 2025-06-19 RX ADMIN — SODIUM CHLORIDE, SODIUM LACTATE, POTASSIUM CHLORIDE, AND CALCIUM CHLORIDE: .6; .31; .03; .02 INJECTION, SOLUTION INTRAVENOUS at 06:11

## 2025-06-19 RX ADMIN — FOLIC ACID 1 MG: 1 TABLET ORAL at 11:58

## 2025-06-19 RX ADMIN — SODIUM CHLORIDE 1000 ML: 0.9 INJECTION, SOLUTION INTRAVENOUS at 08:02

## 2025-06-19 RX ADMIN — ONDANSETRON 4 MG: 2 INJECTION INTRAMUSCULAR; INTRAVENOUS at 04:59

## 2025-06-19 RX ADMIN — ALBUMIN (HUMAN) 150 ML: 12.5 INJECTION, SOLUTION INTRAVENOUS at 09:05

## 2025-06-19 RX ADMIN — MAGNESIUM SULFATE HEPTAHYDRATE 2000 MG: 40 INJECTION, SOLUTION INTRAVENOUS at 11:25

## 2025-06-19 RX ADMIN — SODIUM CHLORIDE, PRESERVATIVE FREE 10 ML: 5 INJECTION INTRAVENOUS at 11:25

## 2025-06-19 RX ADMIN — Medication 0.1 MG: at 09:15

## 2025-06-19 RX ADMIN — GABAPENTIN 200 MG: 100 CAPSULE ORAL at 14:32

## 2025-06-19 RX ADMIN — ALBUTEROL SULFATE 2 PUFF: 90 AEROSOL, METERED RESPIRATORY (INHALATION) at 19:23

## 2025-06-19 RX ADMIN — SERTRALINE HYDROCHLORIDE 25 MG: 50 TABLET ORAL at 11:58

## 2025-06-19 RX ADMIN — INSULIN LISPRO 2 UNITS: 100 INJECTION, SOLUTION INTRAVENOUS; SUBCUTANEOUS at 21:35

## 2025-06-19 RX ADMIN — ATORVASTATIN CALCIUM 20 MG: 10 TABLET, FILM COATED ORAL at 21:36

## 2025-06-19 RX ADMIN — MONTELUKAST 10 MG: 10 TABLET, FILM COATED ORAL at 21:36

## 2025-06-19 RX ADMIN — PROPOFOL 25 MCG/KG/MIN: 10 INJECTION, EMULSION INTRAVENOUS at 09:05

## 2025-06-19 RX ADMIN — OXYCODONE AND ACETAMINOPHEN 1 TABLET: 5; 325 TABLET ORAL at 21:43

## 2025-06-19 RX ADMIN — FLUTICASONE PROPIONATE 4 PUFF: 110 AEROSOL, METERED RESPIRATORY (INHALATION) at 19:24

## 2025-06-19 RX ADMIN — INSULIN LISPRO 2 UNITS: 100 INJECTION, SOLUTION INTRAVENOUS; SUBCUTANEOUS at 17:55

## 2025-06-19 RX ADMIN — SODIUM CHLORIDE, PRESERVATIVE FREE 10 ML: 5 INJECTION INTRAVENOUS at 21:37

## 2025-06-19 RX ADMIN — HEPARIN SODIUM 5000 UNITS: 5000 INJECTION INTRAVENOUS; SUBCUTANEOUS at 14:33

## 2025-06-19 RX ADMIN — Medication 0.1 MG: at 09:05

## 2025-06-19 RX ADMIN — HEPARIN SODIUM 5000 UNITS: 5000 INJECTION INTRAVENOUS; SUBCUTANEOUS at 21:35

## 2025-06-19 RX ADMIN — ALBUMIN (HUMAN) 25 G: 0.25 INJECTION, SOLUTION INTRAVENOUS at 11:54

## 2025-06-19 RX ADMIN — MEROPENEM 1000 MG: 1 INJECTION, POWDER, FOR SOLUTION INTRAVENOUS at 11:58

## 2025-06-19 ASSESSMENT — PAIN - FUNCTIONAL ASSESSMENT: PAIN_FUNCTIONAL_ASSESSMENT: ACTIVITIES ARE NOT PREVENTED

## 2025-06-19 ASSESSMENT — ENCOUNTER SYMPTOMS: SHORTNESS OF BREATH: 1

## 2025-06-19 ASSESSMENT — PAIN SCALES - GENERAL
PAINLEVEL_OUTOF10: 8
PAINLEVEL_OUTOF10: 0
PAINLEVEL_OUTOF10: 5
PAINLEVEL_OUTOF10: 4

## 2025-06-19 ASSESSMENT — PAIN DESCRIPTION - LOCATION: LOCATION: FLANK

## 2025-06-19 ASSESSMENT — PAIN DESCRIPTION - PAIN TYPE: TYPE: ACUTE PAIN

## 2025-06-19 ASSESSMENT — PAIN DESCRIPTION - ORIENTATION: ORIENTATION: RIGHT

## 2025-06-19 ASSESSMENT — PAIN DESCRIPTION - FREQUENCY: FREQUENCY: CONTINUOUS

## 2025-06-19 ASSESSMENT — PAIN DESCRIPTION - DESCRIPTORS: DESCRIPTORS: ACHING

## 2025-06-19 NOTE — PLAN OF CARE
Problem: Chronic Conditions and Co-morbidities  Goal: Patient's chronic conditions and co-morbidity symptoms are monitored and maintained or improved  Outcome: Progressing  Flowsheets (Taken 6/18/2025 1724 by Sherron Concepcion RN)  Care Plan - Patient's Chronic Conditions and Co-Morbidity Symptoms are Monitored and Maintained or Improved: Monitor and assess patient's chronic conditions and comorbid symptoms for stability, deterioration, or improvement     Problem: Discharge Planning  Goal: Discharge to home or other facility with appropriate resources  Outcome: Progressing  Flowsheets  Taken 6/18/2025 1820 by Sherron Concepcion RN  Discharge to home or other facility with appropriate resources:   Identify barriers to discharge with patient and caregiver   Identify discharge learning needs (meds, wound care, etc)  Taken 6/18/2025 1724 by Sherron Concepcion RN  Discharge to home or other facility with appropriate resources: Identify barriers to discharge with patient and caregiver     Problem: Pain  Goal: Verbalizes/displays adequate comfort level or baseline comfort level  Outcome: Progressing  Flowsheets (Taken 6/18/2025 1721 by Sherron Concepcion RN)  Verbalizes/displays adequate comfort level or baseline comfort level: Encourage patient to monitor pain and request assistance     Problem: Safety - Adult  Goal: Free from fall injury  Outcome: Progressing

## 2025-06-19 NOTE — ANESTHESIA POSTPROCEDURE EVALUATION
Department of Anesthesiology  Postprocedure Note    Patient: Keisha Turner  MRN: [id number]  YOB: 1965  Date of evaluation: 6/19/2025    Procedure Summary       Date: 06/19/25 Room / Location: [address]    Anesthesia Start: 0858 Anesthesia Stop: 0940    Procedure: CYSTOSCOPY STENT INSERTION (Right) Diagnosis:       Right kidney stone      (Right kidney stone [N20.0])    Surgeons: Luis Daniel Marsh MD Responsible Provider: Nicolas Coppola MD    Anesthesia Type: MAC ASA Status: 3 - Emergent            Anesthesia Type: MAC    Khris Phase I:      Khris Phase II:      Anesthesia Post Evaluation    Patient location during evaluation: PACU  Patient participation: complete - patient participated  Level of consciousness: sleepy but conscious  Pain score: 0  Nausea & Vomiting: no nausea and no vomiting  Cardiovascular status: blood pressure returned to baseline and hemodynamically stable  Respiratory status: acceptable and face mask  Hydration status: stable  Pain management: adequate    There were no known notable events for this encounter.

## 2025-06-19 NOTE — CARE COORDINATION
Chart reviewed.Cm attempted to see pt however she had very recently returned from OR and was too sleepy to talk with CM. Pt has cysto with stent insertion today. Cm will return to see pt at a later time.

## 2025-06-19 NOTE — OP NOTE
anesthesia service.  Placed in dorsal lithotomy position and prepped and draped in sterile fashion.  Appropriately padded.  Time out performed per protocol.     21 Yoruba cystoscope with 30 degree lens placed in bladder.  Bladder irrigated several times.  Global mild cystitis.  Her right ureter and right renal stone seen on fluoroscopy. Placed sensor guide wire into right collecting system.  Placed 4.8 Yoruba variable right ureter stent into right collecting system.  Efflux of cloudy, purulent urine from right collecting system seen consistent with right pyohydronephrosis.  Minimal bleeding.  She was given a bolus of pressors by anesthesia for her blood pressure.  16 Yoruba borjas catheter placed to promote drainage.  Arranged ICU bed with critical care consult prior to procedure.     She will need outpatient right ureteroscopy, stone manipulation once medically cleared  Remove borjas catheter when ambulating and no longer needs strict I and O's.     Electronically signed by Luis Daniel Marsh MD on 6/19/2025 at 9:27 AM

## 2025-06-19 NOTE — ANESTHESIA PRE PROCEDURE
Department of Anesthesiology  Preprocedure Note       Name:  Keisha Turner   Age:  59 y.o.  :  1965                                          MRN:  [id number]         Date:  2025      Surgeon: Surgeon(s):  Luis Daniel Marsh MD    Procedure: Procedure(s):  CYSTOSCOPY RETROGRADE PYELOGRAM STENT INSERTION    Medications prior to admission:   Prior to Admission medications    Medication Sig Start Date End Date Taking? Authorizing Provider   oxyBUTYnin (DITROPAN-XL) 5 MG extended release tablet Take 1 tablet by mouth daily 25  Yes Provider, Historical, MD   bumetanide (BUMEX) 1 MG tablet Take 1 tablet by mouth daily 3/10/25  Yes Crystal Garner APRN - CNP   losartan (COZAAR) 25 MG tablet Take 1 tablet by mouth daily 25  Yes Crystal Garner APRN - CNP   Dulaglutide 3 MG/0.5ML SOAJ Inject 3 mg into the skin once a week 23  Yes Provider, MD Phillip   JARDIANCE 25 MG tablet Take 1 tablet by mouth daily 23  Yes Provider, Historical, MD   atorvastatin (LIPITOR) 20 MG tablet Take 1 tablet by mouth daily 23 Dose decreased 23  Patient taking differently: Take 1 tablet by mouth daily 23  Yes Karthikeyan Ames MD   QVAR REDIHALER 40 MCG/ACT AERB inhaler Inhale 2 puffs into the lungs 2 times daily 3/31/23  Yes Provider, MD Phillip   albuterol sulfate HFA (PROVENTIL;VENTOLIN;PROAIR) 108 (90 Base) MCG/ACT inhaler Inhale 2 puffs into the lungs every 4-6 hours as needed for Wheezing   Yes Provider, Historical, MD   tiZANidine (ZANAFLEX) 4 MG tablet Take 1 tablet by mouth nightly 23  Yes Provider, MD Phillip   montelukast (SINGULAIR) 10 MG tablet Take 1 tablet by mouth nightly   Yes Provider, MD Phillip   sertraline (ZOLOFT) 25 MG tablet take 1 tablet by oral route every day 22  Yes Provider, MD Phillip   gabapentin (NEURONTIN) 400 MG tablet Take 1 tablet by mouth 3 times daily. 22  Yes Provider, MD Pihllip   metFORMIN (GLUCOPHAGE) 1000 MG tablet

## 2025-06-20 ENCOUNTER — TELEPHONE (OUTPATIENT)
Dept: CARDIOLOGY CLINIC | Age: 60
End: 2025-06-20

## 2025-06-20 ENCOUNTER — APPOINTMENT (OUTPATIENT)
Dept: GENERAL RADIOLOGY | Age: 60
DRG: 853 | End: 2025-06-20
Payer: MEDICARE

## 2025-06-20 PROBLEM — R10.9 FLANK PAIN: Status: ACTIVE | Noted: 2025-06-20

## 2025-06-20 PROBLEM — N13.30 HYDRONEPHROSIS: Status: ACTIVE | Noted: 2025-06-20

## 2025-06-20 PROBLEM — A49.8 PROTEUS MIRABILIS INFECTION: Status: ACTIVE | Noted: 2025-06-20

## 2025-06-20 PROBLEM — N12 PYELONEPHRITIS: Status: ACTIVE | Noted: 2025-06-20

## 2025-06-20 LAB
ALBUMIN SERPL-MCNC: 3 G/DL (ref 3.4–5)
ALBUMIN/GLOB SERPL: 1.2 {RATIO} (ref 1.1–2.2)
ALP SERPL-CCNC: 88 U/L (ref 40–129)
ALT SERPL-CCNC: 21 U/L (ref 10–40)
ANION GAP SERPL CALCULATED.3IONS-SCNC: 15 MMOL/L (ref 9–17)
AST SERPL-CCNC: 52 U/L (ref 15–37)
BASOPHILS # BLD: 0.02 K/UL
BASOPHILS NFR BLD: 0 % (ref 0–1)
BILIRUB SERPL-MCNC: 1.1 MG/DL (ref 0–1)
BUN SERPL-MCNC: 28 MG/DL (ref 7–20)
CALCIUM SERPL-MCNC: 8.1 MG/DL (ref 8.3–10.6)
CHLORIDE SERPL-SCNC: 103 MMOL/L (ref 99–110)
CO2 SERPL-SCNC: 19 MMOL/L (ref 21–32)
CREAT SERPL-MCNC: 2.4 MG/DL (ref 0.6–1.1)
EOSINOPHIL # BLD: 0.08 K/UL
EOSINOPHILS RELATIVE PERCENT: 1 % (ref 0–3)
ERYTHROCYTE [DISTWIDTH] IN BLOOD BY AUTOMATED COUNT: 16.2 % (ref 11.7–14.9)
EST. AVERAGE GLUCOSE BLD GHB EST-MCNC: 134 MG/DL
GFR, ESTIMATED: 20 ML/MIN/1.73M2
GLUCOSE BLD-MCNC: 120 MG/DL (ref 74–99)
GLUCOSE BLD-MCNC: 139 MG/DL (ref 74–99)
GLUCOSE BLD-MCNC: 154 MG/DL (ref 74–99)
GLUCOSE BLD-MCNC: 167 MG/DL (ref 74–99)
GLUCOSE SERPL-MCNC: 107 MG/DL (ref 74–99)
HBA1C MFR BLD: 6.3 % (ref 4.2–6.3)
HCT VFR BLD AUTO: 31.4 % (ref 37–47)
HGB BLD-MCNC: 9.7 G/DL (ref 12.5–16)
IMM GRANULOCYTES # BLD AUTO: 0.22 K/UL
IMM GRANULOCYTES NFR BLD: 2 %
LYMPHOCYTES NFR BLD: 0.87 K/UL
LYMPHOCYTES RELATIVE PERCENT: 8 % (ref 24–44)
MAGNESIUM SERPL-MCNC: 2.3 MG/DL (ref 1.8–2.4)
MCH RBC QN AUTO: 26.4 PG (ref 27–31)
MCHC RBC AUTO-ENTMCNC: 30.9 G/DL (ref 32–36)
MCV RBC AUTO: 85.3 FL (ref 78–100)
MICROORGANISM SPEC CULT: ABNORMAL
MONOCYTES NFR BLD: 0.28 K/UL
MONOCYTES NFR BLD: 3 % (ref 0–5)
NEUTROPHILS NFR BLD: 86 % (ref 36–66)
NEUTS SEG NFR BLD: 8.83 K/UL
PHOSPHATE SERPL-MCNC: 4.6 MG/DL (ref 2.5–4.9)
PLATELET # BLD AUTO: 125 K/UL (ref 140–440)
PMV BLD AUTO: 9.4 FL (ref 7.5–11.1)
POTASSIUM SERPL-SCNC: 4 MMOL/L (ref 3.5–5.1)
PROT SERPL-MCNC: 5.5 G/DL (ref 6.4–8.2)
RBC # BLD AUTO: 3.68 M/UL (ref 4.2–5.4)
SERVICE CMNT-IMP: ABNORMAL
SODIUM SERPL-SCNC: 136 MMOL/L (ref 136–145)
SPECIMEN DESCRIPTION: ABNORMAL
TROPONIN I SERPL HS-MCNC: 34 NG/L (ref 0–14)
WBC OTHER # BLD: 10.3 K/UL (ref 4–10.5)

## 2025-06-20 PROCEDURE — 99223 1ST HOSP IP/OBS HIGH 75: CPT | Performed by: INTERNAL MEDICINE

## 2025-06-20 PROCEDURE — 36600 WITHDRAWAL OF ARTERIAL BLOOD: CPT

## 2025-06-20 PROCEDURE — 6370000000 HC RX 637 (ALT 250 FOR IP): Performed by: UROLOGY

## 2025-06-20 PROCEDURE — 93005 ELECTROCARDIOGRAM TRACING: CPT | Performed by: STUDENT IN AN ORGANIZED HEALTH CARE EDUCATION/TRAINING PROGRAM

## 2025-06-20 PROCEDURE — 2140000000 HC CCU INTERMEDIATE R&B

## 2025-06-20 PROCEDURE — 6360000002 HC RX W HCPCS: Performed by: STUDENT IN AN ORGANIZED HEALTH CARE EDUCATION/TRAINING PROGRAM

## 2025-06-20 PROCEDURE — 83036 HEMOGLOBIN GLYCOSYLATED A1C: CPT

## 2025-06-20 PROCEDURE — 6370000000 HC RX 637 (ALT 250 FOR IP): Performed by: STUDENT IN AN ORGANIZED HEALTH CARE EDUCATION/TRAINING PROGRAM

## 2025-06-20 PROCEDURE — 36415 COLL VENOUS BLD VENIPUNCTURE: CPT

## 2025-06-20 PROCEDURE — 2580000003 HC RX 258: Performed by: UROLOGY

## 2025-06-20 PROCEDURE — 94640 AIRWAY INHALATION TREATMENT: CPT

## 2025-06-20 PROCEDURE — 80053 COMPREHEN METABOLIC PANEL: CPT

## 2025-06-20 PROCEDURE — 6370000000 HC RX 637 (ALT 250 FOR IP): Performed by: ANESTHESIOLOGY

## 2025-06-20 PROCEDURE — 2500000003 HC RX 250 WO HCPCS: Performed by: UROLOGY

## 2025-06-20 PROCEDURE — 82805 BLOOD GASES W/O2 SATURATION: CPT

## 2025-06-20 PROCEDURE — 82962 GLUCOSE BLOOD TEST: CPT

## 2025-06-20 PROCEDURE — 94761 N-INVAS EAR/PLS OXIMETRY MLT: CPT

## 2025-06-20 PROCEDURE — 2580000003 HC RX 258: Performed by: STUDENT IN AN ORGANIZED HEALTH CARE EDUCATION/TRAINING PROGRAM

## 2025-06-20 PROCEDURE — 84100 ASSAY OF PHOSPHORUS: CPT

## 2025-06-20 PROCEDURE — 84484 ASSAY OF TROPONIN QUANT: CPT

## 2025-06-20 PROCEDURE — 6360000002 HC RX W HCPCS: Performed by: UROLOGY

## 2025-06-20 PROCEDURE — 94664 DEMO&/EVAL PT USE INHALER: CPT

## 2025-06-20 PROCEDURE — 83735 ASSAY OF MAGNESIUM: CPT

## 2025-06-20 PROCEDURE — 85025 COMPLETE CBC W/AUTO DIFF WBC: CPT

## 2025-06-20 PROCEDURE — 71045 X-RAY EXAM CHEST 1 VIEW: CPT

## 2025-06-20 PROCEDURE — 2700000000 HC OXYGEN THERAPY PER DAY

## 2025-06-20 RX ORDER — ATROPINE SULFATE 0.1 MG/ML
0.5 INJECTION INTRAVENOUS PRN
Status: DISCONTINUED | OUTPATIENT
Start: 2025-06-20 | End: 2025-06-25 | Stop reason: HOSPADM

## 2025-06-20 RX ADMIN — SODIUM CHLORIDE, SODIUM LACTATE, POTASSIUM CHLORIDE, AND CALCIUM CHLORIDE: .6; .31; .03; .02 INJECTION, SOLUTION INTRAVENOUS at 05:57

## 2025-06-20 RX ADMIN — HEPARIN SODIUM 5000 UNITS: 5000 INJECTION INTRAVENOUS; SUBCUTANEOUS at 14:43

## 2025-06-20 RX ADMIN — FOLIC ACID 1 MG: 1 TABLET ORAL at 10:16

## 2025-06-20 RX ADMIN — SERTRALINE HYDROCHLORIDE 25 MG: 50 TABLET ORAL at 10:16

## 2025-06-20 RX ADMIN — ALBUTEROL SULFATE 2 PUFF: 90 AEROSOL, METERED RESPIRATORY (INHALATION) at 08:24

## 2025-06-20 RX ADMIN — SODIUM CHLORIDE, PRESERVATIVE FREE 10 ML: 5 INJECTION INTRAVENOUS at 20:24

## 2025-06-20 RX ADMIN — MEROPENEM 1000 MG: 1 INJECTION, POWDER, FOR SOLUTION INTRAVENOUS at 20:29

## 2025-06-20 RX ADMIN — ALBUTEROL SULFATE 2 PUFF: 90 AEROSOL, METERED RESPIRATORY (INHALATION) at 19:27

## 2025-06-20 RX ADMIN — MONTELUKAST 10 MG: 10 TABLET, FILM COATED ORAL at 20:16

## 2025-06-20 RX ADMIN — ATORVASTATIN CALCIUM 20 MG: 10 TABLET, FILM COATED ORAL at 21:30

## 2025-06-20 RX ADMIN — GABAPENTIN 200 MG: 100 CAPSULE ORAL at 09:45

## 2025-06-20 RX ADMIN — HEPARIN SODIUM 5000 UNITS: 5000 INJECTION INTRAVENOUS; SUBCUTANEOUS at 21:30

## 2025-06-20 RX ADMIN — ACETAMINOPHEN 650 MG: 325 TABLET ORAL at 21:32

## 2025-06-20 RX ADMIN — GABAPENTIN 200 MG: 100 CAPSULE ORAL at 20:16

## 2025-06-20 RX ADMIN — FLUTICASONE PROPIONATE 4 PUFF: 110 AEROSOL, METERED RESPIRATORY (INHALATION) at 19:28

## 2025-06-20 RX ADMIN — ALBUTEROL SULFATE 2 PUFF: 90 AEROSOL, METERED RESPIRATORY (INHALATION) at 15:58

## 2025-06-20 RX ADMIN — ACETAMINOPHEN 650 MG: 325 TABLET ORAL at 12:27

## 2025-06-20 RX ADMIN — SODIUM CHLORIDE, SODIUM LACTATE, POTASSIUM CHLORIDE, AND CALCIUM CHLORIDE: .6; .31; .03; .02 INJECTION, SOLUTION INTRAVENOUS at 17:59

## 2025-06-20 RX ADMIN — PANTOPRAZOLE SODIUM 40 MG: 40 TABLET, DELAYED RELEASE ORAL at 06:44

## 2025-06-20 RX ADMIN — SODIUM CHLORIDE, PRESERVATIVE FREE 10 ML: 5 INJECTION INTRAVENOUS at 10:16

## 2025-06-20 RX ADMIN — GABAPENTIN 200 MG: 100 CAPSULE ORAL at 14:43

## 2025-06-20 RX ADMIN — OXYCODONE HYDROCHLORIDE 5 MG: 5 TABLET ORAL at 22:04

## 2025-06-20 RX ADMIN — HYDROMORPHONE HYDROCHLORIDE 0.25 MG: 1 INJECTION, SOLUTION INTRAMUSCULAR; INTRAVENOUS; SUBCUTANEOUS at 14:16

## 2025-06-20 RX ADMIN — FLUTICASONE PROPIONATE 4 PUFF: 110 AEROSOL, METERED RESPIRATORY (INHALATION) at 08:24

## 2025-06-20 RX ADMIN — HEPARIN SODIUM 5000 UNITS: 5000 INJECTION INTRAVENOUS; SUBCUTANEOUS at 06:44

## 2025-06-20 ASSESSMENT — PAIN DESCRIPTION - LOCATION
LOCATION: FLANK
LOCATION: HEAD
LOCATION: HEAD
LOCATION: FLANK

## 2025-06-20 ASSESSMENT — PAIN SCALES - WONG BAKER
WONGBAKER_NUMERICALRESPONSE: NO HURT
WONGBAKER_NUMERICALRESPONSE: HURTS A LITTLE BIT
WONGBAKER_NUMERICALRESPONSE: NO HURT

## 2025-06-20 ASSESSMENT — PAIN SCALES - GENERAL
PAINLEVEL_OUTOF10: 7
PAINLEVEL_OUTOF10: 10
PAINLEVEL_OUTOF10: 7
PAINLEVEL_OUTOF10: 0
PAINLEVEL_OUTOF10: 4
PAINLEVEL_OUTOF10: 6
PAINLEVEL_OUTOF10: 3

## 2025-06-20 ASSESSMENT — PAIN DESCRIPTION - ONSET
ONSET: GRADUAL
ONSET: ON-GOING
ONSET: AWAKENED FROM SLEEP

## 2025-06-20 ASSESSMENT — PAIN DESCRIPTION - DESCRIPTORS
DESCRIPTORS: ACHING
DESCRIPTORS: ACHING;DISCOMFORT
DESCRIPTORS: ACHING
DESCRIPTORS: ACHING

## 2025-06-20 ASSESSMENT — PAIN DESCRIPTION - PAIN TYPE
TYPE: ACUTE PAIN

## 2025-06-20 ASSESSMENT — PAIN DESCRIPTION - ORIENTATION
ORIENTATION: RIGHT;LEFT
ORIENTATION: RIGHT

## 2025-06-20 ASSESSMENT — PAIN DESCRIPTION - FREQUENCY
FREQUENCY: INTERMITTENT

## 2025-06-20 ASSESSMENT — PAIN - FUNCTIONAL ASSESSMENT
PAIN_FUNCTIONAL_ASSESSMENT: ACTIVITIES ARE NOT PREVENTED

## 2025-06-20 NOTE — FLOWSHEET NOTE
Attempted to call 3N to give re[port on patient going to 3114--the phone rang for for a long time--Rebecca answered  and transferred  me--on hold for 10 min--this RN again hung up--called 3N [employer] DUSTY Resendiz--she attempted to transfer me to DUSTY Wallace--no answer--attempted to call again--no answer--called [phone] one last -- Chg Astrid RN answered and took report.

## 2025-06-20 NOTE — FLOWSHEET NOTE
Patient transferred to 3114 per bed, monitor, portable 02 and RN--pts belongings including cell phone and  with patient.

## 2025-06-20 NOTE — CARE COORDINATION
06/20/25 1157   Service Assessment   Patient Orientation Unable to Assess;Other (see comment)  (unable to stay awake to speak with CM)   Cognition Other (see comment)  (unable to stay awake to speak with CM)   History Provided By Medical Record   Primary Caregiver Self   Accompanied By/Relationship N/A   Support Systems Parent;Children   Patient's Healthcare Decision Maker is: Legal Next of Kin   PCP Verified by CM Yes   Last Visit to PCP   (unknown)   Prior Functional Level Assistance with the following:;Mobility   Current Functional Level Assistance with the following:;Bathing;Toileting;Mobility  (per hospital protocol)   Can patient return to prior living arrangement Yes   Ability to make needs known: Fair   Family able to assist with home care needs: Yes   Would you like for me to discuss the discharge plan with any other family members/significant others, and if so, who? Yes  (LNOK as needed)   Financial Resources Medicaid;Medicare   Community Resources None   CM/SW Referral Other (see comment)  (discharge planning assessment.)

## 2025-06-20 NOTE — PLAN OF CARE
Problem: Chronic Conditions and Co-morbidities  Goal: Patient's chronic conditions and co-morbidity symptoms are monitored and maintained or improved  Outcome: Progressing     Problem: Discharge Planning  Goal: Discharge to home or other facility with appropriate resources  Outcome: Progressing     Problem: Pain  Goal: Verbalizes/displays adequate comfort level or baseline comfort level  Outcome: Progressing     Problem: Safety - Adult  Goal: Free from fall injury  Outcome: Progressing     Problem: ABCDS Injury Assessment  Goal: Absence of physical injury  Outcome: Progressing     Problem: Skin/Tissue Integrity  Goal: Skin integrity remains intact  Description: 1.  Monitor for areas of redness and/or skin breakdown  2.  Assess vascular access sites hourly  3.  Every 4-6 hours minimum:  Change oxygen saturation probe site  4.  Every 4-6 hours:  If on nasal continuous positive airway pressure, respiratory therapy assess nares and determine need for appliance change or resting period  Outcome: Progressing  Flowsheets (Taken 6/20/2025 8512)  Skin Integrity Remains Intact:   Monitor for areas of redness and/or skin breakdown   Assess vascular access sites hourly   Every 4-6 hours minimum:  Change oxygen saturation probe site

## 2025-06-20 NOTE — CARE COORDINATION
Cm attempted to see pt again and pt very sleepy. Pt cannot stay awake to speak with Cm. Cm will reach out to family to discuss discharge planning.    1151 CM contacted pt's emergency contact, mother, to gather additional information for assessment. Mother states that she lives next door to pt. Pt, sister and dgt (bunny) reside together. Pt's  is also there currently but per mother is not supposed to be and he if doesn't leave she states that she will have him evicted. In addition to the dgt at home pt has a dgt in [facility]. Pt's brother, Luis, and his family lives upstairs from pt's mother (next door). Pt's home is 2 story and she and her sister stay on the 1st floor and her dgt stays upstairs. The bathroom and laundry are on 1st floor. Per mother, pt uses a walker occasionally due to back problems. Pt drives and has a vehicle.  Pt's sister is disabled with some amputations of toes/foot and currently has a wound that is being treated. Pt has PCP and ins. CM asked pt's mother if there were any concerns that she had about pt needs when she returns home and mother denied any needs or concerns. Pt's mother states that she has been dealing with kidney stones/issues for years. Family will assist pt if needs arise.

## 2025-06-21 LAB
ABSOLUTE BANDS: 0.18 K/UL
ABSOLUTE BANDS: 0.43 K/UL
ACB COMPLEX DNA BLD POS QL NAA+NON-PROBE: NOT DETECTED
ALBUMIN SERPL-MCNC: 3 G/DL (ref 3.4–5)
ALBUMIN SERPL-MCNC: 3.2 G/DL (ref 3.4–5)
ALBUMIN/GLOB SERPL: 0.9 {RATIO} (ref 1.1–2.2)
ALBUMIN/GLOB SERPL: 1 {RATIO} (ref 1.1–2.2)
ALP SERPL-CCNC: 115 U/L (ref 40–129)
ALP SERPL-CCNC: 132 U/L (ref 40–129)
ALT SERPL-CCNC: 20 U/L (ref 10–40)
ALT SERPL-CCNC: 22 U/L (ref 10–40)
ANION GAP SERPL CALCULATED.3IONS-SCNC: 13 MMOL/L (ref 9–17)
ANION GAP SERPL CALCULATED.3IONS-SCNC: 16 MMOL/L (ref 9–17)
AST SERPL-CCNC: 46 U/L (ref 15–37)
AST SERPL-CCNC: 50 U/L (ref 15–37)
B FRAGILIS DNA BLD POS QL NAA+NON-PROBE: NOT DETECTED
BANDS: 2 %
BANDS: 5 %
BASOPHILS # BLD: 0 K/UL
BASOPHILS # BLD: 0 K/UL
BASOPHILS NFR BLD: 0 % (ref 0–1)
BASOPHILS NFR BLD: 0 % (ref 0–1)
BILIRUB SERPL-MCNC: 0.9 MG/DL (ref 0–1)
BILIRUB SERPL-MCNC: 1 MG/DL (ref 0–1)
BLACTX-M ISLT/SPM QL: NOT DETECTED
BLAIMP ISLT/SPM QL: NOT DETECTED
BLAKPC ISLT/SPM QL: NOT DETECTED
BLAOXA-48-LIKE ISLT/SPM QL: NOT DETECTED
BLAVIM ISLT/SPM QL: NOT DETECTED
BNP SERPL-MCNC: 6964 PG/ML (ref 0–125)
BUN SERPL-MCNC: 26 MG/DL (ref 7–20)
BUN SERPL-MCNC: 26 MG/DL (ref 7–20)
C ALBICANS DNA BLD POS QL NAA+NON-PROBE: NOT DETECTED
C AURIS DNA BLD POS QL NAA+NON-PROBE: NOT DETECTED
C GATTII+NEOFOR DNA BLD POS QL NAA+N-PRB: NOT DETECTED
C GLABRATA DNA BLD POS QL NAA+NON-PROBE: NOT DETECTED
C KRUSEI DNA BLD POS QL NAA+NON-PROBE: NOT DETECTED
C PARAP DNA BLD POS QL NAA+NON-PROBE: NOT DETECTED
C TROPICLS DNA BLD POS QL NAA+NON-PROBE: NOT DETECTED
CALCIUM SERPL-MCNC: 8.5 MG/DL (ref 8.3–10.6)
CALCIUM SERPL-MCNC: 9 MG/DL (ref 8.3–10.6)
CHLORIDE SERPL-SCNC: 102 MMOL/L (ref 99–110)
CHLORIDE SERPL-SCNC: 103 MMOL/L (ref 99–110)
CO2 SERPL-SCNC: 20 MMOL/L (ref 21–32)
CO2 SERPL-SCNC: 22 MMOL/L (ref 21–32)
CREAT SERPL-MCNC: 1.6 MG/DL (ref 0.6–1.1)
CREAT SERPL-MCNC: 1.6 MG/DL (ref 0.6–1.1)
CRP SERPL HS-MCNC: 314 MG/L (ref 0–5)
E CLOAC COMP DNA BLD POS NAA+NON-PROBE: NOT DETECTED
E COLI DNA BLD POS QL NAA+NON-PROBE: NOT DETECTED
E FAECALIS DNA BLD POS QL NAA+NON-PROBE: NOT DETECTED
E FAECIUM DNA BLD POS QL NAA+NON-PROBE: NOT DETECTED
ENTEROBACTERALES DNA BLD POS NAA+N-PRB: DETECTED
EOSINOPHIL # BLD: 0 K/UL
EOSINOPHIL # BLD: 0.34 K/UL
EOSINOPHILS RELATIVE PERCENT: 0 % (ref 0–3)
EOSINOPHILS RELATIVE PERCENT: 4 % (ref 0–3)
ERYTHROCYTE [DISTWIDTH] IN BLOOD BY AUTOMATED COUNT: 16.3 % (ref 11.7–14.9)
ERYTHROCYTE [DISTWIDTH] IN BLOOD BY AUTOMATED COUNT: 16.5 % (ref 11.7–14.9)
GFR, ESTIMATED: 33 ML/MIN/1.73M2
GFR, ESTIMATED: 33 ML/MIN/1.73M2
GLUCOSE BLD-MCNC: 129 MG/DL (ref 74–99)
GLUCOSE BLD-MCNC: 135 MG/DL (ref 74–99)
GLUCOSE BLD-MCNC: 138 MG/DL (ref 74–99)
GLUCOSE BLD-MCNC: 144 MG/DL (ref 74–99)
GLUCOSE SERPL-MCNC: 125 MG/DL (ref 74–99)
GLUCOSE SERPL-MCNC: 137 MG/DL (ref 74–99)
GP B STREP DNA BLD POS QL NAA+NON-PROBE: NOT DETECTED
HAEM INFLU DNA BLD POS QL NAA+NON-PROBE: NOT DETECTED
HCT VFR BLD AUTO: 31.5 % (ref 37–47)
HCT VFR BLD AUTO: 34.1 % (ref 37–47)
HGB BLD-MCNC: 10.1 G/DL (ref 12.5–16)
HGB BLD-MCNC: 10.8 G/DL (ref 12.5–16)
K OXYTOCA DNA BLD POS QL NAA+NON-PROBE: NOT DETECTED
KLEBSIELLA SP DNA BLD POS QL NAA+NON-PRB: NOT DETECTED
KLEBSIELLA SP DNA BLD POS QL NAA+NON-PRB: NOT DETECTED
L MONOCYTOG DNA BLD POS QL NAA+NON-PROBE: NOT DETECTED
LACTATE BLDV-SCNC: 1.2 MMOL/L (ref 0.4–2)
LACTATE BLDV-SCNC: 1.2 MMOL/L (ref 0.4–2)
LYMPHOCYTES NFR BLD: 0.77 K/UL
LYMPHOCYTES NFR BLD: 1.09 K/UL
LYMPHOCYTES RELATIVE PERCENT: 12 % (ref 24–44)
LYMPHOCYTES RELATIVE PERCENT: 9 % (ref 24–44)
MAGNESIUM SERPL-MCNC: 2.3 MG/DL (ref 1.8–2.4)
MAGNESIUM SERPL-MCNC: 2.4 MG/DL (ref 1.8–2.4)
MCH RBC QN AUTO: 26.1 PG (ref 27–31)
MCH RBC QN AUTO: 26.4 PG (ref 27–31)
MCHC RBC AUTO-ENTMCNC: 31.7 G/DL (ref 32–36)
MCHC RBC AUTO-ENTMCNC: 32.1 G/DL (ref 32–36)
MCV RBC AUTO: 82.4 FL (ref 78–100)
MCV RBC AUTO: 82.5 FL (ref 78–100)
MICROORGANISM SPEC CULT: ABNORMAL
MICROORGANISM SPEC CULT: ABNORMAL
MICROORGANISM/AGENT SPEC: ABNORMAL
MICROORGANISM/AGENT SPEC: ABNORMAL
MONOCYTES NFR BLD: 0.09 K/UL
MONOCYTES NFR BLD: 0.17 K/UL
MONOCYTES NFR BLD: 1 % (ref 0–5)
MONOCYTES NFR BLD: 2 % (ref 0–5)
N MEN DNA BLD POS QL NAA+NON-PROBE: NOT DETECTED
NEUTROPHILS NFR BLD: 80 % (ref 36–66)
NEUTROPHILS NFR BLD: 85 % (ref 36–66)
NEUTS SEG NFR BLD: 6.88 K/UL
NEUTS SEG NFR BLD: 7.74 K/UL
P AERUGINOSA DNA BLD POS NAA+NON-PROBE: NOT DETECTED
PHOSPHATE SERPL-MCNC: 4.1 MG/DL (ref 2.5–4.9)
PLATELET # BLD AUTO: 118 K/UL (ref 140–440)
PLATELET # BLD AUTO: 137 K/UL (ref 140–440)
PLATELET ESTIMATE: NORMAL
PLATELET ESTIMATE: NORMAL
PMV BLD AUTO: 9.1 FL (ref 7.5–11.1)
PMV BLD AUTO: 9.6 FL (ref 7.5–11.1)
POTASSIUM SERPL-SCNC: 3.3 MMOL/L (ref 3.5–5.1)
POTASSIUM SERPL-SCNC: 3.4 MMOL/L (ref 3.5–5.1)
PROT SERPL-MCNC: 6 G/DL (ref 6.4–8.2)
PROT SERPL-MCNC: 6.5 G/DL (ref 6.4–8.2)
PROTEUS SP DNA BLD POS QL NAA+NON-PROBE: DETECTED
RBC # BLD AUTO: 3.82 M/UL (ref 4.2–5.4)
RBC # BLD AUTO: 4.14 M/UL (ref 4.2–5.4)
RBC # BLD: NORMAL 10*6/UL
RBC # BLD: NORMAL 10*6/UL
RESISTANT GENE NDM BY PCR: NOT DETECTED
S AUREUS DNA BLD POS QL NAA+NON-PROBE: NOT DETECTED
S AUREUS+CONS DNA BLD POS NAA+NON-PROBE: NOT DETECTED
S EPIDERMIDIS DNA BLD POS QL NAA+NON-PRB: NOT DETECTED
S LUGDUNENSIS DNA BLD POS QL NAA+NON-PRB: NOT DETECTED
S MALTOPHILIA DNA BLD POS QL NAA+NON-PRB: NOT DETECTED
S MARCESCENS DNA BLD POS NAA+NON-PROBE: NOT DETECTED
S PNEUM DNA BLD POS QL NAA+NON-PROBE: NOT DETECTED
S PYO DNA BLD POS QL NAA+NON-PROBE: ABNORMAL
SALMONELLA DNA BLD POS QL NAA+NON-PROBE: NOT DETECTED
SERVICE CMNT-IMP: ABNORMAL
SERVICE CMNT-IMP: ABNORMAL
SODIUM SERPL-SCNC: 137 MMOL/L (ref 136–145)
SODIUM SERPL-SCNC: 140 MMOL/L (ref 136–145)
SPECIMEN DESCRIPTION: ABNORMAL
SPECIMEN DESCRIPTION: ABNORMAL
STREPTOCOCCUS DNA BLD POS NAA+NON-PROBE: NOT DETECTED
WBC # BLD: NORMAL 10*3/UL
WBC # BLD: NORMAL 10*3/UL
WBC OTHER # BLD: 8.6 K/UL (ref 4–10.5)
WBC OTHER # BLD: 9.1 K/UL (ref 4–10.5)

## 2025-06-21 PROCEDURE — 83605 ASSAY OF LACTIC ACID: CPT

## 2025-06-21 PROCEDURE — 86140 C-REACTIVE PROTEIN: CPT

## 2025-06-21 PROCEDURE — 2700000000 HC OXYGEN THERAPY PER DAY

## 2025-06-21 PROCEDURE — 94761 N-INVAS EAR/PLS OXIMETRY MLT: CPT

## 2025-06-21 PROCEDURE — 85025 COMPLETE CBC W/AUTO DIFF WBC: CPT

## 2025-06-21 PROCEDURE — 94640 AIRWAY INHALATION TREATMENT: CPT

## 2025-06-21 PROCEDURE — 6360000002 HC RX W HCPCS: Performed by: STUDENT IN AN ORGANIZED HEALTH CARE EDUCATION/TRAINING PROGRAM

## 2025-06-21 PROCEDURE — 83880 ASSAY OF NATRIURETIC PEPTIDE: CPT

## 2025-06-21 PROCEDURE — 6370000000 HC RX 637 (ALT 250 FOR IP): Performed by: STUDENT IN AN ORGANIZED HEALTH CARE EDUCATION/TRAINING PROGRAM

## 2025-06-21 PROCEDURE — 2140000000 HC CCU INTERMEDIATE R&B

## 2025-06-21 PROCEDURE — 82962 GLUCOSE BLOOD TEST: CPT

## 2025-06-21 PROCEDURE — 6370000000 HC RX 637 (ALT 250 FOR IP): Performed by: UROLOGY

## 2025-06-21 PROCEDURE — 2500000003 HC RX 250 WO HCPCS: Performed by: UROLOGY

## 2025-06-21 PROCEDURE — 84100 ASSAY OF PHOSPHORUS: CPT

## 2025-06-21 PROCEDURE — 83735 ASSAY OF MAGNESIUM: CPT

## 2025-06-21 PROCEDURE — 36415 COLL VENOUS BLD VENIPUNCTURE: CPT

## 2025-06-21 PROCEDURE — 2580000003 HC RX 258: Performed by: STUDENT IN AN ORGANIZED HEALTH CARE EDUCATION/TRAINING PROGRAM

## 2025-06-21 PROCEDURE — 80053 COMPREHEN METABOLIC PANEL: CPT

## 2025-06-21 RX ORDER — BUMETANIDE 1 MG/1
1 TABLET ORAL DAILY
Status: DISCONTINUED | OUTPATIENT
Start: 2025-06-21 | End: 2025-06-25 | Stop reason: HOSPADM

## 2025-06-21 RX ORDER — FUROSEMIDE 10 MG/ML
60 INJECTION INTRAMUSCULAR; INTRAVENOUS ONCE
Status: COMPLETED | OUTPATIENT
Start: 2025-06-21 | End: 2025-06-21

## 2025-06-21 RX ORDER — POTASSIUM CHLORIDE 1500 MG/1
20 TABLET, EXTENDED RELEASE ORAL ONCE
Status: DISCONTINUED | OUTPATIENT
Start: 2025-06-21 | End: 2025-06-21

## 2025-06-21 RX ORDER — POTASSIUM CHLORIDE 1500 MG/1
20 TABLET, EXTENDED RELEASE ORAL ONCE
Status: COMPLETED | OUTPATIENT
Start: 2025-06-21 | End: 2025-06-21

## 2025-06-21 RX ORDER — POTASSIUM CHLORIDE 7.45 MG/ML
10 INJECTION INTRAVENOUS
Status: COMPLETED | OUTPATIENT
Start: 2025-06-21 | End: 2025-06-21

## 2025-06-21 RX ADMIN — OXYCODONE AND ACETAMINOPHEN 1 TABLET: 5; 325 TABLET ORAL at 21:06

## 2025-06-21 RX ADMIN — GABAPENTIN 200 MG: 100 CAPSULE ORAL at 09:20

## 2025-06-21 RX ADMIN — MEROPENEM 1000 MG: 1 INJECTION, POWDER, FOR SOLUTION INTRAVENOUS at 09:22

## 2025-06-21 RX ADMIN — SODIUM CHLORIDE, PRESERVATIVE FREE 10 ML: 5 INJECTION INTRAVENOUS at 09:20

## 2025-06-21 RX ADMIN — ALBUTEROL SULFATE 2 PUFF: 90 AEROSOL, METERED RESPIRATORY (INHALATION) at 11:10

## 2025-06-21 RX ADMIN — ACETAMINOPHEN 650 MG: 325 TABLET ORAL at 20:40

## 2025-06-21 RX ADMIN — Medication 10 MEQ: at 06:00

## 2025-06-21 RX ADMIN — GABAPENTIN 200 MG: 100 CAPSULE ORAL at 13:19

## 2025-06-21 RX ADMIN — ALBUTEROL SULFATE 2 PUFF: 90 AEROSOL, METERED RESPIRATORY (INHALATION) at 19:17

## 2025-06-21 RX ADMIN — ACETAMINOPHEN 650 MG: 325 TABLET ORAL at 13:21

## 2025-06-21 RX ADMIN — HEPARIN SODIUM 5000 UNITS: 5000 INJECTION INTRAVENOUS; SUBCUTANEOUS at 21:05

## 2025-06-21 RX ADMIN — MONTELUKAST 10 MG: 10 TABLET, FILM COATED ORAL at 20:40

## 2025-06-21 RX ADMIN — ATORVASTATIN CALCIUM 20 MG: 10 TABLET, FILM COATED ORAL at 20:40

## 2025-06-21 RX ADMIN — FUROSEMIDE 60 MG: 10 INJECTION, SOLUTION INTRAMUSCULAR; INTRAVENOUS at 00:45

## 2025-06-21 RX ADMIN — ALBUTEROL SULFATE 2 PUFF: 90 AEROSOL, METERED RESPIRATORY (INHALATION) at 15:11

## 2025-06-21 RX ADMIN — SERTRALINE HYDROCHLORIDE 25 MG: 50 TABLET ORAL at 09:20

## 2025-06-21 RX ADMIN — Medication 10 MEQ: at 03:29

## 2025-06-21 RX ADMIN — BUMETANIDE 1 MG: 1 TABLET ORAL at 13:19

## 2025-06-21 RX ADMIN — FOLIC ACID 1 MG: 1 TABLET ORAL at 09:20

## 2025-06-21 RX ADMIN — ALBUTEROL SULFATE 2 PUFF: 90 AEROSOL, METERED RESPIRATORY (INHALATION) at 07:17

## 2025-06-21 RX ADMIN — FLUTICASONE PROPIONATE 4 PUFF: 110 AEROSOL, METERED RESPIRATORY (INHALATION) at 19:18

## 2025-06-21 RX ADMIN — Medication 10 MEQ: at 07:28

## 2025-06-21 RX ADMIN — FLUTICASONE PROPIONATE 4 PUFF: 110 AEROSOL, METERED RESPIRATORY (INHALATION) at 07:17

## 2025-06-21 RX ADMIN — MEROPENEM 1000 MG: 1 INJECTION, POWDER, FOR SOLUTION INTRAVENOUS at 20:41

## 2025-06-21 RX ADMIN — POTASSIUM CHLORIDE 20 MEQ: 1500 TABLET, EXTENDED RELEASE ORAL at 11:23

## 2025-06-21 RX ADMIN — HEPARIN SODIUM 5000 UNITS: 5000 INJECTION INTRAVENOUS; SUBCUTANEOUS at 13:19

## 2025-06-21 RX ADMIN — PANTOPRAZOLE SODIUM 40 MG: 40 TABLET, DELAYED RELEASE ORAL at 06:00

## 2025-06-21 RX ADMIN — SODIUM CHLORIDE, PRESERVATIVE FREE 10 ML: 5 INJECTION INTRAVENOUS at 20:41

## 2025-06-21 RX ADMIN — GABAPENTIN 200 MG: 100 CAPSULE ORAL at 20:40

## 2025-06-21 RX ADMIN — HEPARIN SODIUM 5000 UNITS: 5000 INJECTION INTRAVENOUS; SUBCUTANEOUS at 06:00

## 2025-06-21 ASSESSMENT — PAIN DESCRIPTION - LOCATION
LOCATION: ABDOMEN;GENERALIZED
LOCATION: ABDOMEN;HEAD
LOCATION: HEAD

## 2025-06-21 ASSESSMENT — PAIN DESCRIPTION - DESCRIPTORS
DESCRIPTORS: ACHING

## 2025-06-21 ASSESSMENT — PAIN SCALES - WONG BAKER
WONGBAKER_NUMERICALRESPONSE: HURTS LITTLE MORE
WONGBAKER_NUMERICALRESPONSE: HURTS LITTLE MORE
WONGBAKER_NUMERICALRESPONSE: HURTS EVEN MORE

## 2025-06-21 ASSESSMENT — PAIN - FUNCTIONAL ASSESSMENT
PAIN_FUNCTIONAL_ASSESSMENT: ACTIVITIES ARE NOT PREVENTED
PAIN_FUNCTIONAL_ASSESSMENT: ACTIVITIES ARE NOT PREVENTED

## 2025-06-21 ASSESSMENT — PAIN DESCRIPTION - ONSET: ONSET: ON-GOING

## 2025-06-21 ASSESSMENT — PAIN DESCRIPTION - PAIN TYPE: TYPE: CHRONIC PAIN

## 2025-06-21 ASSESSMENT — PAIN SCALES - GENERAL
PAINLEVEL_OUTOF10: 0
PAINLEVEL_OUTOF10: 5
PAINLEVEL_OUTOF10: 8
PAINLEVEL_OUTOF10: 7
PAINLEVEL_OUTOF10: 0
PAINLEVEL_OUTOF10: 5
PAINLEVEL_OUTOF10: 6
PAINLEVEL_OUTOF10: 0
PAINLEVEL_OUTOF10: 4
PAINLEVEL_OUTOF10: 0

## 2025-06-21 ASSESSMENT — PAIN DESCRIPTION - ORIENTATION
ORIENTATION: MID;LOWER
ORIENTATION: MID;LOWER

## 2025-06-21 ASSESSMENT — PAIN DESCRIPTION - FREQUENCY: FREQUENCY: CONTINUOUS

## 2025-06-21 NOTE — PLAN OF CARE
Problem: Skin/Tissue Integrity  Goal: Skin integrity remains intact  Description: 1.  Monitor for areas of redness and/or skin breakdown  2.  Assess vascular access sites hourly  3.  Every 4-6 hours minimum:  Change oxygen saturation probe site  4.  Every 4-6 hours:  If on nasal continuous positive airway pressure, respiratory therapy assess nares and determine need for appliance change or resting period  Outcome: Progressing  Flowsheets  Taken 6/20/2025 2004 by Missy Zuniga, RN  Skin Integrity Remains Intact: Monitor for areas of redness and/or skin breakdown  Taken 6/20/2025 1232 by Rodrigo Gamez, RN  Skin Integrity Remains Intact: Monitor for areas of redness and/or skin breakdown     Problem: ABCDS Injury Assessment  Goal: Absence of physical injury  Outcome: Progressing     Problem: Pain  Goal: Verbalizes/displays adequate comfort level or baseline comfort level  Outcome: Progressing     Problem: Safety - Adult  Goal: Free from fall injury  Outcome: Progressing

## 2025-06-22 LAB
ALBUMIN SERPL-MCNC: 3 G/DL (ref 3.4–5)
ALBUMIN/GLOB SERPL: 0.9 {RATIO} (ref 1.1–2.2)
ALP SERPL-CCNC: 136 U/L (ref 40–129)
ALT SERPL-CCNC: 18 U/L (ref 10–40)
ANION GAP SERPL CALCULATED.3IONS-SCNC: 15 MMOL/L (ref 9–17)
AST SERPL-CCNC: 43 U/L (ref 15–37)
BASOPHILS # BLD: 0.02 K/UL
BASOPHILS NFR BLD: 0 % (ref 0–1)
BILIRUB SERPL-MCNC: 0.7 MG/DL (ref 0–1)
BUN SERPL-MCNC: 28 MG/DL (ref 7–20)
CALCIUM SERPL-MCNC: 9.2 MG/DL (ref 8.3–10.6)
CHLORIDE SERPL-SCNC: 102 MMOL/L (ref 99–110)
CO2 SERPL-SCNC: 22 MMOL/L (ref 21–32)
CREAT SERPL-MCNC: 1.2 MG/DL (ref 0.6–1.1)
CRP SERPL HS-MCNC: 254 MG/L (ref 0–5)
EOSINOPHIL # BLD: 0.1 K/UL
EOSINOPHILS RELATIVE PERCENT: 1 % (ref 0–3)
ERYTHROCYTE [DISTWIDTH] IN BLOOD BY AUTOMATED COUNT: 16 % (ref 11.7–14.9)
GFR, ESTIMATED: 47 ML/MIN/1.73M2
GLUCOSE BLD-MCNC: 105 MG/DL (ref 74–99)
GLUCOSE BLD-MCNC: 112 MG/DL (ref 74–99)
GLUCOSE BLD-MCNC: 136 MG/DL (ref 74–99)
GLUCOSE BLD-MCNC: 99 MG/DL (ref 74–99)
GLUCOSE SERPL-MCNC: 107 MG/DL (ref 74–99)
HCT VFR BLD AUTO: 32.1 % (ref 37–47)
HGB BLD-MCNC: 10.4 G/DL (ref 12.5–16)
IMM GRANULOCYTES # BLD AUTO: 0.02 K/UL
IMM GRANULOCYTES NFR BLD: 0 %
LYMPHOCYTES NFR BLD: 1.09 K/UL
LYMPHOCYTES RELATIVE PERCENT: 14 % (ref 24–44)
MAGNESIUM SERPL-MCNC: 2.1 MG/DL (ref 1.8–2.4)
MCH RBC QN AUTO: 26.5 PG (ref 27–31)
MCHC RBC AUTO-ENTMCNC: 32.4 G/DL (ref 32–36)
MCV RBC AUTO: 81.7 FL (ref 78–100)
MONOCYTES NFR BLD: 0.36 K/UL
MONOCYTES NFR BLD: 5 % (ref 0–5)
NEUTROPHILS NFR BLD: 80 % (ref 36–66)
NEUTS SEG NFR BLD: 6.19 K/UL
PHOSPHATE SERPL-MCNC: 2.8 MG/DL (ref 2.5–4.9)
PLATELET # BLD AUTO: 130 K/UL (ref 140–440)
PMV BLD AUTO: 8.8 FL (ref 7.5–11.1)
POTASSIUM SERPL-SCNC: 3.4 MMOL/L (ref 3.5–5.1)
PROT SERPL-MCNC: 6.4 G/DL (ref 6.4–8.2)
RBC # BLD AUTO: 3.93 M/UL (ref 4.2–5.4)
SODIUM SERPL-SCNC: 139 MMOL/L (ref 136–145)
WBC OTHER # BLD: 7.8 K/UL (ref 4–10.5)

## 2025-06-22 PROCEDURE — 2700000000 HC OXYGEN THERAPY PER DAY

## 2025-06-22 PROCEDURE — 94761 N-INVAS EAR/PLS OXIMETRY MLT: CPT

## 2025-06-22 PROCEDURE — 97530 THERAPEUTIC ACTIVITIES: CPT

## 2025-06-22 PROCEDURE — 6370000000 HC RX 637 (ALT 250 FOR IP): Performed by: STUDENT IN AN ORGANIZED HEALTH CARE EDUCATION/TRAINING PROGRAM

## 2025-06-22 PROCEDURE — 84100 ASSAY OF PHOSPHORUS: CPT

## 2025-06-22 PROCEDURE — 94640 AIRWAY INHALATION TREATMENT: CPT

## 2025-06-22 PROCEDURE — 97166 OT EVAL MOD COMPLEX 45 MIN: CPT

## 2025-06-22 PROCEDURE — 80053 COMPREHEN METABOLIC PANEL: CPT

## 2025-06-22 PROCEDURE — 97163 PT EVAL HIGH COMPLEX 45 MIN: CPT

## 2025-06-22 PROCEDURE — 6370000000 HC RX 637 (ALT 250 FOR IP): Performed by: UROLOGY

## 2025-06-22 PROCEDURE — 82962 GLUCOSE BLOOD TEST: CPT

## 2025-06-22 PROCEDURE — 83735 ASSAY OF MAGNESIUM: CPT

## 2025-06-22 PROCEDURE — 2140000000 HC CCU INTERMEDIATE R&B

## 2025-06-22 PROCEDURE — 86140 C-REACTIVE PROTEIN: CPT

## 2025-06-22 PROCEDURE — 2580000003 HC RX 258: Performed by: STUDENT IN AN ORGANIZED HEALTH CARE EDUCATION/TRAINING PROGRAM

## 2025-06-22 PROCEDURE — 36415 COLL VENOUS BLD VENIPUNCTURE: CPT

## 2025-06-22 PROCEDURE — 6370000000 HC RX 637 (ALT 250 FOR IP): Performed by: INTERNAL MEDICINE

## 2025-06-22 PROCEDURE — 85025 COMPLETE CBC W/AUTO DIFF WBC: CPT

## 2025-06-22 PROCEDURE — 2500000003 HC RX 250 WO HCPCS: Performed by: UROLOGY

## 2025-06-22 PROCEDURE — 6360000002 HC RX W HCPCS: Performed by: STUDENT IN AN ORGANIZED HEALTH CARE EDUCATION/TRAINING PROGRAM

## 2025-06-22 RX ADMIN — GABAPENTIN 200 MG: 100 CAPSULE ORAL at 20:47

## 2025-06-22 RX ADMIN — DOCUSATE SODIUM 100 MG: 100 CAPSULE, LIQUID FILLED ORAL at 20:52

## 2025-06-22 RX ADMIN — ALBUTEROL SULFATE 2 PUFF: 90 AEROSOL, METERED RESPIRATORY (INHALATION) at 11:07

## 2025-06-22 RX ADMIN — OXYCODONE AND ACETAMINOPHEN 1 TABLET: 5; 325 TABLET ORAL at 23:20

## 2025-06-22 RX ADMIN — FLUTICASONE PROPIONATE 4 PUFF: 110 AEROSOL, METERED RESPIRATORY (INHALATION) at 07:16

## 2025-06-22 RX ADMIN — OXYCODONE AND ACETAMINOPHEN 1 TABLET: 5; 325 TABLET ORAL at 12:42

## 2025-06-22 RX ADMIN — GABAPENTIN 200 MG: 100 CAPSULE ORAL at 13:38

## 2025-06-22 RX ADMIN — MONTELUKAST 10 MG: 10 TABLET, FILM COATED ORAL at 20:47

## 2025-06-22 RX ADMIN — MEROPENEM 1000 MG: 1 INJECTION, POWDER, FOR SOLUTION INTRAVENOUS at 20:56

## 2025-06-22 RX ADMIN — SERTRALINE HYDROCHLORIDE 25 MG: 50 TABLET ORAL at 08:38

## 2025-06-22 RX ADMIN — HEPARIN SODIUM 5000 UNITS: 5000 INJECTION INTRAVENOUS; SUBCUTANEOUS at 13:38

## 2025-06-22 RX ADMIN — PANTOPRAZOLE SODIUM 40 MG: 40 TABLET, DELAYED RELEASE ORAL at 06:34

## 2025-06-22 RX ADMIN — SODIUM CHLORIDE, PRESERVATIVE FREE 10 ML: 5 INJECTION INTRAVENOUS at 08:39

## 2025-06-22 RX ADMIN — OXYCODONE AND ACETAMINOPHEN 1 TABLET: 5; 325 TABLET ORAL at 08:38

## 2025-06-22 RX ADMIN — ALBUTEROL SULFATE 2 PUFF: 90 AEROSOL, METERED RESPIRATORY (INHALATION) at 15:27

## 2025-06-22 RX ADMIN — ATORVASTATIN CALCIUM 20 MG: 10 TABLET, FILM COATED ORAL at 20:47

## 2025-06-22 RX ADMIN — ALBUTEROL SULFATE 2 PUFF: 90 AEROSOL, METERED RESPIRATORY (INHALATION) at 21:06

## 2025-06-22 RX ADMIN — ACETAMINOPHEN 650 MG: 325 TABLET ORAL at 20:47

## 2025-06-22 RX ADMIN — ALBUTEROL SULFATE 2 PUFF: 90 AEROSOL, METERED RESPIRATORY (INHALATION) at 07:16

## 2025-06-22 RX ADMIN — BUMETANIDE 1 MG: 1 TABLET ORAL at 08:38

## 2025-06-22 RX ADMIN — HEPARIN SODIUM 5000 UNITS: 5000 INJECTION INTRAVENOUS; SUBCUTANEOUS at 06:24

## 2025-06-22 RX ADMIN — MEROPENEM 1000 MG: 1 INJECTION, POWDER, FOR SOLUTION INTRAVENOUS at 08:40

## 2025-06-22 RX ADMIN — HEPARIN SODIUM 5000 UNITS: 5000 INJECTION INTRAVENOUS; SUBCUTANEOUS at 21:30

## 2025-06-22 RX ADMIN — SODIUM CHLORIDE, PRESERVATIVE FREE 10 ML: 5 INJECTION INTRAVENOUS at 20:49

## 2025-06-22 RX ADMIN — FOLIC ACID 1 MG: 1 TABLET ORAL at 08:38

## 2025-06-22 RX ADMIN — OXYCODONE AND ACETAMINOPHEN 1 TABLET: 5; 325 TABLET ORAL at 16:35

## 2025-06-22 RX ADMIN — GABAPENTIN 200 MG: 100 CAPSULE ORAL at 08:37

## 2025-06-22 RX ADMIN — FLUTICASONE PROPIONATE 4 PUFF: 110 AEROSOL, METERED RESPIRATORY (INHALATION) at 21:06

## 2025-06-22 RX ADMIN — LOSARTAN POTASSIUM 25 MG: 25 TABLET, FILM COATED ORAL at 08:38

## 2025-06-22 ASSESSMENT — PAIN DESCRIPTION - ORIENTATION
ORIENTATION: ANTERIOR;POSTERIOR
ORIENTATION: RIGHT

## 2025-06-22 ASSESSMENT — PAIN DESCRIPTION - DESCRIPTORS
DESCRIPTORS: ACHING
DESCRIPTORS: STABBING

## 2025-06-22 ASSESSMENT — PAIN SCALES - GENERAL
PAINLEVEL_OUTOF10: 5
PAINLEVEL_OUTOF10: 7
PAINLEVEL_OUTOF10: 7
PAINLEVEL_OUTOF10: 4
PAINLEVEL_OUTOF10: 10
PAINLEVEL_OUTOF10: 8
PAINLEVEL_OUTOF10: 8

## 2025-06-22 ASSESSMENT — PAIN DESCRIPTION - LOCATION
LOCATION: BACK;FLANK
LOCATION: BACK
LOCATION: HEAD
LOCATION: ABDOMEN;BACK

## 2025-06-22 ASSESSMENT — PAIN SCALES - WONG BAKER
WONGBAKER_NUMERICALRESPONSE: HURTS LITTLE MORE
WONGBAKER_NUMERICALRESPONSE: HURTS WHOLE LOT

## 2025-06-22 ASSESSMENT — PAIN DESCRIPTION - PAIN TYPE: TYPE: CHRONIC PAIN

## 2025-06-23 LAB
ALBUMIN SERPL-MCNC: 3.1 G/DL (ref 3.4–5)
ALBUMIN/GLOB SERPL: 0.9 {RATIO} (ref 1.1–2.2)
ALP SERPL-CCNC: 128 U/L (ref 40–129)
ALT SERPL-CCNC: 18 U/L (ref 10–40)
ANION GAP SERPL CALCULATED.3IONS-SCNC: 15 MMOL/L (ref 9–17)
AST SERPL-CCNC: 42 U/L (ref 15–37)
BASOPHILS # BLD: 0.02 K/UL
BASOPHILS NFR BLD: 0 % (ref 0–1)
BILIRUB SERPL-MCNC: 0.6 MG/DL (ref 0–1)
BUN SERPL-MCNC: 27 MG/DL (ref 7–20)
CALCIUM SERPL-MCNC: 9.1 MG/DL (ref 8.3–10.6)
CHLORIDE SERPL-SCNC: 100 MMOL/L (ref 99–110)
CO2 SERPL-SCNC: 22 MMOL/L (ref 21–32)
CREAT SERPL-MCNC: 0.9 MG/DL (ref 0.6–1.1)
EKG ATRIAL RATE: 95 BPM
EKG DIAGNOSIS: NORMAL
EKG P AXIS: 35 DEGREES
EKG P-R INTERVAL: 160 MS
EKG Q-T INTERVAL: 392 MS
EKG QRS DURATION: 98 MS
EKG QTC CALCULATION (BAZETT): 492 MS
EKG R AXIS: -13 DEGREES
EKG T AXIS: 17 DEGREES
EKG VENTRICULAR RATE: 95 BPM
EOSINOPHIL # BLD: 0.2 K/UL
EOSINOPHILS RELATIVE PERCENT: 3 % (ref 0–3)
ERYTHROCYTE [DISTWIDTH] IN BLOOD BY AUTOMATED COUNT: 16.5 % (ref 11.7–14.9)
GFR, ESTIMATED: 62 ML/MIN/1.73M2
GLUCOSE BLD-MCNC: 119 MG/DL (ref 74–99)
GLUCOSE BLD-MCNC: 130 MG/DL (ref 74–99)
GLUCOSE BLD-MCNC: 131 MG/DL (ref 74–99)
GLUCOSE SERPL-MCNC: 98 MG/DL (ref 74–99)
HCT VFR BLD AUTO: 30.2 % (ref 37–47)
HGB BLD-MCNC: 9.8 G/DL (ref 12.5–16)
IMM GRANULOCYTES # BLD AUTO: 0.05 K/UL
IMM GRANULOCYTES NFR BLD: 1 %
LYMPHOCYTES NFR BLD: 1.45 K/UL
LYMPHOCYTES RELATIVE PERCENT: 22 % (ref 24–44)
MAGNESIUM SERPL-MCNC: 2.2 MG/DL (ref 1.8–2.4)
MCH RBC QN AUTO: 26.3 PG (ref 27–31)
MCHC RBC AUTO-ENTMCNC: 32.5 G/DL (ref 32–36)
MCV RBC AUTO: 81.2 FL (ref 78–100)
MONOCYTES NFR BLD: 0.42 K/UL
MONOCYTES NFR BLD: 6 % (ref 0–5)
NEUTROPHILS NFR BLD: 67 % (ref 36–66)
NEUTS SEG NFR BLD: 4.41 K/UL
PHOSPHATE SERPL-MCNC: 2.4 MG/DL (ref 2.5–4.9)
PLATELET # BLD AUTO: 145 K/UL (ref 140–440)
PMV BLD AUTO: 9.5 FL (ref 7.5–11.1)
POTASSIUM SERPL-SCNC: 2.9 MMOL/L (ref 3.5–5.1)
POTASSIUM SERPL-SCNC: 3.5 MMOL/L (ref 3.5–5.1)
PROT SERPL-MCNC: 6.6 G/DL (ref 6.4–8.2)
RBC # BLD AUTO: 3.72 M/UL (ref 4.2–5.4)
SODIUM SERPL-SCNC: 137 MMOL/L (ref 136–145)
WBC OTHER # BLD: 6.6 K/UL (ref 4–10.5)

## 2025-06-23 PROCEDURE — 84100 ASSAY OF PHOSPHORUS: CPT

## 2025-06-23 PROCEDURE — 6370000000 HC RX 637 (ALT 250 FOR IP): Performed by: STUDENT IN AN ORGANIZED HEALTH CARE EDUCATION/TRAINING PROGRAM

## 2025-06-23 PROCEDURE — 83735 ASSAY OF MAGNESIUM: CPT

## 2025-06-23 PROCEDURE — 2580000003 HC RX 258

## 2025-06-23 PROCEDURE — 2140000000 HC CCU INTERMEDIATE R&B

## 2025-06-23 PROCEDURE — 2500000003 HC RX 250 WO HCPCS: Performed by: STUDENT IN AN ORGANIZED HEALTH CARE EDUCATION/TRAINING PROGRAM

## 2025-06-23 PROCEDURE — 2580000003 HC RX 258: Performed by: STUDENT IN AN ORGANIZED HEALTH CARE EDUCATION/TRAINING PROGRAM

## 2025-06-23 PROCEDURE — 2500000003 HC RX 250 WO HCPCS: Performed by: UROLOGY

## 2025-06-23 PROCEDURE — 2700000000 HC OXYGEN THERAPY PER DAY

## 2025-06-23 PROCEDURE — 6370000000 HC RX 637 (ALT 250 FOR IP): Performed by: UROLOGY

## 2025-06-23 PROCEDURE — 6360000002 HC RX W HCPCS: Performed by: STUDENT IN AN ORGANIZED HEALTH CARE EDUCATION/TRAINING PROGRAM

## 2025-06-23 PROCEDURE — 85025 COMPLETE CBC W/AUTO DIFF WBC: CPT

## 2025-06-23 PROCEDURE — 99232 SBSQ HOSP IP/OBS MODERATE 35: CPT | Performed by: INTERNAL MEDICINE

## 2025-06-23 PROCEDURE — 36415 COLL VENOUS BLD VENIPUNCTURE: CPT

## 2025-06-23 PROCEDURE — 6360000002 HC RX W HCPCS

## 2025-06-23 PROCEDURE — 80053 COMPREHEN METABOLIC PANEL: CPT

## 2025-06-23 PROCEDURE — 94640 AIRWAY INHALATION TREATMENT: CPT

## 2025-06-23 PROCEDURE — 93010 ELECTROCARDIOGRAM REPORT: CPT | Performed by: INTERNAL MEDICINE

## 2025-06-23 PROCEDURE — 6370000000 HC RX 637 (ALT 250 FOR IP): Performed by: INTERNAL MEDICINE

## 2025-06-23 PROCEDURE — 82962 GLUCOSE BLOOD TEST: CPT

## 2025-06-23 PROCEDURE — 84132 ASSAY OF SERUM POTASSIUM: CPT

## 2025-06-23 PROCEDURE — 94761 N-INVAS EAR/PLS OXIMETRY MLT: CPT

## 2025-06-23 RX ORDER — POTASSIUM CHLORIDE 1500 MG/1
40 TABLET, EXTENDED RELEASE ORAL ONCE
Status: COMPLETED | OUTPATIENT
Start: 2025-06-23 | End: 2025-06-23

## 2025-06-23 RX ORDER — LOSARTAN POTASSIUM 25 MG/1
25 TABLET ORAL ONCE
Status: COMPLETED | OUTPATIENT
Start: 2025-06-23 | End: 2025-06-23

## 2025-06-23 RX ORDER — POTASSIUM CHLORIDE 7.45 MG/ML
10 INJECTION INTRAVENOUS
Status: COMPLETED | OUTPATIENT
Start: 2025-06-23 | End: 2025-06-23

## 2025-06-23 RX ORDER — LOSARTAN POTASSIUM 25 MG/1
50 TABLET ORAL DAILY
Status: DISCONTINUED | OUTPATIENT
Start: 2025-06-24 | End: 2025-06-25 | Stop reason: HOSPADM

## 2025-06-23 RX ADMIN — FLUTICASONE PROPIONATE 4 PUFF: 110 AEROSOL, METERED RESPIRATORY (INHALATION) at 20:08

## 2025-06-23 RX ADMIN — MEROPENEM 1000 MG: 1 INJECTION, POWDER, FOR SOLUTION INTRAVENOUS at 08:13

## 2025-06-23 RX ADMIN — FOLIC ACID 1 MG: 1 TABLET ORAL at 08:07

## 2025-06-23 RX ADMIN — GABAPENTIN 200 MG: 100 CAPSULE ORAL at 20:38

## 2025-06-23 RX ADMIN — GABAPENTIN 200 MG: 100 CAPSULE ORAL at 13:45

## 2025-06-23 RX ADMIN — SODIUM CHLORIDE, PRESERVATIVE FREE 10 ML: 5 INJECTION INTRAVENOUS at 08:08

## 2025-06-23 RX ADMIN — FLUTICASONE PROPIONATE 4 PUFF: 110 AEROSOL, METERED RESPIRATORY (INHALATION) at 07:31

## 2025-06-23 RX ADMIN — LOSARTAN POTASSIUM 25 MG: 25 TABLET, FILM COATED ORAL at 08:08

## 2025-06-23 RX ADMIN — ALBUTEROL SULFATE 2 PUFF: 90 AEROSOL, METERED RESPIRATORY (INHALATION) at 07:31

## 2025-06-23 RX ADMIN — HEPARIN SODIUM 5000 UNITS: 5000 INJECTION INTRAVENOUS; SUBCUTANEOUS at 21:54

## 2025-06-23 RX ADMIN — ALBUTEROL SULFATE 2 PUFF: 90 AEROSOL, METERED RESPIRATORY (INHALATION) at 11:40

## 2025-06-23 RX ADMIN — POTASSIUM CHLORIDE 10 MEQ: 7.46 INJECTION, SOLUTION INTRAVENOUS at 10:41

## 2025-06-23 RX ADMIN — POTASSIUM CHLORIDE 10 MEQ: 7.46 INJECTION, SOLUTION INTRAVENOUS at 12:42

## 2025-06-23 RX ADMIN — OXYCODONE AND ACETAMINOPHEN 1 TABLET: 5; 325 TABLET ORAL at 18:59

## 2025-06-23 RX ADMIN — SODIUM PHOSPHATE, MONOBASIC, MONOHYDRATE AND SODIUM PHOSPHATE, DIBASIC, ANHYDROUS 20 MMOL: 142; 276 INJECTION, SOLUTION INTRAVENOUS at 11:39

## 2025-06-23 RX ADMIN — POTASSIUM CHLORIDE 40 MEQ: 1500 TABLET, EXTENDED RELEASE ORAL at 08:07

## 2025-06-23 RX ADMIN — OXYCODONE AND ACETAMINOPHEN 1 TABLET: 5; 325 TABLET ORAL at 06:37

## 2025-06-23 RX ADMIN — OXYCODONE AND ACETAMINOPHEN 1 TABLET: 5; 325 TABLET ORAL at 23:59

## 2025-06-23 RX ADMIN — BUMETANIDE 1 MG: 1 TABLET ORAL at 08:07

## 2025-06-23 RX ADMIN — HEPARIN SODIUM 5000 UNITS: 5000 INJECTION INTRAVENOUS; SUBCUTANEOUS at 06:30

## 2025-06-23 RX ADMIN — POTASSIUM CHLORIDE 10 MEQ: 7.46 INJECTION, SOLUTION INTRAVENOUS at 13:45

## 2025-06-23 RX ADMIN — MONTELUKAST 10 MG: 10 TABLET, FILM COATED ORAL at 20:38

## 2025-06-23 RX ADMIN — ALBUTEROL SULFATE 2 PUFF: 90 AEROSOL, METERED RESPIRATORY (INHALATION) at 20:08

## 2025-06-23 RX ADMIN — LOSARTAN POTASSIUM 25 MG: 25 TABLET, FILM COATED ORAL at 10:34

## 2025-06-23 RX ADMIN — POTASSIUM CHLORIDE 10 MEQ: 7.46 INJECTION, SOLUTION INTRAVENOUS at 09:38

## 2025-06-23 RX ADMIN — ALBUTEROL SULFATE 2 PUFF: 90 AEROSOL, METERED RESPIRATORY (INHALATION) at 15:18

## 2025-06-23 RX ADMIN — GABAPENTIN 200 MG: 100 CAPSULE ORAL at 08:07

## 2025-06-23 RX ADMIN — ATORVASTATIN CALCIUM 20 MG: 10 TABLET, FILM COATED ORAL at 20:38

## 2025-06-23 RX ADMIN — POTASSIUM CHLORIDE 10 MEQ: 7.46 INJECTION, SOLUTION INTRAVENOUS at 08:10

## 2025-06-23 RX ADMIN — HEPARIN SODIUM 5000 UNITS: 5000 INJECTION INTRAVENOUS; SUBCUTANEOUS at 13:45

## 2025-06-23 RX ADMIN — SERTRALINE HYDROCHLORIDE 25 MG: 50 TABLET ORAL at 08:07

## 2025-06-23 RX ADMIN — PANTOPRAZOLE SODIUM 40 MG: 40 TABLET, DELAYED RELEASE ORAL at 06:30

## 2025-06-23 RX ADMIN — OXYCODONE AND ACETAMINOPHEN 1 TABLET: 5; 325 TABLET ORAL at 10:34

## 2025-06-23 RX ADMIN — POTASSIUM CHLORIDE 10 MEQ: 7.46 INJECTION, SOLUTION INTRAVENOUS at 11:42

## 2025-06-23 RX ADMIN — SODIUM CHLORIDE, PRESERVATIVE FREE 10 ML: 5 INJECTION INTRAVENOUS at 20:39

## 2025-06-23 RX ADMIN — OXYCODONE AND ACETAMINOPHEN 1 TABLET: 5; 325 TABLET ORAL at 14:52

## 2025-06-23 RX ADMIN — MEROPENEM 1000 MG: 1 INJECTION, POWDER, FOR SOLUTION INTRAVENOUS at 18:08

## 2025-06-23 ASSESSMENT — PAIN DESCRIPTION - LOCATION
LOCATION: BACK
LOCATION: FLANK
LOCATION: BACK

## 2025-06-23 ASSESSMENT — PAIN DESCRIPTION - DESCRIPTORS
DESCRIPTORS: ACHING
DESCRIPTORS: STABBING
DESCRIPTORS: STABBING

## 2025-06-23 ASSESSMENT — PAIN DESCRIPTION - ORIENTATION
ORIENTATION: RIGHT

## 2025-06-23 ASSESSMENT — PAIN SCALES - GENERAL
PAINLEVEL_OUTOF10: 8
PAINLEVEL_OUTOF10: 8
PAINLEVEL_OUTOF10: 7
PAINLEVEL_OUTOF10: 8
PAINLEVEL_OUTOF10: 7

## 2025-06-23 ASSESSMENT — PAIN SCALES - WONG BAKER: WONGBAKER_NUMERICALRESPONSE: HURTS EVEN MORE

## 2025-06-24 LAB
BASOPHILS # BLD: 0.02 K/UL
BASOPHILS NFR BLD: 0 % (ref 0–1)
CRP SERPL HS-MCNC: 100 MG/L (ref 0–5)
EOSINOPHIL # BLD: 0.18 K/UL
EOSINOPHILS RELATIVE PERCENT: 4 % (ref 0–3)
ERYTHROCYTE [DISTWIDTH] IN BLOOD BY AUTOMATED COUNT: 16.7 % (ref 11.7–14.9)
GLUCOSE BLD-MCNC: 107 MG/DL (ref 74–99)
GLUCOSE BLD-MCNC: 116 MG/DL (ref 74–99)
GLUCOSE BLD-MCNC: 127 MG/DL (ref 74–99)
GLUCOSE BLD-MCNC: 128 MG/DL (ref 74–99)
HCT VFR BLD AUTO: 30.2 % (ref 37–47)
HGB BLD-MCNC: 9.6 G/DL (ref 12.5–16)
IMM GRANULOCYTES # BLD AUTO: 0.05 K/UL
IMM GRANULOCYTES NFR BLD: 1 %
LYMPHOCYTES NFR BLD: 1.35 K/UL
LYMPHOCYTES RELATIVE PERCENT: 29 % (ref 24–44)
MCH RBC QN AUTO: 26.1 PG (ref 27–31)
MCHC RBC AUTO-ENTMCNC: 31.8 G/DL (ref 32–36)
MCV RBC AUTO: 82.1 FL (ref 78–100)
MONOCYTES NFR BLD: 0.33 K/UL
MONOCYTES NFR BLD: 7 % (ref 0–5)
NEUTROPHILS NFR BLD: 59 % (ref 36–66)
NEUTS SEG NFR BLD: 2.76 K/UL
PLATELET # BLD AUTO: 177 K/UL (ref 140–440)
PMV BLD AUTO: 9.7 FL (ref 7.5–11.1)
RBC # BLD AUTO: 3.68 M/UL (ref 4.2–5.4)
WBC OTHER # BLD: 4.7 K/UL (ref 4–10.5)

## 2025-06-24 PROCEDURE — 6370000000 HC RX 637 (ALT 250 FOR IP): Performed by: UROLOGY

## 2025-06-24 PROCEDURE — 85025 COMPLETE CBC W/AUTO DIFF WBC: CPT

## 2025-06-24 PROCEDURE — C1751 CATH, INF, PER/CENT/MIDLINE: HCPCS

## 2025-06-24 PROCEDURE — 76937 US GUIDE VASCULAR ACCESS: CPT

## 2025-06-24 PROCEDURE — 97530 THERAPEUTIC ACTIVITIES: CPT

## 2025-06-24 PROCEDURE — 36410 VNPNXR 3YR/> PHY/QHP DX/THER: CPT

## 2025-06-24 PROCEDURE — 05HD33Z INSERTION OF INFUSION DEVICE INTO RIGHT CEPHALIC VEIN, PERCUTANEOUS APPROACH: ICD-10-PCS | Performed by: UROLOGY

## 2025-06-24 PROCEDURE — 2500000003 HC RX 250 WO HCPCS: Performed by: UROLOGY

## 2025-06-24 PROCEDURE — 94640 AIRWAY INHALATION TREATMENT: CPT

## 2025-06-24 PROCEDURE — 36415 COLL VENOUS BLD VENIPUNCTURE: CPT

## 2025-06-24 PROCEDURE — 6360000002 HC RX W HCPCS: Performed by: STUDENT IN AN ORGANIZED HEALTH CARE EDUCATION/TRAINING PROGRAM

## 2025-06-24 PROCEDURE — 2140000000 HC CCU INTERMEDIATE R&B

## 2025-06-24 PROCEDURE — 87040 BLOOD CULTURE FOR BACTERIA: CPT

## 2025-06-24 PROCEDURE — 99232 SBSQ HOSP IP/OBS MODERATE 35: CPT | Performed by: INTERNAL MEDICINE

## 2025-06-24 PROCEDURE — 6370000000 HC RX 637 (ALT 250 FOR IP): Performed by: STUDENT IN AN ORGANIZED HEALTH CARE EDUCATION/TRAINING PROGRAM

## 2025-06-24 PROCEDURE — 82962 GLUCOSE BLOOD TEST: CPT

## 2025-06-24 PROCEDURE — 6360000002 HC RX W HCPCS

## 2025-06-24 PROCEDURE — 86140 C-REACTIVE PROTEIN: CPT

## 2025-06-24 PROCEDURE — 2580000003 HC RX 258

## 2025-06-24 PROCEDURE — 97535 SELF CARE MNGMENT TRAINING: CPT

## 2025-06-24 PROCEDURE — 94761 N-INVAS EAR/PLS OXIMETRY MLT: CPT

## 2025-06-24 RX ORDER — SODIUM CHLORIDE 0.9 % (FLUSH) 0.9 %
5-40 SYRINGE (ML) INJECTION PRN
Status: DISCONTINUED | OUTPATIENT
Start: 2025-06-24 | End: 2025-06-25 | Stop reason: HOSPADM

## 2025-06-24 RX ORDER — SODIUM CHLORIDE 0.9 % (FLUSH) 0.9 %
5-40 SYRINGE (ML) INJECTION EVERY 12 HOURS SCHEDULED
Status: DISCONTINUED | OUTPATIENT
Start: 2025-06-24 | End: 2025-06-25 | Stop reason: HOSPADM

## 2025-06-24 RX ORDER — SODIUM CHLORIDE 9 MG/ML
INJECTION, SOLUTION INTRAVENOUS PRN
Status: DISCONTINUED | OUTPATIENT
Start: 2025-06-24 | End: 2025-06-25 | Stop reason: HOSPADM

## 2025-06-24 RX ADMIN — LOSARTAN POTASSIUM 50 MG: 25 TABLET, FILM COATED ORAL at 08:02

## 2025-06-24 RX ADMIN — OXYCODONE AND ACETAMINOPHEN 1 TABLET: 5; 325 TABLET ORAL at 17:59

## 2025-06-24 RX ADMIN — FLUTICASONE PROPIONATE 4 PUFF: 110 AEROSOL, METERED RESPIRATORY (INHALATION) at 12:07

## 2025-06-24 RX ADMIN — ALBUTEROL SULFATE 2 PUFF: 90 AEROSOL, METERED RESPIRATORY (INHALATION) at 19:40

## 2025-06-24 RX ADMIN — HEPARIN SODIUM 5000 UNITS: 5000 INJECTION INTRAVENOUS; SUBCUTANEOUS at 13:58

## 2025-06-24 RX ADMIN — MEROPENEM 1000 MG: 1 INJECTION, POWDER, FOR SOLUTION INTRAVENOUS at 09:09

## 2025-06-24 RX ADMIN — OXYCODONE AND ACETAMINOPHEN 1 TABLET: 5; 325 TABLET ORAL at 21:59

## 2025-06-24 RX ADMIN — SERTRALINE HYDROCHLORIDE 25 MG: 50 TABLET ORAL at 08:02

## 2025-06-24 RX ADMIN — FOLIC ACID 1 MG: 1 TABLET ORAL at 08:01

## 2025-06-24 RX ADMIN — BUMETANIDE 1 MG: 1 TABLET ORAL at 08:02

## 2025-06-24 RX ADMIN — PANTOPRAZOLE SODIUM 40 MG: 40 TABLET, DELAYED RELEASE ORAL at 06:27

## 2025-06-24 RX ADMIN — ATORVASTATIN CALCIUM 20 MG: 10 TABLET, FILM COATED ORAL at 20:18

## 2025-06-24 RX ADMIN — OXYCODONE AND ACETAMINOPHEN 1 TABLET: 5; 325 TABLET ORAL at 13:58

## 2025-06-24 RX ADMIN — GABAPENTIN 200 MG: 100 CAPSULE ORAL at 08:01

## 2025-06-24 RX ADMIN — SODIUM CHLORIDE, PRESERVATIVE FREE 10 ML: 5 INJECTION INTRAVENOUS at 09:08

## 2025-06-24 RX ADMIN — HEPARIN SODIUM 5000 UNITS: 5000 INJECTION INTRAVENOUS; SUBCUTANEOUS at 06:28

## 2025-06-24 RX ADMIN — SODIUM CHLORIDE, PRESERVATIVE FREE 10 ML: 5 INJECTION INTRAVENOUS at 20:19

## 2025-06-24 RX ADMIN — ALBUTEROL SULFATE 2 PUFF: 90 AEROSOL, METERED RESPIRATORY (INHALATION) at 17:29

## 2025-06-24 RX ADMIN — GABAPENTIN 200 MG: 100 CAPSULE ORAL at 20:18

## 2025-06-24 RX ADMIN — MEROPENEM 1000 MG: 1 INJECTION, POWDER, FOR SOLUTION INTRAVENOUS at 01:40

## 2025-06-24 RX ADMIN — ALBUTEROL SULFATE 2 PUFF: 90 AEROSOL, METERED RESPIRATORY (INHALATION) at 12:08

## 2025-06-24 RX ADMIN — OXYCODONE AND ACETAMINOPHEN 1 TABLET: 5; 325 TABLET ORAL at 07:59

## 2025-06-24 RX ADMIN — MONTELUKAST 10 MG: 10 TABLET, FILM COATED ORAL at 20:18

## 2025-06-24 RX ADMIN — GABAPENTIN 200 MG: 100 CAPSULE ORAL at 13:58

## 2025-06-24 RX ADMIN — MEROPENEM 1000 MG: 1 INJECTION, POWDER, FOR SOLUTION INTRAVENOUS at 16:57

## 2025-06-24 RX ADMIN — FLUTICASONE PROPIONATE 4 PUFF: 110 AEROSOL, METERED RESPIRATORY (INHALATION) at 19:40

## 2025-06-24 RX ADMIN — HEPARIN SODIUM 5000 UNITS: 5000 INJECTION INTRAVENOUS; SUBCUTANEOUS at 20:18

## 2025-06-24 ASSESSMENT — PAIN DESCRIPTION - DESCRIPTORS
DESCRIPTORS: ACHING;DISCOMFORT
DESCRIPTORS: ACHING;DISCOMFORT
DESCRIPTORS: ACHING

## 2025-06-24 ASSESSMENT — PAIN SCALES - GENERAL
PAINLEVEL_OUTOF10: 0
PAINLEVEL_OUTOF10: 7
PAINLEVEL_OUTOF10: 3
PAINLEVEL_OUTOF10: 3
PAINLEVEL_OUTOF10: 10
PAINLEVEL_OUTOF10: 7
PAINLEVEL_OUTOF10: 7
PAINLEVEL_OUTOF10: 6

## 2025-06-24 ASSESSMENT — PAIN DESCRIPTION - LOCATION
LOCATION: OTHER (COMMENT);FLANK
LOCATION: FLANK
LOCATION: BACK

## 2025-06-24 ASSESSMENT — PAIN DESCRIPTION - FREQUENCY: FREQUENCY: INTERMITTENT

## 2025-06-24 ASSESSMENT — PAIN DESCRIPTION - PAIN TYPE: TYPE: CHRONIC PAIN

## 2025-06-24 ASSESSMENT — PAIN SCALES - WONG BAKER
WONGBAKER_NUMERICALRESPONSE: HURTS WHOLE LOT
WONGBAKER_NUMERICALRESPONSE: HURTS EVEN MORE

## 2025-06-24 ASSESSMENT — PAIN DESCRIPTION - ORIENTATION
ORIENTATION: RIGHT
ORIENTATION: LOWER
ORIENTATION: RIGHT

## 2025-06-24 NOTE — CARE COORDINATION
HC Liaison spoke with pt and pt is agreeable to c at discharge. Demo info reviewed. Pt states she & her mom will do infusion at home.   Notified that pt will dc tomorrow per Shana RENTERIA.   1434 Pt mom Lacey is agreeable to learn IV administration.

## 2025-06-24 NOTE — CARE COORDINATION
Dr Pavon informed CM that pt is ready for d/c and will need IV Ertapenem 1gm  daily until 07/02.  CM informed him of need for a midline or PICC line prior to d/c and need for HHC order to include the order for the IV atb. ID provided CM with the script.  CM met with pt to discuss the need for HHC.  Pt is agreeable and states that she has family at home that can learn how to administer the IV atb.  HHC list offered.  Pt states that she does not have a HHC preference.  Informed her of the hospital affiliation with [facility] and she is agreeable for referral to be made.  D/c plan is home with spouse, daughter and HHC.  Referral made to [facility] via [facility].  Notified [facility] liaison/Cathy of need for IV atb to be arranged today d/t pt is medically ready for d/c. Pt denies any other d/c needs.  TE

## 2025-06-24 NOTE — CARE COORDINATION
informed CM that pt will not be discharged today d/t she spiked a temp and will need BC's.  Notified [facility] liaison/Cathy via PS.  TE

## 2025-06-24 NOTE — PLAN OF CARE
Problem: Chronic Conditions and Co-morbidities  Goal: Patient's chronic conditions and co-morbidity symptoms are monitored and maintained or improved  6/24/2025 1024 by Renetta Estes RN  Outcome: Progressing  6/24/2025 0144 by Robert Vazquez RN  Outcome: Progressing     Problem: Discharge Planning  Goal: Discharge to home or other facility with appropriate resources  6/24/2025 1024 by Renetta Estes RN  Outcome: Progressing  6/24/2025 0144 by Robert Vazquez RN  Outcome: Progressing     Problem: Pain  Goal: Verbalizes/displays adequate comfort level or baseline comfort level  6/24/2025 1024 by Renetta Estes RN  Outcome: Progressing  6/24/2025 0144 by Robert Vazqeuz RN  Outcome: Progressing     Problem: Safety - Adult  Goal: Free from fall injury  6/24/2025 1024 by Renetta Estes RN  Outcome: Progressing  6/24/2025 0144 by Robert Vazquez RN  Outcome: Progressing     Problem: ABCDS Injury Assessment  Goal: Absence of physical injury  6/24/2025 1024 by Renetta Estes RN  Outcome: Progressing  6/24/2025 0144 by Robert Vazquez RN  Outcome: Progressing     Problem: Skin/Tissue Integrity  Goal: Skin integrity remains intact  Description: 1.  Monitor for areas of redness and/or skin breakdown  2.  Assess vascular access sites hourly  3.  Every 4-6 hours minimum:  Change oxygen saturation probe site  4.  Every 4-6 hours:  If on nasal continuous positive airway pressure, respiratory therapy assess nares and determine need for appliance change or resting period  6/24/2025 1024 by Renetta Estes RN  Outcome: Progressing  6/24/2025 0144 by Robert Vazquez RN  Outcome: Progressing     Problem: Infection - Adult  Goal: Absence of infection at discharge  6/24/2025 1024 by Renetta Estes RN  Outcome: Progressing  6/24/2025 0144 by Robert Vazquez RN  Outcome: Progressing  Goal: Absence of infection during hospitalization  6/24/2025 1024 by Renetta Estes RN  Outcome: Progressing  6/24/2025 0144 by Robert Vazquez

## 2025-06-25 VITALS
HEIGHT: 66 IN | WEIGHT: 224.65 LBS | SYSTOLIC BLOOD PRESSURE: 143 MMHG | HEART RATE: 71 BPM | OXYGEN SATURATION: 97 % | RESPIRATION RATE: 17 BRPM | BODY MASS INDEX: 36.1 KG/M2 | TEMPERATURE: 99.3 F | DIASTOLIC BLOOD PRESSURE: 85 MMHG

## 2025-06-25 LAB
CRP SERPL HS-MCNC: 48.7 MG/L (ref 0–5)
GLUCOSE BLD-MCNC: 130 MG/DL (ref 74–99)
GLUCOSE BLD-MCNC: 139 MG/DL (ref 74–99)

## 2025-06-25 PROCEDURE — 6370000000 HC RX 637 (ALT 250 FOR IP): Performed by: STUDENT IN AN ORGANIZED HEALTH CARE EDUCATION/TRAINING PROGRAM

## 2025-06-25 PROCEDURE — 99232 SBSQ HOSP IP/OBS MODERATE 35: CPT | Performed by: INTERNAL MEDICINE

## 2025-06-25 PROCEDURE — 94640 AIRWAY INHALATION TREATMENT: CPT

## 2025-06-25 PROCEDURE — 94761 N-INVAS EAR/PLS OXIMETRY MLT: CPT

## 2025-06-25 PROCEDURE — 2500000003 HC RX 250 WO HCPCS: Performed by: UROLOGY

## 2025-06-25 PROCEDURE — 6370000000 HC RX 637 (ALT 250 FOR IP): Performed by: UROLOGY

## 2025-06-25 PROCEDURE — 2500000003 HC RX 250 WO HCPCS: Performed by: STUDENT IN AN ORGANIZED HEALTH CARE EDUCATION/TRAINING PROGRAM

## 2025-06-25 PROCEDURE — 86140 C-REACTIVE PROTEIN: CPT

## 2025-06-25 PROCEDURE — 36415 COLL VENOUS BLD VENIPUNCTURE: CPT

## 2025-06-25 PROCEDURE — 2580000003 HC RX 258: Performed by: STUDENT IN AN ORGANIZED HEALTH CARE EDUCATION/TRAINING PROGRAM

## 2025-06-25 PROCEDURE — 6360000002 HC RX W HCPCS: Performed by: STUDENT IN AN ORGANIZED HEALTH CARE EDUCATION/TRAINING PROGRAM

## 2025-06-25 PROCEDURE — 82962 GLUCOSE BLOOD TEST: CPT

## 2025-06-25 PROCEDURE — 2580000003 HC RX 258

## 2025-06-25 PROCEDURE — 6360000002 HC RX W HCPCS

## 2025-06-25 RX ORDER — OXYCODONE AND ACETAMINOPHEN 5; 325 MG/1; MG/1
1 TABLET ORAL EVERY 8 HOURS PRN
Qty: 9 TABLET | Refills: 0 | Status: SHIPPED | OUTPATIENT
Start: 2025-06-25 | End: 2025-06-28

## 2025-06-25 RX ADMIN — OXYCODONE AND ACETAMINOPHEN 1 TABLET: 5; 325 TABLET ORAL at 08:22

## 2025-06-25 RX ADMIN — HEPARIN SODIUM 5000 UNITS: 5000 INJECTION INTRAVENOUS; SUBCUTANEOUS at 06:24

## 2025-06-25 RX ADMIN — FLUTICASONE PROPIONATE 4 PUFF: 110 AEROSOL, METERED RESPIRATORY (INHALATION) at 08:04

## 2025-06-25 RX ADMIN — GABAPENTIN 200 MG: 100 CAPSULE ORAL at 08:22

## 2025-06-25 RX ADMIN — ALBUTEROL SULFATE 2 PUFF: 90 AEROSOL, METERED RESPIRATORY (INHALATION) at 08:03

## 2025-06-25 RX ADMIN — LOSARTAN POTASSIUM 50 MG: 25 TABLET, FILM COATED ORAL at 08:22

## 2025-06-25 RX ADMIN — PANTOPRAZOLE SODIUM 40 MG: 40 TABLET, DELAYED RELEASE ORAL at 06:24

## 2025-06-25 RX ADMIN — OXYCODONE AND ACETAMINOPHEN 1 TABLET: 5; 325 TABLET ORAL at 03:52

## 2025-06-25 RX ADMIN — FOLIC ACID 1 MG: 1 TABLET ORAL at 08:22

## 2025-06-25 RX ADMIN — SODIUM CHLORIDE 25 ML: 0.9 INJECTION, SOLUTION INTRAVENOUS at 08:28

## 2025-06-25 RX ADMIN — ACETAMINOPHEN 650 MG: 325 TABLET ORAL at 08:49

## 2025-06-25 RX ADMIN — MEROPENEM 1000 MG: 1 INJECTION, POWDER, FOR SOLUTION INTRAVENOUS at 00:26

## 2025-06-25 RX ADMIN — BUMETANIDE 1 MG: 1 TABLET ORAL at 08:22

## 2025-06-25 RX ADMIN — SODIUM CHLORIDE, PRESERVATIVE FREE 10 ML: 5 INJECTION INTRAVENOUS at 08:29

## 2025-06-25 RX ADMIN — SERTRALINE HYDROCHLORIDE 25 MG: 50 TABLET ORAL at 08:22

## 2025-06-25 RX ADMIN — MEROPENEM 1000 MG: 1 INJECTION, POWDER, FOR SOLUTION INTRAVENOUS at 08:28

## 2025-06-25 RX ADMIN — OXYCODONE AND ACETAMINOPHEN 1 TABLET: 5; 325 TABLET ORAL at 12:38

## 2025-06-25 ASSESSMENT — PAIN DESCRIPTION - FREQUENCY
FREQUENCY: INTERMITTENT
FREQUENCY: INTERMITTENT

## 2025-06-25 ASSESSMENT — PAIN DESCRIPTION - PAIN TYPE
TYPE: CHRONIC PAIN
TYPE: CHRONIC PAIN

## 2025-06-25 ASSESSMENT — PAIN - FUNCTIONAL ASSESSMENT
PAIN_FUNCTIONAL_ASSESSMENT: ACTIVITIES ARE NOT PREVENTED

## 2025-06-25 ASSESSMENT — PAIN SCALES - GENERAL
PAINLEVEL_OUTOF10: 3
PAINLEVEL_OUTOF10: 7

## 2025-06-25 ASSESSMENT — PAIN DESCRIPTION - ORIENTATION
ORIENTATION: RIGHT

## 2025-06-25 ASSESSMENT — PAIN DESCRIPTION - LOCATION
LOCATION: FLANK

## 2025-06-25 ASSESSMENT — PAIN DESCRIPTION - DESCRIPTORS
DESCRIPTORS: ACHING

## 2025-06-25 ASSESSMENT — PAIN DESCRIPTION - ONSET: ONSET: ON-GOING

## 2025-06-25 NOTE — CONSULTS
Department of Urology   Consult Note  [facility] 1 2 3 4 5      Date: 2025   Patient: Keisha Turner   : 1965   DOA: 2025   MRN: [id number]   ROOM#: 3119/[id number]     Reason for Consult:  8mm proximal right ureter stone  Febrile UTI  Recurrent urinary tract infection    Requesting Physician:  Hospitalist    CHIEF COMPLAINT:    8mm proximal right ureter stone with obstruction  Acute renal insufficiency due to obstructing right ureter stone  (atrophic left kidney)  Febrile UTI/ sepsis    History Obtained From:  patient and medical records    HISTORY OF PRESENT ILLNESS:                The patient is a 59 y.o. female with significant past medical history of obesity, DM, HTN, kidney stones presented to ED with 2 days of right sided pain.  History of MDR UTI and kidney stones.  Seen by Dr. VEE in the office.  Atrophic left kidney.  On Cipro.  Developed tachycardia, low BP, fevers this AM.  Last Urine culture in office 2025 was proteus resistant to Cipro.      Reviewed kidney stone treatment options and risks.  Discussed need for emergent urinary diversion with stent or perc neph tube.  She elected to proceed with emergent cystsocopy and right ureter stent placement.  She will need outpatient treatment of her right ureter and likely right renal stone once she is medically improved.  Discussed stent, stent irritation, and need for stent removal/replacement in future.   She understands she may remain intubated after the procedure given her sepsis condition.  We discussed need for right perc neph tube if unable to place ureter stent.  Discussed post op urinary catheter.  Discussed ICU after proceed.     Past Medical History:        Diagnosis Date    Arthritis     Arthritis of left knee     Asthma     last flare up summer 2020    Diabetes mellitus (HCC)     \"use to take Metformin been off this since  \"    Disorder of rotator cuff syndrome of left shoulder and allied disorder     H/O cardiovascular stress test 
 [facility]  Consult Note       Keisha Turner  AGE: 59 y.o.   GENDER: female  : 1965  TODAY'S DATE:  2025    Subjective:     Reason for  Evaluation and Assessment: wound care  eval.      Keisha Turner is a 59 y.o. female referred by:   [x] Physician  [] Nursing  [] Other:     Wound Identification:  Wound Type: pressure and undetermined  Contributing Factors: chronic pressure, decreased mobility, and obesity        PAST MEDICAL HISTORY        Diagnosis Date    Arthritis     Arthritis of left knee     Asthma     last flare up summer 2020    Diabetes mellitus (HCC)     \"use to take Metformin been off this since  \"    Disorder of rotator cuff syndrome of left shoulder and allied disorder     H/O cardiovascular stress test 16    EF68% normal study    History of motion sickness     Hx of echocardiogram 16    EF 55%, normal LV function, moderate LVH    Hyperlipidemia     Hypertension     Kidney stones     \"had kidney stone 2020- removed with surg- , back in - had to put tube in my back(nephostomy tube) for kidney stone-had lithotripsy    Migraine     Obesity     PONV (postoperative nausea and vomiting)     hx of motion sickness    UTI (urinary tract infection)     \"last one 2020\"    Vertigo     \"have trouble leaning to my left and laying flat with this\"    Wears glasses        PAST SURGICAL HISTORY    Past Surgical History:   Procedure Laterality Date    BLADDER SURGERY Right 2023    CYSTOSCOPY RETROGRADE PYELOGRAM STENT INSERTION performed by Rosibel Man MD at [facility] OR    CHOLECYSTECTOMY  age22's    CYSTOSCOPY INSERTION / REMOVAL STENT / STONE N/A 2020    CYSTOSCOPY RETROGRADE PYELOGRAM STENT INSERTION performed by Koko Rodriguez MD at [facility] OR    CYSTOSCOPY INSERTION / REMOVAL STENT / STONE Right 2021    CYSTOSCOPY STENT EXCHANGE URETEROSCOPY EXTRACTION OF STONES performed by Luis Daneil Marsh MD at [facility] OR    DEBRIDEMENT Right 2024 
 [facility]  Consult Note       Keisha Turner  AGE: 59 y.o.   GENDER: female  : 1965  TODAY'S DATE:  2025    Subjective:     Reason for Evaluation and Assessment: wound assessment       Keisha Turner is a 59 y.o. female referred by:   [x] Physician  [] Nursing  [] Other:     Wound Identification:  Wound Type: pressure and MASD  Contributing Factors: chronic pressure, decreased mobility, obesity, and moisture         PAST MEDICAL HISTORY        Diagnosis Date    Arthritis     Arthritis of left knee     Asthma     last flare up summer 2020    Diabetes mellitus (HCC)     \"use to take Metformin been off this since  \"    Disorder of rotator cuff syndrome of left shoulder and allied disorder     H/O cardiovascular stress test 16    EF68% normal study    History of motion sickness     Hx of echocardiogram 16    EF 55%, normal LV function, moderate LVH    Hyperlipidemia     Hypertension     Kidney stones     \"had kidney stone 2020- removed with surg- , back in - had to put tube in my back(nephostomy tube) for kidney stone-had lithotripsy    Migraine     Obesity     PONV (postoperative nausea and vomiting)     hx of motion sickness    UTI (urinary tract infection)     \"last one 2020\"    Vertigo     \"have trouble leaning to my left and laying flat with this\"    Wears glasses        PAST SURGICAL HISTORY    Past Surgical History:   Procedure Laterality Date    BLADDER SURGERY Right 2023    CYSTOSCOPY RETROGRADE PYELOGRAM STENT INSERTION performed by Rosibel Man MD at [facility] OR    CHOLECYSTECTOMY  age20's    CYSTOSCOPY INSERTION / REMOVAL STENT / STONE N/A 2020    CYSTOSCOPY RETROGRADE PYELOGRAM STENT INSERTION performed by Koko Rodriguez MD at [facility] OR    CYSTOSCOPY INSERTION / REMOVAL STENT / STONE Right 2021    CYSTOSCOPY STENT EXCHANGE URETEROSCOPY EXTRACTION OF STONES performed by Luis Daniel Marsh MD at [facility] OR    DEBRIDEMENT Right 
Consult completed. Indication for advanced access: OutPt IV Ertapenem infusions x8 additional days (EOT: 7/2/25); MidLine will meet pt's therapeutic needs while minimizing risks. Procedure/rationale explained to pt & consent obtained. #20ga PowerGlide Pro Extended Dwell MidLine Catheter initiated to RUE Cephalic Vein using sterile, UltraSound-guided technique without difficulty/complications. Positioning verified via UltraSound visualization of catheter within vessel lumen; site returns blood briskly and flushes without resistance/abnormalities. Sterile dressing with SkinPrep, StatLock Securing Device, CHG gel DSSG, and SwabCap applied. 2x PIV's in RUE removed and dressings applied. Pt tolerated well & no other c/o or needs noted or reported.     
Critical Care attestation note    59F with hx of HTN, DM, asthma, now admitted with acute pyelonephritis and SHEYLA with 9mm R ureteral stone . S/p cysto with stent placement this AM.   Transferred to iCU for high potential for decompensation following procedure.    Severe sepsis  UTI  Ureteral stone  SHEYLA  Acute hypoxic resp failure  Asthma  DM    Seen at bedside.  Awake and alert.   Tachypneic on 5L NC, wean to O2 sats > 92. Continue home nebs  Maintaining pressures without pressor support.   Abx switched to Parag given hx of proteus sensitivities on previous Ucx.   Continue borjas for now  Continue supportive care.   Outpatient right ureteroscopy, stone manipulation once medically cleared     I personally saw the patient and made/approved the management plan and take responsibility for the patient management. I have personally examined the patient independently. I have reviewed the patient's available data,including medical history and recent test results. Reviewed and discussed note as documented by KARENA.  I agree with the physical exam findings, assessment and plan. I personally performed a substantive portion of the visit including all aspects of the medical decision making.      I have performed 40 minutes of critical care time, excluding and separately billable procedures      
Nephrology Service Consultation      [facility] [id number]  Phone: [phone]  Office Hours: 8:30AM - 4:30PM  Monday - Friday        MEDICAL DECISION MAKING and Recommendations     -SHEYLA from ATN at this point: creat 2.8 from 1.4 on admission from 1.1 about 3months ago  -Right severe hydronephrosis from obstructive calculus in the right ureter  -Atrophic left kidney: likely poorly to non functioning  -BIlateral renal calculi  -Acidemia  -Sepsis from pyleonephritis  -DM2  -Acute hypoxic resp failure    Suggest:  -The left kidney being atrophic and likely not provided much function, means that the right kidney will have to salvaged as it is likely the one with the most function  -Hoping for urological intervention as appropriate  -Continue IVF  -Avoid nephrotoxins if possible  -Jardiance and losartan on hold for now please    Thank you      Patient Active Problem List    Diagnosis Date Noted    SOB (shortness of breath) on exertion 08/29/2022    Leg swelling 08/29/2022    Family history of coronary arteriosclerosis 08/29/2022    Acute pyelonephritis 06/18/2025    Ureterolithiasis 03/02/2025    Nephrolithiasis 07/23/2024    Type 2 diabetes mellitus 01/17/2024    Morbid obesity (HCC) 10/16/2023    Varicose veins of both legs with edema 07/28/2023    Drug allergy 05/04/2023    Drug-induced erythroderma 05/04/2023    Cellulitis of right lower extremity 05/04/2023    Sepsis (HCC) 04/24/2023    Status post left knee replacement 04/29/2021    Primary osteoarthritis of left knee 04/28/2021    Right ureteral stone 11/27/2020    Tobacco dependence 09/01/2016    Major depressive disorder, recurrent episode with anxious distress 09/01/2016    Mild persistent asthma without complication 09/01/2016    Urinary incontinence 09/01/2016    Irritable bowel syndrome with diarrhea 09/01/2016    DJD (degenerative joint disease) of knee 09/20/2011    Patellofemoral syndrome 08/31/2011    GERD (gastroesophageal reflux 
[facility] ACUTE CARE PHYSICAL THERAPY EVALUATION  Keisha Turner, 1965, 3114/3114-A, 6/22/2025    Assessment:  Patient admitted for acute pyelonephritis with unstable/unpredictable medical features and evolving rehabilitative features.  Patient deconditioned with period of bedrest, requires vigilant nursing mobility efforts to recondition.  Patient has pulmonary activity intolerance, needs frequent small intervals of activity.  She moves well in quick bursts of limited duration activity.  Acute care skilled physical therapy services are appropriate.  There are no significant educational impairments or barriers to learning affecting participation with service.  Prognosis: good.  Clinical decision making:  high complexity:  hx 3+ factors/comorbidities, exam/test 3+ elements of body/structures/functions, unstable/unpredictable presentation.      Discharge recommendations:  Patient has minimal/mild post-acute physical therapy service needs, 1-3 visits per week.  To be safe at home, would need periodic support/supervision , assistance for IADLs, physical assist for home entry/exit, and home health physical therapy service (S1, S2).   Patient stated she intends to return home at discharge.       Plan/Goals:  Physical Therapy Plan  General Plan: 2-3 times per week.  Acute care physical therapy treatment to include patient/caregiver education, therapeutic activity training, gait training, neuromuscular re-education, therapeutic exercise and self care training for home management.    Will begin with bed mobility skills, transfers, sitting activity/balance.    As able, progress transfers and incorporate standing activity/balance.    Subsequently, progress standing activity, incorporating gait training and dynamic balance.      Achieve goals within two weeks.    bed mobility and sup-sit independently  sit-stand and SPT independently  ambulate 150 feet with modified independence and least restrictive 
Itching     Itchy and watery eyes. Pet dander also causes same symptoms.    Tramadol      Ear ringing    Cephalosporins Rash     Delayed hypersensitivity reaction: generalized maculopapular rash      MEDICATIONS  Reviewed and are per the chart/EMR.   IMMUNIZATION HISTORY  Immunization History   Administered Date(s) Administered    COVID-19, PFIZER PURPLE top, DILUTE for use, (age 12 y+), 30mcg/0.3mL 03/16/2021, 04/06/2021, 04/01/2022    COVID-19, PFIZER, 2024/25, (age 12y+), IM, 30mcg/0.3mL 12/07/2023    Influenza Virus Vaccine 09/09/2011    TDaP, ADACEL (age 10y-64y), BOOSTRIX (age 10y+), IM, 0.5mL 02/04/2015     ?  ?  -------------------------------------------------------------------------------------------------------------------    Vital Signs:  Vitals:    06/20/25 1100   BP: (!) 156/74   Pulse: (!) 115   Resp: 26   Temp:    SpO2: (!) 88%         Exam:    VS: noted; wt 103.9 kg  Gen: alert and oriented X3, no distress  Skin: no stigmata of endocarditis  Wounds: C/D/I  HEMT: AT/NC Oropharynx pink, moist, and without lesions or exudates; dentition in good state of repair  Eyes: PERRLA, EOMI, conjunctiva pink, sclera anicteric.   Neck: Supple. Trachea midline. No LAD.  Chest: no distress and CTA. Good air movement.  Heart: RRR and no MRG.   Abd: soft, non-distended, right flank tenderness, no hepatomegaly. Normoactive bowel sounds.  Ext: no clubbing, cyanosis, or edema  Catheter Site: without erythema or tenderness  LDA:   Neuro: Mental status intact. CN 2-12 intact and no focal sensory or motor deficits    ?Diagnostic Studies: reviewed  ??  I have examined this patient and available medical records on this date and have made the above observations, conclusions and recommendations.  Electronically signed by: Electronically signed by Abrahan Adams MD on 6/20/2025 at 11:41 AM    
liver. The spleen, adrenals, pancreas   are unremarkable. Patient is status post cystectomy. Common bile duct is not particularly distended. Moderate to severe right hydronephrosis. Obstructive calculus in the upper one third of the right ureter measures 9.0 mm. There are several small calculi seen in the lower pole of the right kidney. The left kidney is atrophic.. There is a roughly 7 mm calculus in the left kidney. Several smaller test is seen in the remaining left kidney. In addition, the lobe of the left kidney demonstrates a approximately 13.7 mm cyst. Abdominal aorta is of normal caliber.  Atherosclerotic vascular calcifications are noted. No significant dilated loops of bowel are seen. No significant para-aortic nor mesenteric adenopathy are seen. CT PELVIS: CT images of the pelvis demonstrate No free pelvic fluid.  Uterus is absent. Adnexal regions are unremarkable. A normal appendix is apparent in the right lower quadrant. Bone windows demonstrate moderate discogenic disease and facet arthropathy in the lower lumbar region.  No destructive bony changes are seen. Lung bases demonstrate No significant abnormalities.  IMPRESSION:  Abnormal examination that demonstrates moderate right hydronephrosis due to a 9 mm obstructive calculus in the upper one third of the right ureter. Bilateral renal calculi. Small atrophic left kidney. Multiple other findings as described. If there is persistent clinical concern, appropriate additional investigations maybe performed. This dictation was created with voice recognition software. Although every effort is made to review the dictation as it is transcribed, on occasion spoken words, phrases, names, numbers, punctations etc. can be misinterpreted by the technology leading to omissions or inappropriate outcomes.  Dictated and Electronically Signed By: Juan José Ibarra MD [facility] 6/18/2025 15:03          CBC:   Recent Labs     06/18/25  [id number]

## 2025-06-25 NOTE — PROGRESS NOTES
Inpatient Progress Note 6/20/2025        Keisha Turner  1965  [id number]      Assessment/Plan:    Keisha Turner is a 59 y.o. female with a history of hypertension, hyperlipidemia, and diabetes mellitus, obesity and kidney stones who presented to [facility] 6/18/2025 acute pyelonephritis     Problem list  Acute pyelonephritis  Nephrolithiasis with right-sided hydronephrosis  Obstructive SHEYLA  Acute hypoxemic respiratory failure  Metabolic acidosis  Sepsis  Hyperglycemia, uncontrolled NIDDM 2      Neuro: Alert and oriented.  Monitor mentation, multimodal pain regimen  Cardio: Cardiac telemetry.  HDS.   Resp: Acute hypoxemia, supplemental oxygen as needed. On 2L NC. Titrate for SpO2 >92%.  Pulmonary hygiene.  Nebs/bronchodilators as needed  GI: Tolerating diet.  GI PPx  : 8 mm obstructive proximal right ureteral stone, recurrent urinary tract infections, s/p cystoscopy with stent insertion 6/19 with urology.  Patient will need to follow-up outpatient for right ureteroscopy and stone manipulation.  Strict I&O's.  IV fluid resuscitated.  SHEYLA, SCR 2.4 baseline appears to be 1.1.  Urine culture positive Proteus 6/18, cont Merropenem.  Heme: Hgb stable. Daily CBC.  Transfuse for Hgb <7. DVT Ppx: Heparin SQ  ID: Urine culture positive for Proteus, antibiotics with meropenem  Endo: Maintain euglycemia.  Sliding scale insulin as needed.  MSK: DTI prevention     Tubes/Lines/Drains:    PIV, Mendoza   Code Status:   Full Code     Subjective:    Seen and examined at bedside. No overnight events. Hemodynamically stable. Continue IV fluids. Monitor I&Os. Ok to transfer out of ICU       Review of Systems   Constitutional:  Positive for fatigue.   All other systems reviewed and are negative.       Physical Exam:            /67   Pulse (!) 114   Temp 99.3 °F (37.4 °C) (Oral)   Resp 24   Ht 1.676 m (5' 6\")   Wt 103.9 kg (229 lb)   SpO2 99%   BMI 36.96 kg/m²     General: NAD  Eyes: EOMI  ENT: neck supple  Cardiovascular: 
    [facility] [address]   Progress Note  [facility] [address]      Date: 2025   Patient: Keisha Turner   : 1965   DOA: 2025   MRN: [id number]   ROOM#: 2128/2128-A     Admit Date: 2025     Collaborating Urologist on Call at time of admission: Dr. Marsh  CC: Right flank pain  Reason for Consult: 8mm proximal right ureter stone   S/p cystoscopy, right ureteral stent placement 25    Subjective:     Pain: mild, nausea and no vomiting,   Bowel Movement/Flatus: Yes  Voiding: Indwelling catheter with hazy wayne urine    Pt resting in bed, reports feeling better today.    Objective:    Vitals:   /66   Pulse (!) 119   Temp 99.3 °F (37.4 °C) (Oral)   Resp 26   Ht 1.676 m (5' 6\")   Wt 103.9 kg (229 lb)   SpO2 (!) 87%   BMI 36.96 kg/m²   Temp  Av.1 °F (37.3 °C)  Min: 98.3 °F (36.8 °C)  Max: 100 °F (37.8 °C)    Intake/Output Summary (Last 24 hours) at 2025 0931  Last data filed at 2025 0600  Gross per 24 hour   Intake 2496.18 ml   Output 2325 ml   Net 171.18 ml       Physical Exam:   Gen: Pleasant female, in NAD. Fatigued.  Neuro: Non-focal  Resp: Unlabored breathing  Abd: Soft, non-distended, non-tender to palpation, no rebound  Back:   Right CVAT  : Indwelling catheter with hazy wayne urine  Ext: No edema of bilateral LEs    Labs:  WBC:    Lab Results   Component Value Date/Time    WBC 10.3 2025 08:14 AM     Hemoglobin/Hematocrit:    Lab Results   Component Value Date/Time    HGB 9.7 2025 08:14 AM    HCT 31.4 2025 08:14 AM     BMP:   Lab Results   Component Value Date/Time     2025 06:30 AM    K 4.0 2025 06:30 AM     2025 06:30 AM    CO2 19 2025 06:30 AM    BUN 28 2025 06:30 AM    CREATININE 2.4 2025 06:30 AM    CALCIUM 8.1 2025 06:30 AM    GFRAA >60 2022 09:59 AM    LABGLOM 20 2025 06:30 AM    LABGLOM 59 06/15/2023 12:51 PM       Blood Culture:  +Enterobacterales proteus 
    [facility] [address]   Progress Note  [facility] [address]      Date: 2025   Patient: Keisha Turner   : 1965   DOA: 2025   MRN: [id number]   ROOM#: 3114/3114-A     Admit Date: 2025     Collaborating Urologist on Call at time of admission: Dr. Marsh  CC: Right flank pain  Reason for Consult: 8mm proximal right ureter stone   S/p cystoscopy, right ureteral stent placement 25    Subjective:     Pain: mild, nausea and no vomiting,   Bowel Movement/Flatus: Yes  Voiding: Indwelling catheter with clear yellow urine    Pt resting in bed, reports feeling better today.    Objective:    Vitals:   BP (!) 171/79   Pulse 82   Temp 98.5 °F (36.9 °C) (Oral)   Resp 17   Ht 1.676 m (5' 6\")   Wt 103 kg (227 lb)   SpO2 94%   BMI 36.64 kg/m²   Temp  Av.7 °F (37.1 °C)  Min: 98.4 °F (36.9 °C)  Max: 99.2 °F (37.3 °C)    Intake/Output Summary (Last 24 hours) at 2025 0936  Last data filed at 2025 0700  Gross per 24 hour   Intake 1536.15 ml   Output 4050 ml   Net -2513.85 ml       Physical Exam:   Gen: Pleasant female, in NAD.   Neuro: Non-focal  Resp: Unlabored breathing  Abd: Soft, non-distended, non-tender to palpation, no rebound  Back:   Mild right CVAT  : Indwelling catheter with clear yellow urine  Ext: No edema of bilateral LEs    Labs:  WBC:    Lab Results   Component Value Date/Time    WBC 6.6 2025 04:59 AM     Hemoglobin/Hematocrit:    Lab Results   Component Value Date/Time    HGB 9.8 2025 04:59 AM    HCT 30.2 2025 04:59 AM     BMP:   Lab Results   Component Value Date/Time     2025 04:59 AM    K 2.9 2025 04:59 AM     2025 04:59 AM    CO2 22 2025 04:59 AM    BUN 27 2025 04:59 AM    CREATININE 0.9 2025 04:59 AM    CALCIUM 9.1 2025 04:59 AM    GFRAA >60 2022 09:59 AM    LABGLOM 62 2025 04:59 AM    LABGLOM 59 06/15/2023 12:51 PM       Blood Culture:  +Enterobacterales proteus 
    V2.0    [facility] [place]      Name:  Keisha Turner /Age/Sex: 1965  (59 y.o. female)   MRN & CSN:  [id number] & [id number] Encounter Date/Time: 2025 8:14 AM EDT   Location:  [facility]/954-A PCP: Alka Mcleod MD     Attending:Devin Pierre MD       Hospital Day: 2    Assessment and Recommendations   Keisha Turner is a 59 y.o. female with pmh of  who presents with Acute pyelonephritis      Plan:     Acute pyelonephritis:  Nephrolithiasis with right-sided hydronephrosis:  Obstructive SHEYLA:  -Admit to MedSur  - Telemetry  - Patient status post 1 L IV fluids in the ED  - IV fluids  - N.p.o. on admission- plan for intervention in am per Dr Man per report  - Urology consulted from ED and aware/per Nursing staff  - Pain meds as needed  - Antiemetics as needed  - IV ciprofloxacin  - Urine cultures  -Blood cultures  - Pro-Vijay/CRP  -De-escalate antibiotics as able  - Daily BMP  - Strict intake and output record  - Avoid nephrotoxic medication  - Adjust medications to patient's creatinine clearance  - Bladder scan/calculate Fe urea  -Hold losartan  - Hold Bumex    Acute Hypoxemic Respiratory Failure:   -Pt was noted to be hypoxemic overnight  , ABG 7.31/40/PO2 60, HCO3 20  -Likely iso severe sepsis , will r/o fluid overload  -O2 PRN, requiring HFNC  -    History of hypertension-holding losartan in setting of SHEYLA, continue hydralazine as needed  Asthma-patient not in acute exacerbation.  Continue albuterol as needed, Qvar  Type 2 diabetes mellitus-hold oral hypoglycemics, start with low-dose sliding scale insulin with hypoglycemia protocol  Hyperlipidemia-atorvastatin    Diet Diet NPO   DVT Prophylaxis [] Lovenox, []  Heparin, [] SCDs, [] Ambulation,  [] Eliquis, [] Xarelto  [] Coumadin   Code Status Full Code   Disposition From: Home  Expected Disposition: TBD  Estimated Date of Discharge: 3 days  Patient requires continued admission due to    Surrogate Decision Maker/ POA  Self/Mother 
    V2.0    [facility] [place]      Name:  Keisha Turner /Age/Sex: 1965  (59 y.o. female)   MRN & CSN:  [id number] & [id number] Encounter Date/Time: 2025 8:14 AM EDT   Location:  -A PCP: Alka Mcleod MD     Attending:Devin Pierre MD       Hospital Day: 3    Assessment and Recommendations   Keisha Turner is a 59 y.o. female with pmh of  who presents with Acute pyelonephritis      Plan:     Acute pyelonephritis:  Nephrolithiasis with right-sided hydronephrosis:  Obstructive SHEYLA:  -Admit to MedSur  - Telemetry  - Patient status post 1 L IV fluids in the ED  - IV fluids  - N.p.o. on admission- plan for intervention in am per Dr Man per report  - Urology consulted from ED and aware/per Nursing staff  - Pain meds as needed  - Antiemetics as needed  - IV ciprofloxacin  - Urine cultures  -Blood cultures  - Pro-Vijay/CRP  -De-escalate antibiotics as able  - Daily BMP  - Strict intake and output record  - Avoid nephrotoxic medication  - Adjust medications to patient's creatinine clearance  - Bladder scan/calculate Fe urea  -Hold losartan  - Hold Bumex    Acute Hypoxemic Respiratory Failure:   -Pt was noted to be hypoxemic overnight  , ABG 7.31/40/PO2 60, HCO3 20  -Likely iso severe sepsis , will r/o fluid overload  -O2 PRN, requiring HFNC  -    History of hypertension-holding losartan in setting of SHEYLA, continue hydralazine as needed  Asthma-patient not in acute exacerbation.  Continue albuterol as needed, Qvar  Type 2 diabetes mellitus-hold oral hypoglycemics, start with low-dose sliding scale insulin with hypoglycemia protocol  Hyperlipidemia-atorvastatin    Diet ADULT DIET; Regular; 4 carb choices (60 gm/meal); Low Potassium (Less than 3000 mg/day)   DVT Prophylaxis [] Lovenox, []  Heparin, [] SCDs, [] Ambulation,  [] Eliquis, [] Xarelto  [] Coumadin   Code Status Full Code   Disposition From: Home  Expected Disposition: TBD  Estimated Date of Discharge: 3 days  Patient requires 
    V2.0    [facility] Progress Note      Name:  Keisha Turner /Age/Sex: 1965  (59 y.o. female)   MRN & CSN:  [id number] & [id number] Encounter Date/Time: 2025 8:14 AM EDT   Location:  [address] [place] [address]-A PCP: Alka Mcleod MD     Attending:Devin Pierre MD       Hospital Day: 6    Assessment and Recommendations   Keisha Turner is a 59 y.o. female with pmh of  who presents with Acute pyelonephritis      Plan:     Acute pyelonephritis:  Nephrolithiasis with right-sided hydronephrosis-s/p   Obstructive SHEYLA -Resolved  Proteus Mirabilis Bacteremia:  -Admit to MedSurg  - Telemetry  - Patient status post 1 L IV fluids in the ED  - IV fluids  - N.p.o. on admission- plan for intervention in am per Dr Man per report  - Urology consulted from ED and aware/per Nursing staff  - Pain meds as needed  - Antiemetics as needed  - IV ciprofloxacin  - Urine cultures growing proteus  -Blood cultures growing Proteus mirabilis  - Pro-Vijay 0.05/  -De-escalate antibiotics as able  - Daily BMP  - Strict intake and output record  - Avoid nephrotoxic medication  - Adjust medications to patient's creatinine clearance  - Bladder scan/calculate Fe urea  -Hold losartan  - Hold Bumex  -S/p cystoscopy, right ureteral stent placement 25   - Pt will need outpatient right ureteroscopy, stone manipulation once medically cleared in the near future. RTC 25 at 0950 for reevaluation and repeat urine culture. Right ureteroscopy/laser lithotripsy scheduled 25 at 0700 at [facility] per Urology    Acute Hypoxemic Respiratory Failure iso Fluid overload: - Improving  -Pt was noted to be hypoxemic overnight  , ABG 7.31/40/PO2 60, HCO3 20  -CXR w b/l infiltrtaed likely fluid overload  -O2 PRN, requiring HFNC  -weaning as able  -Pt net -ve 13+ litres, s/p lasix 60 mg IV x onc and currently on home Bumex    History of hypertension-holding losartan in setting of SHEYLA, continue hydralazine as needed  Asthma-patient not in acute 
    V2.0    [facility] [place]      Name:  Keisha Turner /Age/Sex: 1965  (59 y.o. female)   MRN & CSN:  [id number] & [id number] Encounter Date/Time: 2025 8:14 AM EDT   [facility] PCP: Alka Mcleod MD     Attending:Devin Pierre MD       Hospital Day: 5    Assessment and Recommendations   Keisha Turner is a 59 y.o. female with pmh of  who presents with Acute pyelonephritis      Plan:     Acute pyelonephritis:  Nephrolithiasis with right-sided hydronephrosis-s/p   Obstructive SHEYLA -Improving  Proteus Mirabilis Bacteremia:  -Admit to MedSur  - Telemetry  - Patient status post 1 L IV fluids in the ED  - IV fluids  - N.p.o. on admission- plan for intervention in am per Dr Man per report  - Urology consulted from ED and aware/per Nursing staff  - Pain meds as needed  - Antiemetics as needed  - IV ciprofloxacin  - Urine cultures growing proteus  -Blood cultures growing Proteus mirabilis  - Pro-Vijay 0.05/  -De-escalate antibiotics as able  - Daily BMP  - Strict intake and output record  - Avoid nephrotoxic medication  - Adjust medications to patient's creatinine clearance  - Bladder scan/calculate Fe urea  -Hold losartan  - Hold Bumex  -S/p cystoscopy, right ureteral stent placement 25   - Pt will need outpatient right ureteroscopy, stone manipulation once medically cleared in the near future. RTC 25 at 0950 for reevaluation and repeat urine culture. Right ureteroscopy/laser lithotripsy scheduled 25 at 0700 at [facility] per Urology    Acute Hypoxemic Respiratory Failure:   -Pt was noted to be hypoxemic overnight  , ABG 7.31/40/PO2 60, HCO3 20  -Likely iso severe sepsis , will r/o fluid overload  -O2 PRN, requiring HFNC  -weaning as able  -Pt net -ve 10+ litres, s/p lasix 60 mg IV x onc and currently on home Bumex    History of hypertension-holding losartan in setting of SHEYLA, continue hydralazine as needed  Asthma-patient not in acute exacerbation.  Continue 
    V2.0    [facility] Progress Note      Name:  Keisha Turner /Age/Sex: 1965  (59 y.o. female)   MRN & CSN:  [id number] & [id number] Encounter Date/Time: 2025 8:14 AM EDT   [facility]  [facility] PCP: Alka Mcleod MD     Attending:Devin Pierre MD       Hospital Day: 4    Assessment and Recommendations   Keisha Turner is a 59 y.o. female with pmh of  who presents with Acute pyelonephritis      Plan:     Acute pyelonephritis:  Nephrolithiasis with right-sided hydronephrosis:  Obstructive SHEYLA:  -Admit to MedSur  - Telemetry  - Patient status post 1 L IV fluids in the ED  - IV fluids  - N.p.o. on admission- plan for intervention in am per Dr Man per report  - Urology consulted from ED and aware/per Nursing staff  - Pain meds as needed  - Antiemetics as needed  - IV ciprofloxacin  - Urine cultures  -Blood cultures  - Pro-Vijay/CRP  -De-escalate antibiotics as able  - Daily BMP  - Strict intake and output record  - Avoid nephrotoxic medication  - Adjust medications to patient's creatinine clearance  - Bladder scan/calculate Fe urea  -Hold losartan  - Hold Bumex    Acute Hypoxemic Respiratory Failure:   -Pt was noted to be hypoxemic overnight  , ABG 7.31/40/PO2 60, HCO3 20  -Likely iso severe sepsis , will r/o fluid overload  -O2 PRN, requiring HFNC  -    History of hypertension-holding losartan in setting of SHEYLA, continue hydralazine as needed  Asthma-patient not in acute exacerbation.  Continue albuterol as needed, Qvar  Type 2 diabetes mellitus-hold oral hypoglycemics, start with low-dose sliding scale insulin with hypoglycemia protocol  Hyperlipidemia-atorvastatin    Diet Diet NPO   DVT Prophylaxis [] Lovenox, []  Heparin, [] SCDs, [] Ambulation,  [] Eliquis, [] Xarelto  [] Coumadin   Code Status Full Code   Disposition From: Home  Expected Disposition: TBD  Estimated Date of Discharge: 3 days  Patient requires continued admission due to    Surrogate Decision Maker/ POA  Self/Mother 
    [facility] [address]   Progress Note  [facility] [address]      Date: 2025   Patient: Keisha Turner   : 1965   DOA: 2025   MRN: [id number]   ROOM#: 3114/3114-A     Admit Date: 2025     Collaborating Urologist on Call at time of admission: Dr. Marsh  CC: Right flank pain  Reason for Consult: 8mm proximal right ureter stone   S/p cystoscopy, right ureteral stent placement 25    Subjective:     Pain: mild, nausea and no vomiting,   Bowel Movement/Flatus: Yes  Voiding: Indwelling catheter with hazy wayne urine    Pt resting in bed, reports feeling better today.    Objective:    Vitals:   BP (!) 144/76   Pulse 88   Temp 98.5 °F (36.9 °C) (Oral)   Resp 14   Ht 1.676 m (5' 6\")   Wt 103.9 kg (229 lb)   SpO2 94%   BMI 36.96 kg/m²   Temp  Av.1 °F (37.3 °C)  Min: 97.7 °F (36.5 °C)  Max: 100.1 °F (37.8 °C)    Intake/Output Summary (Last 24 hours) at 2025 1013  Last data filed at 2025 0926  Gross per 24 hour   Intake 120 ml   Output 8950 ml   Net -8830 ml       Physical Exam:   Gen: Pleasant female, in NAD. Fatigued.  Neuro: Non-focal  Resp: Unlabored breathing  Abd: Soft, non-distended, non-tender to palpation, no rebound  Back:   Right CVAT  : Indwelling catheter with hazy wayne urine  Ext: No edema of bilateral LEs    Labs:  WBC:    Lab Results   Component Value Date/Time    WBC 9.1 2025 04:24 AM     Hemoglobin/Hematocrit:    Lab Results   Component Value Date/Time    HGB 10.8 2025 04:24 AM    HCT 34.1 2025 04:24 AM     BMP:   Lab Results   Component Value Date/Time     2025 04:24 AM    K 3.4 2025 04:24 AM     2025 04:24 AM    CO2 22 2025 04:24 AM    BUN 26 2025 04:24 AM    CREATININE 1.6 2025 04:24 AM    CALCIUM 9.0 2025 04:24 AM    GFRAA >60 2022 09:59 AM    LABGLOM 33 2025 04:24 AM    LABGLOM 59 06/15/2023 12:51 PM       Blood Culture:  +Enterobacterales proteus 
  CLINICAL PHARMACY SERVICES  Renal dose adjustment made per [facility] protocol     Estimated Creatinine Clearance: 83 mL/min (based on SCr of 0.9 mg/dL).  Recent Labs     06/21/25  0424 06/22/25  0411 06/23/25  0459   BUN 26* 28* 27*   CREATININE 1.6* 1.2* 0.9   HGB 10.8* 10.4* 9.8*   HCT 34.1* 32.1* 30.2*   * 130* 145     PREVIOUS ORDER:  Meropenem 1000mg Q12H       NEW RENALLY ADJUSTED ORDER:    Meropenem 1000mg Q8H       Marbin Ordoñez [facility]  6/23/2025 5:13 PM   
  Occupational Therapy Treatment Note    Name: Keisha Turner MRN: [id number] :   1965   Date:  2025   Admission Date: 2025 Room:  [address] [place] [address]       Restrictions/Precautions:  Contact Precautions, General Precautions, Fall Risk     Communication with other providers: Per chart review and Nurse patient is appropriate for therapeutic intervention.     Subjective:  Patient states:  Agreeable to OT. \"Need to use bathroom.\"  Pain:   R flank, back 10/10    Objective:    Observation: In high miller's position upon arrival   Objective Measures:  Alert and oriented    Treatment, including education:  Therapeutic Activity Training:   Therapeutic activity training was instructed today.  Cues were given for safety, sequence, UE/LE placement, awareness, and balance.    Bed mobility:SUP with bed features  Sitting balance:G for dyn  Sit/stand transfers:Sup for safety  Functional Mobility: SBA around the room<>bathroom without AD    Activities performed today included bed mobility training, transfers, functional mobility  to increase strength, activity tolerance to facilitate IND c ADL tasks, func transfers / mobility with G safety awareness carryover    Self Care Training:   Cues were given for safety, sequence, UE/LE placement, visual cues, and balance. Activities performed today included dressing, toileting, personal hygiene, and grooming task.  Grooming: SBA while standing at sink  UB bathing: face and arms while standing at sink  LB bathing: deny  UB dressing: deny  LB dressing: SBA with don socks and deny donning undergarments  Toileting: SBA for safety        Assessment / Impression:  Pt refused to wear gait belt and set alarm on bed/chair. Notify the nurse and pt signed waiver disclaimer.   Patient's tolerance of treatment: Well  Adverse Reaction: None  Significant change in status and impact: Improved from initial evaluation  Barriers to improvement: None noted    Safety  Patient educated on role of OT , 
  Physician Progress Note      PATIENT:               DEBBY CASTILLO  CSN #:                  [id number]  :                       1965  ADMIT DATE:       2025 1:15 PM  DISCH DATE:  RESPONDING  PROVIDER #:        Emeli Pavon MD          QUERY TEXT:    Pulmonary edema is documented in the medical record Infectious Disease   Progress Notes by Abrahan Adams MD at 2025. Please specify the type   and acuity:    The clinical indicators include:  This  is a 59 y.o. female with a pmh of type 2 diabetes mellitus,   hypertension, asthma, hyperlipidemia, nephrolithiasis who presents with Acute   pyelonephritis    - \" Acute hypoxic respiratory failure- Secondary to pulmonary edema. Given   furosemide, and continues to receive home bumetanide. \" (from Infectious   Disease Progress Notes by Abrahan Adams MD at 2025)    - Interval substantially worsened diffuse bilateral airspace opacities,   pneumonia versus alveolar pulmonary edema versus ARDS. (from CXR )    Bumex tab, IV Lasix, Losartan, CXR    Thank Chau sauer [facility] CDS  Options provided:  -- Acute pulmonary edema related to heart disease  -- Chronic pulmonary edema related to heart disease  -- Other - I will add my own diagnosis  -- Disagree - Not applicable / Not valid  -- Disagree - Clinically unable to determine / Unknown  -- Refer to Clinical Documentation Reviewer    PROVIDER RESPONSE TEXT:    This patient has acute pulmonary edema related to heart disease.    Query created by: Chau Win on 2025 2:10 AM      Electronically signed by:  Emeli Pavon MD 2025 11:20 AM          
0941 Pt arrived to PACU after cysto. Monitors on. Pt to go to ICU and awaiting bed availability. Pt on 6L non rebreather mask. O2 sats >92. Pt wakes and follows commands.  Denies pain or nausea.  RR 20s. Denies SOB.  0950 ICU bed available. Notified ICU charge of transport. Will give bedside report.  0955 Pt RR low 30s.  VS otherwise unchanged.  Pt denies pain or nausea. O2 sats >92%. Dr Issa at bedside. Dr. Coppola notified and aware. Okay to transport to ICU per Dr. Coppola.    1005 Pt transferred to ICU. Bedside report given.    
4 Eyes Skin Assessment     NAME:  Keisha Turner  YOB: 1965  MEDICAL RECORD NUMBER:  [id number]    The patient is being assessed for  Admission    I agree that at least one RN has performed a thorough Head to Toe Skin Assessment on the patient. ALL assessment sites listed below have been assessed.      Areas assessed by both nurses:    Head, Face, Ears, Shoulders, Back, Chest, Arms, Elbows, Hands, Sacrum. Buttock, Coccyx, Ischium, and Legs. Feet and Heels        Does the Patient have a Wound? Yes wound(s) were present on assessment. LDA wound assessment was Initiated and completed by RN    Patient has blanchable redness on coccyx.   Redness on bilateral lower extremities and in abdominal folds.        Jhonatan Prevention initiated by RN: No  Wound Care Orders initiated by RN: Yes    Pressure Injury (Stage 3,4, Unstageable, DTI, NWPT, and Complex wounds) if present, place Wound referral order by RN under : No    New Ostomies, if present place, Ostomy referral order under : No     Nurse 1 eSignature: Electronically signed by Sylwia Rivera RN on 6/19/25 at 1:19 PM EDT    **SHARE this note so that the co-signing nurse can place an eSignature**    Nurse 2 eSignature: Electronically signed by Damien Julio RN on 6/19/25 at 5:30 PM EDT    
4 Eyes Skin Assessment     NAME:  Keisha Turner  YOB: 1965  MEDICAL RECORD NUMBER:  [id number]    The patient is being assessed for  Transfer to New Unit    I agree that at least one RN has performed a thorough Head to Toe Skin Assessment on the patient. ALL assessment sites listed below have been assessed.      Areas assessed by both nurses:    Head, Face, Ears, Shoulders, Back, Chest, Arms, Elbows, Hands, Sacrum. Buttock, Coccyx, Ischium, Legs. Feet and Heels, and Under Medical Devices         Does the Patient have a Wound? Yes wound(s) were present on assessment. LDA wound assessment was Initiated and completed by RN       Jhonatan Prevention initiated by RN: Yes  Wound Care Orders initiated by RN: No    Pressure Injury (Stage 3,4, Unstageable, DTI, NWPT, and Complex wounds) if present, place Wound referral order by RN under : Yes    New Ostomies, if present place, Ostomy referral order under : No     Nurse 1 eSignature: Electronically signed by Rodrigo Gamez RN on 6/20/25 at 4:30 PM EDT    **SHARE this note so that the co-signing nurse can place an eSignature**    Nurse 2 eSignature: {Esignature:[id number]}  
4 Eyes Skin Assessment     NAME:  Keisha Turner  YOB: 1965  MEDICAL RECORD NUMBER:  [id number]    The patient is being assessed for  Admission    I agree that at least one RN has performed a thorough Head to Toe Skin Assessment on the patient. ALL assessment sites listed below have been assessed.      Areas assessed by both nurses:    Head, Face, Ears, Shoulders, Back, Chest, Arms, Elbows, Hands, Sacrum. Buttock, Coccyx, Ischium, and Legs. Feet and Heels        Does the Patient have a Wound? No noted wound(s)     Redness under abdominal fold left side. Blanchable redness buttock. Bilateral ankle surgical scares. Left lateral ankle scabbed abrasion    Jhonatan Prevention initiated by RN: Yes  Wound Care Orders initiated by RN: No    Pressure Injury (Stage 3,4, Unstageable, DTI, NWPT, and Complex wounds) if present, place Wound referral order by RN under : No    New Ostomies, if present place, Ostomy referral order under : No     Nurse 1 eSignature: Electronically signed by Sherron Concepcion RN on 6/18/25 at 5:53 PM EDT    **SHARE this note so that the co-signing nurse can place an eSignature**    Nurse 2 eSignature: Electronically signed by Emmy Jones RN on 6/18/25 at 9:43 PM EDT   
ABG results: pH 7.35 pCO2 40.8 pO2 54.8 HCO3 22.4  
Assumed care of patient at this time.   
ED TO INPATIENT SBAR HANDOFF    Patient Name: Keisha Turner   :  1965  59 y.o.   Preferred Name  Keisha  Family/Caregiver Present no   Restraints no   C-SSRS: Risk of Suicide: No Risk  Sitter no   Sepsis Risk Score Sepsis V2 Risk Score: 27.7    PLEASE NOTE--Encounter / Re-Admission Within 30 Days  This patient has had another encounter or admission within the last 30 days.      Readmission Risk Score: 13.5      Situation  Chief Complaint   Patient presents with    Flank Pain     Pt presents to the ed with complaints of flank pain, nausea, vomiting. States that she has had kidney stones.      Brief Description of Patient's Condition: Keisha Turner is a 59 y.o. female that presents with complaint of right-sided flank pain, Titus pain, nausea.  History of kidney stones distal similar.  Symptoms started last night constant pain right flank right lower quadrant abdomen.  Nausea vomiting no cough chest pain shortness of breath no urine complaints without complaints.  No other questions or concerns.  Just constant pain.   Mental Status: oriented and alert  Arrived from: home    Imaging:   CT ABDOMEN PELVIS WO CONTRAST Additional Contrast? None   Final Result        Abnormal labs:   Abnormal Labs Reviewed   CBC WITH AUTO DIFFERENTIAL - Abnormal; Notable for the following components:       Result Value    WBC 12.1 (*)     MCH 26.3 (*)     MCHC 30.8 (*)     RDW 15.8 (*)     Neutrophils % 79 (*)     Lymphocytes % 16 (*)     All other components within normal limits   COMPREHENSIVE METABOLIC PANEL - Abnormal; Notable for the following components:    Glucose 129 (*)     Creatinine 1.4 (*)     Est, Glom Filt Rate 40 (*)     Alkaline Phosphatase 140 (*)     AST 42 (*)     All other components within normal limits   URINALYSIS - Abnormal; Notable for the following components:    Turbidity UA Cloudy (*)     Glucose, Ur >=1000 (*)     Urine Hgb LARGE (*)     Protein,  (*)     Leukocyte Esterase, Urine MODERATE (*)     
Infectious Disease Progress Note  2025   Patient Name: Keisha Turner : 1965     Assessment  Severe sepsis secondary to right kidney pyelonephritis, related to obstructive calculi, pyohydronephrosis complicated by Proteus mirabilis bacteremia  2-day history of right-sided flank pain, nausea, vomiting, and dysuria.  CT of the abdomen and pelvis showed hydronephrosis.  2025: Dr. Marsh performed a cystoscopy and right ureteral stent placement and made an assessment of pyohydronephrosis  Afebrile, right flank abdominal pain 7/10, CRP on a dwt.   SHEYLA  Resolved   Acute hypoxic respiratory failure  Secondary to pulmonary edema. Given furosemide, and continues to receive home bumetanide  Improving  Cephalosporin allergy  Hives to ceftriaxone within the last 10 years  Comorbid conditions: type 2 diabetes mellitus, hypertension, asthma, hyperlipidemia, nephrolithiasis      Plan  Therapeutic:  Continue IV meropenem  May change to ertapenem 1g IV daily; 14-day abx course (EoT: 2025)  After discharge the following should be done:  Weekly labs drawn on Monday during the course of treatment  CBC with differential, CMP, ESR, CRP  Cathflo for blocked vascular access as needed  Fax results to Attn: [facility]  # [phone]  See in clinic within one week after discharge  Disposition: home with IV abx  Diagnostic:  2025; Blood cx  F/u:  Other:  may discharge home.     Reason for visit: F/u Severe sepsis secondary to right kidney pyelonephritis, related to obstructive calculi, pyohydronephrosis complicated by Proteus mirabilis bacteremia?  History:?Interval history noted  Denies n/v/d/f or untoward effects of antimicrobials  Physical Exam:  Vital Signs: /66   Pulse 59   Temp 99.2 °F (37.3 °C)   Resp 24   Ht 1.676 m (5' 6\")   Wt 101.9 kg (224 lb 10.4 oz)   SpO2 97%   BMI 36.26 kg/m²     Gen: alert and oriented X3, no distress  Skin: no stigmata of endocarditis  Wounds: 
Infectious Disease Progress Note  2025   Patient Name: Keisha Turner : 1965     Assessment  Severe sepsis secondary to right kidney pyelonephritis, related to obstructive calculi, pyohydronephrosis complicated by Proteus mirabilis bacteremia  2-day history of right-sided flank pain, nausea, vomiting, and dysuria.  CT of the abdomen and pelvis showed hydronephrosis.  2025: Dr. Marsh performed a cystoscopy and right ureteral stent placement and made an assessment of pyohydronephrosis  Continues to have 7/10 pain in the right flank. Afebrile   SHEYLA  improving  Acute hypoxic respiratory failure  Secondary to pulmonary edema. Given furosemide, and continues to receive home bumetanide  Cephalosporin allergy  Hives to ceftriaxone within the last 10 years  Comorbid conditions: type 2 diabetes mellitus, hypertension, asthma, hyperlipidemia, nephrolithiasis      Plan  Therapeutic:  Continue IV meropenem  May change to ertapenem 1g IV daily; 14-day abx course (EoT: 2025)  Diagnostic:  CRP  F/u:  Other: Urology follow up     Reason for visit: F/u Severe sepsis secondary to right kidney pyelonephritis, related to obstructive calculi, pyohydronephrosis complicated by Proteus mirabilis bacteremia?  History:?Interval history noted  Denies n/v/d/f or untoward effects of antimicrobials  Physical Exam:  Vital Signs: /74   Pulse 75   Temp 98.5 °F (36.9 °C) (Oral)   Resp 17   Ht 1.676 m (5' 6\")   Wt 103 kg (227 lb)   SpO2 94%   BMI 36.64 kg/m²     Gen: alert and oriented X3, no distress  Skin: no stigmata of endocarditis  Wounds: C/D/I  HEMT: AT/NC Oropharynx pink, moist, and without lesions or exudates; dentition in good state of repair  Eyes: PERRLA, EOMI, conjunctiva pink, sclera anicteric.   Neck: Supple. Trachea midline. No LAD.  Chest: no distress and CTA. Good air movement.  Heart: RRR and no MRG.   Abd: soft, non-distended, right flank tenderness, no hepatomegaly. Normoactive bowel 
Infectious Disease Progress Note  2025   Patient Name: Keisha Turner : 1965     Assessment  Severe sepsis secondary to right kidney pyelonephritis, related to obstructive calculi, pyohydronephrosis complicated by Proteus mirabilis bacteremia  2-day history of right-sided flank pain, nausea, vomiting, and dysuria.  CT of the abdomen and pelvis showed hydronephrosis.  2025: Dr. Marsh performed a cystoscopy and right ureteral stent placement and made an assessment of pyohydronephrosis  One episode of fever overnight; no change in abdominal pain.   SHEYLA  Resolved   Acute hypoxic respiratory failure  Secondary to pulmonary edema. Given furosemide, and continues to receive home bumetanide  Improving  Cephalosporin allergy  Hives to ceftriaxone within the last 10 years  Comorbid conditions: type 2 diabetes mellitus, hypertension, asthma, hyperlipidemia, nephrolithiasis      Plan  Therapeutic:  Continue IV meropenem  May change to ertapenem 1g IV daily; 14-day abx course (EoT: 2025)  After discharge the following should be done:  Weekly labs drawn on Monday during the course of treatment  CBC with differential, CMP, ESR, CRP  Cathflo for blocked vascular access as needed  Fax results to Attn: [facility]  # [phone]  See in clinic within one week after discharge  Disposition: home with IV abx  Diagnostic:  2025; Blood cx  F/u:  Other: Urology follow up     Reason for visit: F/u Severe sepsis secondary to right kidney pyelonephritis, related to obstructive calculi, pyohydronephrosis complicated by Proteus mirabilis bacteremia?  History:?Interval history noted  Denies n/v/d/f or untoward effects of antimicrobials  Physical Exam:  Vital Signs: BP (!) 146/77   Pulse 87   Temp (!) 101.5 °F (38.6 °C) (Oral)   Resp 25   Ht 1.676 m (5' 6\")   Wt 103 kg (227 lb)   SpO2 95%   BMI 36.64 kg/m²     Gen: alert and oriented X3, no distress  Skin: no stigmata of 
Lab called needing a new BMP. This RN attempted to get more labs from patient but was unsuccessful. Called phlebotomy to get this patients new BMP.     
Nephrology Progress Note        [address] [place] [address]  Phone: [phone]  Office Hours: 8:30AM - 4:30PM  Monday - Friday 6/21/2025 7:09 AM  Subjective:   Admit Date: 6/18/2025  PCP: Alka Mcleod MD  Interval History:   On hiflow oxygen  Alert  8L urine yesterday      Diet: Diet NPO      Data:   Scheduled Meds:   potassium chloride  20 mEq Oral Once    meropenem  1,000 mg IntraVENous Q12H    insulin lispro  0-8 Units SubCUTAneous 4x Daily AC & HS    gabapentin  200 mg Oral TID    heparin (porcine)  5,000 Units SubCUTAneous 3 times per day    sodium chloride flush  5-40 mL IntraVENous 2 times per day    albuterol sulfate HFA  2 puff Inhalation 4x Daily RT    fluticasone  4 puff Inhalation BID RT    atorvastatin  20 mg Oral Daily    [Held by provider] bumetanide  1 mg Oral Daily    [Held by provider] Dulaglutide  3 mg SubCUTAneous Weekly    folic acid  1 mg Oral Daily    [Held by provider] losartan  25 mg Oral Daily    montelukast  10 mg Oral Nightly    pantoprazole  40 mg Oral QAM AC    sertraline  25 mg Oral Daily     Continuous Infusions:   dextrose      sodium chloride       PRN Meds:atropine, glucose, dextrose bolus **OR** dextrose bolus, glucagon (rDNA), dextrose, sodium chloride flush, sodium chloride, magnesium sulfate, ondansetron **OR** ondansetron, polyethylene glycol, acetaminophen **OR** acetaminophen, oxyCODONE-acetaminophen, docusate sodium, hydrALAZINE  I/O last 3 completed shifts:  In: 120 [P.O.:120]  Out: 9450 [Urine:9450]  No intake/output data recorded.    Intake/Output Summary (Last 24 hours) at 6/21/2025 0709  Last data filed at 6/21/2025 0645  Gross per 24 hour   Intake 120 ml   Output 8050 ml   Net -7930 ml       CBC:   Recent Labs     06/20/25  0814 06/21/25  0036 06/21/25  0424   WBC 10.3 8.6 9.1   HGB 9.7* 10.1* 10.8*   * 118* 137*       BMP:    Recent Labs     06/20/25  0630 06/21/25  0036 06/21/25  0424    137 140   K 4.0 3.3* 3.4*    103 
Nephrology Progress Note        [address] [place] [address]  Phone: [phone]  Office Hours: 8:30AM - 4:30PM  Monday - Friday 6/20/2025 7:10 AM  Subjective:   Admit Date: 6/18/2025  PCP: Alka Mcleod MD  Interval History:   ON NC  More interactive  Nonoliguric     Diet: ADULT DIET; Regular; 4 carb choices (60 gm/meal); Low Potassium (Less than 3000 mg/day)      Data:   Scheduled Meds:   meropenem  1,000 mg IntraVENous Q12H    insulin lispro  0-8 Units SubCUTAneous 4x Daily AC & HS    gabapentin  200 mg Oral TID    heparin (porcine)  5,000 Units SubCUTAneous 3 times per day    sodium chloride flush  5-40 mL IntraVENous 2 times per day    albuterol sulfate HFA  2 puff Inhalation 4x Daily RT    fluticasone  4 puff Inhalation BID RT    atorvastatin  20 mg Oral Daily    [Held by provider] bumetanide  1 mg Oral Daily    [Held by provider] Dulaglutide  3 mg SubCUTAneous Weekly    folic acid  1 mg Oral Daily    [Held by provider] losartan  25 mg Oral Daily    montelukast  10 mg Oral Nightly    pantoprazole  40 mg Oral QAM AC    sertraline  25 mg Oral Daily     Continuous Infusions:   dextrose      sodium chloride      lactated ringers 100 mL/hr at 06/20/25 0557     PRN Meds:oxyCODONE **OR** oxyCODONE, glucose, dextrose bolus **OR** dextrose bolus, glucagon (rDNA), dextrose, sodium chloride flush, sodium chloride, potassium chloride **OR** potassium alternative oral replacement **OR** potassium chloride, magnesium sulfate, ondansetron **OR** ondansetron, polyethylene glycol, acetaminophen **OR** acetaminophen, HYDROmorphone, oxyCODONE-acetaminophen, docusate sodium, hydrALAZINE  I/O last 3 completed shifts:  In: 2616.2 [P.O.:1080; IV Piggyback:1536.2]  Out: 2825 [Urine:2825]  No intake/output data recorded.    Intake/Output Summary (Last 24 hours) at 6/20/2025 0710  Last data filed at 6/20/2025 0600  Gross per 24 hour   Intake 2496.18 ml   Output 2325 ml   Net 171.18 ml       CBC:   Recent Labs    
Nephrology Progress Note        [address] [place] [address]  Phone: [phone]  Office Hours: 8:30AM - 4:30PM  Monday - Friday 6/22/2025 7:10 AM  Subjective:   Admit Date: 6/18/2025  PCP: Alka Mcleod MD  Interval History:   On NC  Now hypertensive  Nonoliguric     Diet: Diet NPO      Data:   Scheduled Meds:   bumetanide  1 mg Oral Daily    meropenem  1,000 mg IntraVENous Q12H    insulin lispro  0-8 Units SubCUTAneous 4x Daily AC & HS    gabapentin  200 mg Oral TID    heparin (porcine)  5,000 Units SubCUTAneous 3 times per day    sodium chloride flush  5-40 mL IntraVENous 2 times per day    albuterol sulfate HFA  2 puff Inhalation 4x Daily RT    fluticasone  4 puff Inhalation BID RT    atorvastatin  20 mg Oral Daily    [Held by provider] Dulaglutide  3 mg SubCUTAneous Weekly    folic acid  1 mg Oral Daily    losartan  25 mg Oral Daily    montelukast  10 mg Oral Nightly    pantoprazole  40 mg Oral QAM AC    sertraline  25 mg Oral Daily     Continuous Infusions:   dextrose      sodium chloride       PRN Meds:atropine, glucose, dextrose bolus **OR** dextrose bolus, glucagon (rDNA), dextrose, sodium chloride flush, sodium chloride, magnesium sulfate, ondansetron **OR** ondansetron, polyethylene glycol, acetaminophen **OR** acetaminophen, oxyCODONE-acetaminophen, docusate sodium, hydrALAZINE  I/O last 3 completed shifts:  In: 1489.5 [P.O.:980; I.V.:10; IV Piggyback:499.5]  Out: 9650 [Urine:9650]  No intake/output data recorded.    Intake/Output Summary (Last 24 hours) at 6/22/2025 0710  Last data filed at 6/22/2025 0433  Gross per 24 hour   Intake 1489.48 ml   Output 3900 ml   Net -2410.52 ml       CBC:   Recent Labs     06/21/25  0036 06/21/25  0424 06/22/25  0411   WBC 8.6 9.1 7.8   HGB 10.1* 10.8* 10.4*   * 137* 130*       BMP:    Recent Labs     06/21/25  0036 06/21/25  0424 06/22/25  0411    140 139   K 3.3* 3.4* 3.4*    102 102   CO2 20* 22 22   BUN 26* 26* 28* 
Occupational Therapy    [facility] ACUTE CARE OCCUPATIONAL THERAPY EVALUATION  Keisha Turner, 1965, 3114/3114-A, 6/22/2025    History  [place]:  The primary encounter diagnosis was Flank pain. Diagnoses of Abdominal pain, unspecified abdominal location, Nausea, Kidney stone, Hydronephrosis, unspecified hydronephrosis type, and Pyelonephritis were also pertinent to this visit.  Patient  has a past medical history of Arthritis, Arthritis of left knee, Asthma, Diabetes mellitus (HCC), Disorder of rotator cuff syndrome of left shoulder and allied disorder, H/O cardiovascular stress test, History of motion sickness, Hx of echocardiogram, Hyperlipidemia, Hypertension, Kidney stones, Migraine, Obesity, PONV (postoperative nausea and vomiting), UTI (urinary tract infection), Vertigo, and Wears glasses.  Patient  has a past surgical history that includes Foot surgery (Bilateral); shoulder surgery (Right); Tubal ligation (1992); Kidney stone surgery; CYSTOSCOPY INSERTION / REMOVAL STENT / STONE (N/A, 11/28/2020); CYSTOSCOPY INSERTION / REMOVAL STENT / STONE (Right, 1/21/2021); Hysterectomy (2001); hernia repair (2019?); Tonsillectomy (age 19); Cholecystectomy (age20's); Total knee arthroplasty (Left, 4/28/2021); Foot Tendon Surgery (Left, 10/21/2022); Plantar fascia surgery (Left, 10/21/2022); Bladder surgery (Right, 5/16/2023); Lithotripsy (Right, 6/20/2023); Wound debridement (Right, 7/23/2024); Wound debridement (Right, 3/2/2025); and Wound debridement (Right, 6/19/2025).    Subjective:  Patient states:  \"My legs are definitely weaker\".    Pain:  4/10 pain in lower back, constant, aching  Pain Intervention: increased movement, repositioned, RN notified.    Communication with other providers:  Handoff to RN  Restrictions: Contact Precautions, General Precautions, Fall Risk    Home Setup/Prior level of function    lives with family/caregiver, typically independent mobility , and independent with ADLs  Basic 
Outpatient Pharmacy Progress Note for Meds-to-Beds    Total number of Prescriptions Filled: 1    Additional Documentation:  Medication(s) were delivered to the patient's room prior to discharge      Thank you for letting us serve your patients.  [address] [address]    Phone: [phone]    Fax: [phone]        
Patient is admitted for Acute pyelonephritis: s/p Nephrolithiasis with right-sided hydronephrosis  She is complaining of Rt flank pain that radiates to her right lower abd quadrant. PRN pain meds given .  Hospitalist made aware. Will continue to monitor her closely.  
Patient refusing telemetry despite education. Dr. Pavon made aware via secure message.   
Physical Therapy  Attempted to see pt but she declined treatment stating that she is going home today    
RENAL DOSE ADJUSTMENT MADE PER P/T PROTOCOL    PREVIOUS ORDER:  Zosyn 3375 mg IV q12h EI    Estimated Creatinine Clearance: 27 mL/min (A) (based on SCr of 2.8 mg/dL (H)).  Recent Labs     06/18/25  1343 06/19/25  0452   BUN 10 17   CREATININE 1.4* 2.8*    164     NEW RENALLY ADJUSTED ORDER:  Zosyn 4500 mg IV x once, followed by 3375 mg IV q8h EI per protocol    Thank you,  Jackie Bermudez [facility], PharmD.  6/19/2025 8:33 AM    
RR-RN asked to evaluate patient for RR HR and fever. Upon arrival patinet resting quietly without distress. RR 30 's Temp 103.1 per primary RN. She is sleeping soundly and arouses to verbal stimuli. She is admitted for obstructing ureteral stone, pyelonephritis, and hydronephrosis. Bolus fluids with obstructing stones not helpful before obstruction cleared. 's currently. Oxygen saturation 98%. Tylenol to be given by primary RN and straight cath performed. Please don't hesitate to call or utilize [employer] messaging for Rapid Resource RN should any further needs or concerns arise.      Will Singleton   Rapid Resource RN   [facility] [phone]    
Spiritual Health History and Assessment/Progress Note  [facility]    Spiritual/Emotional Needs,  ,  ,      Name: Keisha Turner MRN: [id number]    Age: 59 y.o.     Sex: female   Language: English   Restorationism: Non-Episcopal   Acute pyelonephritis     Date: 6/24/2025            Total Time Calculated: 4 min              Spiritual Assessment began in [facility]N        Referral/Consult From: Family   Encounter Overview/Reason: Spiritual/Emotional Needs  Service Provided For: Patient    Lisy, Belief, Meaning:   Patient unable to assess at this time  Family/Friends No family/friends present      Importance and Influence:  Patient unable to assess at this time  Family/Friends No family/friends present    Community:  Patient Other: unable to assess  Family/Friends No family/friends present    Assessment and Plan of Care:     Patient Interventions include: Facilitated expression of thoughts and feelings  Family/Friends Interventions include: No family/friends present    Patient Plan of Care: Spiritual Care available upon further referral  Family/Friends Plan of Care: No family/friends present    Electronically signed by TAMMIE Avalos on 6/24/2025 at 12:00 PM   
06/23/25  1545    137  --    K 3.4* 2.9* 3.5    100  --    CO2 22 22  --    BUN 28* 27*  --    CREATININE 1.2* 0.9  --    GLUCOSE 107* 98  --      Hepatic:   Recent Labs     06/22/25  0411 06/23/25  0459   AST 43* 42*   ALT 18 18   BILITOT 0.7 0.6   ALKPHOS 136* 128     Lipids:   Lab Results   Component Value Date/Time    CHOL 188 03/02/2025 12:00 AM    HDL 37 03/02/2025 12:00 AM    TRIG 103 03/02/2025 12:00 AM     Hemoglobin A1C:   Lab Results   Component Value Date/Time    LABA1C 6.3 06/20/2025 06:30 AM     TSH: No results found for: \"TSH\"  Troponin:   Lab Results   Component Value Date/Time    TROPONINT <0.010 02/21/2016 12:08 AM    TROPONINT <0.010 02/20/2016 08:13 PM    TROPONINT <0.010 02/20/2016 04:21 PM     Lactic Acid:   No results for input(s): \"LACTA\" in the last 72 hours.    BNP:   No results for input(s): \"PROBNP\" in the last 72 hours.    UA:  Lab Results   Component Value Date/Time    NITRU NEGATIVE 06/18/2025 01:43 PM    COLORU Yellow 06/18/2025 01:43 PM    PHUR 7.0 06/18/2025 01:43 PM    WBCUA 287 06/18/2025 01:43 PM    RBCUA 125 06/18/2025 01:43 PM    RBCUA 470 07/23/2024 03:27 AM    MUCUS RARE 06/18/2025 01:43 PM    TRICHOMONAS NONE SEEN 07/23/2024 03:27 AM    YEAST RARE 05/17/2023 03:35 AM    BACTERIA RARE 03/02/2025 12:55 AM    CLARITYU CLEAR 07/23/2024 03:27 AM    LEUKOCYTESUR MODERATE 06/18/2025 01:43 PM    UROBILINOGEN 0.2 06/18/2025 01:43 PM    BILIRUBINUR NEGATIVE 06/18/2025 01:43 PM    BLOODU MODERATE NUMBER OR AMOUNT OBSERVED 07/23/2024 03:27 AM    GLUCOSEU >=1000 06/18/2025 01:43 PM    KETUA NEGATIVE 06/18/2025 01:43 PM    AMORPHOUS RARE 05/23/2018 02:15 PM     Urine Cultures: No results found for: \"LABURIN\"  Blood Cultures: No results found for: \"BC\"  No results found for: \"BLOODCULT2\"  Organism:   Lab Results   Component Value Date/Time    ORG PROT 10/10/2016 07:12 PM         Electronically signed by Emeli Pavon MD on 6/24/2025 at 9:05 AM    
Dictated and Electronically Signed By: Juan José Ibarra MD [facility] 6/18/2025 15:03          CBC:   Recent Labs     06/23/25  0459 06/24/25  0434   WBC 6.6 4.7   HGB 9.8* 9.6*    177     BMP:    Recent Labs     06/23/25  0459 06/23/25  1545     --    K 2.9* 3.5     --    CO2 22  --    BUN 27*  --    CREATININE 0.9  --    GLUCOSE 98  --      Hepatic:   Recent Labs     06/23/25 0459   AST 42*   ALT 18   BILITOT 0.6   ALKPHOS 128     Lipids:   Lab Results   Component Value Date/Time    CHOL 188 03/02/2025 12:00 AM    HDL 37 03/02/2025 12:00 AM    TRIG 103 03/02/2025 12:00 AM     Hemoglobin A1C:   Lab Results   Component Value Date/Time    LABA1C 6.3 06/20/2025 06:30 AM     TSH: No results found for: \"TSH\"  Troponin:   Lab Results   Component Value Date/Time    TROPONINT <0.010 02/21/2016 12:08 AM    TROPONINT <0.010 02/20/2016 08:13 PM    TROPONINT <0.010 02/20/2016 04:21 PM     Lactic Acid:   No results for input(s): \"LACTA\" in the last 72 hours.    BNP:   No results for input(s): \"PROBNP\" in the last 72 hours.    UA:  Lab Results   Component Value Date/Time    NITRU NEGATIVE 06/18/2025 01:43 PM    COLORU Yellow 06/18/2025 01:43 PM    PHUR 7.0 06/18/2025 01:43 PM    WBCUA 287 06/18/2025 01:43 PM    RBCUA 125 06/18/2025 01:43 PM    RBCUA 470 07/23/2024 03:27 AM    MUCUS RARE 06/18/2025 01:43 PM    TRICHOMONAS NONE SEEN 07/23/2024 03:27 AM    YEAST RARE 05/17/2023 03:35 AM    BACTERIA RARE 03/02/2025 12:55 AM    CLARITYU CLEAR 07/23/2024 03:27 AM    LEUKOCYTESUR MODERATE 06/18/2025 01:43 PM    UROBILINOGEN 0.2 06/18/2025 01:43 PM    BILIRUBINUR NEGATIVE 06/18/2025 01:43 PM    BLOODU MODERATE NUMBER OR AMOUNT OBSERVED 07/23/2024 03:27 AM    GLUCOSEU >=1000 06/18/2025 01:43 PM    KETUA NEGATIVE 06/18/2025 01:43 PM    AMORPHOUS RARE 05/23/2018 02:15 PM     Urine Cultures: No results found for: \"LABURIN\"  Blood Cultures: No results found for: \"BC\"  No results found for:

## 2025-06-25 NOTE — DISCHARGE SUMMARY
V2.0  Discharge Summary    Name:  Keisha Turner /Age/Sex: 1965 (59 y.o. female)   Admit Date: 2025  Discharge Date: 25    MRN & CSN:  [id number] & [id number] Encounter Date and Time 25 11:42 AM EDT    Attending:  Emeli Pavon MD Discharging Provider: Emeli Pavon MD       Hospital Course:     Brief HPI: Keisha Turner is a 59 y.o. female who presented with acute pyelonephritis    Brief Problem Based Course:   Acute pyelonephritis:  Nephrolithiasis with right-sided hydronephrosis-s/p   Obstructive SHEYLA -Resolved  Proteus Mirabilis Bacteremia:  -Admit to [facility]  - Telemetry  - Patient status post 1 L IV fluids in the ED  - IV fluids  - N.p.o. on admission- plan for intervention in am per Dr Man per report  - Urology consulted from ED and aware/per Nursing staff  - Urine cultures growing proteus  -Blood cultures growing Proteus mirabilis  - Pro-Vijay 0.05/  - Strict intake and output record  - Avoid nephrotoxic medication  - Losartan and Bumex initially held but then restarted  -S/p cystoscopy, right ureteral stent placement 25   - Pt will need outpatient right ureteroscopy, stone manipulation once medically cleared in the near future. RTC 25 at 0950 for reevaluation and repeat urine culture. Right ureteroscopy/laser lithotripsy scheduled 25 at 0700 at [facility] per Urology  - Patient spiked fever of 101.5 on .  Discussed with ID, inflammatory markers trending down and patient has been afebrile since.  Patient cleared for discharge from ID standpoint.     Acute Hypoxemic Respiratory Failure iso Fluid overload: - resolved  -Pt was noted to be hypoxemic overnight  , ABG 7.31/40/PO2 60, HCO3 20  -CXR w b/l infiltrtaed likely fluid overload  -O2 PRN, requiring HFNC  -weaning as able  -s/p lasix 60 mg IV x onc and currently on home Bumex     History of hypertension-holding losartan in setting of SHEYLA, continue hydralazine as needed  Asthma-patient not in acute

## 2025-06-25 NOTE — CARE COORDINATION
Anaphylaxis kit (Konstantin w/[facility]) added w/ Dr Pavon permission as pt will have 1st dose in the home and it is [employer]'s policy...[facility] and told to delivery atbs after 7/8p tonight ([place]). [facility] will see pt tomorrow (Dr Pavon aware). Pt is in agreement with plan.    Pt nurse  [facility] is also aware that pt can dc from [facility] standpoint at any time. C will see pt tomorrow and delivery of atbs will be sometime after 7p.   Labs given to Frank Pharmacist per Dr. Adams note.

## 2025-06-26 ENCOUNTER — TELEPHONE (OUTPATIENT)
Dept: CARDIOLOGY CLINIC | Age: 60
End: 2025-06-26

## 2025-06-27 NOTE — PROGRESS NOTES
Surgery @ [facility] on 7/3/25 you will be called 7/2/25 with times    NOTHING TO EAT OR DRINK AFTER MIDNIGHT DAY OF SURGERY    1. Enter thru the hospital main entrance on day of surgery, check in at the Information Desk. If you arrive prior to 6:00am, enter thru the ER entrance.    2. Follow the directions as prescribed by the doctor for your procedure and medications.         Morning of surgery take: Gabapentin and Prilosec with a sip of water. Bring inhaler with you     Trulicity-typically takes on Thursadays but hasn't taken in over a month. She was just inpatient. Continue to hold until after surgery.    Hold- Metformin DOS   Hold for 3 days- Jardiance (last day 6/29/25)         Stop vitamins, supplements and NSAIDS:  NOW (Tylenol and Percocet are ok for PRN pain)     3. Check with your Doctor regarding stopping blood thinners and follow their instructions.    4. Do not smoke, vape or use chewing tobacco morning of surgery. Do not drink any alcoholic beverages 24 hours prior to surgery.       This includes NA Beer. No street drugs 7 days prior to surgery.    5. If you have dentures, contacts of glasses they will be removed before going to the OR; please bring a case.    6. Please bring picture ID, insurance card, paperwork from the doctor’s office (H & P, Consent, & card for implantable devices).    7. Take a shower with an antibacterial soap the night before surgery and the morning of surgery. Do not put anything on your skin      After your morning shower.    8. You will need a responsible adult to drive you home and check on you after surgery.

## 2025-06-29 LAB
MICROORGANISM SPEC CULT: NORMAL
MICROORGANISM SPEC CULT: NORMAL
SERVICE CMNT-IMP: NORMAL
SERVICE CMNT-IMP: NORMAL
SPECIMEN DESCRIPTION: NORMAL
SPECIMEN DESCRIPTION: NORMAL

## 2025-07-02 ENCOUNTER — HOSPITAL ENCOUNTER (OUTPATIENT)
Age: 60
Setting detail: SPECIMEN
Discharge: HOME | End: 2025-07-02
Payer: MEDICARE

## 2025-07-02 ENCOUNTER — ANESTHESIA EVENT (OUTPATIENT)
Dept: OPERATING ROOM | Age: 60
End: 2025-07-02
Payer: MEDICARE

## 2025-07-02 LAB
ALBUMIN SERPL-MCNC: 3.8 G/DL (ref 3.4–5)
ALBUMIN/GLOB SERPL: 1.3 {RATIO} (ref 1.1–2.2)
ALP SERPL-CCNC: 175 U/L (ref 40–129)
ALT SERPL-CCNC: 41 U/L (ref 10–40)
ANION GAP SERPL CALCULATED.3IONS-SCNC: 13 MMOL/L (ref 9–17)
AST SERPL-CCNC: 17 U/L (ref 15–37)
BASOPHILS # BLD: 0.01 K/UL
BASOPHILS NFR BLD: 0 % (ref 0–1)
BILIRUB SERPL-MCNC: 0.2 MG/DL (ref 0–1)
BUN SERPL-MCNC: 16 MG/DL (ref 7–20)
CALCIUM SERPL-MCNC: 9.7 MG/DL (ref 8.3–10.6)
CHLORIDE SERPL-SCNC: 99 MMOL/L (ref 99–110)
CO2 SERPL-SCNC: 26 MMOL/L (ref 21–32)
CREAT SERPL-MCNC: 1 MG/DL (ref 0.6–1.1)
CRP SERPL HS-MCNC: 9.6 MG/L (ref 0–5)
EOSINOPHIL # BLD: 0 K/UL
EOSINOPHILS RELATIVE PERCENT: 0 % (ref 0–3)
ERYTHROCYTE [DISTWIDTH] IN BLOOD BY AUTOMATED COUNT: 16 % (ref 11.7–14.9)
ERYTHROCYTE [SEDIMENTATION RATE] IN BLOOD BY WESTERGREN METHOD: 56 MM/HR (ref 0–30)
GFR, ESTIMATED: 57 ML/MIN/1.73M2
GLUCOSE SERPL-MCNC: 263 MG/DL (ref 74–99)
HCT VFR BLD AUTO: 32.6 % (ref 37–47)
HGB BLD-MCNC: 10.2 G/DL (ref 12.5–16)
IMM GRANULOCYTES # BLD AUTO: 0.04 K/UL
IMM GRANULOCYTES NFR BLD: 1 %
LYMPHOCYTES NFR BLD: 1.89 K/UL
LYMPHOCYTES RELATIVE PERCENT: 24 % (ref 24–44)
MCH RBC QN AUTO: 26.1 PG (ref 27–31)
MCHC RBC AUTO-ENTMCNC: 31.3 G/DL (ref 32–36)
MCV RBC AUTO: 83.4 FL (ref 78–100)
MONOCYTES NFR BLD: 0.23 K/UL
MONOCYTES NFR BLD: 3 % (ref 0–5)
NEUTROPHILS NFR BLD: 73 % (ref 36–66)
NEUTS SEG NFR BLD: 5.72 K/UL
PLATELET # BLD AUTO: 412 K/UL (ref 140–440)
PMV BLD AUTO: 9.8 FL (ref 7.5–11.1)
POTASSIUM SERPL-SCNC: 3.9 MMOL/L (ref 3.5–5.1)
PROT SERPL-MCNC: 6.7 G/DL (ref 6.4–8.2)
RBC # BLD AUTO: 3.91 M/UL (ref 4.2–5.4)
SODIUM SERPL-SCNC: 138 MMOL/L (ref 136–145)
WBC OTHER # BLD: 7.9 K/UL (ref 4–10.5)

## 2025-07-02 PROCEDURE — 85652 RBC SED RATE AUTOMATED: CPT

## 2025-07-02 PROCEDURE — 80053 COMPREHEN METABOLIC PANEL: CPT

## 2025-07-02 PROCEDURE — 86140 C-REACTIVE PROTEIN: CPT

## 2025-07-02 PROCEDURE — 85025 COMPLETE CBC W/AUTO DIFF WBC: CPT

## 2025-07-02 ASSESSMENT — LIFESTYLE VARIABLES: SMOKING_STATUS: 1

## 2025-07-02 NOTE — PROGRESS NOTES
LVM-Notified patient surgery @ [facility] on  7/3/25 @ 0930, arrival 0730. NPO status reviewed.

## 2025-07-03 ENCOUNTER — HOSPITAL ENCOUNTER (OUTPATIENT)
Age: 60
Setting detail: OUTPATIENT SURGERY
Discharge: HOME | End: 2025-07-03
Attending: SPECIALIST | Admitting: SPECIALIST
Payer: MEDICARE

## 2025-07-03 ENCOUNTER — APPOINTMENT (OUTPATIENT)
Dept: GENERAL RADIOLOGY | Age: 60
End: 2025-07-03
Attending: SPECIALIST
Payer: MEDICARE

## 2025-07-03 ENCOUNTER — ANESTHESIA (OUTPATIENT)
Dept: OPERATING ROOM | Age: 60
End: 2025-07-03
Payer: MEDICARE

## 2025-07-03 VITALS
SYSTOLIC BLOOD PRESSURE: 140 MMHG | WEIGHT: 224 LBS | HEART RATE: 88 BPM | HEIGHT: 66 IN | OXYGEN SATURATION: 98 % | TEMPERATURE: 97.4 F | BODY MASS INDEX: 36 KG/M2 | DIASTOLIC BLOOD PRESSURE: 83 MMHG | RESPIRATION RATE: 18 BRPM

## 2025-07-03 DIAGNOSIS — G89.18 POST-OPERATIVE PAIN: Primary | ICD-10-CM

## 2025-07-03 LAB
COHGB MFR BLD: 2.3 % (ref 0.5–1.5)
GLUCOSE BLD-MCNC: 103 MG/DL (ref 74–99)
GLUCOSE BLD-MCNC: 111 MG/DL (ref 74–99)
HCO3 ARTERIAL: 22.4 MMOL/L (ref 21–28)
METHEMOGLOBIN: 0.2 % (ref 0.5–1.5)
NEGATIVE BASE EXCESS, ART: 2.8 MMOL/L (ref 0–3)
OXYHGB MFR BLD: 84 %
PCO2 ARTERIAL: 40.8 MMHG (ref 32–45)
PH ARTERIAL: 7.36 (ref 7.35–7.45)
PO2 ARTERIAL: 54.8 MMHG (ref 83–108)

## 2025-07-03 PROCEDURE — C1894 INTRO/SHEATH, NON-LASER: HCPCS | Performed by: SPECIALIST

## 2025-07-03 PROCEDURE — 76000 FLUOROSCOPY <1 HR PHYS/QHP: CPT

## 2025-07-03 PROCEDURE — 6360000002 HC RX W HCPCS: Performed by: SPECIALIST

## 2025-07-03 PROCEDURE — C1747 HC ENDOSCOPE, SINGLE, URINARY TRACT: HCPCS | Performed by: SPECIALIST

## 2025-07-03 PROCEDURE — 7100000001 HC PACU RECOVERY - ADDTL 15 MIN: Performed by: SPECIALIST

## 2025-07-03 PROCEDURE — 6360000002 HC RX W HCPCS: Performed by: NURSE ANESTHETIST, CERTIFIED REGISTERED

## 2025-07-03 PROCEDURE — 7100000000 HC PACU RECOVERY - FIRST 15 MIN: Performed by: SPECIALIST

## 2025-07-03 PROCEDURE — 6360000002 HC RX W HCPCS: Performed by: ANESTHESIOLOGY

## 2025-07-03 PROCEDURE — 3700000000 HC ANESTHESIA ATTENDED CARE: Performed by: SPECIALIST

## 2025-07-03 PROCEDURE — 3700000001 HC ADD 15 MINUTES (ANESTHESIA): Performed by: SPECIALIST

## 2025-07-03 PROCEDURE — 82962 GLUCOSE BLOOD TEST: CPT

## 2025-07-03 PROCEDURE — 3600000015 HC SURGERY LEVEL 5 ADDTL 15MIN: Performed by: SPECIALIST

## 2025-07-03 PROCEDURE — C1758 CATHETER, URETERAL: HCPCS | Performed by: SPECIALIST

## 2025-07-03 PROCEDURE — 3600000005 HC SURGERY LEVEL 5 BASE: Performed by: SPECIALIST

## 2025-07-03 PROCEDURE — 7100000010 HC PHASE II RECOVERY - FIRST 15 MIN: Performed by: SPECIALIST

## 2025-07-03 PROCEDURE — 6370000000 HC RX 637 (ALT 250 FOR IP): Performed by: ANESTHESIOLOGY

## 2025-07-03 PROCEDURE — 2720000010 HC SURG SUPPLY STERILE: Performed by: SPECIALIST

## 2025-07-03 PROCEDURE — 2709999900 HC NON-CHARGEABLE SUPPLY: Performed by: SPECIALIST

## 2025-07-03 PROCEDURE — 7100000011 HC PHASE II RECOVERY - ADDTL 15 MIN: Performed by: SPECIALIST

## 2025-07-03 PROCEDURE — 2580000003 HC RX 258: Performed by: SPECIALIST

## 2025-07-03 PROCEDURE — C1769 GUIDE WIRE: HCPCS | Performed by: SPECIALIST

## 2025-07-03 RX ORDER — SODIUM CHLORIDE 0.9 % (FLUSH) 0.9 %
5-40 SYRINGE (ML) INJECTION EVERY 12 HOURS SCHEDULED
Status: DISCONTINUED | OUTPATIENT
Start: 2025-07-03 | End: 2025-07-03 | Stop reason: HOSPADM

## 2025-07-03 RX ORDER — FENTANYL CITRATE 50 UG/ML
INJECTION, SOLUTION INTRAMUSCULAR; INTRAVENOUS
Status: DISCONTINUED | OUTPATIENT
Start: 2025-07-03 | End: 2025-07-03 | Stop reason: SDUPTHER

## 2025-07-03 RX ORDER — PROCHLORPERAZINE EDISYLATE 5 MG/ML
5 INJECTION INTRAMUSCULAR; INTRAVENOUS
Status: DISCONTINUED | OUTPATIENT
Start: 2025-07-03 | End: 2025-07-03 | Stop reason: HOSPADM

## 2025-07-03 RX ORDER — PROPOFOL 10 MG/ML
INJECTION, EMULSION INTRAVENOUS
Status: DISCONTINUED | OUTPATIENT
Start: 2025-07-03 | End: 2025-07-03 | Stop reason: SDUPTHER

## 2025-07-03 RX ORDER — CIPROFLOXACIN 500 MG/1
500 TABLET, FILM COATED ORAL 2 TIMES DAILY
Qty: 10 TABLET | Refills: 0 | Status: SHIPPED | OUTPATIENT
Start: 2025-07-03 | End: 2025-07-08

## 2025-07-03 RX ORDER — OXYCODONE HYDROCHLORIDE 5 MG/1
5 TABLET ORAL
Status: DISCONTINUED | OUTPATIENT
Start: 2025-07-03 | End: 2025-07-03 | Stop reason: HOSPADM

## 2025-07-03 RX ORDER — FENTANYL CITRATE 50 UG/ML
25 INJECTION, SOLUTION INTRAMUSCULAR; INTRAVENOUS EVERY 5 MIN PRN
Status: DISCONTINUED | OUTPATIENT
Start: 2025-07-03 | End: 2025-07-03 | Stop reason: HOSPADM

## 2025-07-03 RX ORDER — SODIUM CHLORIDE, SODIUM LACTATE, POTASSIUM CHLORIDE, CALCIUM CHLORIDE 600; 310; 30; 20 MG/100ML; MG/100ML; MG/100ML; MG/100ML
INJECTION, SOLUTION INTRAVENOUS CONTINUOUS
Status: DISCONTINUED | OUTPATIENT
Start: 2025-07-03 | End: 2025-07-03 | Stop reason: HOSPADM

## 2025-07-03 RX ORDER — CIPROFLOXACIN 2 MG/ML
400 INJECTION, SOLUTION INTRAVENOUS ONCE
Status: COMPLETED | OUTPATIENT
Start: 2025-07-03 | End: 2025-07-03

## 2025-07-03 RX ORDER — SCOPOLAMINE 1 MG/3D
1 PATCH, EXTENDED RELEASE TRANSDERMAL
Status: DISCONTINUED | OUTPATIENT
Start: 2025-07-03 | End: 2025-07-03 | Stop reason: HOSPADM

## 2025-07-03 RX ORDER — SODIUM CHLORIDE 0.9 % (FLUSH) 0.9 %
5-40 SYRINGE (ML) INJECTION PRN
Status: DISCONTINUED | OUTPATIENT
Start: 2025-07-03 | End: 2025-07-03 | Stop reason: HOSPADM

## 2025-07-03 RX ORDER — ACETAMINOPHEN 325 MG/1
650 TABLET ORAL ONCE AS NEEDED
Status: DISCONTINUED | OUTPATIENT
Start: 2025-07-03 | End: 2025-07-03 | Stop reason: HOSPADM

## 2025-07-03 RX ORDER — SODIUM CHLORIDE 9 MG/ML
INJECTION, SOLUTION INTRAVENOUS PRN
Status: DISCONTINUED | OUTPATIENT
Start: 2025-07-03 | End: 2025-07-03 | Stop reason: HOSPADM

## 2025-07-03 RX ORDER — DEXAMETHASONE SODIUM PHOSPHATE 4 MG/ML
INJECTION, SOLUTION INTRA-ARTICULAR; INTRALESIONAL; INTRAMUSCULAR; INTRAVENOUS; SOFT TISSUE
Status: DISCONTINUED | OUTPATIENT
Start: 2025-07-03 | End: 2025-07-03 | Stop reason: SDUPTHER

## 2025-07-03 RX ORDER — MIDAZOLAM HYDROCHLORIDE 2 MG/2ML
2 INJECTION, SOLUTION INTRAMUSCULAR; INTRAVENOUS
Status: DISCONTINUED | OUTPATIENT
Start: 2025-07-03 | End: 2025-07-03 | Stop reason: HOSPADM

## 2025-07-03 RX ORDER — LIDOCAINE HYDROCHLORIDE 20 MG/ML
INJECTION, SOLUTION INTRAVENOUS
Status: DISCONTINUED | OUTPATIENT
Start: 2025-07-03 | End: 2025-07-03 | Stop reason: SDUPTHER

## 2025-07-03 RX ORDER — DIPHENHYDRAMINE HYDROCHLORIDE 50 MG/ML
12.5 INJECTION, SOLUTION INTRAMUSCULAR; INTRAVENOUS
Status: DISCONTINUED | OUTPATIENT
Start: 2025-07-03 | End: 2025-07-03 | Stop reason: HOSPADM

## 2025-07-03 RX ORDER — ONDANSETRON 2 MG/ML
4 INJECTION INTRAMUSCULAR; INTRAVENOUS
Status: COMPLETED | OUTPATIENT
Start: 2025-07-03 | End: 2025-07-03

## 2025-07-03 RX ORDER — ONDANSETRON 2 MG/ML
INJECTION INTRAMUSCULAR; INTRAVENOUS
Status: DISCONTINUED | OUTPATIENT
Start: 2025-07-03 | End: 2025-07-03 | Stop reason: SDUPTHER

## 2025-07-03 RX ORDER — MIDAZOLAM HYDROCHLORIDE 1 MG/ML
INJECTION, SOLUTION INTRAMUSCULAR; INTRAVENOUS
Status: DISCONTINUED | OUTPATIENT
Start: 2025-07-03 | End: 2025-07-03 | Stop reason: SDUPTHER

## 2025-07-03 RX ORDER — HYDRALAZINE HYDROCHLORIDE 20 MG/ML
10 INJECTION INTRAMUSCULAR; INTRAVENOUS
Status: DISCONTINUED | OUTPATIENT
Start: 2025-07-03 | End: 2025-07-03 | Stop reason: HOSPADM

## 2025-07-03 RX ORDER — GLYCOPYRROLATE 0.2 MG/ML
INJECTION INTRAMUSCULAR; INTRAVENOUS
Status: DISCONTINUED | OUTPATIENT
Start: 2025-07-03 | End: 2025-07-03 | Stop reason: SDUPTHER

## 2025-07-03 RX ORDER — LABETALOL HYDROCHLORIDE 5 MG/ML
10 INJECTION, SOLUTION INTRAVENOUS
Status: DISCONTINUED | OUTPATIENT
Start: 2025-07-03 | End: 2025-07-03 | Stop reason: HOSPADM

## 2025-07-03 RX ORDER — HYDROCODONE BITARTRATE AND ACETAMINOPHEN 5; 325 MG/1; MG/1
1 TABLET ORAL EVERY 6 HOURS PRN
Qty: 12 TABLET | Refills: 0 | Status: SHIPPED | OUTPATIENT
Start: 2025-07-03 | End: 2025-07-06

## 2025-07-03 RX ADMIN — SODIUM CHLORIDE, POTASSIUM CHLORIDE, SODIUM LACTATE AND CALCIUM CHLORIDE: 600; 310; 30; 20 INJECTION, SOLUTION INTRAVENOUS at 11:17

## 2025-07-03 RX ADMIN — HYDROMORPHONE HYDROCHLORIDE 0.5 MG: 1 INJECTION, SOLUTION INTRAMUSCULAR; INTRAVENOUS; SUBCUTANEOUS at 12:34

## 2025-07-03 RX ADMIN — LIDOCAINE HYDROCHLORIDE 100 MG: 20 INJECTION, SOLUTION INTRAVENOUS at 11:46

## 2025-07-03 RX ADMIN — FENTANYL CITRATE 50 MCG: 50 INJECTION, SOLUTION INTRAMUSCULAR; INTRAVENOUS at 12:13

## 2025-07-03 RX ADMIN — ONDANSETRON 4 MG: 2 INJECTION INTRAMUSCULAR; INTRAVENOUS at 12:22

## 2025-07-03 RX ADMIN — GLYCOPYRROLATE 0.2 MG: 0.2 INJECTION INTRAMUSCULAR; INTRAVENOUS at 12:06

## 2025-07-03 RX ADMIN — ONDANSETRON 4 MG: 2 INJECTION INTRAMUSCULAR; INTRAVENOUS at 12:33

## 2025-07-03 RX ADMIN — FENTANYL CITRATE 50 MCG: 50 INJECTION, SOLUTION INTRAMUSCULAR; INTRAVENOUS at 12:22

## 2025-07-03 RX ADMIN — HYDROMORPHONE HYDROCHLORIDE 0.5 MG: 1 INJECTION, SOLUTION INTRAMUSCULAR; INTRAVENOUS; SUBCUTANEOUS at 12:56

## 2025-07-03 RX ADMIN — PHENYLEPHRINE HYDROCHLORIDE 100 MCG: 10 INJECTION INTRAVENOUS at 12:07

## 2025-07-03 RX ADMIN — PHENYLEPHRINE HYDROCHLORIDE 50 MCG: 10 INJECTION INTRAVENOUS at 12:04

## 2025-07-03 RX ADMIN — DEXAMETHASONE SODIUM PHOSPHATE 8 MG: 4 INJECTION, SOLUTION INTRAMUSCULAR; INTRAVENOUS at 11:52

## 2025-07-03 RX ADMIN — FENTANYL CITRATE 50 MCG: 50 INJECTION, SOLUTION INTRAMUSCULAR; INTRAVENOUS at 11:55

## 2025-07-03 RX ADMIN — FENTANYL CITRATE 50 MCG: 50 INJECTION, SOLUTION INTRAMUSCULAR; INTRAVENOUS at 11:49

## 2025-07-03 RX ADMIN — PROPOFOL 200 MG: 10 INJECTION, EMULSION INTRAVENOUS at 11:46

## 2025-07-03 RX ADMIN — CIPROFLOXACIN 400 MG: 2 INJECTION, SOLUTION INTRAVENOUS at 11:40

## 2025-07-03 RX ADMIN — MIDAZOLAM 2 MG: 1 INJECTION INTRAMUSCULAR; INTRAVENOUS at 11:40

## 2025-07-03 ASSESSMENT — PAIN SCALES - GENERAL
PAINLEVEL_OUTOF10: 7
PAINLEVEL_OUTOF10: 0
PAINLEVEL_OUTOF10: 7
PAINLEVEL_OUTOF10: 0

## 2025-07-03 ASSESSMENT — PAIN - FUNCTIONAL ASSESSMENT
PAIN_FUNCTIONAL_ASSESSMENT: PREVENTS OR INTERFERES SOME ACTIVE ACTIVITIES AND ADLS
PAIN_FUNCTIONAL_ASSESSMENT: 0-10
PAIN_FUNCTIONAL_ASSESSMENT: 0-10
PAIN_FUNCTIONAL_ASSESSMENT: PREVENTS OR INTERFERES SOME ACTIVE ACTIVITIES AND ADLS

## 2025-07-03 ASSESSMENT — PAIN DESCRIPTION - DESCRIPTORS
DESCRIPTORS: ACHING
DESCRIPTORS: ACHING

## 2025-07-03 ASSESSMENT — PAIN DESCRIPTION - LOCATION
LOCATION: FLANK
LOCATION: FLANK

## 2025-07-03 ASSESSMENT — PAIN DESCRIPTION - FREQUENCY
FREQUENCY: CONTINUOUS
FREQUENCY: CONTINUOUS

## 2025-07-03 ASSESSMENT — PAIN DESCRIPTION - PAIN TYPE
TYPE: SURGICAL PAIN
TYPE: SURGICAL PAIN

## 2025-07-03 ASSESSMENT — PAIN DESCRIPTION - ORIENTATION
ORIENTATION: RIGHT
ORIENTATION: RIGHT

## 2025-07-03 ASSESSMENT — LIFESTYLE VARIABLES: SMOKING_STATUS: 1

## 2025-07-03 ASSESSMENT — PAIN DESCRIPTION - ONSET
ONSET: ON-GOING
ONSET: ON-GOING

## 2025-07-03 NOTE — ANESTHESIA POSTPROCEDURE EVALUATION
Department of Anesthesiology  Postprocedure Note    Patient: Keisha Turner  MRN: [id number]  YOB: 1965  Date of evaluation: 7/3/2025    Procedure Summary       Date: 07/03/25 Room / Location: [address]    Anesthesia Start: 1141 Anesthesia Stop: 1231    Procedure: RIGHT CYSTOSCOPY URETEROSCOPY RETROGRADE PYELOGRAM STONE MANIPULATION WITH LASER LITHOTRIPSY POSSIBLE STENT PLACEMENT (Right: Ureter) Diagnosis:       Ureteral stone      (Ureteral stone [N20.1])    Surgeons: Rosibel Man MD Responsible Provider: Yogesh Natarajan MD    Anesthesia Type: general ASA Status: 3            Anesthesia Type: No value filed.    Khris Phase I: Khris Score: 10    Khris Phase II: Khris Score: 10    Anesthesia Post Evaluation    Patient location during evaluation: bedside  Patient participation: complete - patient participated  Level of consciousness: awake and alert  Airway patency: patent  Nausea & Vomiting: no nausea and no vomiting  Cardiovascular status: hemodynamically stable  Respiratory status: acceptable  Hydration status: euvolemic  Pain management: adequate        No notable events documented.

## 2025-07-03 NOTE — OP NOTE
Operative Note      Patient: Keisha Turner  YOB: 1965  MRN: [id number]    Date of Procedure: 7/3/2025    Pre-Op Diagnosis Codes:      * Ureteral stone [N20.1]    Post-Op Diagnosis: Same       Procedure(s):  RIGHT CYSTOSCOPY URETEROSCOPY RETROGRADE PYELOGRAM STONE MANIPULATION WITH LASER LITHOTRIPSY POSSIBLE STENT PLACEMENT    Surgeon(s):  Rosibel Man MD    Assistant:   * No surgical staff found *    Anesthesia: General    Estimated Blood Loss (mL): Minimal    Complications: None    Specimens:   * No specimens in log *    Implants:  * No implants in log *      Drains:   [REMOVED] Urinary Catheter 06/19/25 Mendoza (Removed)   Catheter Indications Need for fluid volume management of the critically ill patient in a critical care setting 06/23/25 0700   Site Assessment Pink 06/23/25 0400   Urine Color Yellow 06/23/25 0400   Urine Appearance Clear 06/23/25 0400   Urine Odor Malodorous 06/23/25 0400   Collection Container Standard 06/23/25 0400   Securement Method Securing device (Describe) 06/23/25 0400   Catheter Care  Soap and water 06/22/25 0433   Catheter Best Practices  Drainage tube clipped to bed;Catheter secured to thigh;Tamper seal intact;Bag below bladder;Bag not on floor;Lack of dependent loop in tubing 06/23/25 0400   Status Draining;Patent 06/23/25 0400   Output (mL) 1800 mL 06/23/25 1030       Findings:  Infection Present At Time Of Surgery (PATOS) (choose all levels that have infection present):  No infection present  Other Findings: 9mm right renal stone    Detailed Description of Procedure:   The is a 59-year-old female recently underwent a right stent placement secondary 9 mm striking stone she is here now for definitive treatment of her stone via the endoscopic approach.    Description of procedure: Patient defined holding area taken to procedure room placed in a dorsolithotomy position patient general region was prepped and draped sterile fashion.  21 Jordanian cystoscopy she was  introduced per urethra localization no findings of infection of bladder right ureteral stone was identified grasped and and room removed intact.  2 sensor wires a pass of the right ureter navigator ureteral access sheath was passed with the working wires into the right distal ureter left to view disposable flexible ureteroscope then passed with access sheath the ureter all the way to the kidney stone could be seen in the midpole calyx using a 365 µm José Luis laser fiber under renal settings for fragmentation dusting the stone was dusted into silt.  She had another right right lower pole stone however the stone was in a cavity either a diverticulum the infundibulum was very small smaller than the diameter of my ureteroscope for this reason we will leave the stone alone.  This point we elected not to replace the stent access sheath ureteroscope and wire removed patient taken cover room Toller procedure well.    Electronically signed by Rosibel Man MD on 7/3/2025 at 12:21 PM

## 2025-07-03 NOTE — PROGRESS NOTES
1309: Patient returns to [facility] following procedure, bedside report received from Kym RN, VSS, family at bedside, call light in reach, beverage of choice provided.   1325: VSS  1330: Discharge instructions reviewed with patient and mom Paola, both verbalized understanding.   1335: IV removed, Patient dressing, call light in reach  1338: Patient taken via wheelchair to DC to car w family.

## 2025-07-03 NOTE — ANESTHESIA PRE PROCEDURE
Department of Anesthesiology  Preprocedure Note       Name:  Keisha Turner   Age:  59 y.o.  :  1965                                          MRN:  [id number]         Date:  7/3/2025      Surgeon: Surgeon(s):  Rosibel Man MD    Procedure: Procedure(s):  RIGHT CYSTOSCOPY URETEROSCOPY RETROGRADE PYELOGRAM STONE MANIPULATION WITH LASER LITHOTRIPSY POSSIBLE STENT PLACEMENT    Medications prior to admission:   Prior to Admission medications    Medication Sig Start Date End Date Taking? Authorizing Provider   gabapentin (NEURONTIN) 400 MG tablet Take 0.5 tablets by mouth 3 times daily for 30 days. 25 Yes Emeli Pavon MD   oxyBUTYnin (DITROPAN-XL) 5 MG extended release tablet Take 1 tablet by mouth daily 25  Yes ProviderPhillip MD   bumetanide (BUMEX) 1 MG tablet Take 1 tablet by mouth daily 3/10/25  Yes Crystal Garner APRN - CNP   losartan (COZAAR) 25 MG tablet Take 1 tablet by mouth daily 25  Yes Crystal Garner APRN - CNP   atorvastatin (LIPITOR) 20 MG tablet Take 1 tablet by mouth daily 23 Dose decreased 23  Patient taking differently: Take 1 tablet by mouth daily 23  Yes Karthikeyan Ames MD   QVAR REDIHALER 40 MCG/ACT AERB inhaler Inhale 2 puffs into the lungs 2 times daily 3/31/23  Yes Provider, MD Phillip   albuterol sulfate HFA (PROVENTIL;VENTOLIN;PROAIR) 108 (90 Base) MCG/ACT inhaler Inhale 2 puffs into the lungs every 4-6 hours as needed for Wheezing   Yes Provider, Historical, MD   montelukast (SINGULAIR) 10 MG tablet Take 1 tablet by mouth nightly   Yes Provider, MD Phillip   sertraline (ZOLOFT) 25 MG tablet take 1 tablet by oral route every day 22  Yes Provider, MD Phillip   metFORMIN (GLUCOPHAGE) 1000 MG tablet Take 1 tablet by mouth 2 times daily (with meals) 22  Yes ProviderPhillip MD   omeprazole (PRILOSEC) 20 MG delayed release capsule take 1 capsule by oral route every day before a meal 22  Yes Provider 
  03/03/25 133/66   02/18/25 116/60       NPO Status:                                                                                 BMI:   Wt Readings from Last 3 Encounters:   06/25/25 101.9 kg (224 lb 10.4 oz)   03/03/25 105.8 kg (233 lb 4 oz)   02/18/25 105.3 kg (232 lb 3.2 oz)     Body mass index is 36.15 kg/m².    CBC:   Lab Results   Component Value Date/Time    WBC 4.7 06/24/2025 04:34 AM    RBC 3.68 06/24/2025 04:34 AM    HGB 9.6 06/24/2025 04:34 AM    HCT 30.2 06/24/2025 04:34 AM    MCV 82.1 06/24/2025 04:34 AM    RDW 16.7 06/24/2025 04:34 AM     06/24/2025 04:34 AM       CMP:   Lab Results   Component Value Date/Time     06/23/2025 04:59 AM    K 3.5 06/23/2025 03:45 PM     06/23/2025 04:59 AM    CO2 22 06/23/2025 04:59 AM    BUN 27 06/23/2025 04:59 AM    CREATININE 0.9 06/23/2025 04:59 AM    GFRAA >60 09/19/2022 09:59 AM    LABGLOM 62 06/23/2025 04:59 AM    LABGLOM 59 06/15/2023 12:51 PM    GLUCOSE 98 06/23/2025 04:59 AM    CALCIUM 9.1 06/23/2025 04:59 AM    BILITOT 0.6 06/23/2025 04:59 AM    ALKPHOS 128 06/23/2025 04:59 AM    AST 42 06/23/2025 04:59 AM    ALT 18 06/23/2025 04:59 AM       POC Tests: No results for input(s): \"POCGLU\", \"POCNA\", \"POCK\", \"POCCL\", \"POCBUN\", \"POCHEMO\", \"POCHCT\" in the last 72 hours.    Coags:   Lab Results   Component Value Date/Time    PROTIME 13.6 03/02/2025 05:00 AM    PROTIME 10.4 04/30/2012 06:23 AM    INR 1.0 03/02/2025 05:00 AM    APTT 24.4 07/16/2013 02:42 AM       HCG (If Applicable):   Lab Results   Component Value Date    PREGTESTUR NEGATIVE 03/20/2016        ABGs:   Lab Results   Component Value Date/Time    PHART 7.317 06/18/2025 10:18 PM    PO2ART 62.2 06/18/2025 10:18 PM    QTM9PIJ 40.4 06/18/2025 10:18 PM    VTM6CTH 20.2 06/18/2025 10:18 PM        Type & Screen (If Applicable):  No results found for: \"ABORH\", \"LABANTI\"    Drug/Infectious Status (If Applicable):  No results found for: \"HIV\", \"HEPCAB\"    COVID-19 Screening (If Applicable):

## 2025-07-03 NOTE — PROGRESS NOTES
1229 Patient arrives to PACU, placed on cardiac monitor, alarms on.  Patient arrives drowsy but easily arousable.  Respirations unlabored.  Patient with stone removal of R ureter; no stent placed.  1234 Medicated with 0.5mg Dilaudid for R flank pain and 4mg Zofran for nausea.  1240 Warm blankets placed on patient for comfort.  1242 Ice water given per request.  1253 Blood sugar 111.  1256 Medicated with 0.5mg Dilaudid for R flank pain.  Tolerating ice water.  Denies other needs.   1307 Patient leaving PACU with no acute pain and no changes in condition.

## 2025-07-03 NOTE — BRIEF OP NOTE
Brief Postoperative Note      Patient: Keisha Turner  YOB: 1965  MRN: [id number]    Date of Procedure: 7/3/2025    Pre-Op Diagnosis Codes:      * Ureteral stone [N20.1]    Post-Op Diagnosis: Same       Procedure(s):  RIGHT CYSTOSCOPY URETEROSCOPY RETROGRADE PYELOGRAM STONE MANIPULATION WITH LASER LITHOTRIPSY POSSIBLE STENT PLACEMENT    Surgeon(s):  Rosibel Man MD    Assistant:  * No surgical staff found *    Anesthesia: General    Estimated Blood Loss (mL): Minimal    Complications: None    Specimens:   * No specimens in log *    Implants:  * No implants in log *      Drains:   [REMOVED] Urinary Catheter 06/19/25 Mendoza (Removed)   Catheter Indications Need for fluid volume management of the critically ill patient in a critical care setting 06/23/25 0700   Site Assessment Pink 06/23/25 0400   Urine Color Yellow 06/23/25 0400   Urine Appearance Clear 06/23/25 0400   Urine Odor Malodorous 06/23/25 0400   Collection Container Standard 06/23/25 0400   Securement Method Securing device (Describe) 06/23/25 0400   Catheter Care  Soap and water 06/22/25 0433   Catheter Best Practices  Drainage tube clipped to bed;Catheter secured to thigh;Tamper seal intact;Bag below bladder;Bag not on floor;Lack of dependent loop in tubing 06/23/25 0400   Status Draining;Patent 06/23/25 0400   Output (mL) 1800 mL 06/23/25 1030       Findings:  Infection Present At Time Of Surgery (PATOS) (choose all levels that have infection present):  No infection present  Other Findings: 9mm right renal stone    Electronically signed by Rosibel Man MD on 7/3/2025 at 12:19 PM

## 2025-07-07 ENCOUNTER — TELEPHONE (OUTPATIENT)
Dept: INFECTIOUS DISEASES | Age: 60
End: 2025-07-07

## 2025-07-07 NOTE — TELEPHONE ENCOUNTER
Called from [facility] states pt is finished with ABX 7/2/25.  Can the PICC line be pulled?  Advised to maintain the line until they hear back.  Please advise.

## 2025-07-14 ENCOUNTER — TRANSCRIBE ORDERS (OUTPATIENT)
Dept: ADMINISTRATIVE | Age: 60
End: 2025-07-14

## 2025-07-14 DIAGNOSIS — N28.89 URETERAL FISTULA: ICD-10-CM

## 2025-07-14 DIAGNOSIS — N20.0 KIDNEY STONE: Primary | ICD-10-CM

## 2025-08-04 ENCOUNTER — HOSPITAL ENCOUNTER (OUTPATIENT)
Dept: ULTRASOUND IMAGING | Age: 60
Discharge: HOME | End: 2025-08-04
Attending: SPECIALIST
Payer: MEDICARE

## 2025-08-04 DIAGNOSIS — N28.89 URETERAL FISTULA: ICD-10-CM

## 2025-08-04 DIAGNOSIS — N20.0 KIDNEY STONE: ICD-10-CM

## 2025-08-04 PROCEDURE — 76775 US EXAM ABDO BACK WALL LIM: CPT

## 2025-08-14 ENCOUNTER — TRANSCRIBE ORDERS (OUTPATIENT)
Dept: ADMINISTRATIVE | Age: 60
End: 2025-08-14

## 2025-08-14 ENCOUNTER — HOSPITAL ENCOUNTER (OUTPATIENT)
Dept: MAMMOGRAPHY | Age: 60
Discharge: HOME | End: 2025-08-14
Payer: MEDICARE

## 2025-08-14 DIAGNOSIS — N20.0 URIC ACID NEPHROLITHIASIS: Primary | ICD-10-CM

## 2025-08-14 DIAGNOSIS — Z12.31 BREAST CANCER SCREENING BY MAMMOGRAM: ICD-10-CM

## 2025-08-14 DIAGNOSIS — N39.0 URINARY TRACT INFECTION WITHOUT HEMATURIA, SITE UNSPECIFIED: ICD-10-CM

## 2025-08-14 PROCEDURE — 77063 BREAST TOMOSYNTHESIS BI: CPT

## 2025-09-02 RX ORDER — LOSARTAN POTASSIUM 25 MG/1
25 TABLET ORAL DAILY
Qty: 30 TABLET | Refills: 5 | Status: SHIPPED | OUTPATIENT
Start: 2025-09-02

## (undated) DEVICE — CATHETER URET 5FR L70CM POLYUR CONE FLX TIP KINK RESIST W/

## (undated) DEVICE — SINGLE PORT MANIFOLD: Brand: NEPTUNE 2

## (undated) DEVICE — SUTURE ETHBND SZ 1 L30IN NONABSORBABLE GRN OS-6 L36MM 1/2 X538H

## (undated) DEVICE — DBD-PACK,CYSTOSCOPY,PK VI,AURORA: Brand: MEDLINE

## (undated) DEVICE — Device

## (undated) DEVICE — TOWEL,OR,DSP,ST,BLUE,STD,6/PK,12PK/CS: Brand: MEDLINE

## (undated) DEVICE — SOLUTION PREP POVIDONE IOD FOR SKIN MUCOUS MEM PRIOR TO

## (undated) DEVICE — GLOVE ORANGE PI 8   MSG9080

## (undated) DEVICE — GLOVE SURG SZ 75 CRM LTX FREE POLYISOPRENE POLYMER BEAD ANTI

## (undated) DEVICE — GOWN,SIRUS,POLYRNF,BRTHSLV,XLN/XL,20/CS: Brand: MEDLINE

## (undated) DEVICE — Z INACTIVE USE 2635503 SOLUTION IRRIG 3000ML ST H2O USP UROMATIC PLAS CONT

## (undated) DEVICE — LINER SUCT CANSTR 1500CC SEMI RIG W/ POR HYDROPHOBIC SHUT

## (undated) DEVICE — CONE TIP URETERAL CATHETER: Brand: URETERAL CATHETER

## (undated) DEVICE — PIN BNE FIX TEMP L140MM DIA4MM MAKO

## (undated) DEVICE — MARKER SURG SKIN UTIL REGULAR/FINE 2 TIP W/ RUL AND 9 LBL

## (undated) DEVICE — KIT INT FIX FEM TIB CKPT MAKOPLASTY

## (undated) DEVICE — CATHETER URET 5FR L70CM TIP 8FR OPN END CONE TIP INJ HUB

## (undated) DEVICE — TOWEL OR BLUEE 16X26IN ST 8 PACK ORB08 16X26ORTWL

## (undated) DEVICE — SYSTEM SKIN CLSR 22CM DERMBND PRINEO

## (undated) DEVICE — SYRINGE 20ML LL S/C 50

## (undated) DEVICE — TUBING, SUCTION, 9/32" X 20', STRAIGHT: Brand: MEDLINE INDUSTRIES, INC.

## (undated) DEVICE — Z INACTIVE USE 2660663 SOLUTION IRRIG 1000ML STRIL H2O USP PLAS POUR BTL

## (undated) DEVICE — SYRINGE IRRIG 60ML SFT PLIABLE BLB EZ TO GRP 1 HND USE W/

## (undated) DEVICE — STERILE POLYISOPRENE POWDER-FREE SURGICAL GLOVES: Brand: PROTEXIS

## (undated) DEVICE — DRAINBAG,ANTI-REFLUX TOWER,L/F,2000ML,LL: Brand: MEDLINE

## (undated) DEVICE — SYRINGE MED 20ML STD CLR PLAS LUERLOCK TIP N CTRL DISP

## (undated) DEVICE — Device: Brand: POWER-FLO®

## (undated) DEVICE — SPONGE LAP W18XL18IN WHT COT 4 PLY FLD STRUNG RADPQ DISP ST

## (undated) DEVICE — GOWN SURG L L43IN BLU REINF VELC AND TIE LEV 4 IMPERV CRIT

## (undated) DEVICE — GLOVE SURG SZ 65 L12IN FNGR THK79MIL GRN LTX FREE

## (undated) DEVICE — PACK,CYSTOSCOPY,PK I,AURORA: Brand: MEDLINE

## (undated) DEVICE — COUNTER NDL 30 COUNT FOAM STRP SGL MAG

## (undated) DEVICE — GUIDEWIRE UROLOGICAL STR 3 CM 0.038 INX150 CM NIT SENSOR

## (undated) DEVICE — 1LYRTR 16FR10ML 100%SILI SNAP: Brand: MEDLINE INDUSTRIES, INC.

## (undated) DEVICE — BASKET STONE RETRV L120CM DIA12MM SHTH 1.9FR NIT POLYAMIDE

## (undated) DEVICE — JELLY,LUBE,STERILE,FLIP TOP,TUBE,2-OZ: Brand: MEDLINE

## (undated) DEVICE — TRAY PREP DRY W/ PREM GLV 2 APPL 6 SPNG 2 UNDPD 1 OVERWRAP

## (undated) DEVICE — TUBING, SUCTION, 9/32" X 10', STRAIGHT: Brand: MEDLINE

## (undated) DEVICE — GOWN,SIRUS,FABRNF,XL,20/CS: Brand: MEDLINE

## (undated) DEVICE — SOLUTION IV IRRIG WATER 1000ML POUR BRL 2F7114

## (undated) DEVICE — SOLUTION IV IRRIG POUR BRL 0.9% SODIUM CHL 2F7124

## (undated) DEVICE — NITINOL STONE RETRIEVAL BASKET: Brand: ZERO TIP

## (undated) DEVICE — PADDING CAST W6INXL4YD COT LO LINTING WYTEX

## (undated) DEVICE — NEEDLE HYPO 25GA L1.5IN BLU POLYPR HUB S STL REG BVL STR

## (undated) DEVICE — YANKAUER,FLEXIBLE HANDLE,REGLR CAPACITY: Brand: MEDLINE INDUSTRIES, INC.

## (undated) DEVICE — GLOVE ORANGE PI 7   MSG9070

## (undated) DEVICE — PAD CLD R HIP REG HOSE NONSTERILE

## (undated) DEVICE — Z DISCONTINUED NO SUB IDED TRAY PREP DRY W/ PREM GLV 2 APPL 6 SPNG 2 UNDPD 1 OVERWRAP

## (undated) DEVICE — URETEROSCOPE DGT FLX SHTH GLOB STD MOB CART CMSO IMAGER

## (undated) DEVICE — UROLOGIC DRAIN BAG: Brand: UNBRANDED

## (undated) DEVICE — BANDAGE COMPR W4INXL5YD WHT BGE POLY COT M E WRP WV HK AND

## (undated) DEVICE — SET IRRIG L94IN ID0.281IN W/ 4.5IN DST FLX CONN 2 LD ON OFF

## (undated) DEVICE — CONTAINER,SPECIMEN,OR STERILE,4OZ: Brand: MEDLINE

## (undated) DEVICE — TUBING, SUCTION, 1/4" X 10', STRAIGHT: Brand: MEDLINE

## (undated) DEVICE — PREMIUM WET SKIN PREP TRAY: Brand: MEDLINE INDUSTRIES, INC.

## (undated) DEVICE — SYRINGE MED 10ML LUERLOCK TIP W/O SFTY DISP

## (undated) DEVICE — CONVERTORS STOCKINETTE: Brand: CONVERTORS

## (undated) DEVICE — T4 HOOD

## (undated) DEVICE — CATHETER,FOLEY,100%SILICONE,16FR,10ML,LF: Brand: MEDLINE

## (undated) DEVICE — GOWN,ECLIPSE,POLYRNF,BRTHSLV,L,30/CS: Brand: MEDLINE

## (undated) DEVICE — SUTURE STRATAFIX SPRL SZ 1 L14IN ABSRB VLT L48CM CTX 1/2 SXPD2B405

## (undated) DEVICE — GUIDEWIRE ENDOSCP L150CM DIA0.035IN TIP 3CM PTFE NIT

## (undated) DEVICE — GLOVE SURG SZ 8 CRM LTX FREE POLYISOPRENE POLYMER BEAD ANTI

## (undated) DEVICE — TUBING SCTION CONN 9/32X10 RIB

## (undated) DEVICE — SUTURE VCRL SZ 2-0 L18IN ABSRB UD CT-1 L36MM 1/2 CIR J839D

## (undated) DEVICE — GLOVE SURG SZ 65 THK91MIL LTX FREE SYN POLYISOPRENE

## (undated) DEVICE — STERILE LATEX POWDER-FREE SURGICAL GLOVESWITH NITRILE COATING: Brand: PROTEXIS

## (undated) DEVICE — KIT DRP FOR RIO ROBOTIC ARM ASST SYS

## (undated) DEVICE — FIBER LASER 200UM 2J 80HZ 60W D F L FOR LITHO MOSES

## (undated) DEVICE — 3M™ STERI-DRAPE™ U-DRAPE 1015: Brand: STERI-DRAPE™

## (undated) DEVICE — GLOVE ORANGE PI 7 1/2   MSG9075

## (undated) DEVICE — MAT ABSRB W28XL48IN C6L FLR ULT W POLY BK

## (undated) DEVICE — FIBER LASER 365UM 6J 80HZ 120W D F L FOR LITHO MOSES

## (undated) DEVICE — GLOVE SURG SZ 7 CRM LTX FREE POLYISOPRENE POLYMER BEAD ANTI

## (undated) DEVICE — MANIFOLD SUCT SMK EVAC SGL PRT DISP NEPTUNE 2

## (undated) DEVICE — ANESTHESIA CIRCUIT ADULT-LF: Brand: MEDLINE INDUSTRIES, INC.

## (undated) DEVICE — BAG DRNGE W 8MM ADPT AND SUCT HOSE CYSTO UROLOGICAL FOR

## (undated) DEVICE — ALCOHOL RUBBING ISO 16OZ 70%

## (undated) DEVICE — OPEN-END FLEXI-TIP URETERAL CATHETER: Brand: FLEXI-TIP

## (undated) DEVICE — BOOT POS LEG DEMAYO

## (undated) DEVICE — PACK,BASIC,IX: Brand: MEDLINE

## (undated) DEVICE — MAT FLOOR ULTRA ABS 28X48IN

## (undated) DEVICE — CURITY NON-ADHERENT STRIPS: Brand: CURITY

## (undated) DEVICE — SINGLE-USE DIGITAL FLEXIBLE URETEROSCOPE: Brand: LITHOVUE

## (undated) DEVICE — BANDAGE,GAUZE,BULKEE II,4.5"X4.1YD,STRL: Brand: MEDLINE

## (undated) DEVICE — SEAL ENDOSCP INSTR 7FR BX SELF SEAL

## (undated) DEVICE — SUTURE STARTAFIX 3-0 PS-1 30CM

## (undated) DEVICE — PENCIL ES CRD L10FT HND SWCHING ROCK SWCH W/ EDGE COAT BLDE

## (undated) DEVICE — KIT TRK KNEE PROC VIZADISC

## (undated) DEVICE — YANKAUER,BULB TIP,W/O VENT,RIGID,STERILE: Brand: MEDLINE

## (undated) DEVICE — BANDAGE,ELASTIC,ESMARK,STERILE,4"X9',LF: Brand: MEDLINE

## (undated) DEVICE — GUIDEWIRE URO L150CM DIA0.038IN STD NIT HYDRPHLC STR TIP

## (undated) DEVICE — BLADE SURG NO15 C STL SHRP PREM

## (undated) DEVICE — CORD RETRCT SIL

## (undated) DEVICE — BANDAGE,SELF ADHRNT,COFLEX,4"X5YD,STRL: Brand: COLABEL

## (undated) DEVICE — CATHETER FOL 2 W 16 FR 5 CC URETH COUNCL TIP SIL LUBRI-SIL

## (undated) DEVICE — PADDING UNDERCAST W4INXL4YD 100% COT CRIMPED FINISH WBRL II

## (undated) DEVICE — ELECTRODE ES AD CRDLSS PT RET REM POLYHESIVE

## (undated) DEVICE — ZIMMER® STERILE DISPOSABLE TOURNIQUET CUFF WITH PLC, DUAL PORT, SINGLE BLADDER, 34 IN. (86 CM)

## (undated) DEVICE — TRAY EPI 25GA L3.5IN CONTAIN BPA DEHP PVC PENCAN

## (undated) DEVICE — BAG URIN DRNGE 2000ML SLDE TAP LUERLOCK PRT ANTI REFLX TWR

## (undated) DEVICE — SUTURE STRATAFIX SYMMETRIC SZ 1 L18IN ABSRB VLT CT1 L36CM SXPP1A404

## (undated) DEVICE — HOOD, PEEL-AWAY: Brand: FLYTE

## (undated) DEVICE — SUTURE VCRL SZ 3-0 L27IN ABSRB UD L26MM SH 1/2 CIR J416H

## (undated) DEVICE — URETERAL ACCESS SHEATH SET: Brand: NAVIGATOR HD

## (undated) DEVICE — DUAL CUT SAGITTAL BLADE

## (undated) DEVICE — BLADE SURG SAW STD S STL OSC W/ SERR EDGE DISP

## (undated) DEVICE — SET, IRRIGATION CYSTO, Y-TYPE, 81": Brand: MEDLINE

## (undated) DEVICE — ECTRA II PROCEDURE KIT,                                    SINGLE-USE, DISPOSABLE. CONTENTS                                    PROBE KNIFE, RETROGRADE KNIFE,                                    TRIANGLE KNIFE, HAND PAD, SWABS: Brand: ECTRA

## (undated) DEVICE — Z INACTIVE USE 2660664 SOLUTION IRRIG 3000ML 0.9% SOD CHL USP UROMATIC PLAS CONT

## (undated) DEVICE — 4-PORT MANIFOLD: Brand: NEPTUNE 2

## (undated) DEVICE — SURGICAL PROCEDURE PACK TOT KNEE LF

## (undated) DEVICE — CHLORAPREP 26ML ORANGE

## (undated) DEVICE — DRAPE,EXTREMITY,89X128,STERILE: Brand: MEDLINE

## (undated) DEVICE — GLOVE SURG SZ 65 CRM LTX FREE POLYISOPRENE POLYMER BEAD ANTI

## (undated) DEVICE — COVER,TABLE,44X90,STERILE: Brand: MEDLINE